# Patient Record
Sex: MALE | Race: WHITE | Employment: UNEMPLOYED | URBAN - METROPOLITAN AREA
[De-identification: names, ages, dates, MRNs, and addresses within clinical notes are randomized per-mention and may not be internally consistent; named-entity substitution may affect disease eponyms.]

---

## 2017-01-26 ENCOUNTER — OFFICE VISIT (OUTPATIENT)
Dept: ORTHOPEDIC SURGERY | Age: 52
End: 2017-01-26

## 2017-01-26 VITALS
RESPIRATION RATE: 18 BRPM | OXYGEN SATURATION: 97 % | HEIGHT: 70 IN | DIASTOLIC BLOOD PRESSURE: 63 MMHG | SYSTOLIC BLOOD PRESSURE: 110 MMHG | HEART RATE: 66 BPM | BODY MASS INDEX: 26.14 KG/M2 | TEMPERATURE: 98.2 F | WEIGHT: 182.6 LBS

## 2017-01-26 DIAGNOSIS — M96.1 LUMBAR POSTLAMINECTOMY SYNDROME: Primary | ICD-10-CM

## 2017-01-26 DIAGNOSIS — M54.16 RADICULOPATHY, LUMBAR REGION: ICD-10-CM

## 2017-01-26 DIAGNOSIS — M46.1 SACROILIITIS, NOT ELSEWHERE CLASSIFIED (HCC): ICD-10-CM

## 2017-01-26 RX ORDER — ZINC GLUCONATE 50 MG
50 TABLET ORAL
COMMUNITY
End: 2017-09-05

## 2017-01-26 RX ORDER — ASPIRIN 325 MG
1000 TABLET, DELAYED RELEASE (ENTERIC COATED) ORAL
COMMUNITY
End: 2017-09-05

## 2017-01-26 RX ORDER — ASCORBIC ACID 500 MG
500 TABLET ORAL
COMMUNITY
End: 2017-09-05
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
## 2017-01-30 ENCOUNTER — HOSPITAL ENCOUNTER (OUTPATIENT)
Dept: LAB | Age: 52
Discharge: HOME | End: 2017-01-30
Payer: MEDICARE

## 2017-01-30 DIAGNOSIS — Z13.9 SCREENING: Primary | ICD-10-CM

## 2017-01-30 DIAGNOSIS — Z13.9 SCREENING: ICD-10-CM

## 2017-01-30 LAB
ALBUMIN SERPL BCP-MCNC: 3.9 G/DL (ref 3.4–5)
ALBUMIN/GLOB SERPL: 1.4 {RATIO} (ref 0.8–1.7)
ALP SERPL-CCNC: 73 U/L (ref 45–117)
ALT SERPL-CCNC: 20 U/L (ref 16–61)
ANION GAP BLD CALC-SCNC: 6 MMOL/L (ref 3–18)
AST SERPL W P-5'-P-CCNC: 9 U/L (ref 15–37)
BILIRUB SERPL-MCNC: 0.3 MG/DL (ref 0.2–1)
BUN SERPL-MCNC: 20 MG/DL (ref 7–18)
BUN/CREAT SERPL: 23 (ref 12–20)
CALCIUM SERPL-MCNC: 8.6 MG/DL (ref 8.5–10.1)
CHLORIDE SERPL-SCNC: 108 MMOL/L (ref 100–108)
CHOLEST SERPL-MCNC: 146 MG/DL
CO2 SERPL-SCNC: 28 MMOL/L (ref 21–32)
CREAT SERPL-MCNC: 0.88 MG/DL (ref 0.6–1.3)
GLOBULIN SER CALC-MCNC: 2.7 G/DL (ref 2–4)
GLUCOSE SERPL-MCNC: 105 MG/DL (ref 74–99)
HDLC SERPL-MCNC: 40 MG/DL (ref 40–60)
HDLC SERPL: 3.7 {RATIO} (ref 0–5)
LDLC SERPL CALC-MCNC: 90.4 MG/DL (ref 0–100)
LIPID PROFILE,FLP: NORMAL
POTASSIUM SERPL-SCNC: 4.2 MMOL/L (ref 3.5–5.5)
PROT SERPL-MCNC: 6.6 G/DL (ref 6.4–8.2)
PSA SERPL-MCNC: 1.8 NG/ML (ref 0–4)
SODIUM SERPL-SCNC: 142 MMOL/L (ref 136–145)
TRIGL SERPL-MCNC: 78 MG/DL (ref ?–150)
TSH SERPL DL<=0.05 MIU/L-ACNC: 1.66 UIU/ML (ref 0.36–3.74)
VLDLC SERPL CALC-MCNC: 15.6 MG/DL

## 2017-01-30 PROCEDURE — 36415 COLL VENOUS BLD VENIPUNCTURE: CPT | Performed by: FAMILY MEDICINE

## 2017-01-30 PROCEDURE — 84153 ASSAY OF PSA TOTAL: CPT | Performed by: FAMILY MEDICINE

## 2017-01-30 PROCEDURE — 80061 LIPID PANEL: CPT | Performed by: FAMILY MEDICINE

## 2017-01-30 PROCEDURE — 84443 ASSAY THYROID STIM HORMONE: CPT | Performed by: FAMILY MEDICINE

## 2017-01-30 PROCEDURE — 80053 COMPREHEN METABOLIC PANEL: CPT | Performed by: FAMILY MEDICINE

## 2017-02-02 ENCOUNTER — OFFICE VISIT (OUTPATIENT)
Dept: FAMILY MEDICINE CLINIC | Age: 52
End: 2017-02-02

## 2017-02-02 VITALS
WEIGHT: 177 LBS | HEART RATE: 73 BPM | BODY MASS INDEX: 25.34 KG/M2 | SYSTOLIC BLOOD PRESSURE: 97 MMHG | TEMPERATURE: 97.6 F | HEIGHT: 70 IN | OXYGEN SATURATION: 97 % | DIASTOLIC BLOOD PRESSURE: 67 MMHG | RESPIRATION RATE: 20 BRPM

## 2017-02-02 DIAGNOSIS — M51.36 DEGENERATIVE DISC DISEASE, LUMBAR: ICD-10-CM

## 2017-02-02 DIAGNOSIS — K21.00 GASTROESOPHAGEAL REFLUX DISEASE WITH ESOPHAGITIS: ICD-10-CM

## 2017-02-02 DIAGNOSIS — R91.1 NODULE OF RIGHT LUNG: ICD-10-CM

## 2017-02-02 DIAGNOSIS — Z23 ENCOUNTER FOR IMMUNIZATION: ICD-10-CM

## 2017-02-02 DIAGNOSIS — J45.40 MODERATE PERSISTENT ASTHMA WITHOUT COMPLICATION: Primary | ICD-10-CM

## 2017-02-02 DIAGNOSIS — Z86.73 HISTORY OF CVA (CEREBROVASCULAR ACCIDENT): ICD-10-CM

## 2017-02-02 RX ORDER — PANTOPRAZOLE SODIUM 40 MG/1
40 TABLET, DELAYED RELEASE ORAL DAILY
Qty: 30 TAB | Refills: 2 | Status: SHIPPED | OUTPATIENT
Start: 2017-02-02 | End: 2017-05-02 | Stop reason: ALTCHOICE

## 2017-02-02 RX ORDER — CYCLOBENZAPRINE HCL 10 MG
10 TABLET ORAL
Qty: 30 TAB | Refills: 0 | Status: SHIPPED | OUTPATIENT
Start: 2017-02-02 | End: 2017-05-02 | Stop reason: SDUPTHER

## 2017-02-02 RX ORDER — PREDNISONE 20 MG/1
60 TABLET ORAL
Qty: 9 TAB | Refills: 0 | Status: SHIPPED | OUTPATIENT
Start: 2017-02-02 | End: 2017-02-05

## 2017-02-02 RX ORDER — BUDESONIDE AND FORMOTEROL FUMARATE DIHYDRATE 160; 4.5 UG/1; UG/1
1 AEROSOL RESPIRATORY (INHALATION) 2 TIMES DAILY
Qty: 1 INHALER | Refills: 3 | Status: SHIPPED | OUTPATIENT
Start: 2017-02-02 | End: 2017-05-02 | Stop reason: SDUPTHER

## 2017-02-02 RX ORDER — ALBUTEROL SULFATE 90 UG/1
2 AEROSOL, METERED RESPIRATORY (INHALATION)
Qty: 1 INHALER | Refills: 11 | Status: SHIPPED | OUTPATIENT
Start: 2017-02-02 | End: 2018-01-02 | Stop reason: SDUPTHER

## 2017-02-02 RX ORDER — RANITIDINE 150 MG/1
150 TABLET, FILM COATED ORAL
Qty: 30 TAB | Refills: 2 | Status: SHIPPED | OUTPATIENT
Start: 2017-02-02 | End: 2017-05-02 | Stop reason: SDUPTHER

## 2017-02-02 RX ORDER — CODEINE PHOSPHATE AND GUAIFENESIN 10; 100 MG/5ML; MG/5ML
5 SOLUTION ORAL
Qty: 180 ML | Refills: 0 | Status: SHIPPED | OUTPATIENT
Start: 2017-02-02 | End: 2017-05-02 | Stop reason: ALTCHOICE

## 2017-02-02 NOTE — PROGRESS NOTES
HISTORY OF PRESENT ILLNESS  Elinor Tierney is a 46 y.o. male. 2/2/2017  9:00 AM    Chief Complaint   Patient presents with    Follow Up Chronic Condition     pt here for follow up chronic condition DDD of Lumbar       HPI: Here today for follow up chronic disease. Last labs recently done. Following with GI, orthopedics, and spine center. Asthma: Has been using Albuterol inhaler PRN; however, feels that he has been uncontrolled the last few months. Reports using his Albuterol inhaler on a daily basis and waking up with wheezing. Does have a cough, nonproductive at this time. No fevers or chills. Right lung nodule noted 10/2015- no follow up done. DDD lumbar: Has been taking Gabapentin TID as prescribed- finds that this helps with chronic pain. Has had several back surgeries done in the past. Follows with spine center. Recent flare ups with back pain has caused him to require visits with spine center more frequently. Requesting refill for Flexeril PRN. CVA history: History in 2010- has been on ASA since. No residual. No complaints. Does not follow with neurology. GERD with esophagitis: Had been following with GI due to abdominal pain with N/V that was occurring a few months ago. EGD done 10/19/2016. Grade 1 esophagitis- mild distal esophagitis- PPI and H2 blocker. No new symptoms and no current complaints. Regular BMs without blood. History diverticulitis. Has not follow up with GI since having EGD done. Review of Systems   Constitutional: Negative for chills, fever and malaise/fatigue. Eyes: Negative for double vision, photophobia and pain. Respiratory: Positive for cough, shortness of breath and wheezing. Negative for sputum production. Cardiovascular: Negative for chest pain, palpitations and leg swelling. Gastrointestinal: Negative for abdominal pain, constipation, diarrhea, nausea and vomiting. Genitourinary: Negative for dysuria, frequency and urgency.    Musculoskeletal: Negative for back pain, joint pain and myalgias. Neurological: Negative for dizziness, weakness and headaches. PHQ Screening   PHQ 2 / 9, over the last two weeks 2/2/2017   Little interest or pleasure in doing things Not at all   Feeling down, depressed or hopeless Not at all   Total Score PHQ 2 0         History  Past Medical History   Diagnosis Date    Asthma     DDD (degenerative disc disease), lumbar 2013     noted on MRI with annular tears    Diverticulitis 2016    GERD (gastroesophageal reflux disease) 2016     with esophagitis according to endscopy    Kidney stone     Lumbar post-laminectomy syndrome     Sacroiliitis ([address]     Stroke [facility]) may 14 2010    Unspecified rotator cuff tear or rupture of left shoulder, not specified as traumatic 03/2016       Past Surgical History   Procedure Laterality Date    Hx orthopaedic  03/2016     left shoulder    Hx back surgery  2002, 2004, 2006     L5-S1 fusion, laminectomies    Hx mohs procedure       right    Hx knee arthroscopy Right      knee    Colonoscopy N/A 8/12/2016     COLONOSCOPY with polypectomy [facility] by [facility]lio Coburn [facility] at [facility] Hx endoscopy  10/2016     grade 1 espohagitis    Hx endoscopy  11/2016     mild esophagitis       Social History     Social History    Marital status: SINGLE     Spouse name: N/A    Number of children: N/A    Years of education: N/A     Occupational History    Not on file.      Social History Main Topics    Smoking status: Former Smoker     Packs/day: 2.00     Types: Cigarettes    Smokeless tobacco: Never Used    Alcohol use No    Drug use: No    Sexual activity: Yes     Partners: Female     Other Topics Concern    Not on file     Social History Narrative    ** Merged History Encounter **            Family History   Problem Relation Age of Onset    Other Brother     Cancer Maternal Grandmother        Allergies   Allergen Reactions    Penicillins Angioedema    Penicillins Anaphylaxis    Vicodin [Hydrocodone-Acetaminophen] Nausea and Vomiting    Azithromycin Nausea and Vomiting    Vicodin [Hydrocodone-Acetaminophen] Nausea and Vomiting    Erythromycin Other (comments)       Current Outpatient Prescriptions   Medication Sig Dispense Refill    pantoprazole (PROTONIX) 40 mg tablet Take 1 Tab by mouth daily. 30 Tab 1    raNITIdine (ZANTAC) 150 mg tablet Take 1 Tab by mouth nightly. 30 Tab 1    albuterol (PROVENTIL HFA, VENTOLIN HFA, PROAIR HFA) 90 mcg/actuation inhaler Take 2 Puffs by inhalation every six (6) hours as needed for Wheezing. 1 Inhaler 2    gabapentin (NEURONTIN) 800 mg tablet Take 1 Tab by mouth three (3) times daily. 90 Tab 5    aspirin 81 mg chewable tablet Take 81 mg by mouth daily.  zinc gluconate 50 mg tablet 50 mg.      omega 3-dha-epa-fish oil (FISH OIL) 60- mg cap 1,000 mg.      ascorbic acid, vitamin C, (VITAMIN C) 500 mg tablet 500 mg.  cyclobenzaprine (FLEXERIL) 10 mg tablet Take 1 Tab by mouth three (3) times daily as needed for Muscle Spasm(s). 30 Tab 0    oxyCODONE-acetaminophen (PERCOCET) 5-325 mg per tablet Take 1 tab BID-TID PRN Pain 30 Tab 0    ondansetron (ZOFRAN ODT) 4 mg disintegrating tablet Take 1 Tab by mouth every eight (8) hours as needed for Nausea. 20 Tab 0       Health Maintenance and Screenings  *Medicare wellness done 11/2016      Advance Care Planning:   Patient was offered the opportunity to discuss advance care planning NO   Does patient have an Advance Directive:  NO   If no, did you provide information on [facility]ctions? Patient Care Team:  Patient Care Team:  Taiwo Will NP as PCP - General (Nurse Practitioner)  Mi Robledo MD (Orthopedic Surgery)  Anuja Dean MD (Physiatry)  Lilibeth Solomon MD (Gastroenterology)        LABS:     Ref.  Range 1/30/2017 09:04   Sodium Latest Ref Range: 136 - 145 mmol/L 142   Potassium Latest Ref Range: 3.5 - 5.5 mmol/L 4.2   Chloride Latest Ref Range: 100 - 108 mmol/L 108   CO2 Latest Ref Range: 21 - 32 mmol/L 28   Anion gap Latest Ref Range: 3.0 - 18 mmol/L 6   Glucose Latest Ref Range: 74 - 99 mg/dL 105 (H)   BUN Latest Ref Range: 7.0 - 18 MG/DL 20 (H)   Creatinine Latest Ref Range: 0.6 - 1.3 MG/DL 0.88   BUN/Creatinine ratio Latest Ref Range: 12 - 20   23 (H)   Calcium Latest Ref Range: 8.5 - 10.1 MG/DL 8.6   GFR est non-AA Latest Ref Range: >60 ml/min/1.73m2 >60   GFR est AA Latest Ref Range: >60 ml/min/1.73m2 >60   Bilirubin, total Latest Ref Range: 0.2 - 1.0 MG/DL 0.3   Protein, total Latest Ref Range: 6.4 - 8.2 g/dL 6.6   Albumin Latest Ref Range: 3.4 - 5.0 g/dL 3.9   Globulin Latest Ref Range: 2.0 - 4.0 g/dL 2.7   A-G Ratio Latest Ref Range: 0.8 - 1.7   1.4   ALT Latest Ref Range: 16 - 61 U/L 20   AST Latest Ref Range: 15 - 37 U/L 9 (L)   Alk. phosphatase Latest Ref Range: 45 - 117 U/L 73   Triglyceride Latest Ref Range: <150 MG/DL 78   Cholesterol, total Latest Ref Range: <200 MG/   HDL Cholesterol Latest Ref Range: 40 - 60 MG/DL 40   CHOL/HDL Ratio Latest Ref Range: 0 - 5.0   3.7   LDL, calculated Latest Ref Range: 0 - 100 MG/DL 90.4   VLDL, calculated Latest Units: MG/DL 15.6   TSH Latest Ref Range: 0.36 - 3.74 uIU/mL 1.66   Prostate Specific Ag Latest Ref Range: 0.0 - 4.0 ng/mL 1.8       RADIOLOGY:    10/2015  CT abdomen and pelvis - Renal stone protocol    INDICATION: 2 weeks abdominal pain with blood in urine  TECHNIQUE: 5 mm collimation axial images obtained from the diaphragm to the  level of the pubic symphysis without oral or nonionic intravenous contrast,  following the renal calculus protocol. COMPARISON: November 30, 2014  Abdomen Findings:   Please note, lack of intravenous contrast renders this study suboptimal for  evaluating solid abdominal organs.    Lung bases: Dependent atelectasis; 5 mm nodule right lung base  Liver: Benign calcification  Spleen: Normal  Pancreas: Normal  Adrenal glands: Normal  Gallbladder: Normal  Aorta:Nonaneurysmal, partially calcified  Left Kidney: 5 mm nonobstructing stone mid kidney. Moderate intrarenal  collecting system dilatation  Right Kidney: 6 mm nonobstructing stone superior pole  Pelvis Findings:   Bowel: Nonobstructed  Appendix: Normal caliber without surrounding inflammatory change  No free intraperitoneal fluid or gas. Left ureter: Moderately dilated throughout; 7 mm stone at the UVJ  Right ureter: No stone or dilatation  Bladder: Mild asymmetrically thickened posterior wall. 4 mm stone along the  posterior left base  Prostatic calcifications. Skeleton: Degenerative changes present in the sacroiliac joints and spine. L5-S1  fusion hardware present. Stable mild compression deformity of the T11 vertebral  body. Lymph nodes: No enlargement  IMPRESSION:  Moderate left intrarenal collecting system dilatation and dilatation of the left  ureter, secondary to a 7 mm stone at the left UVJ. Bilateral nonobstructing urinary tract calculi. Stone in the bladder. Asymmetric bladder wall thickening posteriorly. 5 mm nodule right lung base. [employer] RECOMMENDATIONS:  LOW SMOKING OR OTHER EXPOSURE RISK:  4 - 6mm: Follow-up CT at 12 months; if stable, no further follow-up. HIGH SMOKING OR OTHER EXPOSURE RISK:  4 - 6mm: Follow-up CT at 6 - 12months: if stable, follow-up at 24 months. If  stable at 24 months, no further follow-up. Physical Exam   Constitutional: He is oriented to person, place, and time. He appears well-developed and well-nourished. No distress. Neck: Normal range of motion. Neck supple. Cardiovascular: Normal rate, regular rhythm and normal heart sounds. No murmur heard. Pulmonary/Chest: Effort normal and breath sounds normal. No respiratory distress. Abdominal: Soft. Bowel sounds are normal. There is no tenderness. Musculoskeletal: He exhibits no edema. Neurological: He is alert and oriented to person, place, and time.  He exhibits normal muscle tone. Coordination normal.   Skin: Skin is warm and dry. Vitals:    02/02/17 0834   BP: 97/67   Pulse: 73   Resp: 20   Temp: 97.6 °F (36.4 °C)   TempSrc: Oral   SpO2: 97%   Weight: 177 lb (80.3 kg)   Height: 5' 10\" (1.778 m)   PainSc:   4   PainLoc: Back       ASSESSMENT and Steph Spann was seen today for diarrhea and kidney stone. Diagnoses and all orders for this visit:    Moderate persistent asthma without complication  *Discussed with patient- appears to be uncontrolled asthma. Moderate persistent- will start on Symbicort for maintenance inhaler and continue with Albuterol PRN. Will do burst dosing of steroid and cough medication to improve control at this time and prevent exacerbation. *Advised to rinse mouth after using Symbicort due to ICS. - albuterol (PROVENTIL HFA, VENTOLIN HFA, PROAIR HFA) 90 mcg/actuation inhaler; Take 2 Puffs by inhalation every six (6) hours as needed for Wheezing or Shortness of Breath. Dispense: 1 Inhaler; Refill: 11  - guaiFENesin-codeine (GUAIFENESIN AC) 100-10 mg/5 mL solution; Take 5 mL by mouth three (3) times daily as needed for Cough or Congestion. Max Daily Amount: 15 mL. Dispense: 180 mL; Refill: 0  - predniSONE (DELTASONE) 20 mg tablet; Take 3 Tabs by mouth daily (with breakfast) for 3 days. Dispense: 9 Tab; Refill: 0  - budesonide-formoterol (SYMBICORT) 160-4.5 mcg/actuation HFA inhaler; Take 1 Puff by inhalation two (2) times a day. Dispense: 1 Inhaler; Refill: 3    Nodule of right lung  *Will order repeat. Has been lost to follow up. - CT CHEST W WO CONT; Future    History of CVA (cerebrovascular accident)  *Noted. LDL controlled at 90, consider statin in future. Patient prefers not to take medication if does not need to. On ASA daily- continue. Gastroesophageal reflux disease with esophagitis  *Continue with PPI and H2 blocker due to esophagitis. Is on ASA which may be contributing; however, ASA beneficial due to history of CVA.    *Highly advised to follow up with GI regarding esophagitis. Most likely will need titrated on medications. - pantoprazole (PROTONIX) 40 mg tablet; Take 1 Tab by mouth daily. Dispense: 30 Tab; Refill: 2  - raNITIdine (ZANTAC) 150 mg tablet; Take 1 Tab by mouth nightly. Dispense: 30 Tab; Refill: 2    Degenerative disc disease, lumbar  *Continue with spine center. Refill on Flexeril for PRN usage. - cyclobenzaprine (FLEXERIL) 10 mg tablet; Take 1 Tab by mouth three (3) times daily as needed for Muscle Spasm(s). Dispense: 30 Tab; Refill: 0      Encounter for immunization  *Given today in office. See LPN documentation.   - ADMIN PNEUMOCOCCAL VACCINE  - PNEUMOCOCCAL POLYSACCHARIDE VACCINE, 23-VALENT, ADULT OR IMMUNOSUPPRESSED PT DOSE,        *Plan of care reviewed with patient. Patient in agreement with plan and expresses understanding. All questions answered and patient encouraged to call or RTO if further questions or concerns. Follow-up Disposition:  Return in about 3 months (around 5/2/2017).

## 2017-02-02 NOTE — MR AVS SNAPSHOT
Visit Information Date & Time Provider Department Dept. Phone Encounter #  
 2/2/2017  8:15 AM INDIANA Delvalle [phone] [id number] Your Appointments 3/2/2017  8:35 AM  
Follow Up with Mason Paz MD  
[facility] ([facility] Appt Note: 6 mnth fu  
 [address] [facility] [address]  
[address] [place] [address],[address]  
  
    
 5/2/2017  8:15 AM  
FOLLOW UP EXAM with INDIANA Delvalle ([facility] Appt Note: 3 month f/u  
 [address] [facility] [address]  
[facility] [address]  
  
    
 11/21/2017  8:30 AM  
Office Visit with INDIANA Delvalle ([facility]) Appt Note: [address] [facility] [address]  
[facility] [address] Upcoming Health Maintenance Date Due Pneumococcal 19-64 Medium Risk (1 of 1 - PPSV23) 7/18/1984 DTaP/Tdap/Td series (1 - Tdap) 1/2/2011 MEDICARE YEARLY EXAM 11/18/2017 COLONOSCOPY 8/12/2021 Allergies as of 2/2/2017  Review Complete On: 2/2/2017 By: Ashley Jones NP Severity Noted Reaction Type Reactions Penicillins High 10/14/2012    Angioedema Penicillins High 05/15/2013    Anaphylaxis Vicodin [Hydrocodone-acetaminophen] High 10/14/2012    Nausea and Vomiting Azithromycin  06/02/2016    Nausea and Vomiting Vicodin [Hydrocodone-acetaminophen]  05/15/2013    Nausea and Vomiting Erythromycin Low 07/12/2016    Other (comments) Current Immunizations  Reviewed on 2/2/2017 Name Date Influenza Vaccine (Quad) PF 11/17/2016 Pneumococcal Polysaccharide (PPSV-23)  Incomplete Td 1/1/2011  Reviewed by Ashley Jones NP on 2/2/2017 at  9:17 AM  
 Reviewed by Taiwo Will NP on 2/2/2017 at  9:17 AM  
 Reviewed by Taiwo Will NP on 2/2/2017 at  9:17 AM  
 Reviewed by Taiwo Will NP on 2/2/2017 at  9:25 AM  
 Reviewed by Taiwo Will NP on 2/2/2017 at  9:25 AM  
You Were Diagnosed With   
  
 Codes Comments Moderate persistent asthma without complication    -  Primary ICD-10-CM: J45.40 ICD-9-CM: 493.90 History of CVA (cerebrovascular accident)     ICD-10-CM: Z86.73 
ICD-9-CM: V12.54 Gastroesophageal reflux disease with esophagitis     ICD-10-CM: K21.0 ICD-9-CM: 530.11 Degenerative disc disease, lumbar     ICD-10-CM: M51.36 
ICD-9-CM: 722.52 Encounter for immunization     ICD-10-CM: N80 ICD-9-CM: V03.89 Nodule of right lung     ICD-10-CM: R91.1 ICD-9-CM: 793.11 Vitals BP Pulse Temp Resp Height(growth percentile) Weight(growth percentile) 97/67 (BP 1 Location: Right arm, BP Patient Position: Sitting) 73 97.6 °F (36.4 °C) (Oral) 20 5' 10\" (1.778 m) 177 lb (80.3 kg) SpO2 BMI Smoking Status 97% 25.4 kg/m2 Former Smoker Vitals History BMI and BSA Data Body Mass Index Body Surface Area  
 25.4 kg/m 2 1.99 m 2 Preferred Pharmacy Pharmacy Name Phone [facility] [address] [phone] Your Updated Medication List  
  
   
This list is accurate as of: 2/2/17  9:39 AM.  Always use your most recent med list.  
  
  
  
  
 albuterol 90 mcg/actuation inhaler Commonly known as:  PROVENTIL HFA, VENTOLIN HFA, PROAIR HFA Take 2 Puffs by inhalation every six (6) hours as needed for Wheezing or Shortness of Breath. ascorbic acid (vitamin C) 500 mg tablet Commonly known as:  VITAMIN C  
500 mg.  
  
 aspirin 81 mg chewable tablet Take 81 mg by mouth daily. budesonide-formoterol 160-4.5 mcg/actuation HFA inhaler Commonly known as:  SYMBICORT Take 1 Puff by inhalation two (2) times a day. cyclobenzaprine 10 mg tablet Commonly known as:  FLEXERIL Take 1 Tab by mouth three (3) times daily as needed for Muscle Spasm(s). FISH OIL 60- mg Cap Generic drug:  omega 3-dha-epa-fish oil  
1,000 mg.  
  
 gabapentin 800 mg tablet Commonly known as:  NEURONTIN Take 1 Tab by mouth three (3) times daily. guaiFENesin-codeine 100-10 mg/5 mL solution Commonly known as:  guaiFENesin AC Take 5 mL by mouth three (3) times daily as needed for Cough or Congestion. Max Daily Amount: 15 mL. ondansetron 4 mg disintegrating tablet Commonly known as:  ZOFRAN ODT Take 1 Tab by mouth every eight (8) hours as needed for Nausea. oxyCODONE-acetaminophen 5-325 mg per tablet Commonly known as:  PERCOCET Take 1 tab BID-TID PRN Pain  
  
 pantoprazole 40 mg tablet Commonly known as:  PROTONIX Take 1 Tab by mouth daily. predniSONE 20 mg tablet Commonly known as:  Raya Chaitanya Take 3 Tabs by mouth daily (with breakfast) for 3 days. raNITIdine 150 mg tablet Commonly known as:  ZANTAC Take 1 Tab by mouth nightly. zinc gluconate 50 mg tablet 50 mg.  
  
  
  
  
Prescriptions Printed Refills  
 guaiFENesin-codeine (GUAIFENESIN AC) 100-10 mg/5 mL solution 0 Sig: Take 5 mL by mouth three (3) times daily as needed for Cough or Congestion. Max Daily Amount: 15 mL. Class: Print Route: Oral  
  
Prescriptions Sent to Pharmacy Refills  
 pantoprazole (PROTONIX) 40 mg tablet 2 Sig: Take 1 Tab by mouth daily. Class: Normal  
 Pharmacy: [facility] [place] [address]. Ph #: [phone] Route: Oral  
 raNITIdine (ZANTAC) 150 mg tablet 2 Sig: Take 1 Tab by mouth nightly. Class: Normal  
 Pharmacy: [address] [place] [address]. Rd[address] [place] [address]. Ph #: [phone] Route: Oral  
 cyclobenzaprine (FLEXERIL) 10 mg tablet 0  Sig: Take 1 Tab by mouth three (3) times daily as needed for Muscle Spasm(s). Class: Normal  
 Pharmacy: [address] Ph #: [phone] Route: Oral  
 albuterol (PROVENTIL HFA, VENTOLIN HFA, PROAIR HFA) 90 mcg/actuation inhaler 11 Sig: Take 2 Puffs by inhalation every six (6) hours as needed for Wheezing or Shortness of Breath. Class: Normal  
 Pharmacy: [address] Ph #: [phone] Route: Inhalation  
 predniSONE (DELTASONE) 20 mg tablet 0 Sig: Take 3 Tabs by mouth daily (with breakfast) for 3 days. Class: Normal  
 Pharmacy: [address] Ph #: [phone] Route: Oral  
 budesonide-formoterol (SYMBICORT) 160-4.5 mcg/actuation HFA inhaler 3 Sig: Take 1 Puff by inhalation two (2) times a day. Class: Normal  
 Pharmacy: [address] Ph #: [phone] Route: Inhalation We Performed the Following ADMIN PNEUMOCOCCAL VACCINE [ [facility]] PNEUMOCOCCAL POLYSACCHARIDE VACCINE, 23-VALENT, ADULT OR IMMUNOSUPPRESSED PT DOSE, [76983 CPT(R)] To-Do List   
 02/02/2017 Imaging:  CT CHEST W WO CONT Patient Instructions Gastroenterology [facility] [address], [address] [place] [address] also locations at [place] [address] Phone: [phone] Please provide this summary of care documentation to your next provider. Your primary care clinician is listed as Neel Berkowitz. If you have any questions after today's visit, please call [phone].

## 2017-02-02 NOTE — PATIENT INSTRUCTIONS
Gastroenterology    [facility] [address] [facility], [address] [place], [place] [address] 2[address] [facility], [address]- also locations at [facility] and [facility]   Phone: [phone]

## 2017-02-15 ENCOUNTER — HOSPITAL ENCOUNTER (OUTPATIENT)
Dept: CT IMAGING | Age: 52
Discharge: HOME | End: 2017-02-15
Attending: NURSE PRACTITIONER
Payer: MEDICARE

## 2017-02-15 DIAGNOSIS — R91.1 NODULE OF RIGHT LUNG: ICD-10-CM

## 2017-02-15 PROCEDURE — 74011636320 HC RX REV CODE- 636/320: Performed by: NURSE PRACTITIONER

## 2017-02-15 PROCEDURE — 71260 CT THORAX DX C+: CPT

## 2017-02-15 RX ADMIN — IOPAMIDOL 75 ML: 612 INJECTION, SOLUTION INTRAVENOUS at 08:42

## 2017-02-16 ENCOUNTER — TELEPHONE (OUTPATIENT)
Dept: FAMILY MEDICINE CLINIC | Age: 52
End: 2017-02-16

## 2017-03-02 ENCOUNTER — OFFICE VISIT (OUTPATIENT)
Dept: ORTHOPEDIC SURGERY | Age: 52
End: 2017-03-02

## 2017-03-02 ENCOUNTER — TELEPHONE (OUTPATIENT)
Dept: FAMILY MEDICINE CLINIC | Age: 52
End: 2017-03-02

## 2017-03-02 VITALS
DIASTOLIC BLOOD PRESSURE: 65 MMHG | HEART RATE: 65 BPM | WEIGHT: 179.6 LBS | SYSTOLIC BLOOD PRESSURE: 106 MMHG | RESPIRATION RATE: 16 BRPM | BODY MASS INDEX: 25.71 KG/M2 | HEIGHT: 70 IN

## 2017-03-02 DIAGNOSIS — M54.16 RADICULOPATHY, LUMBAR REGION: ICD-10-CM

## 2017-03-02 DIAGNOSIS — M46.1 SACROILIITIS (HCC): ICD-10-CM

## 2017-03-02 DIAGNOSIS — M96.1 POSTLAMINECTOMY SYNDROME: Primary | ICD-10-CM

## 2017-03-02 RX ORDER — GABAPENTIN 800 MG/1
800 TABLET ORAL 3 TIMES DAILY
Qty: 90 TAB | Refills: 5 | Status: SHIPPED | OUTPATIENT
Start: 2017-03-02 | End: 2017-08-22 | Stop reason: SDUPTHER

## 2017-03-02 NOTE — TELEPHONE ENCOUNTER
Patient's osmar called to check the status of the patient's CT scan. She added that she was told by someone in [email or web address] [employer] office that NP [facility] ROMIE MORALES haven't reviewed the results and was also not there at that time. Asking to be called back NP [facility] ROMIE MORALES.

## 2017-03-02 NOTE — TELEPHONE ENCOUNTER
Return call made to Ms. Arevalo at [phone].  No answer left message that a letter had been mailed out 2 weeks ago, results of the CT was normal.
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
## 2017-03-02 NOTE — MR AVS SNAPSHOT
Visit Information Date & Time Provider Department Dept. Phone Encounter #  
 3/2/2017  8:35 AM Anuja Dean MD [facility] [phone] [id number] Follow-up Instructions Return for following MRI. Your Appointments 5/2/2017  8:15 AM  
[facility] Appt Note: 3 month f/u  
 [address] [employer] [address]  
[facility] [address]  
  
    
 11/21/2017  8:30 AM  
Office Visit with Taiwo Will NP [facility] ([facility]) Appt Note: [address] [facility] [address]  
[facility] [address] Upcoming Health Maintenance Date Due DTaP/Tdap/Td series (1 - Tdap) 1/2/2011 MEDICARE YEARLY EXAM 11/18/2017 COLONOSCOPY 8/12/2021 Allergies as of 3/2/2017  Review Complete On: 3/2/2017 By: Anuja Dean MD  
  
 Severity Noted Reaction Type Reactions Penicillins High 10/14/2012    Angioedema Penicillins High 05/15/2013    Anaphylaxis Vicodin [Hydrocodone-acetaminophen] High 10/14/2012    Nausea and Vomiting Azithromycin  06/02/2016    Nausea and Vomiting Vicodin [Hydrocodone-acetaminophen]  05/15/2013    Nausea and Vomiting Erythromycin Low 07/12/2016    Other (comments) Current Immunizations  Reviewed on 2/2/2017 Name Date Influenza Vaccine (Quad) PF 11/17/2016 Pneumococcal Polysaccharide (PPSV-23) 2/2/2017 Td 1/1/2011 Not reviewed this visit You Were Diagnosed With   
  
 Codes Comments Postlaminectomy syndrome    -  Primary ICD-10-CM: M96.1 ICD-9-CM: 722.80 Sacroiliitis ([facility] [address]     ICD-10-CM: M46.1 ICD-9-CM: 720.2 Radiculopathy, lumbar region     ICD-10-CM: M54.16 
ICD-9-CM: 724.4 Vitals  BP  
  
  
  
  
  
 106/65 (BP 1 Location: Left arm, BP Patient Position: Sitting) BMI and BSA Data Body Mass Index Body Surface Area 25.77 kg/m 2 2.01 m 2 Preferred Pharmacy Pharmacy Name Phone [facility] [address] [phone] Your Updated Medication List  
  
   
This list is accurate as of: 3/2/17  9:06 AM.  Always use your most recent med list.  
  
  
  
  
 albuterol 90 mcg/actuation inhaler Commonly known as:  PROVENTIL HFA, VENTOLIN HFA, PROAIR HFA Take 2 Puffs by inhalation every six (6) hours as needed for Wheezing or Shortness of Breath. ascorbic acid (vitamin C) 500 mg tablet Commonly known as:  VITAMIN C  
500 mg.  
  
 aspirin 81 mg chewable tablet Take 81 mg by mouth daily. budesonide-formoterol 160-4.5 mcg/actuation HFA inhaler Commonly known as:  SYMBICORT Take 1 Puff by inhalation two (2) times a day. cyclobenzaprine 10 mg tablet Commonly known as:  FLEXERIL Take 1 Tab by mouth three (3) times daily as needed for Muscle Spasm(s). FISH OIL 60- mg Cap Generic drug:  omega 3-dha-epa-fish oil  
1,000 mg.  
  
 * gabapentin 800 mg tablet Commonly known as:  NEURONTIN Take 1 Tab by mouth three (3) times daily. * gabapentin 800 mg tablet Commonly known as:  NEURONTIN Take 1 Tab by mouth three (3) times daily. guaiFENesin-codeine 100-10 mg/5 mL solution Commonly known as:  guaiFENesin AC Take 5 mL by mouth three (3) times daily as needed for Cough or Congestion. Max Daily Amount: 15 mL. ondansetron 4 mg disintegrating tablet Commonly known as:  ZOFRAN ODT Take 1 Tab by mouth every eight (8) hours as needed for Nausea. oxyCODONE-acetaminophen 5-325 mg per tablet Commonly known as:  PERCOCET Take 1 tab BID-TID PRN Pain  
  
 pantoprazole 40 mg tablet Commonly known as:  PROTONIX Take 1 Tab by mouth daily. raNITIdine 150 mg tablet Commonly known as:  ZANTAC Take 1 Tab by mouth nightly. zinc gluconate 50 mg tablet 50 mg.  
  
 * Notice: This list has 2 medication(s) that are the same as other medications prescribed for you. Read the directions carefully, and ask your doctor or other care provider to review them with you. Prescriptions Sent to Pharmacy Refills  
 gabapentin (NEURONTIN) 800 mg tablet 5 Sig: Take 1 Tab by mouth three (3) times daily. Class: Normal  
 Pharmacy: [place] [id number]  #: [phone] Route: Oral  
  
Follow-up Instructions Return for following MRI. To-Do List   
 03/09/2017 Imaging:  MRI LUMB SPINE W WO CONT Please provide this summary of care documentation to your next provider. Your primary care clinician is listed as Indiana Greene. If you have any questions after today's visit, please call [phone].

## 2017-03-02 NOTE — TELEPHONE ENCOUNTER
3/2/2017  11:02 AM    Chief Complaint   Patient presents with    Other     MRI results        Noted request of CT results. I reviewed results about 2 weeks ago and mailed letter to patient. Forwarded to clinical staff to inform wife of this and can give verbal of results.

## 2017-03-08 ENCOUNTER — HOSPITAL ENCOUNTER (OUTPATIENT)
Dept: MRI IMAGING | Age: 52
Discharge: HOME | End: 2017-03-08
Attending: PHYSICAL MEDICINE & REHABILITATION
Payer: MEDICARE

## 2017-03-08 VITALS — BODY MASS INDEX: 25.68 KG/M2 | WEIGHT: 179 LBS

## 2017-03-08 DIAGNOSIS — M54.16 RADICULOPATHY, LUMBAR REGION: ICD-10-CM

## 2017-03-08 DIAGNOSIS — M96.1 POSTLAMINECTOMY SYNDROME: ICD-10-CM

## 2017-03-08 DIAGNOSIS — M46.1 SACROILIITIS (HCC): ICD-10-CM

## 2017-03-08 PROCEDURE — 72158 MRI LUMBAR SPINE W/O & W/DYE: CPT

## 2017-03-08 PROCEDURE — 74011250636 HC RX REV CODE- 250/636: Performed by: PHYSICAL MEDICINE & REHABILITATION

## 2017-03-08 PROCEDURE — A9585 GADOBUTROL INJECTION: HCPCS | Performed by: PHYSICAL MEDICINE & REHABILITATION

## 2017-03-08 RX ADMIN — GADOBUTROL 7.5 ML: 604.72 INJECTION INTRAVENOUS at 20:16

## 2017-04-03 ENCOUNTER — OFFICE VISIT (OUTPATIENT)
Dept: FAMILY MEDICINE CLINIC | Age: 52
End: 2017-04-03

## 2017-04-03 VITALS
HEIGHT: 70 IN | BODY MASS INDEX: 24.62 KG/M2 | HEART RATE: 75 BPM | DIASTOLIC BLOOD PRESSURE: 80 MMHG | TEMPERATURE: 96.4 F | RESPIRATION RATE: 18 BRPM | WEIGHT: 172 LBS | SYSTOLIC BLOOD PRESSURE: 115 MMHG | OXYGEN SATURATION: 97 %

## 2017-04-03 DIAGNOSIS — R10.32 LEFT LOWER QUADRANT PAIN: Primary | ICD-10-CM

## 2017-04-03 LAB
BILIRUB UR QL STRIP: NEGATIVE
GLUCOSE UR-MCNC: NEGATIVE MG/DL
KETONES P FAST UR STRIP-MCNC: NEGATIVE MG/DL
PH UR STRIP: 5.5 [PH] (ref 4.6–8)
PROT UR QL STRIP: NEGATIVE MG/DL
SP GR UR STRIP: 1.02 (ref 1–1.03)
UA UROBILINOGEN AMB POC: NORMAL (ref 0.2–1)
URINALYSIS CLARITY POC: CLEAR
URINALYSIS COLOR POC: YELLOW
URINE BLOOD POC: NEGATIVE
URINE LEUKOCYTES POC: NEGATIVE
URINE NITRITES POC: NEGATIVE

## 2017-04-03 RX ORDER — METRONIDAZOLE 500 MG/1
500 TABLET ORAL 3 TIMES DAILY
Qty: 30 TAB | Refills: 0 | Status: SHIPPED | OUTPATIENT
Start: 2017-04-03 | End: 2017-04-13

## 2017-04-03 RX ORDER — LEVOFLOXACIN 750 MG/1
750 TABLET ORAL DAILY
Qty: 10 TAB | Refills: 0 | Status: SHIPPED | OUTPATIENT
Start: 2017-04-03 | End: 2017-05-02 | Stop reason: ALTCHOICE

## 2017-04-03 NOTE — PROGRESS NOTES
Patient: Tu Harris MRN: [id number]  SSN: [id number]    YOB: 1965  Age: 46 y.o. Sex: male      Date of Service: 4/3/2017   Provider: Lima Humphreys   Office Location:   [address] [place] [facility], [address] [place] [address], [address]  Office Phone: [phone]  Office Fax: [phone]        REASON FOR VISIT:   Chief Complaint   Patient presents with    Abdominal Pain     pt states burning sensation in lower stomach for a couple days        VITALS:   Visit Vitals    /80 (BP 1 Location: Right arm, BP Patient Position: Sitting)    Pulse 75    Temp 96.4 °F (35.8 °C) (Oral)    Resp 18    Ht 5' 10\" (1.778 m)    Wt 172 lb (78 kg)    SpO2 97%    BMI 24.68 kg/m2       MEDICATIONS:   Current Outpatient Prescriptions   Medication Sig Dispense Refill    gabapentin (NEURONTIN) 800 mg tablet Take 1 Tab by mouth three (3) times daily. 90 Tab 5    pantoprazole (PROTONIX) 40 mg tablet Take 1 Tab by mouth daily. 30 Tab 2    raNITIdine (ZANTAC) 150 mg tablet Take 1 Tab by mouth nightly. 30 Tab 2    cyclobenzaprine (FLEXERIL) 10 mg tablet Take 1 Tab by mouth three (3) times daily as needed for Muscle Spasm(s). 30 Tab 0    albuterol (PROVENTIL HFA, VENTOLIN HFA, PROAIR HFA) 90 mcg/actuation inhaler Take 2 Puffs by inhalation every six (6) hours as needed for Wheezing or Shortness of Breath. 1 Inhaler 11    guaiFENesin-codeine (GUAIFENESIN AC) 100-10 mg/5 mL solution Take 5 mL by mouth three (3) times daily as needed for Cough or Congestion. Max Daily Amount: 15 mL. 180 mL 0    budesonide-formoterol (SYMBICORT) 160-4.5 mcg/actuation HFA inhaler Take 1 Puff by inhalation two (2) times a day.  1 Inhaler 3    zinc gluconate 50 mg tablet 50 mg.      omega 3-dha-epa-fish oil (FISH OIL) 60- mg cap 1,000 mg.      ascorbic acid, vitamin C, (VITAMIN C) 500 mg tablet 500 mg.      oxyCODONE-acetaminophen (PERCOCET) 5-325 mg per tablet Take 1 tab BID-TID PRN Pain 30 Tab 0    ondansetron (ZOFRAN ODT) 4 mg disintegrating tablet Take 1 Tab by mouth every eight (8) hours as needed for Nausea. 20 Tab 0    gabapentin (NEURONTIN) 800 mg tablet Take 1 Tab by mouth three (3) times daily. 90 Tab 5    aspirin 81 mg chewable tablet Take 81 mg by mouth daily.           ALLERGIES:   Allergies   Allergen Reactions    Penicillins Angioedema    Penicillins Anaphylaxis    Vicodin [Hydrocodone-Acetaminophen] Nausea and Vomiting    Azithromycin Nausea and Vomiting    Vicodin [Hydrocodone-Acetaminophen] Nausea and Vomiting    Erythromycin Other (comments)        ACTIVE MEDICAL PROBLEMS:  Patient Active Problem List   Diagnosis Code    Degenerative disc disease, lumbar M51.36    S/P spinal fusion Z98.1    Lumbar postlaminectomy syndrome M96.1    Lumbar neuritis M54.16    Radiculopathy, lumbar region M54.16    History of CVA (cerebrovascular accident) Z86.73        MEDICAL/SURGICAL HISTORY:  Past Medical History:   Diagnosis Date    Asthma     DDD (degenerative disc disease), lumbar 2013    noted on MRI with annular tears    Diverticulitis 2016    GERD (gastroesophageal reflux disease) 2016    with esophagitis according to endscopy    Kidney stone     Lumbar post-laminectomy syndrome     Sacroiliitis ([address]     Stroke [facility]) may 14 2010    Unspecified rotator cuff tear or rupture of left shoulder, not specified as traumatic 03/2016      Past Surgical History:   Procedure Laterality Date    COLONOSCOPY N/A 8/12/2016    COLONOSCOPY with polypectomy performed by Trupti Mendoza MD at [address]  2002, 2004, 2006    L5-S1 fusion, laminectomies    HX ENDOSCOPY  10/2016    grade 1 espohagitis    HX ENDOSCOPY  11/2016    mild esophagitis    HX KNEE ARTHROSCOPY Right     knee    HX MOHS PROCEDURES      right    HX ORTHOPAEDIC  03/2016    left shoulder        FAMILY HISTORY:  Family History   Problem Relation Age of Onset    Other Brother     Cancer Maternal Grandmother         SOCIAL HISTORY:  Social History   Substance Use Topics    Smoking status: Former Smoker     Packs/day: 2.00     Types: Cigarettes    Smokeless tobacco: Never Used    Alcohol use No            HISTORY OF PRESENT ILLNESS:   Grace Raines is a 46 y.o. male who presents to the office complaining of LLQ abdominal pain x 2-3 days. Patient believes he passed a kidney stone this morning around 2 am. Has a history of kidney stones, and had been experiencing intermittent left sided flank and abdominal pain on and off for the past week. Over the past few days, noted blood in his urine, as well as difficulty urinating. Describes that his urinary stream would \"stop flowing\" before he felt that he had fully emptied his bladder. Symptoms spontaneously resolved after an episode of painful urination early this morning. Of note, he has been referred to urology, but has not yet returned the call to schedule an appointment. Patient now complains of a \"burning\" pain in the left lower quadrant of his abdomen, which radiates to the left upper quadrant. He thinks pain is worse after eating. He does have a history of diverticulitis as well, and believes symptoms feel similar, but states he has been too distracted by the kidney stone pain to really notice it. Pain is associated with diarrhea, described as non-bloody \"liquid stool\" x 2-3 days. REVIEW OF SYSTEMS:  Review of Systems   Constitutional: Positive for weight loss. Negative for chills, diaphoresis, fever and malaise/fatigue. Respiratory: Negative for cough, shortness of breath and wheezing. Cardiovascular: Negative for chest pain and palpitations. Gastrointestinal: Positive for abdominal pain, diarrhea and nausea. Negative for blood in stool, constipation, melena and vomiting. Genitourinary: Positive for dysuria, flank pain and hematuria. Negative for frequency and urgency. Neurological: Negative for dizziness and weakness. PHYSICAL EXAMINATION:  Physical Exam   Constitutional: He is well-developed, well-nourished, and in no distress. Cardiovascular: Normal rate, regular rhythm and normal heart sounds. Exam reveals no gallop and no friction rub. No murmur heard. Pulmonary/Chest: Effort normal and breath sounds normal. He has no wheezes. He has no rales. Abdominal: Soft. Bowel sounds are normal. He exhibits no distension and no mass. There is tenderness ( LUQ and LLQ). There is no rebound and no guarding. negative CVA tenderness   Skin: Skin is warm and dry. No rash noted. RESULTS:  Results for orders placed or performed in visit on 04/03/17   AMB POC URINALYSIS DIP STICK AUTO W/O MICRO   Result Value Ref Range    Color (UA POC) Yellow     Clarity (UA POC) Clear     Glucose (UA POC) Negative Negative    Bilirubin (UA POC) Negative Negative    Ketones (UA POC) Negative Negative    Specific gravity (UA POC) 1.020 1.001 - 1.035    Blood (UA POC) Negative Negative    pH (UA POC) 5.5 4.6 - 8.0    Protein (UA POC) Negative Negative mg/dL    Urobilinogen (UA POC) 0.2 mg/dL 0.2 - 1    Nitrites (UA POC) Negative Negative    Leukocyte esterase (UA POC) Negative Negative        ASSESSMENT/PLAN:  Agueda Escobedo was seen today for abdominal pain. Diagnoses and all orders for this visit:    Left lower quadrant pain  -     AMB POC URINALYSIS DIP STICK AUTO W/O MICRO  -     levoFLOXacin (LEVAQUIN) 750 mg tablet; Take 1 Tab by mouth daily. -     metroNIDAZOLE (FLAGYL) 500 mg tablet; Take 1 Tab by mouth three (3) times daily for 10 days. Other orders  -     Cancel: CULTURE, URINE; Future    - Reassured of negative urine dip in office today, but encouraged patient to follow up with urology given his chronic complaints  - Today's LLQ pain seems to be more consistent with diverticulitis.  Given patient's history of recurrent infections, and the fact that he is well-appearing today, will forgo CT scan and treat empirically with Flagyl and Levaquin as above  - Advised to seek care in the ER if symptoms worsen or fever develops. Return here if not improving substantially over the course of the next 72 hours    Follow up with PCP as scheduled 5/2/17  Return sooner as needed     Patient expresses understanding and is agreeable with the above plan.       PATIENT CARE TEAM:   Patient Care Team:  Johnathan Berry NP as PCP - General (Nurse Practitioner)  Katelyn Chan MD (Orthopedic Surgery)  Aileen Lockett MD (Physiatry)  Gato Rodriguez MD (Gastroenterology)       [place], [place]   April 3, 2017    4:36 PM

## 2017-04-03 NOTE — PROGRESS NOTES
Chief Complaint   Patient presents with    Abdominal Pain     pt states burning sensation in lower stomach for a couple days

## 2017-04-20 ENCOUNTER — OFFICE VISIT (OUTPATIENT)
Dept: ORTHOPEDIC SURGERY | Age: 52
End: 2017-04-20

## 2017-04-20 VITALS
WEIGHT: 178.4 LBS | SYSTOLIC BLOOD PRESSURE: 93 MMHG | HEART RATE: 61 BPM | DIASTOLIC BLOOD PRESSURE: 71 MMHG | RESPIRATION RATE: 18 BRPM | HEIGHT: 70 IN | BODY MASS INDEX: 25.54 KG/M2

## 2017-04-20 DIAGNOSIS — M54.16 RADICULOPATHY, LUMBAR REGION: ICD-10-CM

## 2017-04-20 DIAGNOSIS — M51.36 DDD (DEGENERATIVE DISC DISEASE), LUMBAR: ICD-10-CM

## 2017-04-20 DIAGNOSIS — M96.1 LUMBAR POSTLAMINECTOMY SYNDROME: Primary | ICD-10-CM

## 2017-04-20 DIAGNOSIS — M51.26 LUMBAR HERNIATED DISC: ICD-10-CM

## 2017-04-20 DIAGNOSIS — M46.1 SACROILIITIS (HCC): ICD-10-CM

## 2017-04-20 NOTE — MR AVS SNAPSHOT
Visit Information Date & Time Provider Department Dept. Phone Encounter #  
 4/20/2017  8:35 AM Pepper Granados MD [facility] [phone] [id number] Follow-up Instructions Return in about 4 months (around 8/20/2017). Your Appointments 5/2/2017  8:15 AM  
FOLLOW UP EXAM with Theron Bentley NP [address] [facility] ([address]) Appt Note: 3 month f/u  
 [address] [facility] [id number]  
[facility] [address]  
  
    
 11/21/2017  8:30 AM  
Office Visit with Theron Bentley NP [address] [facility] ([address]) Appt Note: [address] [facility] [address]  
[facility] [id number] Upcoming Health Maintenance Date Due DTaP/Tdap/Td series (1 - Tdap) 1/2/2011 MEDICARE YEARLY EXAM 11/18/2017 COLONOSCOPY 8/12/2021 Allergies as of 4/20/2017  Review Complete On: 4/20/2017 By: Pepper Granados MD  
  
 Severity Noted Reaction Type Reactions Penicillins High 10/14/2012    Angioedema Penicillins High 05/15/2013    Anaphylaxis Vicodin [Hydrocodone-acetaminophen] High 10/14/2012    Nausea and Vomiting Azithromycin  06/02/2016    Nausea and Vomiting Vicodin [Hydrocodone-acetaminophen]  05/15/2013    Nausea and Vomiting Erythromycin Low 07/12/2016    Other (comments) Current Immunizations  Reviewed on 2/2/2017 Name Date Influenza Vaccine (Quad) PF 11/17/2016 Pneumococcal Polysaccharide (PPSV-23) 2/2/2017 Td 1/1/2011 Not reviewed this visit You Were Diagnosed With   
  
 Codes Comments Lumbar postlaminectomy syndrome    -  Primary ICD-10-CM: M96.1 ICD-9-CM: 722.83 Lumbar herniated disc     ICD-10-CM: M51.26 
ICD-9-CM: 722.10 Sacroiliitis ([facility] [address]     ICD-10-CM: M46.1 ICD-9-CM: 720.2 Radiculopathy, lumbar region     ICD-10-CM: M54.16 
ICD-9-CM: 724.4 DDD (degenerative disc disease), lumbar     ICD-10-CM: M51.36 
ICD-9-CM: 722.52 Vitals BP Pulse Resp Height(growth percentile) Weight(growth percentile) BMI  
 93/71 (BP 1 Location: Left arm, BP Patient Position: Sitting) 61 18 5' 10\" (1.778 m) 178 lb 6.4 oz (80.9 kg) 25.6 kg/m2 Smoking Status Former Smoker BMI and BSA Data Body Mass Index Body Surface Area  
 25.6 kg/m 2 2 m 2 Preferred Pharmacy [facility] [id number] [phone] Your Updated Medication List  
  
   
This list is accurate as of: 4/20/17  9:45 AM.  Always use your most recent med list.  
  
  
  
  
 albuterol 90 mcg/actuation inhaler Commonly known as:  PROVENTIL HFA, VENTOLIN HFA, PROAIR HFA Take 2 Puffs by inhalation every six (6) hours as needed for Wheezing or Shortness of Breath. ascorbic acid (vitamin C) 500 mg tablet Commonly known as:  VITAMIN C  
500 mg.  
  
 aspirin 81 mg chewable tablet Take 81 mg by mouth daily. budesonide-formoterol 160-4.5 mcg/actuation HFA inhaler Commonly known as:  SYMBICORT Take 1 Puff by inhalation two (2) times a day. cyclobenzaprine 10 mg tablet Commonly known as:  FLEXERIL Take 1 Tab by mouth three (3) times daily as needed for Muscle Spasm(s). FISH OIL 60- mg Cap Generic drug:  omega 3-dha-epa-fish oil  
1,000 mg.  
  
 * gabapentin 800 mg tablet Commonly known as:  NEURONTIN Take 1 Tab by mouth three (3) times daily. * gabapentin 800 mg tablet Commonly known as:  NEURONTIN Take 1 Tab by mouth three (3) times daily. guaiFENesin-codeine 100-10 mg/5 mL solution Commonly known as:  guaiFENesin AC Take 5 mL by mouth three (3) times daily as needed for Cough or Congestion. Max Daily Amount: 15 mL. levoFLOXacin 750 mg tablet Commonly known as:  Madhu Kubas Take 1 Tab by mouth daily. ondansetron 4 mg disintegrating tablet Commonly known as:  ZOFRAN ODT Take 1 Tab by mouth every eight (8) hours as needed for Nausea. oxyCODONE-acetaminophen 5-325 mg per tablet Commonly known as:  PERCOCET Take 1 tab BID-TID PRN Pain  
  
 pantoprazole 40 mg tablet Commonly known as:  PROTONIX Take 1 Tab by mouth daily. raNITIdine 150 mg tablet Commonly known as:  ZANTAC Take 1 Tab by mouth nightly. zinc gluconate 50 mg tablet 50 mg.  
  
 * Notice: This list has 2 medication(s) that are the same as other medications prescribed for you. Read the directions carefully, and ask your doctor or other care provider to review them with you. Follow-up Instructions Return in about 4 months (around 8/20/2017). Please provide this summary of care documentation to your next provider. Your primary care clinician is listed as Thomas Moore. If you have any questions after today's visit, please call [phone].
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
## 2017-05-02 ENCOUNTER — OFFICE VISIT (OUTPATIENT)
Dept: FAMILY MEDICINE CLINIC | Age: 52
End: 2017-05-02

## 2017-05-02 ENCOUNTER — HOSPITAL ENCOUNTER (OUTPATIENT)
Dept: LAB | Age: 52
Discharge: HOME | End: 2017-05-02
Payer: MEDICARE

## 2017-05-02 VITALS
TEMPERATURE: 95.8 F | RESPIRATION RATE: 18 BRPM | HEART RATE: 67 BPM | OXYGEN SATURATION: 97 % | SYSTOLIC BLOOD PRESSURE: 99 MMHG | WEIGHT: 174 LBS | DIASTOLIC BLOOD PRESSURE: 68 MMHG | HEIGHT: 70 IN | BODY MASS INDEX: 24.91 KG/M2

## 2017-05-02 DIAGNOSIS — K30 FUNCTIONAL DYSPEPSIA: ICD-10-CM

## 2017-05-02 DIAGNOSIS — R11.2 NAUSEA AND VOMITING, INTRACTABILITY OF VOMITING NOT SPECIFIED, UNSPECIFIED VOMITING TYPE: ICD-10-CM

## 2017-05-02 DIAGNOSIS — K21.00 GASTROESOPHAGEAL REFLUX DISEASE WITH ESOPHAGITIS: ICD-10-CM

## 2017-05-02 DIAGNOSIS — R41.0 CONFUSION AND DISORIENTATION: ICD-10-CM

## 2017-05-02 DIAGNOSIS — Z86.73 HISTORY OF CVA (CEREBROVASCULAR ACCIDENT): ICD-10-CM

## 2017-05-02 DIAGNOSIS — M51.36 DEGENERATIVE DISC DISEASE, LUMBAR: ICD-10-CM

## 2017-05-02 DIAGNOSIS — J45.40 MODERATE PERSISTENT ASTHMA WITHOUT COMPLICATION: Primary | ICD-10-CM

## 2017-05-02 DIAGNOSIS — R73.01 ELEVATED FASTING GLUCOSE: ICD-10-CM

## 2017-05-02 LAB — HBA1C MFR BLD HPLC: 5.3 %

## 2017-05-02 PROCEDURE — 83013 H PYLORI (C-13) BREATH: CPT | Performed by: NURSE PRACTITIONER

## 2017-05-02 RX ORDER — BUDESONIDE AND FORMOTEROL FUMARATE DIHYDRATE 160; 4.5 UG/1; UG/1
1 AEROSOL RESPIRATORY (INHALATION) 2 TIMES DAILY
Qty: 1 INHALER | Refills: 5 | Status: SHIPPED | OUTPATIENT
Start: 2017-05-02 | End: 2018-01-02 | Stop reason: SDUPTHER

## 2017-05-02 RX ORDER — ONDANSETRON 4 MG/1
4 TABLET, ORALLY DISINTEGRATING ORAL
Qty: 20 TAB | Refills: 0 | Status: SHIPPED | OUTPATIENT
Start: 2017-05-02 | End: 2017-09-05

## 2017-05-02 RX ORDER — RANITIDINE 150 MG/1
150 TABLET, FILM COATED ORAL 2 TIMES DAILY
Qty: 60 TAB | Refills: 11 | Status: SHIPPED | OUTPATIENT
Start: 2017-05-02 | End: 2017-08-07 | Stop reason: SDUPTHER

## 2017-05-02 RX ORDER — CYCLOBENZAPRINE HCL 10 MG
10 TABLET ORAL
Qty: 30 TAB | Refills: 5 | Status: SHIPPED | OUTPATIENT
Start: 2017-05-02 | End: 2017-11-29 | Stop reason: SDUPTHER

## 2017-05-02 NOTE — PROGRESS NOTES
HISTORY OF PRESENT ILLNESS  Martin Whitley is a 46 y.o. male. 5/2/2017  9:00 AM    Chief Complaint   Patient presents with    Follow-up     pt presents for follow up for chronic conditions        HPI: Here today for follow up chronic disease. Last labs done 1/2017. Following with GI, orthopedics, and spine center. Asthma: Has been using Symbicort- has been managing symptoms. Has been using Albuterol inhaler PRN about 2-3 times per week. Felt like he was flared up during allergy season. No new complaints. DDD lumbar: Has been taking Gabapentin TID as prescribed- finds that this helps with chronic pain. Has had several back surgeries done in the past. Follows with spine center. Recent flare ups with back pain has caused him to require visits with spine center more frequently. Using Flexeril PRN. CVA history: History in 2010- has been on ASA since. No residual. Does not follow with neurology. GERD with esophagitis: Had been following with GI due to abdominal pain with N/V that was occurring about 6 months ago. EGD done 10/2016 showing Grade 1 esophagitis- PPI and H2 blocker but would like to switch to just H2 blocker if possible. Using Zofran PRN for severe nausea. Still has some breakthrough symptoms. Regular BMs without blood. History diverticulitis. Has not follow up with GI since having EGD done. Complains of episodes where he becomes pale, diaphoretic, dizzy, weak, and confused. He states that this happens intermittently and has no pattern that he can think of. Finds that eating something sweet helps with symptoms but reports that he can have an episode shortly after eating a meal. No syncope, no chest pain, and no SOB. History of CVA in 2010- self reported. Review of Systems   Constitutional: Positive for diaphoresis. Negative for chills, fever and malaise/fatigue. Eyes: Negative for double vision, photophobia and pain.    Respiratory: Negative for cough, sputum production, shortness of breath and wheezing. Cardiovascular: Negative for chest pain, palpitations and leg swelling. Gastrointestinal: Negative for abdominal pain, constipation, diarrhea, nausea and vomiting. Genitourinary: Negative for dysuria, frequency and urgency. Musculoskeletal: Negative for back pain, joint pain and myalgias. Neurological: Positive for dizziness and weakness. Negative for tingling, sensory change, speech change, focal weakness, seizures, loss of consciousness and headaches. Psychiatric/Behavioral: Negative for depression. PHQ Screening   PHQ 2 / 9, over the last two weeks 4/3/2017   Little interest or pleasure in doing things Not at all   Feeling down, depressed or hopeless Not at all   Total Score PHQ 2 0         History  Past Medical History:   Diagnosis Date    Asthma     DDD (degenerative disc disease), lumbar 2013    noted on MRI with annular tears    Diverticulitis 2016    GERD (gastroesophageal reflux disease) 2016    with esophagitis according to endscopy    Kidney stone     Lumbar post-laminectomy syndrome     Sacroiliitis ([facility] [address]     Stroke [facility]) may 14 2010    Unspecified rotator cuff tear or rupture of left shoulder, not specified as traumatic 03/2016       Past Surgical History:   Procedure Laterality Date    COLONOSCOPY N/A 8/12/2016    COLONOSCOPY with polypectomy performed by Madeline Denise MD at [address]  2002, 2004, 2006    L5-S1 fusion, laminectomies    HX ENDOSCOPY  10/2016    grade 1 espohagitis    HX ENDOSCOPY  11/2016    mild esophagitis    HX KNEE ARTHROSCOPY Right     knee    HX MOHS PROCEDURES      right    HX ORTHOPAEDIC  03/2016    left shoulder       Social History     Social History    Marital status: SINGLE     Spouse name: N/A    Number of children: N/A    Years of education: N/A     Occupational History    Not on file.      Social History Main Topics    Smoking status: Former Smoker     Packs/day: 2.00     Types: Cigarettes    Smokeless tobacco: Never Used    Alcohol use No    Drug use: No    Sexual activity: Yes     Partners: Female     Other Topics Concern    Not on file     Social History Narrative    ** Merged History Encounter **            Family History   Problem Relation Age of Onset    Other Brother     Cancer Maternal Grandmother        Allergies   Allergen Reactions    Penicillins Angioedema    Penicillins Anaphylaxis    Vicodin [Hydrocodone-Acetaminophen] Nausea and Vomiting    Azithromycin Nausea and Vomiting    Vicodin [Hydrocodone-Acetaminophen] Nausea and Vomiting    Erythromycin Other (comments)       Current Outpatient Prescriptions   Medication Sig Dispense Refill    gabapentin (NEURONTIN) 800 mg tablet Take 1 Tab by mouth three (3) times daily. 90 Tab 5    pantoprazole (PROTONIX) 40 mg tablet Take 1 Tab by mouth daily. 30 Tab 2    raNITIdine (ZANTAC) 150 mg tablet Take 1 Tab by mouth nightly. 30 Tab 2    cyclobenzaprine (FLEXERIL) 10 mg tablet Take 1 Tab by mouth three (3) times daily as needed for Muscle Spasm(s). 30 Tab 0    albuterol (PROVENTIL HFA, VENTOLIN HFA, PROAIR HFA) 90 mcg/actuation inhaler Take 2 Puffs by inhalation every six (6) hours as needed for Wheezing or Shortness of Breath. 1 Inhaler 11    budesonide-formoterol (SYMBICORT) 160-4.5 mcg/actuation HFA inhaler Take 1 Puff by inhalation two (2) times a day. 1 Inhaler 3    ondansetron (ZOFRAN ODT) 4 mg disintegrating tablet Take 1 Tab by mouth every eight (8) hours as needed for Nausea. 20 Tab 0    aspirin 81 mg chewable tablet Take 81 mg by mouth daily.       zinc gluconate 50 mg tablet 50 mg.      omega 3-dha-epa-fish oil (FISH OIL) 60- mg cap 1,000 mg.      ascorbic acid, vitamin C, (VITAMIN C) 500 mg tablet 500 mg.      oxyCODONE-acetaminophen (PERCOCET) 5-325 mg per tablet Take 1 tab BID-TID PRN Pain 30 Tab 0       Health Maintenance and Screenings  *Medicare wellness done 11/2016      Advance Care Planning:   Patient was offered the opportunity to discuss advance care planning NO   Does patient have an Advance Directive:  NO   If no, did you provide information on C[facility] Connections? Patient Care Team:  Patient Care Team:  Micky Sarkar NP as PCP - General (Nurse Practitioner)  Lauren Warren MD (Orthopedic Surgery)  Mariana Frey MD (Physiatry)  Jack Mcmillan MD (Gastroenterology)        LABS:    Lab Results   Component Value Date/Time    Hemoglobin A1c (POC) 5.3 05/02/2017 08:30 AM         RADIOLOGY:    10/2016 CT head  CT OF THE HEAD WITHOUT CONTRAST. CLINICAL HISTORY: New onset headache, starting yesterday. TECHNIQUE: Helical scan obtained of the head were obtained from the skull vertex  through the base of the skull without intravenous contrast. All CT scans are  performed using dose optimization techniques as appropriate to the performed  exam including the following: Automated exposure control, adjustment of mA  and/or kV according to patient size, and use of iterative reconstructive  technique. COMPARISON: 3/1/2015. FINDINGS:   The sulcal pattern and ventricular system are normal in size and configuration  for age. No intracranial hemorrhage. No mass effect. The visualized paranasal  sinuses are clear. The mastoid air cells are clear. The visualized bony  structures are unremarkable. IMPRESSION  IMPRESSION:   Stable, unremarkable exam      Physical Exam   Constitutional: He is oriented to person, place, and time. He appears well-developed and well-nourished. No distress. Eyes: EOM are normal. Pupils are equal, round, and reactive to light. Neck: Normal range of motion. Neck supple. No thyromegaly present. Cardiovascular: Normal rate, regular rhythm and normal heart sounds. No murmur heard. Pulmonary/Chest: Effort normal and breath sounds normal. No respiratory distress. Abdominal: Soft. Bowel sounds are normal. There is no tenderness.    Musculoskeletal: He exhibits no edema. Neurological: He is alert and oriented to person, place, and time. No cranial nerve deficit. He exhibits normal muscle tone. Coordination normal.   Skin: Skin is warm and dry. Psychiatric: His speech is normal. His mood appears not anxious. He does not exhibit a depressed mood. Vitals:    05/02/17 0818   BP: 99/68   Pulse: 67   Resp: 18   Temp: 95.8 °F (35.4 °C)   TempSrc: Oral   SpO2: 97%   Weight: 174 lb (78.9 kg)   Height: 5' 10\" (1.778 m)   PainSc:   4   PainLoc: Back       ASSESSMENT and Kd Lan was seen today for diarrhea and kidney stone. Diagnoses and all orders for this visit:      Moderate persistent asthma without complication  *Refilled. Controlled symptoms. Nodule of lung resolved on CT chest recently. - budesonide-formoterol (SYMBICORT) 160-4.5 mcg/actuation HFA inhaler; Take 1 Puff by inhalation two (2) times a day. Dispense: 1 Inhaler; Refill: 5    History of CVA (cerebrovascular accident)  *Diagnosed in past per medical records. No evidence noted on last CT in 10/2016. Further work up to be done as below due to recurrent episodes of confusion and sweating. Continue on ASA- see below. Not following with neruology. - DUPLEX CAROTID BILATERAL; Future    Gastroesophageal reflux disease with esophagitis  *Discussed with patient. He has not seen GI despite encouragement in the past. Will do Hpylori testing. He would like to trial decrease to H2 blocker BID instead of PPI in the AM and H2 blocker in the PM- risk with PPI usage is reasoning. Has had EGD done in 11/2016 showing esophagitis- still having some symptoms, see below.  - H. PYLORI BREATH TEST; Future  - H PYLORI DRUG ADMIN  - raNITIdine (ZANTAC) 150 mg tablet; Take 1 Tab by mouth two (2) times a day. Dispense: 60 Tab; Refill: 11    Functional dyspepsia/ Nausea and vomiting, intractability of vomiting not specified, unspecified vomiting type  *HPylori testing completed. PRN Zofran.  Advised on low acidic diet- eats a lot of tomato based foods, but has been cutting back.  - H. PYLORI BREATH TEST; Future  - H PYLORI DRUG ADMIN  - ondansetron (ZOFRAN ODT) 4 mg disintegrating tablet; Take 1 Tab by mouth every eight (8) hours as needed for Nausea. Dispense: 20 Tab; Refill: 0    Degenerative disc disease, lumbar  *Refilled. Follows with spine center. - cyclobenzaprine (FLEXERIL) 10 mg tablet; Take 1 Tab by mouth three (3) times daily as needed for Muscle Spasm(s). Dispense: 30 Tab; Refill: 5    Confusion and disorientation  *Discussed with patient about possible causes. A1c today normal. Could be hypoglycemia; however, patient reports eating meals a few hours before episodes. Encouraged to keep log. Would like to do US carotids for further evaluation for stroke risk. *Discussed with patient about symptoms that would require an ER visit. Patient understands symptoms that are an emergency and will go to the ER if they occur.   - AMB POC HEMOGLOBIN A1C  - DUPLEX CAROTID BILATERAL; Future    Elevated fasting glucose  *Noted on last labs and then with possible hypoglycemic episodes. Normal A1c.   - AMB POC HEMOGLOBIN A1C        *Plan of care reviewed with patient. Patient in agreement with plan and expresses understanding. All questions answered and patient encouraged to call or RTO if further questions or concerns. Follow-up Disposition:  Return in about 3 months (around 8/2/2017) for chronic disease followup- 15 min.

## 2017-05-02 NOTE — PATIENT INSTRUCTIONS
Learning About Diet for Kidney Stone Prevention  What are kidney stones? Kidney stones are made of salts and minerals in the urine that form small \"govind. \" Stones can form in the kidneys and the ureters (the tubes that lead from the kidneys to the bladder). They can also form in the bladder. Stones may not cause a problem as long as they stay in the kidneys. But they can cause sudden, severe pain. Pain is most likely when the stones travel from the kidneys to the bladder. Kidney stones can cause bloody urine. Kidney stones often run in families. You are more likely to get them if you don't drink enough fluids, mainly water. Certain foods and drinks and some dietary supplements may also increase your risk for kidney stones if you consume too much of them. What can you do to prevent kidney stones? Changing what you eat may not prevent all types of kidney stones. But for people who have a history of certain kinds of kidney stones, some changes in diet may help. A dietitian can help you set up a meal plan that includes healthy, low-oxalate choices. Here are some general guidelines to get you started. Plan your meals and snacks around foods that are low in oxalate. These foods include:  · Corn, kale, parsnips, and squash,. · Beef, chicken, pork, turkey, and fish. · Milk, butter, cheese, and yogurt. You can eat certain foods that are medium-high in oxalate, but eat them only once in a while. These foods include:  · Bread. · Brown rice. · English muffins. · Figs. · Popcorn. · String beans. · Tomatoes. Limit very high-oxalate foods, including:  · Black tea. · Coffee. · Chocolate. · Dark green vegetables. · Nuts. Here are some other things you can do to help prevent kidney stones. · Drink plenty of fluids. If you have kidney, heart, or liver disease and have to limit fluids, talk with your doctor before you increase the amount of fluids you drink.   · Do not take more than the recommended daily dose of vitamins C and D.  · Limit the salt in your diet. · Eat a balanced diet that is not too high in protein. Follow-up care is a key part of your treatment and safety. Be sure to make and go to all appointments, and call your doctor if you are having problems. It's also a good idea to know your test results and keep a list of the medicines you take. Where can you learn more? Go to http://lola-conner.info/. Enter C138 in the search box to learn more about \"Learning About Diet for Kidney Stone Prevention. \"  Current as of: July 26, 2016  Content Version: 11.2  © 0523-7253 [employer]. Care instructions adapted under license by [employer] (which disclaims liability or warranty for this information). If you have questions about a medical condition or this instruction, always ask your healthcare professional. [address] any warranty or liability for your use of this information.

## 2017-05-04 LAB — UREA BREATH TEST QL: NEGATIVE

## 2017-07-07 ENCOUNTER — OFFICE VISIT (OUTPATIENT)
Dept: ORTHOPEDIC SURGERY | Age: 52
End: 2017-07-07

## 2017-07-07 ENCOUNTER — TELEPHONE (OUTPATIENT)
Dept: ORTHOPEDIC SURGERY | Age: 52
End: 2017-07-07

## 2017-07-07 VITALS
RESPIRATION RATE: 18 BRPM | DIASTOLIC BLOOD PRESSURE: 73 MMHG | WEIGHT: 172 LBS | BODY MASS INDEX: 24.62 KG/M2 | TEMPERATURE: 97.8 F | HEART RATE: 82 BPM | HEIGHT: 70 IN | SYSTOLIC BLOOD PRESSURE: 106 MMHG

## 2017-07-07 DIAGNOSIS — M62.830 MUSCLE SPASM OF BACK: ICD-10-CM

## 2017-07-07 DIAGNOSIS — Z98.1 S/P SPINAL FUSION: ICD-10-CM

## 2017-07-07 DIAGNOSIS — M96.1 LUMBAR POSTLAMINECTOMY SYNDROME: ICD-10-CM

## 2017-07-07 DIAGNOSIS — M51.36 DEGENERATIVE DISC DISEASE, LUMBAR: Primary | ICD-10-CM

## 2017-07-07 DIAGNOSIS — M51.36 DDD (DEGENERATIVE DISC DISEASE), LUMBAR: ICD-10-CM

## 2017-07-07 DIAGNOSIS — M54.16 RADICULOPATHY, LUMBAR REGION: ICD-10-CM

## 2017-07-07 RX ORDER — OXYCODONE AND ACETAMINOPHEN 5; 325 MG/1; MG/1
TABLET ORAL
Qty: 20 TAB | Refills: 0 | Status: SHIPPED | OUTPATIENT
Start: 2017-07-07 | End: 2017-09-05

## 2017-07-07 RX ORDER — METHYLPREDNISOLONE 4 MG/1
TABLET ORAL
Qty: 1 DOSE PACK | Refills: 0 | Status: SHIPPED | OUTPATIENT
Start: 2017-07-07 | End: 2017-08-22 | Stop reason: ALTCHOICE

## 2017-07-07 NOTE — TELEPHONE ENCOUNTER
kuldip Watters per HIPAA, called to advise Dr. Jesica Lopez that patient is not doing well and having trouble walking - high fall risk. They were offered an appointment for Monday, but are concerned that patient will be falling all weekend. Can Dr. Jesica Lopez see patient this afternoon for an emergency appointment? Please contact patient or Tasneem Marie at [phone] to advise.

## 2017-07-07 NOTE — PATIENT INSTRUCTIONS
Back Pain: Care Instructions  Your Care Instructions    Back pain has many possible causes. It is often related to problems with muscles and ligaments of the back. It may also be related to problems with the nerves, discs, or bones of the back. Moving, lifting, standing, sitting, or sleeping in an awkward way can strain the back. Sometimes you don't notice the injury until later. Arthritis is another common cause of back pain. Although it may hurt a lot, back pain usually improves on its own within several weeks. Most people recover in 12 weeks or less. Using good home treatment and being careful not to stress your back can help you feel better sooner. Follow-up care is a key part of your treatment and safety. Be sure to make and go to all appointments, and call your doctor if you are having problems. Its also a good idea to know your test results and keep a list of the medicines you take. How can you care for yourself at home? · Sit or lie in positions that are most comfortable and reduce your pain. Try one of these positions when you lie down:  ¨ Lie on your back with your knees bent and supported by large pillows. ¨ Lie on the floor with your legs on the seat of a sofa or chair. Corie Loges on your side with your knees and hips bent and a pillow between your legs. ¨ Lie on your stomach if it does not make pain worse. · Do not sit up in bed, and avoid soft couches and twisted positions. Bed rest can help relieve pain at first, but it delays healing. Avoid bed rest after the first day of back pain. · Change positions every 30 minutes. If you must sit for long periods of time, take breaks from sitting. Get up and walk around, or lie in a comfortable position. · Try using a heating pad on a low or medium setting for 15 to 20 minutes every 2 or 3 hours. Try a warm shower in place of one session with the heating pad. · You can also try an ice pack for 10 to 15 minutes every 2 to 3 hours.  Put a thin cloth between the ice pack and your skin. · Take pain medicines exactly as directed. ¨ If the doctor gave you a prescription medicine for pain, take it as prescribed. ¨ If you are not taking a prescription pain medicine, ask your doctor if you can take an over-the-counter medicine. · Take short walks several times a day. You can start with 5 to 10 minutes, 3 or 4 times a day, and work up to longer walks. Walk on level surfaces and avoid hills and stairs until your back is better. · Return to work and other activities as soon as you can. Continued rest without activity is usually not good for your back. · To prevent future back pain, do exercises to stretch and strengthen your back and stomach. Learn how to use good posture, safe lifting techniques, and proper body mechanics. When should you call for help? Call your doctor now or seek immediate medical care if:  · You have new or worsening numbness in your legs. · You have new or worsening weakness in your legs. (This could make it hard to stand up.)  · You lose control of your bladder or bowels. Watch closely for changes in your health, and be sure to contact your doctor if:  · Your pain gets worse. · You are not getting better after 2 weeks. Where can you learn more? Go to http://lola-conner.info/. Enter C552 in the search box to learn more about \"Back Pain: Care Instructions. \"  Current as of: March 21, 2017  Content Version: 11.3  © 6842-1765 [email or web address]. Care instructions adapted under license by [employer] (which disclaims liability or warranty for this information). If you have questions about a medical condition or this instruction, always ask your healthcare professional. [address] any warranty or liability for your use of this information.

## 2017-07-07 NOTE — TELEPHONE ENCOUNTER
Clorinda Glisson called again regarding below message, patient is unable to walk.   Please contact them as soon as possible to advise if can be seen today by Dr. Rina Zabala

## 2017-07-07 NOTE — PROGRESS NOTES
[address]  [address], [address]  [place], [address] [place] [address] Street  Phone: [phone]  Fax: [phone]    Dave Staples  : 1965  PCP: Hao Balderrama NP    PROGRESS NOTE    HISTORY OF PRESENT ILLNESS:  Chief Complaint   Patient presents with    Back Pain     lower back pain     Radha Townsend is a 46 y.o.  male with history of low back pain without radicular symptoms. He laird a history of L5-S1 fusion and is diagnosed with lumbar postlaminectomy syndrome. He presents today with a flare of low back pain. 2 nights ago he started with a small amount of pain and this has increased greatly over the past 2 days. He denies leg pain. He does admit to bilateral buttock pain. He denies any injury or trauma. Pain is sharp and non-radiating. Pain is worse with standing and affects sleep . Nothing is helping the pain. He comes in today walking with a forward bent posture. He is unable to stand up right due to pain. Denies bladder/bowel dysfunction, saddle paresthesia, weakness, gait disturbance, or other neurological deficit. Pt at this time desires to continue with current care/proceed with medication evaluation. He normally takes Neurontin 800 mg TID. He also takes Flexeril qhs from his PCP. He has managed this type of flare before MDP. His most recent steroid was from his PCP for 3 days for a respiratory issue in 2017. He saw urologist this morning for a F/U for kidney stones. He will be started on HCTZ, low salt diet, no tea and increase water intake. ASSESSMENT  46 y.o. male with low back pain. There are no diagnoses linked to this encounter. IMPRESSION/PLAN    1) Pt was given information on back care and exercises. 2)  MDP today. 3) Short course of Percocet for severe pain. 4) Mr. Tiffanie Penny has a reminder for a \"due or due soon\" health maintenance.  I have asked that he contact his primary care provider, Hao Balderrama NP, for follow-up on this health maintenance. 5) We have informed patient to notify us for immediate appointment if he has any worsening neurogical symptoms or if an emergency situation presents, then call 911  5) Pt will follow-up in August with Dr. Kike Bowling. Risks and benefits of ongoing opiate therapy have been reviewed with the patient.  is appropriate. No pain behaviors. Denies thoughts of harming self or others. Pt has a good risk to benefit ratio which allows the pt to function in a home environment without side effects. PAST MEDICAL HISTORY  Past Medical History:   Diagnosis Date    Asthma     DDD (degenerative disc disease), lumbar 2013    noted on MRI with annular tears    Diverticulitis 2016    GERD (gastroesophageal reflux disease) 2016    with esophagitis according to endscopy    Kidney stone     Lumbar post-laminectomy syndrome     [facility] ([facility] [address]     Stroke [facility]) may 14 2010    Unspecified rotator cuff tear or rupture of left shoulder, not specified as traumatic 03/2016        MEDICATIONS  Current Outpatient Prescriptions   Medication Sig Dispense Refill    pantoprazole (PROTONIX) 40 mg tablet       hydroCHLOROthiazide (HYDRODIURIL) 25 mg tablet Take 1 Tab by mouth daily. Indications: CALCIUM RENAL CALCULI PREVENTION 90 Tab 3    levoFLOXacin (LEVAQUIN) 750 mg tablet       raNITIdine (ZANTAC) 150 mg tablet Take 1 Tab by mouth two (2) times a day. 60 Tab 11    budesonide-formoterol (SYMBICORT) 160-4.5 mcg/actuation HFA inhaler Take 1 Puff by inhalation two (2) times a day. 1 Inhaler 5    cyclobenzaprine (FLEXERIL) 10 mg tablet Take 1 Tab by mouth three (3) times daily as needed for Muscle Spasm(s). 30 Tab 5    ondansetron (ZOFRAN ODT) 4 mg disintegrating tablet Take 1 Tab by mouth every eight (8) hours as needed for Nausea. 20 Tab 0    gabapentin (NEURONTIN) 800 mg tablet Take 1 Tab by mouth three (3) times daily.  90 Tab 5    albuterol (PROVENTIL HFA, VENTOLIN HFA, PROAIR HFA) 90 mcg/actuation inhaler Take 2 Puffs by inhalation every six (6) hours as needed for Wheezing or Shortness of Breath. 1 Inhaler 11    zinc gluconate 50 mg tablet 50 mg.      omega 3-dha-epa-fish oil (FISH OIL) 60- mg cap 1,000 mg.      ascorbic acid, vitamin C, (VITAMIN C) 500 mg tablet 500 mg.      oxyCODONE-acetaminophen (PERCOCET) 5-325 mg per tablet Take 1 tab BID-TID PRN Pain 30 Tab 0    aspirin 81 mg chewable tablet Take 81 mg by mouth daily. ALLERGIES  Allergies   Allergen Reactions    Penicillins Angioedema    Penicillins Anaphylaxis    Vicodin [Hydrocodone-Acetaminophen] Nausea and Vomiting    Azithromycin Nausea and Vomiting    Vicodin [Hydrocodone-Acetaminophen] Nausea and Vomiting    Erythromycin Other (comments)       SOCIAL HISTORY  Social History     Social History    Marital status: SINGLE     Spouse name: N/A    Number of children: N/A    Years of education: N/A     Occupational History    Not on file. Social History Main Topics    Smoking status: Former Smoker     Packs/day: 2.00     Types: Cigarettes    Smokeless tobacco: Never Used    Alcohol use No    Drug use: No    Sexual activity: Yes     Partners: Female     Other Topics Concern    Not on file     Social History Narrative    ** Merged History Encounter **            SUBJECTIVE    Pain Scale: 10 - Worst pain ever/10    Pain Assessment  4/20/2017   Location of Pain Back;Leg;Buttocks   Location Modifiers Left;Right   Severity of Pain 4   Quality of Pain Aching   Quality of Pain Comment -   Duration of Pain Persistent   Frequency of Pain Constant   Aggravating Factors Bending;Stretching;Straightening;Exercise;Kneeling;Squatting;Standing;Walking;Stairs   Aggravating Factors Comment -   Limiting Behavior Yes   Relieving Factors Rest   Result of Injury No   Work-Related Injury -   Type of Injury -   Type of Injury Comment -       Accompanied by family member.     REVIEW OF SYSTEMS  ROS    Constitutional: Negative for fever, chills, or weight change. Respiratory: Negative for cough or shortness of breath. Cardiovascular: Negative for chest pain or palpitations. Gastrointestinal: Negative for acid reflux, change in bowel habits, or constipation. Genitourinary: Negative for incontinence, dysuria and flank pain. Musculoskeletal: Positive for low back pain. Skin: Negative for rash. Neurological: Negative for headaches, dizziness, or numbness. Endo/Heme/Allergies: Negative . Psychiatric/Behavioral: Negative. PHYSICAL EXAMINATION  Visit Vitals    /73    Pulse 82    Temp 97.8 °F (36.6 °C) (Oral)    Resp 18    Ht 5' 10\" (1.778 m)    Wt 172 lb (78 kg)    BMI 24.68 kg/m2       Constitutional: Well developed,  well nourished,  awake, alert, and in no acute distress. Neurological:  Sensation to light touch is intact. Psychiatric: Affect and mood are appropriate. Integumentary: No rashes or abrasions noted on exposed areas,  warm, dry and intact. Cardiovascular/Peripheral Vascular:  No peripheral edema is noted. Lymphatic:  No evidence of lymphedema. No cervical lymphadenopathy. SPINE/MUSCULOSKELETAL EXAM    Lumbar spine:  No rash, ecchymosis, or gross obliquity. No fasciculations. No focal atrophy is noted. Range of motion is decreased. Tenderness to palpation low back . SI joints non-tender. Trochanters non tender. Musculoskeletal:  Pain with extension, axial loading, or forward flexion. No pain with internal or external rotation of his hips. MOTOR     Hip Flex  Quads Hamstrings Ankle DF EHL Ankle PF   Right +4/5 +4/5 +4/5 +4/5 +4/5 +4/5   Left +4/5 +4/5 +4/5 +4/5 +4/5 +4/5       Straight Leg raise negative right, positive left. Ambulation without assistive device. Full weight bearing, non-antalgic gait. The patient is having to walk in a bent over position due to pain.      Felix Torres NP

## 2017-07-07 NOTE — TELEPHONE ENCOUNTER
Called and spoke to Albino Waller([facility]) and she states the patient was currently in the urology office and she states she had to help the patient to give a urine sample because he can not stand up by himself due to the pain. She states the patient had to basically walk on his hands and knees due to the pain. She states the patient usually have these flare ups one or twice a year and Dr. Lacy Rhodes has been treating him. She states he started to have some pain the night before last and now the pain is 12/10 pain level. She states he has not fallen yet but he has caught himself from falling. There is weakness and pain even when he is lying down. I have spoken to Dr. Lacy Rhodes and he states the patient has been on steroids twice in the past couple of months and that we could do an injection. Dr. Lacy Rhodes does not have any openings but one of our NP can see him to evaluate and make sure he is neurologically intact. Called and informed Ms. Roxy Hamilton ([facility]) and informed her that Dr. Lacy Rhodes will not be able to see him today but he recommends he see one of our NP to make sure he is neurologically intact and to be evaluated. Patient was heard in the background saying the pain was in his low back on his left side and in his left buttocks and it feels like its burning and on fire. I also informed her that Dr. Lacy Rhodes also offered if the patient would like to have an injection done. And she states he does not want one at this time because when he usually gets these flare ups he is given a steroid and a muscle relaxant and that usually helps. She states their PCP gives them Flexeril now. I informed him that we have made an appointment with our NP Allen Pat at 2:30 pm at our [facility] office. She verbalized understanding.

## 2017-08-07 DIAGNOSIS — K21.00 GASTROESOPHAGEAL REFLUX DISEASE WITH ESOPHAGITIS: ICD-10-CM

## 2017-08-07 RX ORDER — RANITIDINE 150 MG/1
TABLET, FILM COATED ORAL
Qty: 30 TAB | Refills: 2 | Status: SHIPPED | OUTPATIENT
Start: 2017-08-07 | End: 2017-09-05 | Stop reason: SDUPTHER

## 2017-08-14 ENCOUNTER — TELEPHONE (OUTPATIENT)
Dept: FAMILY MEDICINE CLINIC | Age: 52
End: 2017-08-14

## 2017-08-14 NOTE — TELEPHONE ENCOUNTER
Call made to Mr. Miracle Haney, spoke with wife Mrs Adrian Llamas, using two identifiers for the pt, name and . She was made aware that he has a Bilateral Carotid Duplex that was ordered at last visit that needed to be done. She stated that she was unaware of that and stated that someone from [facility] called the pt but didn't have anything in the system to be scheduled. I made her aware that the test was ordered on 17 at last visit. She was given the number to Central Scheduling to call and get it scheduled and she verbalized understanding and confirmed that they will be in for the scheduled appointment tomorrow.

## 2017-08-22 ENCOUNTER — OFFICE VISIT (OUTPATIENT)
Dept: ORTHOPEDIC SURGERY | Age: 52
End: 2017-08-22

## 2017-08-22 VITALS
HEART RATE: 62 BPM | WEIGHT: 174 LBS | BODY MASS INDEX: 24.91 KG/M2 | HEIGHT: 70 IN | DIASTOLIC BLOOD PRESSURE: 67 MMHG | SYSTOLIC BLOOD PRESSURE: 103 MMHG

## 2017-08-22 DIAGNOSIS — M51.36 OTHER INTERVERTEBRAL DISC DEGENERATION, LUMBAR REGION: ICD-10-CM

## 2017-08-22 DIAGNOSIS — M51.36 DEGENERATIVE DISC DISEASE, LUMBAR: ICD-10-CM

## 2017-08-22 DIAGNOSIS — M96.1 POSTLAMINECTOMY SYNDROME, LUMBAR: Primary | ICD-10-CM

## 2017-08-22 RX ORDER — GABAPENTIN 800 MG/1
800 TABLET ORAL 3 TIMES DAILY
Qty: 90 TAB | Refills: 5 | Status: SHIPPED | OUTPATIENT
Start: 2017-08-22 | End: 2017-11-08 | Stop reason: SDUPTHER
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
## 2017-08-24 ENCOUNTER — HOSPITAL ENCOUNTER (OUTPATIENT)
Age: 52
Setting detail: OUTPATIENT SURGERY
Discharge: HOME | End: 2017-08-24
Attending: PHYSICAL MEDICINE & REHABILITATION | Admitting: PHYSICAL MEDICINE & REHABILITATION
Payer: MEDICARE

## 2017-08-24 ENCOUNTER — APPOINTMENT (OUTPATIENT)
Dept: GENERAL RADIOLOGY | Age: 52
End: 2017-08-24
Attending: PHYSICAL MEDICINE & REHABILITATION
Payer: MEDICARE

## 2017-08-24 VITALS
TEMPERATURE: 98 F | OXYGEN SATURATION: 97 % | BODY MASS INDEX: 24.91 KG/M2 | HEIGHT: 70 IN | WEIGHT: 174 LBS | RESPIRATION RATE: 18 BRPM | SYSTOLIC BLOOD PRESSURE: 118 MMHG | HEART RATE: 61 BPM | DIASTOLIC BLOOD PRESSURE: 79 MMHG

## 2017-08-24 PROCEDURE — 74011250636 HC RX REV CODE- 250/636

## 2017-08-24 PROCEDURE — 74011000250 HC RX REV CODE- 250

## 2017-08-24 PROCEDURE — 77030014124 HC TY EPDRL BBMI -A: Performed by: PHYSICAL MEDICINE & REHABILITATION

## 2017-08-24 PROCEDURE — 74011250637 HC RX REV CODE- 250/637: Performed by: PHYSICAL MEDICINE & REHABILITATION

## 2017-08-24 PROCEDURE — 77030003543 HC NDL EPDRL BBMI -A: Performed by: PHYSICAL MEDICINE & REHABILITATION

## 2017-08-24 PROCEDURE — 74011636320 HC RX REV CODE- 636/320

## 2017-08-24 PROCEDURE — 76010000009 HC PAIN MGT 0 TO 30 MIN PROC: Performed by: PHYSICAL MEDICINE & REHABILITATION

## 2017-08-24 RX ORDER — LIDOCAINE HYDROCHLORIDE 10 MG/ML
INJECTION, SOLUTION EPIDURAL; INFILTRATION; INTRACAUDAL; PERINEURAL AS NEEDED
Status: DISCONTINUED | OUTPATIENT
Start: 2017-08-24 | End: 2017-08-24 | Stop reason: HOSPADM

## 2017-08-24 RX ORDER — DEXAMETHASONE SODIUM PHOSPHATE 100 MG/10ML
INJECTION INTRAMUSCULAR; INTRAVENOUS AS NEEDED
Status: DISCONTINUED | OUTPATIENT
Start: 2017-08-24 | End: 2017-08-24 | Stop reason: HOSPADM

## 2017-08-24 RX ORDER — DIAZEPAM 5 MG/1
5-20 TABLET ORAL ONCE
Status: COMPLETED | OUTPATIENT
Start: 2017-08-24 | End: 2017-08-24

## 2017-08-24 RX ADMIN — DIAZEPAM 10 MG: 5 TABLET ORAL at 13:32

## 2017-08-24 NOTE — DISCHARGE INSTRUCTIONS
[facility]   ([place])  Dr. Seb Espana, Dr. Obadiah Severe, Dr. Nestor Han not drive a car, operate heavy machinery or dangerous equipment for 24 hours. * Activity as tolerated; rest for the remainder of the day. * Resume pre-block medications including those for your family doctor. * Do not drink alcoholic beverages for 24 hours. Alcohol and the medications you have received may interact and cause an adverse reaction. * You may feel better this evening and worse tomorrow, as the numbing medications wears off and the steroid has yet to begin to work. After 48 hrs the steroid should begin to release bringing you relief. * You may shower this evening and remove any bandages. * Avoid hot tubs and heating pads for 24 hours. You may use cold packs on the procedure site as tolerated for the first 24 hours. * If a headache develops, drink plenty of fluids and rest.  Take over the counter medications for headache if needed. If the headache continues longer than 24 hours, call MD at the [address] [place] [address]. [phone]    * Continue taking pain medications as needed. * You may resume your regular diet if tolerated. Otherwise, start with sips of water and advance slowly. * If Diabetic: check your blood sugar three times a day for the next 3 days. If your sugar is greater than 300 call your family doctor. If your sugar is greater than 400, have someone transport you to the nearest Emergency Room. * If you experience any of the following problems, Please Call the [address] [place] [address] at [phone].         * Shortness of Breath    * Fever of 101 or higher    * Nausea / Vomiting    * Severe Headache    * Weakness or numbness in arms or legs that is not      resolving    * Prolonged increase in pain greater than 4 days      DISCHARGE SUMMARY from Nurse      PATIENT INSTRUCTIONS:    After oral sedation, for 24 hours or while taking prescription Narcotics:  · Limit your activities  · Do not drive and operate hazardous machinery  · Do not make important personal or business decisions  · Do  not drink alcoholic beverages  · If you have not urinated within 8 hours after discharge, please contact your surgeon on call. Report the following to your surgeon:  · Excessive pain, swelling, redness or odor of or around the surgical area  · Temperature over 101  · Nausea and vomiting lasting longer than 4 hours or if unable to take medications  · Any signs of decreased circulation or nerve impairment to extremity: change in color, persistent  numbness, tingling, coldness or increase pain  · Any questions            What to do at Home:  Recommended activity: Activity as tolerated, NO DRIVING FOR 12 Hours post injection          *  Please give a list of your current medications to your Primary Care Provider. *  Please update this list whenever your medications are discontinued, doses are      changed, or new medications (including over-the-counter products) are added. *  Please carry medication information at all times in case of emergency situations. These are general instructions for a healthy lifestyle:    No smoking/ No tobacco products/ Avoid exposure to second hand smoke    Surgeon General's Warning:  Quitting smoking now greatly reduces serious risk to your health. Obesity, smoking, and sedentary lifestyle greatly increases your risk for illness    A healthy diet, regular physical exercise & weight monitoring are important for maintaining a healthy lifestyle    You may be retaining fluid if you have a history of heart failure or if you experience any of the following symptoms:  Weight gain of 3 pounds or more overnight or 5 pounds in a week, increased swelling in our hands or feet or shortness of breath while lying flat in bed.   Please call your doctor as soon as you notice any of these symptoms; do not wait until your next office visit. Recognize signs and symptoms of STROKE:    F-face looks uneven    A-arms unable to move or move unevenly    S-speech slurred or non-existent    T-time-call 911 as soon as signs and symptoms begin-DO NOT go       Back to bed or wait to see if you get better-TIME IS BRAIN. [employer] audience Activation    Thank you for requesting access to [employer]. Please follow the instructions below to securely access and download your online medical record. [employer] allows you to send messages to your doctor, view your test results, renew your prescriptions, schedule appointments, and more. How Do I Sign Up? 1. In your internet browser, go to [email or web address]  2. Click on the First Time User? Click Here link in the Sign In box. You will be redirect to the New Member Sign Up page. 3. Enter your Bizible Access Code exactly as it appears below. You will not need to use this code after youve completed the sign-up process. If you do not sign up before the expiration date, you must request a new code. Bizible Access Code: Activation code not generated  Current Bizible Status: Patient Declined (This is the date your Bizible access code will )    4. Enter the last four digits of your Social Security Number (xxxx) and Date of Birth (mm/dd/yyyy) as indicated and click Submit. You will be taken to the next sign-up page. 5. Create a Bizible ID. This will be your Bizible login ID and cannot be changed, so think of one that is secure and easy to remember. 6. Create a Bizible password. You can change your password at any time. 7. Enter your Password Reset Question and Answer. This can be used at a later time if you forget your password. 8. Enter your e-mail address. You will receive e-mail notification when new information is available in [address] 9. Click Sign Up. You can now view and download portions of your medical record.   10. Click the Download Summary menu link to download a portable copy of your medical information. Additional Information    If you have questions, please visit the Frequently Asked Questions section of the [employer] website at [email or web address] Remember, [employer] is NOT to be used for urgent needs. For medical emergencies, dial 911.

## 2017-08-24 NOTE — PROCEDURES
Intralaminar Epidural Steroid Block Procedure Note      Patient Name: Radha Townsend    Date of Procedure: August 24, 2017    Preoperative Diagnosis: Cervical postlaminectomy syndrome [M96.1]  Annular tear of lumbar disc [M51.36]    Post Operative Diagnosis: Cervical postlaminectomy syndrome [M96.1]  Annular tear of lumbar disc [M51.36]    Procedure: L3-L4 Epidural Block     PROCEDURE:  Lumbar Epidural Block    Consent:  Informed  Consent was obtained prior to the procedure. The patient was given the opportunity to ask questions regarding the procedure and its associated risks. In addition to the potential risks associated with the procedure itself, the patient was informed both verbally and in writing of potential side effects of the use glucocorticoids. The patient appeared to comprehend the informed consent and desired to have the procedure performed. The patient was placed in the prone position on the fluoroscopy table and the back prepped and draped in the usual sterile manner. The interlaminar space was identified using fluoroscopy and the skin anesthetized with 1% Lidocaine. A #18 Tuohy Epidural needle was advanced under fluoroscopic control from a paramedian approach to the  epidural space as noted above, using the loss of resistance to fluid technique. Then, 30 mg of preservative free Dexamethasone and 4 cc of Lidocaine was slowly injected. The patient tolerated the procedure well. The injection area was cleaned and band aids applied. No excessive bleeding was noted. Patient dressed and was discharged to home with instructions.

## 2017-08-28 ENCOUNTER — HOSPITAL ENCOUNTER (OUTPATIENT)
Dept: VASCULAR SURGERY | Age: 52
Discharge: HOME | End: 2017-08-28
Attending: NURSE PRACTITIONER
Payer: MEDICARE

## 2017-08-28 DIAGNOSIS — R41.0 CONFUSION AND DISORIENTATION: ICD-10-CM

## 2017-08-28 DIAGNOSIS — Z86.73 HISTORY OF CVA (CEREBROVASCULAR ACCIDENT): ICD-10-CM

## 2017-08-28 PROCEDURE — 93880 EXTRACRANIAL BILAT STUDY: CPT

## 2017-08-28 NOTE — PROCEDURES
[facility]  *** FINAL REPORT ***    Name: Liza Ruiz  MRN: [id number]    Outpatient  : 1965  HIS Order #: [id number]  [id number] [facility] Visit #: [id number]  Date: 28 Aug 2017    TYPE OF TEST: Cerebrovascular Duplex    REASON FOR TEST  Transient ischemic attacks, Hypertension, unspecified, Tobacco use    Right Carotid:-             Proximal               Mid                 Distal  cm/s  Systolic  Diastolic  Systolic  Diastolic  Systolic  Diastolic  CCA:     01.0      24.0       95.0      26.0      102.0      30.0  Bulb:  ECA:     78.0      19.0  ICA:     72.0      18.0       65.0      26.0       66.0      13.0  ICA/CCA:  0.7       0.6    ICA Stenosis: Normal    Right Vertebral:-  Finding: Antegrade  Sys:       41.0  Zoila:       11.0    Right Subclavian: Normal    Left Carotid:-            Proximal                Mid                 Distal  cm/s  Systolic  Diastolic  Systolic  Diastolic  Systolic  Diastolic  CCA:     01.7      22.0       89.0      26.0       92.0      26.0  Bulb:  ECA:     75.0      17.0  ICA:     44.0      17.0       60.0      21.0       63.0      29.0  ICA/CCA:  0.7       1.1    ICA Stenosis: Normal    Left Vertebral:-  Finding: Antegrade  Sys:       31.0  Zoila:        9.0    Left Subclavian: Normal    INTERPRETATION/FINDINGS  Duplex images were obtained using 2-D gray scale, color flow, and  spectral Doppler analysis. 1. No evidence of significant arterial occlusive disease in the  internal carotid arteries. 2. No significant stenosis in the external carotid arteries  bilaterally. 3. Antegrade flow in both vertebral arteries. 4. Normal flow in both subclavian arteries. ADDITIONAL COMMENTS  No previous study available for comparison. I have personally reviewed the data relevant to the interpretation of  this  study. TECHNOLOGIST: ABBIE Helton RVS  Signed: 2017 03:21 PM    PHYSICIAN: Shelbi Lockett D.O.   Signed: 2017 04:10 PM

## 2017-09-05 ENCOUNTER — OFFICE VISIT (OUTPATIENT)
Dept: FAMILY MEDICINE CLINIC | Age: 52
End: 2017-09-05

## 2017-09-05 VITALS
RESPIRATION RATE: 20 BRPM | TEMPERATURE: 95.6 F | OXYGEN SATURATION: 96 % | BODY MASS INDEX: 24.62 KG/M2 | WEIGHT: 172 LBS | HEIGHT: 70 IN | HEART RATE: 58 BPM | DIASTOLIC BLOOD PRESSURE: 78 MMHG | SYSTOLIC BLOOD PRESSURE: 112 MMHG

## 2017-09-05 DIAGNOSIS — J45.40 MODERATE PERSISTENT ASTHMA WITHOUT COMPLICATION: Primary | ICD-10-CM

## 2017-09-05 DIAGNOSIS — Z23 ENCOUNTER FOR IMMUNIZATION: ICD-10-CM

## 2017-09-05 DIAGNOSIS — R61 DIAPHORESIS: ICD-10-CM

## 2017-09-05 DIAGNOSIS — K21.00 GASTROESOPHAGEAL REFLUX DISEASE WITH ESOPHAGITIS: ICD-10-CM

## 2017-09-05 DIAGNOSIS — M25.561 ACUTE PAIN OF RIGHT KNEE: ICD-10-CM

## 2017-09-05 DIAGNOSIS — Z86.73 HISTORY OF CVA (CEREBROVASCULAR ACCIDENT): ICD-10-CM

## 2017-09-05 DIAGNOSIS — R41.0 CONFUSION: ICD-10-CM

## 2017-09-05 RX ORDER — RANITIDINE 150 MG/1
150 TABLET, FILM COATED ORAL
Qty: 30 TAB | Refills: 2 | Status: SHIPPED | OUTPATIENT
Start: 2017-09-05 | End: 2017-11-29 | Stop reason: SDUPTHER

## 2017-09-05 RX ORDER — PANTOPRAZOLE SODIUM 40 MG/1
40 TABLET, DELAYED RELEASE ORAL DAILY
Qty: 30 TAB | Refills: 2 | Status: SHIPPED | OUTPATIENT
Start: 2017-09-05 | End: 2017-11-29 | Stop reason: SDUPTHER

## 2017-09-05 NOTE — PROGRESS NOTES
Pt presented today for Influenza Vaccine Administered without complaints. Pt observed for 5 min. No adverse  reactions noted and pt left office in stable condition.

## 2017-09-05 NOTE — PROGRESS NOTES
HISTORY OF PRESENT ILLNESS  Jill Joiner is a 46 y.o. male. 9/5/2017  8:25 AM    Chief Complaint   Patient presents with    Follow Up Chronic Condition     pt here for follow up chronic conditions Asthma, CVA, DDD Lumbar, GERD    Results     pt here for results of duplex studies       HPI: Here today for follow up chronic disease. Last labs done 1/2017 and 7/2017. Following with GI and the spine center (DDD lumbar, post-laminectomy syndrome). Asthma: Has been using Symbicort- has been managing symptoms. Has been using Albuterol inhaler PRN about 2-3 times per week. Allergies make asthma control worse. No new complaints. CVA history: History in 2010- has been on ASA since. No residual. Does not follow with neurology. GERD with esophagitis: EGD done 10/2016 showing Grade 1 esophagitis- PPI and H2 blocker being used which keeps symptoms controlled. Using Zofran PRN for severe nausea. Still has some breakthrough symptoms. Regular BMs without blood. History diverticulitis. Wife reports that they have seen GI afterwards and there was no new plans made. Episodes of pale, diaphoretic, dizzy, weak, and confused. Has been happening for a few years. He states that this happens intermittently and has no pattern that he can think of- can occur twice in a month and then not for a few months. Finds that eating something sweet helps with symptoms but reports that he can have an episode shortly after eating a meal. No syncope, no chest pain, and no SOB. History of CVA in 2010- self reported. A1c 5/2017 5.3%. Negative carotid duplex done recently. No episodes in the last month. Complains of right knee pain that started a few months ago after falling. He denies any direct fall on the knee but since then has been having inside right knee pain. Reports that if he takes a \"wrong step\" that it really hurts. Did wear a brace for a little while which helped some. Has not been taking anything for the pain. Review of Systems   Constitutional: Negative for chills, fever and malaise/fatigue. Eyes: Negative for double vision, photophobia and pain. Respiratory: Negative for cough, shortness of breath and wheezing. Cardiovascular: Negative for chest pain, palpitations and leg swelling. Gastrointestinal: Negative for abdominal pain, constipation, diarrhea, nausea and vomiting. Genitourinary: Negative for dysuria, frequency and urgency. Musculoskeletal: Positive for back pain and joint pain. Negative for myalgias. +chronic back pain   Neurological: Negative for dizziness, weakness and headaches. PHQ Screening   PHQ over the last two weeks 9/5/2017   Little interest or pleasure in doing things Not at all   Feeling down, depressed or hopeless Not at all   Total Score PHQ 2 0         History  Past Medical History:   Diagnosis Date    Asthma     DDD (degenerative disc disease), lumbar 2013    noted on MRI with annular tears    Diverticulitis 2016    GERD (gastroesophageal reflux disease) 2016    with esophagitis according to endscopy    Kidney stone     Lumbar post-laminectomy syndrome     Sacroiliitis ([facility] [address]     Stroke [facility]) may 14 2010    Unspecified rotator cuff tear or rupture of left shoulder, not specified as traumatic 03/2016       Past Surgical History:   Procedure Laterality Date    COLONOSCOPY N/A 8/12/2016    COLONOSCOPY with polypectomy performed by Robert Tovar MD at [address]  2002, 2004, 2006    L5-S1 fusion, laminectomies    HX ENDOSCOPY  10/2016    grade 1 espohagitis    HX ENDOSCOPY  11/2016    mild esophagitis    HX KNEE ARTHROSCOPY Right     knee    HX MOHS PROCEDURES      right    HX ORTHOPAEDIC      right shoulder       Social History     Social History    Marital status: SINGLE     Spouse name: N/A    Number of children: N/A    Years of education: N/A     Occupational History    Not on file.      Social History Main Topics    Smoking status: Former Smoker     Packs/day: 2.00     Types: Cigarettes    Smokeless tobacco: Never Used    Alcohol use No    Drug use: No    Sexual activity: Yes     Partners: Female     Other Topics Concern    Not on file     Social History Narrative    ** Merged History Encounter **            Family History   Problem Relation Age of Onset    Other Brother     Cancer Maternal Grandmother        Allergies   Allergen Reactions    Penicillins Angioedema    Penicillins Anaphylaxis    Vicodin [Hydrocodone-Acetaminophen] Nausea and Vomiting    Azithromycin Nausea and Vomiting    Vicodin [Hydrocodone-Acetaminophen] Nausea and Vomiting    Erythromycin Other (comments)       Current Outpatient Prescriptions   Medication Sig Dispense Refill    raNITIdine (ZANTAC) 150 mg tablet Take 1 Tab by mouth nightly. 30 Tab 2    pantoprazole (PROTONIX) 40 mg tablet Take 1 Tab by mouth daily. 30 Tab 2    gabapentin (NEURONTIN) 800 mg tablet Take 1 Tab by mouth three (3) times daily. 90 Tab 5    budesonide-formoterol (SYMBICORT) 160-4.5 mcg/actuation HFA inhaler Take 1 Puff by inhalation two (2) times a day. 1 Inhaler 5    cyclobenzaprine (FLEXERIL) 10 mg tablet Take 1 Tab by mouth three (3) times daily as needed for Muscle Spasm(s). 30 Tab 5    albuterol (PROVENTIL HFA, VENTOLIN HFA, PROAIR HFA) 90 mcg/actuation inhaler Take 2 Puffs by inhalation every six (6) hours as needed for Wheezing or Shortness of Breath. 1 Inhaler 11    aspirin 81 mg chewable tablet Take 81 mg by mouth daily. Health Maintenance and Screenings  *Medicare wellness done 11/2016      Advance Care Planning:   Patient was offered the opportunity to discuss advance care planning NO   Does patient have an Advance Directive:  NO   If no, did you provide information on Caring Connections?          Patient Care Team:  Patient Care Team:  Kelsey Collins NP as PCP - General (Nurse Practitioner)  Martinez Johnson MD (Orthopedic Surgery)  Natalie Mcfarland MD (Physiatry)  Gaston Day MD (Gastroenterology)  Precious Granados MD (Urology)        LABS:     Ref. Range 7/7/2017 11:19   Sodium Latest Ref Range: 134 - 144 mmol/L 142   Potassium Latest Ref Range: 3.5 - 5.2 mmol/L 4.7   Chloride Latest Ref Range: 96 - 106 mmol/L 107 (H)   CO2 Latest Ref Range: 18 - 29 mmol/L 20   Glucose Latest Ref Range: 65 - 99 mg/dL 111 (H)   BUN Latest Ref Range: 6 - 24 mg/dL 15   Creatinine Latest Ref Range: 0.76 - 1.27 mg/dL 0.79   BUN/Creatinine ratio Latest Ref Range: 9 - 20  19   Calcium Latest Ref Range: 8.7 - 10.2 mg/dL 8.8   GFR est non-AA Latest Ref Range: >59 mL/min/1.73 104   GFR est AA Latest Ref Range: >59 mL/min/1.73 120       RADIOLOGY:    INTERPRETATION/FINDINGS  Duplex images were obtained using 2-D gray scale, color flow, and  spectral Doppler analysis. 1. No evidence of significant arterial occlusive disease in the  internal carotid arteries. 2. No significant stenosis in the external carotid arteries  bilaterally. 3. Antegrade flow in both vertebral arteries. 4. Normal flow in both subclavian arteries. Physical Exam   Constitutional: He is oriented to person, place, and time. He appears well-developed and well-nourished. No distress. Eyes: EOM are normal. Pupils are equal, round, and reactive to light. Neck: Normal range of motion. Neck supple. Cardiovascular: Normal rate, regular rhythm and normal heart sounds. No murmur heard. Pulmonary/Chest: Effort normal and breath sounds normal. No respiratory distress. Abdominal: Soft. Bowel sounds are normal. There is no tenderness. Musculoskeletal: He exhibits no edema. Right knee: He exhibits normal range of motion, no swelling, no effusion, no LCL laxity, normal patellar mobility and no MCL laxity. Tenderness found. Medial joint line tenderness noted. Neurological: He is alert and oriented to person, place, and time. No cranial nerve deficit.  He exhibits normal muscle tone. Coordination normal.   Skin: Skin is warm and dry. Psychiatric: His speech is normal. His mood appears not anxious. He does not exhibit a depressed mood. Vitals:    09/05/17 0816   BP: 112/78   Pulse: (!) 58   Resp: 20   Temp: 95.6 °F (35.3 °C)   TempSrc: Oral   SpO2: 96%   Weight: 172 lb (78 kg)   Height: 5' 10\" (1.778 m)   PainSc:   7   PainLoc: Knee       ASSESSMENT and PLAN  Diagnoses and all orders for this visit:      Moderate persistent asthma without complication  *Controlled, continue current maintenance inhaler and PRN Albuterol. Refills not needed at this time. History of CVA (cerebrovascular accident)  *Continue on ASA- may be contributing to below; however, due to history of CVA, continuance recommended. Gastroesophageal reflux disease with esophagitis  *Will continue on PPI and H2 blocker therapy at this. Encouraged to follow up with GI. Hpylori negative in past.  - raNITIdine (ZANTAC) 150 mg tablet; Take 1 Tab by mouth nightly. Dispense: 30 Tab; Refill: 2  - pantoprazole (PROTONIX) 40 mg tablet; Take 1 Tab by mouth daily. Dispense: 30 Tab; Refill: 2    Confusion/ Diaphoresis  *Discussed with patient and wife. Advised on possible causes- negative DM work up, CT head negative, and negative carotids. Advised on cardiac work up such as stress test- declines at this time. *Discussed with patient about symptoms that would require an ER visit. Patient understands symptoms that are an emergency and will go to the ER if they occur. Acute pain of right knee  *No instability noted in knee. Advised on conservative management including ice, brace use, exercises, and Tylenol PRN. Advised on xray, joint injection, and/or orthopedics referral- declines at this time. Encounter for immunization  *Given today in office.  See LPN documentation.   - ADMIN INFLUENZA VIRUS VAC  - INFLUENZA VIRUS VACCINE QUADRIVALENT, PRESERVATIVE FREE SYRINGE ([id number])        *Plan of care reviewed with patient. Patient in agreement with plan and expresses understanding. All questions answered and patient encouraged to call or RTO if further questions or concerns. Follow-up Disposition:  Return in about 3 months (around 12/5/2017) for chronic disease routine care- 30 min.

## 2017-09-25 ENCOUNTER — OFFICE VISIT (OUTPATIENT)
Dept: ORTHOPEDIC SURGERY | Age: 52
End: 2017-09-25

## 2017-09-25 VITALS
DIASTOLIC BLOOD PRESSURE: 71 MMHG | HEART RATE: 63 BPM | WEIGHT: 175.4 LBS | BODY MASS INDEX: 25.11 KG/M2 | SYSTOLIC BLOOD PRESSURE: 104 MMHG | RESPIRATION RATE: 20 BRPM | HEIGHT: 70 IN

## 2017-09-25 DIAGNOSIS — M96.1 POSTLAMINECTOMY SYNDROME OF LUMBAR REGION: Primary | ICD-10-CM

## 2017-09-25 DIAGNOSIS — M51.36 OTHER INTERVERTEBRAL DISC DEGENERATION, LUMBAR REGION: ICD-10-CM

## 2017-09-25 RX ORDER — GABAPENTIN 800 MG/1
800 TABLET ORAL 3 TIMES DAILY
Qty: 90 TAB | Refills: 5 | Status: SHIPPED | OUTPATIENT
Start: 2017-09-25 | End: 2018-01-30 | Stop reason: SDUPTHER
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
## 2017-09-25 NOTE — MR AVS SNAPSHOT
Visit Information Date & Time Provider Department Dept. Phone Encounter #  
 9/25/2017  9:15 AM Mesfin Mariscal  [address], [address] [facility] - SPECIALTY [place] [address] [phone] Follow-up Instructions Return for with NP in 6 months. Your Appointments 11/21/2017  8:30 AM  
Office Visit with INDIANA Sanders ([facility]) Appt Note: [address] [facility] [address]  
[facility] [address]  
  
    
 12/5/2017  8:30 AM  
Follow Up with INDIANA Sanders ([facility]) Appt Note: 3 month f/u 30 min chronic disease [facility] [address] [facility]  
[phone]  
  
   
 [address]  
  
    
  
 10/6/2017  9:30 AM  
Any with Tadeo Lyn MD  
[facility] ([facility]) Appt Note: 3m fu  
 [address] 
[address] [facility] [address]  
[address] [facility] [address] [place] [address] Upcoming Health Maintenance Date Due DTaP/Tdap/Td series (1 - Tdap) 1/2/2011 MEDICARE YEARLY EXAM 11/18/2017 Prostate-Specific Antigen 1/30/2018 COLONOSCOPY 8/12/2021 Allergies as of 9/25/2017  Review Complete On: 9/25/2017 By: Mesfin Mariscal MD  
  
 Severity Noted Reaction Type Reactions Penicillins High 10/14/2012    Angioedema Penicillins High 05/15/2013    Anaphylaxis Vicodin [Hydrocodone-acetaminophen] High 10/14/2012    Nausea and Vomiting Azithromycin  06/02/2016    Nausea and Vomiting Vicodin [Hydrocodone-acetaminophen]  05/15/2013    Nausea and Vomiting Erythromycin Low 07/12/2016    Other (comments) Current Immunizations  Reviewed on 2/2/2017 Name Date Influenza Vaccine (Quad) PF 9/5/2017, 11/17/2016 Pneumococcal Polysaccharide (PPSV-23) 2/2/2017 Td 1/1/2011 Not reviewed this visit You Were Diagnosed With   
  
 Codes Comments Postlaminectomy syndrome of lumbar region    -  Primary ICD-10-CM: M96.1 ICD-9-CM: 722.83 Other intervertebral disc degeneration, lumbar region     ICD-10-CM: M51.36 
ICD-9-CM: 722.52 Vitals BP Pulse Resp Height(growth percentile) Weight(growth percentile) BMI  
 104/71 (BP 1 Location: Left arm, BP Patient Position: Sitting) 63 20 5' 10\" (1.778 m) 175 lb 6.4 oz (79.6 kg) 25.17 kg/m2 Smoking Status Former Smoker Vitals History BMI and BSA Data Body Mass Index Body Surface Area  
 25.17 kg/m 2 1.98 m 2 Preferred Pharmacy Pharmacy Name Phone [facility] [address] [phone] Your Updated Medication List  
  
   
This list is accurate as of: 9/25/17 10:00 AM.  Always use your most recent med list.  
  
  
  
  
 albuterol 90 mcg/actuation inhaler Commonly known as:  PROVENTIL HFA, VENTOLIN HFA, PROAIR HFA Take 2 Puffs by inhalation every six (6) hours as needed for Wheezing or Shortness of Breath. aspirin 81 mg chewable tablet Take 81 mg by mouth daily. budesonide-formoterol 160-4.5 mcg/actuation HFA inhaler Commonly known as:  SYMBICORT Take 1 Puff by inhalation two (2) times a day. cyclobenzaprine 10 mg tablet Commonly known as:  FLEXERIL Take 1 Tab by mouth three (3) times daily as needed for Muscle Spasm(s). * gabapentin 800 mg tablet Commonly known as:  NEURONTIN Take 1 Tab by mouth three (3) times daily. * gabapentin 800 mg tablet Commonly known as:  NEURONTIN Take 1 Tab by mouth three (3) times daily. pantoprazole 40 mg tablet Commonly known as:  PROTONIX Take 1 Tab by mouth daily. raNITIdine 150 mg tablet Commonly known as:  ZANTAC Take 1 Tab by mouth nightly. * Notice:   This list has 2 medication(s) that are the same as other medications prescribed for you. Read the directions carefully, and ask your doctor or other care provider to review them with you. Prescriptions Sent to Pharmacy Refills  
 gabapentin (NEURONTIN) 800 mg tablet 5 Sig: Take 1 Tab by mouth three (3) times daily. Class: Normal  
 Pharmacy: [facility] [address]  #: [phone] Route: Oral  
  
Follow-up Instructions Return for with NP in 6 months. Please provide this summary of care documentation to your next provider. Your primary care clinician is listed as Stephen Osgood. If you have any questions after today's visit, please call [phone].

## 2017-10-31 ENCOUNTER — OFFICE VISIT (OUTPATIENT)
Dept: ORTHOPEDIC SURGERY | Age: 52
End: 2017-10-31

## 2017-10-31 VITALS
HEART RATE: 60 BPM | BODY MASS INDEX: 25.05 KG/M2 | RESPIRATION RATE: 18 BRPM | HEIGHT: 70 IN | WEIGHT: 175 LBS | OXYGEN SATURATION: 99 % | SYSTOLIC BLOOD PRESSURE: 115 MMHG | DIASTOLIC BLOOD PRESSURE: 64 MMHG | TEMPERATURE: 97.4 F

## 2017-10-31 DIAGNOSIS — M54.16 RADICULOPATHY, LUMBAR REGION: ICD-10-CM

## 2017-10-31 DIAGNOSIS — M51.36 DEGENERATIVE DISC DISEASE, LUMBAR: Primary | ICD-10-CM

## 2017-10-31 DIAGNOSIS — M51.26 LUMBAR HERNIATED DISC: ICD-10-CM

## 2017-10-31 RX ORDER — KETOROLAC TROMETHAMINE 15 MG/ML
30 INJECTION, SOLUTION INTRAMUSCULAR; INTRAVENOUS ONCE
Qty: 1 VIAL | Refills: 0
Start: 2017-10-31 | End: 2017-10-31

## 2017-10-31 RX ORDER — METHYLPREDNISOLONE 4 MG/1
TABLET ORAL
Qty: 1 DOSE PACK | Refills: 0 | Status: SHIPPED | OUTPATIENT
Start: 2017-10-31 | End: 2017-11-08 | Stop reason: ALTCHOICE

## 2017-10-31 NOTE — MR AVS SNAPSHOT
Visit Information Date & Time Provider Department Dept. Phone Encounter #  
 10/31/2017 10:00 AM Art Goode NP [facility] [phone] [id number] Follow-up Instructions Return in about 4 weeks (around 11/28/2017) for w/ NP for fu. Your Appointments 11/21/2017  8:30 AM  
Office Visit with INDIANA Padilla ([facility]) Appt Note: [address] [facility] [address]  
[facility] [address]  
  
    
 12/5/2017  8:30 AM  
Follow Up with Moustapha Nathan, NP [address] ([facility]) Appt Note: 3 month f/u 30 min chronic disease [address]  
  
   
 [address] [facility]  
  
    
 3/27/2018  8:30 AM  
Follow Up with Cindy Leon MD  
[address] [facility], [address] and [facility] [place] ([facility]) Appt Note: 6 mo follow up  lower back/pat request Dr Anshul Kitchen not NP  
 [address] [facility] [address] [facility] [address]  
  
    
  
 12/14/2017  8:30 AM  
Any with Florina Homans, MD  
[facility] ([facility]) Appt Note: 3m fu; mailed new appt; 3m fu  
 [address] 
[address] [facility] [address]  
[address] [facility] [address] [facility] [address] Upcoming Health Maintenance Date Due DTaP/Tdap/Td series (1 - Tdap) 1/2/2011 MEDICARE YEARLY EXAM 11/18/2017 Prostate-Specific Antigen 1/30/2018 COLONOSCOPY 8/12/2021 Allergies as of 10/31/2017  Review Complete On: 9/25/2017 By: Cindy Leon MD  
  
 Severity Noted Reaction Type Reactions Penicillins High 10/14/2012    Angioedema Penicillins High 05/15/2013    Anaphylaxis  Vicodin [Hydrocodone-acetaminophen] High 10/14/2012    Nausea and Vomiting Azithromycin  06/02/2016    Nausea and Vomiting Vicodin [Hydrocodone-acetaminophen]  05/15/2013    Nausea and Vomiting Erythromycin Low 07/12/2016    Other (comments) Current Immunizations  Reviewed on 2/2/2017 Name Date Influenza Vaccine (Quad) PF 9/5/2017, 11/17/2016 Pneumococcal Polysaccharide (PPSV-23) 2/2/2017 Td 1/1/2011 Not reviewed this visit You Were Diagnosed With   
  
 Codes Comments Degenerative disc disease, lumbar    -  Primary ICD-10-CM: M51.36 
ICD-9-CM: 722.52 Radiculopathy, lumbar region     ICD-10-CM: M54.16 
ICD-9-CM: 724.4 Lumbar herniated disc     ICD-10-CM: M51.26 
ICD-9-CM: 722.10 Vitals BP Pulse Temp Resp Height(growth percentile) Weight(growth percentile)  
 115/64 60 97.4 °F (36.3 °C) 18 5' 10\" (1.778 m) 175 lb (79.4 kg) SpO2 BMI Smoking Status 99% 25.11 kg/m2 Former Smoker BMI and BSA Data Body Mass Index Body Surface Area  
 25.11 kg/m 2 1.98 m 2 Preferred Pharmacy Pharmacy Name Phone [facility] [phone] Your Updated Medication List  
  
   
This list is accurate as of: 10/31/17 10:54 AM.  Always use your most recent med list.  
  
  
  
  
 albuterol 90 mcg/actuation inhaler Commonly known as:  PROVENTIL HFA, VENTOLIN HFA, PROAIR HFA Take 2 Puffs by inhalation every six (6) hours as needed for Wheezing or Shortness of Breath. aspirin 81 mg chewable tablet Take 81 mg by mouth daily. budesonide-formoterol 160-4.5 mcg/actuation Hfaa Commonly known as:  SYMBICORT Take 1 Puff by inhalation two (2) times a day. cyclobenzaprine 10 mg tablet Commonly known as:  FLEXERIL Take 1 Tab by mouth three (3) times daily as needed for Muscle Spasm(s). * gabapentin 800 mg tablet Commonly known as:  NEURONTIN Take 1 Tab by mouth three (3) times daily. * gabapentin 800 mg tablet Commonly known as:  NEURONTIN Take 1 Tab by mouth three (3) times daily. ketorolac 15 mg/mL Soln injection Commonly known as:  TORADOL  
2 mL by IntraMUSCular route once for 1 dose. methylPREDNISolone 4 mg tablet Commonly known as:  MEDROL (CRISTA) Per dose pack instructions  
  
 pantoprazole 40 mg tablet Commonly known as:  PROTONIX Take 1 Tab by mouth daily. raNITIdine 150 mg tablet Commonly known as:  ZANTAC Take 1 Tab by mouth nightly. * Notice: This list has 2 medication(s) that are the same as other medications prescribed for you. Read the directions carefully, and ask your doctor or other care provider to review them with you. Prescriptions Sent to Pharmacy Refills  
 methylPREDNISolone (MEDROL, CRISTA,) 4 mg tablet 0 Sig: Per dose pack instructions Class: Normal  
 Pharmacy: [address]  #: [phone] We Performed the Following KETOROLAC TROMETHAMINE INJ [ \[facility]\""] OK THER/PROPH/DIAG INJECTION, SUBCUT/IM [id number] CPT(R)] Follow-up Instructions Return in about 4 weeks (around 11/28/2017) for w/ NP for fu. Patient Instructions Back Pain: Care Instructions Your Care Instructions Back pain has many possible causes. It is often related to problems with muscles and ligaments of the back. It may also be related to problems with the nerves, discs, or bones of the back. Moving, lifting, standing, sitting, or sleeping in an awkward way can strain the back. Sometimes you don't notice the injury until later. Arthritis is another common cause of back pain. Although it may hurt a lot, back pain usually improves on its own within several weeks. Most people recover in 12 weeks or less. Using good home treatment and being careful not to stress your back can help you feel better sooner. Follow-up care is a key part of your treatment and safety.  Be sure to make and go to all appointments, and call your doctor if you are having problems. It's also a good idea to know your test results and keep a list of the medicines you take. How can you care for yourself at home? · Sit or lie in positions that are most comfortable and reduce your pain. Try one of these positions when you lie down: ¨ Lie on your back with your knees bent and supported by large pillows. ¨ Lie on the floor with your legs on the seat of a sofa or chair. Katiuska Phoebe on your side with your knees and hips bent and a pillow between your legs. ¨ Lie on your stomach if it does not make pain worse. · Do not sit up in bed, and avoid soft couches and twisted positions. Bed rest can help relieve pain at first, but it delays healing. Avoid bed rest after the first day of back pain. · Change positions every 30 minutes. If you must sit for long periods of time, take breaks from sitting. Get up and walk around, or lie in a comfortable position. · Try using a heating pad on a low or medium setting for 15 to 20 minutes every 2 or 3 hours. Try a warm shower in place of one session with the heating pad. · You can also try an ice pack for 10 to 15 minutes every 2 to 3 hours. Put a thin cloth between the ice pack and your skin. · Take pain medicines exactly as directed. ¨ If the doctor gave you a prescription medicine for pain, take it as prescribed. ¨ If you are not taking a prescription pain medicine, ask your doctor if you can take an over-the-counter medicine. · Take short walks several times a day. You can start with 5 to 10 minutes, 3 or 4 times a day, and work up to longer walks. Walk on level surfaces and avoid hills and stairs until your back is better. · Return to work and other activities as soon as you can. Continued rest without activity is usually not good for your back.  
· To prevent future back pain, do exercises to stretch and strengthen your back and stomach. Learn how to use good posture, safe lifting techniques, and proper body mechanics. When should you call for help? Call your doctor now or seek immediate medical care if: 
? · You have new or worsening numbness in your legs. ? · You have new or worsening weakness in your legs. (This could make it hard to stand up.) ? · You lose control of your bladder or bowels. ? Watch closely for changes in your health, and be sure to contact your doctor if: 
? · Your pain gets worse. ? · You are not getting better after 2 weeks. Where can you learn more? Go to http://lola-conner.info/. Enter J015 in the search box to learn more about \"Back Pain: Care Instructions. \" Current as of: March 21, 2017 Content Version: 11.4 © 3593-6331 [employer]. Care instructions adapted under license by [employer] (which disclaims liability or warranty for this information). If you have questions about a medical condition or this instruction, always ask your healthcare professional. [place] [address] any warranty or liability for your use of this information. Introducing hospitals & HEALTH SERVICES! Dear Dagoberto Griffith: Thank you for requesting a [employer] account. Our records indicate that you have previously registered for a [employer] account but its currently inactive. Please call our [employer] support line at [phone]. Additional Information If you have questions, please visit the Frequently Asked Questions section of the [employer] website at [email or web address] Clinicbook/[employer]/. Remember, [employer] is NOT to be used for urgent needs. For medical emergencies, dial 911. Now available from your iPhone and Android! Please provide this summary of care documentation to your next provider. Your primary care clinician is listed as Indiana Greene. If you have any questions after today's visit, please call [phone].

## 2017-10-31 NOTE — PROGRESS NOTES
[address] [facility],[address]  [facility] ([place], [address]  Phone: [phone]  Fax: [phone]    Lennie Mallory  : 1965  PCP: Anthony Serrano NP    PROGRESS NOTE    HISTORY OF PRESENT ILLNESS:  Chief Complaint   Patient presents with    Back Pain     increased back pain     Leonard Mariscal is a 46 y.o.  male with history of low back pain without radicular symptoms. He has a history of a L5-S1 fusion and is diagnosed with lumbar post laminectomy syndrome. He also has sacroiliits and He reports bilateral SI joint injections provided significant relief. He is intolerant of Cymbalta. He recently underwent a L3/4 epidural in August noting great relief. He was to continue Neurontin 800 mg TID. Today, he states his pain is much worse today. This pain started about 1 week ago, no injury. This is a different location of pain. His pain today is in his left low back, buttocks, postieor thigh to his heel, L5-S1 distribution. Denies bladder/bowel dysfunction, saddle paresthesia, weakness, gait disturbance, or other neurological deficit. States he has been using flexeril and motrin with minimal relief. Pt at this time desires to  continue with current care/proceed with medication evaluation. Lumbar spine MRI dated 3/8/17 reviewed. Per report, similar surgical changes of decompression and fusion L5/S1. Patent central canal and foramina at this level. Slightly progressed degenerative changes above the fusion level with multiple level posterior annular fissures and disc herniations as discussed. No high-grade central canal stenosis or foraminal stenosis. L4/L5 disc extrusion increased in size and narrowing right greater than left lateral recesses with abutment of the crossing L5 nerve root but no gross impingement    ASSESSMENT  46 y.o. male with low back pain. Diagnoses and all orders for this visit:    1. Degenerative disc disease, lumbar    2.  Radiculopathy, lumbar region    3. Lumbar herniated disc         IMPRESSION/PLAN    1) Pt was given information on low back exercises. 2) Toradol injection today for pain. 3) MDP to start tomorrow for this acute flare of pain. No NSAIDS while on MDP. 4) Tylenol for pain. 5) Mr. Patricia Cortez has a reminder for a \"due or due soon\" health maintenance. I have asked that he contact his primary care provider, Macario Bloch, NP, for follow-up on this health maintenance. 6) We have informed patient to notify us for immediate appointment if he has any worsening neurogical symptoms or if an emergency situation presents, then call 911  7) Pt will follow-up in 4 weeks. No pain behaviors. Denies thoughts of harming self or others. Pt has a good risk to benefit ratio which allows the pt to function in a home environment without side effects. PAST MEDICAL HISTORY  Past Medical History:   Diagnosis Date    Asthma     DDD (degenerative disc disease), lumbar 2013    noted on MRI with annular tears    Diverticulitis 2016    GERD (gastroesophageal reflux disease) 2016    with esophagitis according to endscopy    Kidney stone     Lumbar post-laminectomy syndrome     [facility]roiliitis ([facility] [address]     Stroke [facility]) may 14 2010    Unspecified rotator cuff tear or rupture of left shoulder, not specified as traumatic 03/2016        MEDICATIONS  Current Outpatient Prescriptions   Medication Sig Dispense Refill    gabapentin (NEURONTIN) 800 mg tablet Take 1 Tab by mouth three (3) times daily. 90 Tab 5    raNITIdine (ZANTAC) 150 mg tablet Take 1 Tab by mouth nightly. 30 Tab 2    pantoprazole (PROTONIX) 40 mg tablet Take 1 Tab by mouth daily. 30 Tab 2    gabapentin (NEURONTIN) 800 mg tablet Take 1 Tab by mouth three (3) times daily. 90 Tab 5    budesonide-formoterol (SYMBICORT) 160-4.5 mcg/actuation HFA inhaler Take 1 Puff by inhalation two (2) times a day.  1 Inhaler 5    cyclobenzaprine (FLEXERIL) 10 mg tablet Take 1 Tab by mouth three (3) times daily as needed for Muscle Spasm(s). 30 Tab 5    albuterol (PROVENTIL HFA, VENTOLIN HFA, PROAIR HFA) 90 mcg/actuation inhaler Take 2 Puffs by inhalation every six (6) hours as needed for Wheezing or Shortness of Breath. 1 Inhaler 11    aspirin 81 mg chewable tablet Take 81 mg by mouth daily. ALLERGIES  Allergies   Allergen Reactions    Penicillins Angioedema    Penicillins Anaphylaxis    Vicodin [Hydrocodone-Acetaminophen] Nausea and Vomiting    Azithromycin Nausea and Vomiting    Vicodin [Hydrocodone-Acetaminophen] Nausea and Vomiting    Erythromycin Other (comments)       SOCIAL HISTORY    Social History     Social History    Marital status: SINGLE     Spouse name: N/A    Number of children: N/A    Years of education: N/A     Occupational History    Not on file. Social History Main Topics    Smoking status: Former Smoker     Packs/day: 2.00     Types: Cigarettes    Smokeless tobacco: Never Used    Alcohol use No    Drug use: No    Sexual activity: Yes     Partners: Female     Other Topics Concern    Not on file     Social History Narrative    ** Merged History Encounter **            SUBJECTIVE      Pain Scale: 10 - Worst pain ever/10    Pain Assessment  9/25/2017   Location of Pain Back   Location Modifiers -   Severity of Pain 4   Quality of Pain Aching   Quality of Pain Comment -   Duration of Pain Persistent   Frequency of Pain Constant   Aggravating Factors -   Aggravating Factors Comment -   Limiting Behavior Some   Relieving Factors -   Result of Injury -   Work-Related Injury -   Type of Injury -   Type of Injury Comment -       Accompanied by family member. REVIEW OF SYSTEMS  ROS    Constitutional: Negative for fever, chills, or weight change. Respiratory: Negative for cough or shortness of breath. Cardiovascular: Negative for chest pain or palpitations. Gastrointestinal: Negative for acid reflux, change in bowel habits, or constipation.   Genitourinary: Negative for incontinence, dysuria and flank pain. Musculoskeletal: Positive for low back and left leg pain. Skin: Negative for rash. Neurological: Negative for headaches, dizziness, or numbness. Endo/Heme/Allergies: Negative . Psychiatric/Behavioral: Negative. PHYSICAL EXAMINATION  Visit Vitals    /64    Pulse 60    Temp 97.4 °F (36.3 °C)    Resp 18    Ht 5' 10\" (1.778 m)    Wt 175 lb (79.4 kg)    SpO2 99%    BMI 25.11 kg/m2       Constitutional: Well developed,  well nourished,  awake, alert, and in no acute distress. Neurological:  Sensation to light touch is intact. Psychiatric: Affect and mood are appropriate. Integumentary: No rashes or abrasions noted on exposed areas,  warm, dry and intact. Cardiovascular/Peripheral Vascular:  No peripheral edema is noted. Lymphatic:  No evidence of lymphedema. No cervical lymphadenopathy. SPINE/MUSCULOSKELETAL EXAM        Lumbar spine:  No rash, ecchymosis, or gross obliquity. No fasciculations. No focal atrophy is noted. Range of motion is intact. Tenderness to palpation  To low back. SI joints non-tender. Trochanters non tender. Musculoskeletal:  No pain with extension, axial loading, or forward flexion. No pain with internal or external rotation of his hips. MOTOR     Hip Flex  Quads Hamstrings Ankle DF EHL Ankle PF   Right +4/5 +4/5 +4/5 +4/5 +4/5 +4/5   Left +4/5 +4/5 +4/5 +4/5 +4/5 +4/5   Straight Leg raise positive left. normal gait and station    Ambulation without assistive device. full weight bearing, non-antalgic gait.     Rajani Price NP

## 2017-10-31 NOTE — PATIENT INSTRUCTIONS

## 2017-11-07 ENCOUNTER — TELEPHONE (OUTPATIENT)
Dept: ORTHOPEDIC SURGERY | Age: 52
End: 2017-11-07

## 2017-11-07 NOTE — TELEPHONE ENCOUNTER
Have him come in tomorrow to see [address] [place] [address]. She will re-evaluate him and schedule a block if indicated.

## 2017-11-07 NOTE — TELEPHONE ENCOUNTER
Patient was seen 10/3/2017 given steroids which have not helped at all. Patient wants to go ahead a schedule a block as soon as possible due to his pain Please call patient back at [phone] with date and facility.

## 2017-11-08 ENCOUNTER — OFFICE VISIT (OUTPATIENT)
Dept: ORTHOPEDIC SURGERY | Age: 52
End: 2017-11-08

## 2017-11-08 VITALS
HEART RATE: 70 BPM | BODY MASS INDEX: 25.05 KG/M2 | DIASTOLIC BLOOD PRESSURE: 56 MMHG | HEIGHT: 70 IN | WEIGHT: 175 LBS | SYSTOLIC BLOOD PRESSURE: 97 MMHG | RESPIRATION RATE: 18 BRPM

## 2017-11-08 DIAGNOSIS — M96.1 POSTLAMINECTOMY SYNDROME OF LUMBAR REGION: ICD-10-CM

## 2017-11-08 DIAGNOSIS — M54.16 LUMBAR NEURITIS: ICD-10-CM

## 2017-11-08 DIAGNOSIS — Z51.81 ENCOUNTER FOR THERAPEUTIC DRUG MONITORING: ICD-10-CM

## 2017-11-08 DIAGNOSIS — M54.16 RADICULOPATHY, LUMBAR REGION: ICD-10-CM

## 2017-11-08 DIAGNOSIS — M54.16 LUMBAR NEURITIS: Primary | ICD-10-CM

## 2017-11-08 RX ORDER — OXYCODONE AND ACETAMINOPHEN 5; 325 MG/1; MG/1
1 TABLET ORAL
Qty: 25 TAB | Refills: 0 | Status: SHIPPED | OUTPATIENT
Start: 2017-11-08 | End: 2017-11-15 | Stop reason: ALTCHOICE

## 2017-11-08 NOTE — PROGRESS NOTES
Chief complaint/History of Present Illness:  Chief Complaint   Patient presents with    Back Pain     Follow up increased pain in low back    Leg Pain     ABRAM Regan is a  46 y.o.  male      HISTORY OF PRESENT ILLNESS:  The patient comes in today for severe radiating, left buttock and leg pain down to his foot. He has numbness and tingling in it. It is a sharp shooting pain. The pain is so bad he feels like he is dragging his leg at times. He was seen by SAILAJA Thapa on October 31, 2017, where he was given a Medrol Dosepak and a Toradol injection. He states neither one helped at all. The Toradol did help him sleep when he got home but other than that, it did not help at all. He has a history of an L5-S1 fusion back in 2006 by some [place]. He is on Gabapentin 800 mg three times a day. He denies fever or bowel or bladder dysfunction. PHYSICAL EXAM:  Mr. Eva Rodriguez is a 66-year-old male. He is alert and oriented. He is writhing in pain on the table lying on his right side. When I try to get him to sitting, he can barely sit. He has to lean back a little bit. He has a very positive straight leg raise on the left, negative on the right. ASSESSENT/PLAN:  This is a patient with a prior fusion who is having an increased flare of pain over the last two weeks of which steroids have not helped. MRI from this year shows a disc bulge at L4-5 with a superimposed central disc herniation that migrates 6 mm below the level of the disc. It crosses the L5 nerve root without gross impingement, moderate foraminal stenosis. I feel the patient would benefit from selective nerve root block. He has had blocks in the past including that, epidurals, and SI joint injections. They all seem to help him with his particular pattern of pain. We are going to order a L L4-L5 selective nerve root block.   I am going to give him a weeks worth of Percocet for his acute pain here that he rates as a 10/10. The least amount of pain he has had this week is a 7/10. We did the opioid risk tool. He scored a 3/10 for a personal history of alcohol abuse, but he has been alcohol-free for the past five years. His  is appropriate. We will get a UDS today. I do not feel we need to have a pain agreement since this is a one-time acute pain prescription and not for chronic pain. He will continue his Neurontin, and we will see him back after the block. Review of systems:    Past Medical History:   Diagnosis Date    Asthma     DDD (degenerative disc disease), lumbar 2013    noted on MRI with annular tears    Diverticulitis 2016    GERD (gastroesophageal reflux disease) 2016    with esophagitis according to endscopy    Kidney stone     Lumbar post-laminectomy syndrome     Sacroiliitis ([facility] [address]     Stroke [facility]) may 14 2010    Unspecified rotator cuff tear or rupture of left shoulder, not specified as traumatic 03/2016     Past Surgical History:   Procedure Laterality Date    COLONOSCOPY N/A 8/12/2016    COLONOSCOPY with polypectomy performed by Asad Moreira MD at [address]  2002, 2004, 2006    L5-S1 fusion, laminectomies    HX ENDOSCOPY  10/2016    grade 1 espohagitis    HX ENDOSCOPY  11/2016    mild esophagitis    HX KNEE ARTHROSCOPY Right     knee    HX MOHS PROCEDURES      right    HX ORTHOPAEDIC      right shoulder     Social History     Social History    Marital status: SINGLE     Spouse name: N/A    Number of children: N/A    Years of education: N/A     Occupational History    Not on file.      Social History Main Topics    Smoking status: Former Smoker     Packs/day: 2.00     Types: Cigarettes    Smokeless tobacco: Never Used    Alcohol use No    Drug use: No    Sexual activity: Yes     Partners: Female     Other Topics Concern    Not on file     Social History Narrative    ** Merged History Encounter **          Family History   Problem Relation Age of Onset    Other Brother     Cancer Maternal Grandmother        Physical Exam:  Visit Vitals    BP 97/56    Pulse 70    Resp 18    Ht 5' 10\" (1.778 m)    Wt 175 lb (79.4 kg)    BMI 25.11 kg/m2     Pain Scale:10 /10    Least pain over the last week has been 7/10. Worst pain over the last week has been 10/10. Opioid Risk Tool Reviewed: YES, Score 3  Aberrant behaviors: None. Urine Drug Screen: ordered today  Controlled substance agreement on file: no.  reviewed:yes  Pill count is consistent with his prescription: n/a  Concomitant use of a benzodiazepine: no  MME 0  Narcan not warranted        Risks and benefits of ongoing opiate therapy have been reviewed with the patient. No pain behaviors. Denies thoughts of harming self or others. Pt has a good risk to benefit ratio which allows the pt to function in a home environment without side effects        has been . reviewed and is appropriate          Diagnoses and all orders for this visit:    1. Lumbar neuritis  -     SCHEDULE SURGERY  -     oxyCODONE-acetaminophen (PERCOCET) 5-325 mg per tablet; Take 1 Tab by mouth every six (6) hours as needed for Pain. Max Daily Amount: 4 Tabs. Indications: Pain  -     DRUG SCREEN UR - W/ CONFIRM; Future    2. Radiculopathy, lumbar region  -     SCHEDULE SURGERY  -     oxyCODONE-acetaminophen (PERCOCET) 5-325 mg per tablet; Take 1 Tab by mouth every six (6) hours as needed for Pain. Max Daily Amount: 4 Tabs. Indications: Pain  -     DRUG SCREEN UR - W/ CONFIRM; Future    3. Postlaminectomy syndrome of lumbar region  -     SCHEDULE SURGERY  -     oxyCODONE-acetaminophen (PERCOCET) 5-325 mg per tablet; Take 1 Tab by mouth every six (6) hours as needed for Pain. Max Daily Amount: 4 Tabs. Indications: Pain  -     DRUG SCREEN UR - W/ CONFIRM; Future    4. Encounter for therapeutic drug monitoring  -     SCHEDULE SURGERY  -     oxyCODONE-acetaminophen (PERCOCET) 5-325 mg per tablet;  Take 1 Tab by mouth every six (6) hours as needed for Pain. Max Daily Amount: 4 Tabs. Indications: Pain  -     DRUG SCREEN UR - W/ CONFIRM; Future            Follow-up Disposition:  Return for after block.         We have informed Charly Bella to notify us for immediate appointment if he has any worsening neurogical symptoms or if an emergency situation presents, then call [phone]

## 2017-11-08 NOTE — PATIENT INSTRUCTIONS

## 2017-11-09 ENCOUNTER — APPOINTMENT (OUTPATIENT)
Dept: GENERAL RADIOLOGY | Age: 52
End: 2017-11-09
Attending: PHYSICAL MEDICINE & REHABILITATION
Payer: MEDICARE

## 2017-11-09 ENCOUNTER — HOSPITAL ENCOUNTER (OUTPATIENT)
Age: 52
Setting detail: OUTPATIENT SURGERY
Discharge: HOME | End: 2017-11-09
Attending: PHYSICAL MEDICINE & REHABILITATION | Admitting: PHYSICAL MEDICINE & REHABILITATION
Payer: MEDICARE

## 2017-11-09 VITALS
OXYGEN SATURATION: 98 % | RESPIRATION RATE: 20 BRPM | TEMPERATURE: 98.2 F | SYSTOLIC BLOOD PRESSURE: 114 MMHG | BODY MASS INDEX: 25.05 KG/M2 | WEIGHT: 175 LBS | HEIGHT: 70 IN | DIASTOLIC BLOOD PRESSURE: 82 MMHG | HEART RATE: 76 BPM

## 2017-11-09 PROCEDURE — 77030003676 HC NDL SPN MPRI -A: Performed by: PHYSICAL MEDICINE & REHABILITATION

## 2017-11-09 PROCEDURE — 76010000009 HC PAIN MGT 0 TO 30 MIN PROC: Performed by: PHYSICAL MEDICINE & REHABILITATION

## 2017-11-09 PROCEDURE — 74011250637 HC RX REV CODE- 250/637: Performed by: PHYSICAL MEDICINE & REHABILITATION

## 2017-11-09 PROCEDURE — 77030003669 HC NDL SPN COOK -B: Performed by: PHYSICAL MEDICINE & REHABILITATION

## 2017-11-09 PROCEDURE — 74011250636 HC RX REV CODE- 250/636: Performed by: PHYSICAL MEDICINE & REHABILITATION

## 2017-11-09 PROCEDURE — 74011000250 HC RX REV CODE- 250: Performed by: PHYSICAL MEDICINE & REHABILITATION

## 2017-11-09 PROCEDURE — 74011000250 HC RX REV CODE- 250

## 2017-11-09 PROCEDURE — 74011250636 HC RX REV CODE- 250/636

## 2017-11-09 PROCEDURE — 74011636320 HC RX REV CODE- 636/320: Performed by: PHYSICAL MEDICINE & REHABILITATION

## 2017-11-09 PROCEDURE — 74011636320 HC RX REV CODE- 636/320

## 2017-11-09 RX ORDER — LIDOCAINE HYDROCHLORIDE 10 MG/ML
INJECTION, SOLUTION EPIDURAL; INFILTRATION; INTRACAUDAL; PERINEURAL AS NEEDED
Status: DISCONTINUED | OUTPATIENT
Start: 2017-11-09 | End: 2017-11-09 | Stop reason: HOSPADM

## 2017-11-09 RX ORDER — DIAZEPAM 5 MG/1
5-20 TABLET ORAL ONCE
Status: COMPLETED | OUTPATIENT
Start: 2017-11-09 | End: 2017-11-09

## 2017-11-09 RX ORDER — DEXAMETHASONE SODIUM PHOSPHATE 100 MG/10ML
INJECTION INTRAMUSCULAR; INTRAVENOUS AS NEEDED
Status: DISCONTINUED | OUTPATIENT
Start: 2017-11-09 | End: 2017-11-09 | Stop reason: HOSPADM

## 2017-11-09 RX ADMIN — DIAZEPAM 10 MG: 5 TABLET ORAL at 13:58

## 2017-11-09 NOTE — PROCEDURES
PROCEDURE NOTE      Patient Name: Ramakrishna Walker    Date of Procedure: November 9, 2017    Preoperative Diagnosis:  Cervical postlaminectomy syndrome [M96.1]  Acute left lumbar radiculopathy [M54.16]    Post Operative Diagnosis:  Cervical postlaminectomy syndrome [M96.1]  Acute left lumbar radiculopathy [M54.16]    Procedure :    left L4 Selective Nerve Root Block  left L5 Selective Nerve Root Block      Consent:  Informed consent was obtained prior to the procedure. The patient was given the opportunity to ask questions regarding the procedure and its associated risks. In addition to the potential risks associated with the procedure itself, the patient was informed both verbally and in writing of the potential side effects of the use of glucocorticoid. The patient appeared to comprehend the informed consent and desired to have the procedure performed. Procedure: The patient was placed in the prone position on the fluoroscopy table and the back was prepped and draped in the usual sterile manner. The exact spinal level was  identified using fluoroscopy, and Lidocaine 1 % was injected locally, a # 22 gauge spinal needle was passed to the transverse process. The depth was noted and the needle redirected to pass inferior and approximately one cm anterior to the transverse process. YES  1 cc of Isovue M-200 was used to verify positioning in the epidural and paravertebral space and outlined the course of the spinal nerve into the epidural space. The same procedure was repeated at each spinal level indicated above. A total of 30 mg of preservative free Dexamethasone and 4 cc of Lidocaine was slowly injected. The patient tolerated the procedure well. The injection area was cleaned and bandaids applied. Not excessive bleeding was noted. Patient dressed and discharged to home with instructions. Discussion: The patient tolerated the procedure well. Aisha Singh MD  November 9, 2017

## 2017-11-09 NOTE — DISCHARGE INSTRUCTIONS
[facility]   ([place])  Dr. Junior Mcgrath, Dr. Roselyn Pryor, Dr. Romano Hence not drive a car, operate heavy machinery or dangerous equipment for 24 hours. * Activity as tolerated; rest for the remainder of the day. * Resume pre-block medications including those for your family doctor. * Do not drink alcoholic beverages for 24 hours. Alcohol and the medications you have received may interact and cause an adverse reaction. * You may feel better this evening and worse tomorrow, as the numbing medications wears off and the steroid has yet to begin to work. After 48 hrs the steroid should begin to release bringing you relief. * You may shower this evening and remove any bandages. * Avoid hot tubs and heating pads for 24 hours. You may use cold packs on the procedure site as tolerated for the first 24 hours. * If a headache develops, drink plenty of fluids and rest.  Take over the counter medications for headache if needed. If the headache continues longer than 24 hours, call MD at the [address] [place] [address]. [phone]    * Continue taking pain medications as needed. * You may resume your regular diet if tolerated. Otherwise, start with sips of water and advance slowly. * If Diabetic: check your blood sugar three times a day for the next 3 days. If your sugar is greater than 300 call your family doctor. If your sugar is greater than 400, have someone transport you to the nearest Emergency Room. * If you experience any of the following problems, Please Call the [address] [place] [address] at [phone].         * Shortness of Breath    * Fever of 101 or higher    * Nausea / Vomiting    * Severe Headache    * Weakness or numbness in arms or legs that is not      resolving    * Prolonged increase in pain greater than 4 days      DISCHARGE SUMMARY from Nurse      PATIENT INSTRUCTIONS:    After oral sedation, for 24 hours or while taking prescription Narcotics:  · Limit your activities  · Do not drive and operate hazardous machinery  · Do not make important personal or business decisions  · Do  not drink alcoholic beverages  · If you have not urinated within 8 hours after discharge, please contact your surgeon on call. Report the following to your surgeon:  · Excessive pain, swelling, redness or odor of or around the surgical area  · Temperature over 101  · Nausea and vomiting lasting longer than 4 hours or if unable to take medications  · Any signs of decreased circulation or nerve impairment to extremity: change in color, persistent  numbness, tingling, coldness or increase pain  · Any questions            What to do at Home:  Recommended activity: Activity as tolerated, NO DRIVING FOR 12 Hours post injection          *  Please give a list of your current medications to your Primary Care Provider. *  Please update this list whenever your medications are discontinued, doses are      changed, or new medications (including over-the-counter products) are added. *  Please carry medication information at all times in case of emergency situations. These are general instructions for a healthy lifestyle:    No smoking/ No tobacco products/ Avoid exposure to second hand smoke    Surgeon General's Warning:  Quitting smoking now greatly reduces serious risk to your health. Obesity, smoking, and sedentary lifestyle greatly increases your risk for illness    A healthy diet, regular physical exercise & weight monitoring are important for maintaining a healthy lifestyle    You may be retaining fluid if you have a history of heart failure or if you experience any of the following symptoms:  Weight gain of 3 pounds or more overnight or 5 pounds in a week, increased swelling in our hands or feet or shortness of breath while lying flat in bed.   Please call your doctor as soon as you notice any of these symptoms; do not wait until your next office visit. Recognize signs and symptoms of STROKE:    F-face looks uneven    A-arms unable to move or move unevenly    S-speech slurred or non-existent    T-time-call 911 as soon as signs and symptoms begin-DO NOT go       Back to bed or wait to see if you get better-TIME IS BRAIN.

## 2017-11-15 ENCOUNTER — HOSPITAL ENCOUNTER (EMERGENCY)
Age: 52
Discharge: HOME | End: 2017-11-15
Attending: EMERGENCY MEDICINE
Payer: MEDICARE

## 2017-11-15 ENCOUNTER — APPOINTMENT (OUTPATIENT)
Dept: GENERAL RADIOLOGY | Age: 52
End: 2017-11-15
Attending: EMERGENCY MEDICINE
Payer: MEDICARE

## 2017-11-15 VITALS
DIASTOLIC BLOOD PRESSURE: 76 MMHG | SYSTOLIC BLOOD PRESSURE: 118 MMHG | WEIGHT: 175 LBS | BODY MASS INDEX: 25.11 KG/M2 | RESPIRATION RATE: 17 BRPM | TEMPERATURE: 98.1 F | OXYGEN SATURATION: 96 % | HEART RATE: 85 BPM

## 2017-11-15 DIAGNOSIS — M25.531 WRIST PAIN, ACUTE, RIGHT: Primary | ICD-10-CM
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
## 2017-11-15 PROCEDURE — 74011250637 HC RX REV CODE- 250/637: Performed by: PHYSICIAN ASSISTANT

## 2017-11-15 PROCEDURE — 73110 X-RAY EXAM OF WRIST: CPT

## 2017-11-15 PROCEDURE — 99283 EMERGENCY DEPT VISIT LOW MDM: CPT

## 2017-11-15 PROCEDURE — L3908 WHO COCK-UP NONMOLDE PRE OTS: HCPCS

## 2017-11-15 RX ORDER — OXYCODONE AND ACETAMINOPHEN 5; 325 MG/1; MG/1
1 TABLET ORAL
Qty: 10 TAB | Refills: 0 | Status: SHIPPED | OUTPATIENT
Start: 2017-11-15 | End: 2017-12-04

## 2017-11-15 RX ORDER — OXYCODONE AND ACETAMINOPHEN 5; 325 MG/1; MG/1
1 TABLET ORAL
Status: COMPLETED | OUTPATIENT
Start: 2017-11-15 | End: 2017-11-15

## 2017-11-15 RX ADMIN — OXYCODONE HYDROCHLORIDE AND ACETAMINOPHEN 1 TABLET: 5; 325 TABLET ORAL at 20:47

## 2017-11-16 NOTE — ED PROVIDER NOTES
HPI Comments: 7:34 PM Torrey Zhang is a 46 y.o. male with h/o CVA who presents to ED complaining of right wrist/hand pain onset yesterday. Pt states he slipped on his deck and fell, caught himself on his hands and has had pain in his wrist hand since. Has taken OTC pain meds and ice pack with no relief. Pt has history of wrist fracture and states pain in in same are of past fracture. Pain is 10/10 and constant. Pt denies tobacco use, ETOH use, drug use. No other concerns or symptoms at this time. PCP: Mercie Snellen, NP      The history is provided by the patient. Past Medical History:   Diagnosis Date    Asthma     DDD (degenerative disc disease), lumbar 2013    noted on MRI with annular tears    Diverticulitis 2016    GERD (gastroesophageal reflux disease) 2016    with esophagitis according to endscopy    Kidney stone     Lumbar post-laminectomy syndrome     Sacroiliitis ([address]     Stroke [facility]) may 14 2010    Unspecified rotator cuff tear or rupture of left shoulder, not specified as traumatic 03/2016       Past Surgical History:   Procedure Laterality Date    COLONOSCOPY N/A 8/12/2016    COLONOSCOPY with polypectomy performed by Kerry Rogers MD at [address]  2002, 2004, 2006    L5-S1 fusion, laminectomies    HX ENDOSCOPY  10/2016    grade 1 espohagitis    HX ENDOSCOPY  11/2016    mild esophagitis    HX KNEE ARTHROSCOPY Right     knee    HX MOHS PROCEDURES      right    HX ORTHOPAEDIC      right shoulder         Family History:   Problem Relation Age of Onset    Other Brother     Cancer Maternal Grandmother        Social History     Social History    Marital status: SINGLE     Spouse name: N/A    Number of children: N/A    Years of education: N/A     Occupational History    Not on file.      Social History Main Topics    Smoking status: Former Smoker     Packs/day: 2.00     Types: Cigarettes    Smokeless tobacco: Never Used    Alcohol use No    Drug use: No    Sexual activity: Yes     Partners: Female     Other Topics Concern    Not on file     Social History Narrative    ** Merged History Encounter **              ALLERGIES: Penicillins; Penicillins; Vicodin [hydrocodone-acetaminophen]; Azithromycin; Vicodin [hydrocodone-acetaminophen]; and Erythromycin    Review of Systems   Constitutional: Negative for fatigue and fever. HENT: Negative for congestion. Respiratory: Negative for shortness of breath. Cardiovascular: Negative for chest pain. Gastrointestinal: Negative for diarrhea, nausea and vomiting. Musculoskeletal: Positive for arthralgias and joint swelling (rigth wrist). Positive for right wrist/hand pain   Skin: Negative for color change and wound. Neurological: Negative for dizziness and headaches. All other systems reviewed and are negative. Vitals:    11/15/17 1926   BP: 118/76   Pulse: 85   Resp: 17   Temp: 98.1 °F (36.7 °C)   SpO2: 96%   Weight: 79.4 kg (175 lb)            Physical Exam   Constitutional: He is oriented to person, place, and time. He appears well-developed and well-nourished. He appears distressed. HENT:   Head: Normocephalic and atraumatic. Nose: Nose normal.   Eyes: Pupils are equal, round, and reactive to light. Neck: Normal range of motion. Cardiovascular: Normal rate, regular rhythm and normal heart sounds. Pulmonary/Chest: Effort normal. No respiratory distress. He has no wheezes. Musculoskeletal: He exhibits tenderness. Right wrist: He exhibits decreased range of motion, tenderness, bony tenderness and swelling. He exhibits no effusion, no crepitus, no deformity and no laceration. + snuffbox tenderness   Neurological: He is alert and oriented to person, place, and time. Skin: Skin is warm and dry. Psychiatric: He has a normal mood and affect. His behavior is normal.   Vitals reviewed.        MDM  Number of Diagnoses or Management Options  Diagnosis management comments: XR Results (most recent):  Results from Hospital Encounter encounter on 11/15/17    XR WRIST RT AP/LAT/OBL MIN 3V    Narrative  HISTORY: Right wrist injury. Pain. Exam: Right wrist.    Technique: Three views right wrist were obtained. No prior studies were  performed for comparison. FINDINGS: No acute fracture, dislocation or radiopaque foreign bodies are seen. Joint spaces are unremarkable. Visualized soft tissues are within normal limits. Impression  IMPRESSION:  1. No acute fracture-dislocation. IMpression: wrist pain, fall    Plan: thumb spica  Discharge home  Pain control  FOllow up with Ortho one week for repeat xray       Amount and/or Complexity of Data Reviewed  Tests in the radiology section of CPT®: ordered and reviewed    Risk of Complications, Morbidity, and/or Mortality  Presenting problems: moderate  Diagnostic procedures: moderate  Management options: moderate    Patient Progress  Patient progress: stable    ED Course       Procedures      Vitals:  Patient Vitals for the past 12 hrs:   Temp Pulse Resp BP SpO2   11/15/17 1926 98.1 °F (36.7 °C) 85 17 118/76 96 %         Medications ordered:   Medications - No data to display      Lab findings:  No results found for this or any previous visit (from the past 12 hour(s)). X-Ray, CT or other radiology findings or impressions:  XR WRIST RT AP/LAT    (Results Pending)       Progress notes, Consult notes or additional Procedure notes:       Disposition:  Diagnosis: No diagnosis found. Disposition: discharge    Follow-up Information     None           Patient's Medications   Start Taking    No medications on file   Continue Taking    ALBUTEROL (PROVENTIL HFA, VENTOLIN HFA, PROAIR HFA) 90 MCG/ACTUATION INHALER    Take 2 Puffs by inhalation every six (6) hours as needed for Wheezing or Shortness of Breath. ASPIRIN 81 MG CHEWABLE TABLET    Take 81 mg by mouth daily.     BUDESONIDE-FORMOTEROL (SYMBICORT) 160-4.5 MCG/ACTUATION HFA INHALER    Take 1 Puff by inhalation two (2) times a day. CYCLOBENZAPRINE (FLEXERIL) 10 MG TABLET    Take 1 Tab by mouth three (3) times daily as needed for Muscle Spasm(s). GABAPENTIN (NEURONTIN) 800 MG TABLET    Take 1 Tab by mouth three (3) times daily. OXYCODONE-ACETAMINOPHEN (PERCOCET) 5-325 MG PER TABLET    Take 1 Tab by mouth every six (6) hours as needed for Pain. Max Daily Amount: 4 Tabs. Indications: Pain    PANTOPRAZOLE (PROTONIX) 40 MG TABLET    Take 1 Tab by mouth daily. RANITIDINE (ZANTAC) 150 MG TABLET    Take 1 Tab by mouth nightly. These Medications have changed    No medications on file   Stop Taking    No medications on file         Scribe [address] [place] [address] acting as a scribe for and in the presence of Frances Diallo MD      November 15, 2017 at 7:32 PM       Provider Attestation:      I personally performed the services described in the documentation, reviewed the documentation, as recorded by the scribe in my presence, and it accurately and completely records my words and actions.  November 15, 2017 at 7:32 PM - Frances Diallo MD

## 2017-11-16 NOTE — ED NOTES
I have reviewed discharge instructions with the patient. The patient verbalized understanding. Ulnar gutter splint applied to RUE, per order.  Pt left ED in stable condition with no distress noted and no complaints voiced

## 2017-11-16 NOTE — DISCHARGE INSTRUCTIONS
Musculoskeletal Pain: Care Instructions  Your Care Instructions    Different problems with the bones, muscles, nerves, ligaments, and tendons in the body can cause pain. One or more areas of your body may ache or burn. Or they may feel tired, stiff, or sore. The medical term for this type of pain is musculoskeletal pain. It can have many different causes. Sometimes the pain is caused by an injury such as a strain or sprain. Or you might have pain from using one part of your body in the same way over and over again. This is called overuse. In some cases, the cause of the pain is another health problem such as arthritis or fibromyalgia. The doctor will examine you and ask you questions about your health to help find the cause of your pain. Blood tests or imaging tests like an X-ray may also be helpful. But sometimes doctors can't find a cause of the pain. Treatment depends on your symptoms and the cause of the pain, if known. The doctor has checked you carefully, but problems can develop later. If you notice any problems or new symptoms, get medical treatment right away. Follow-up care is a key part of your treatment and safety. Be sure to make and go to all appointments, and call your doctor if you are having problems. It's also a good idea to know your test results and keep a list of the medicines you take. How can you care for yourself at home? · Rest until you feel better. · Do not do anything that makes the pain worse. Return to exercise gradually if you feel better and your doctor says it's okay. · Be safe with medicines. Read and follow all instructions on the label. ¨ If the doctor gave you a prescription medicine for pain, take it as prescribed. ¨ If you are not taking a prescription pain medicine, ask your doctor if you can take an over-the-counter medicine. · Put ice or a cold pack on the area for 10 to 20 minutes at a time to ease pain.  Put a thin cloth between the ice and your skin.  When should you call for help? Call your doctor now or seek immediate medical care if:  ? · You have new pain, or your pain gets worse. ? · You have new symptoms such as a fever, a rash, or chills. ? Watch closely for changes in your health, and be sure to contact your doctor if:  ? · You do not get better as expected. Where can you learn more? Go to http://lola-conner.info/. Enter C533 in the search box to learn more about \"Musculoskeletal Pain: Care Instructions. \"  Current as of: October 14, 2016  Content Version: 11.4  © 8790-5616 [employer]. Care instructions adapted under license by [employer] (which disclaims liability or warranty for this information). If you have questions about a medical condition or this instruction, always ask your healthcare professional. [address] any warranty or liability for your use of this information.

## 2017-11-28 ENCOUNTER — OFFICE VISIT (OUTPATIENT)
Dept: ORTHOPEDIC SURGERY | Age: 52
End: 2017-11-28

## 2017-11-28 VITALS
WEIGHT: 177 LBS | BODY MASS INDEX: 25.34 KG/M2 | SYSTOLIC BLOOD PRESSURE: 99 MMHG | DIASTOLIC BLOOD PRESSURE: 65 MMHG | HEART RATE: 67 BPM | HEIGHT: 70 IN

## 2017-11-28 DIAGNOSIS — M96.1 LUMBAR POSTLAMINECTOMY SYNDROME: Primary | ICD-10-CM

## 2017-11-28 DIAGNOSIS — M51.36 OTHER INTERVERTEBRAL DISC DEGENERATION, LUMBAR REGION: ICD-10-CM

## 2017-11-28 DIAGNOSIS — M51.26 LUMBAR HERNIATED DISC: ICD-10-CM

## 2017-11-28 DIAGNOSIS — M54.16 RADICULOPATHY, LUMBAR REGION: ICD-10-CM
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
## 2017-11-28 NOTE — MR AVS SNAPSHOT
Visit Information Date & Time Provider Department Dept. Phone Encounter #  
 11/28/2017  9:40 AM Sarahi Lowe  [facility], [address] and [facility] [place] [phone] Follow-up Instructions Return if symptoms worsen or fail to improve. Your Appointments 11/29/2017  8:45 AM  
Follow Up with Anthony Serrano NP [address] ([facility]) Appt Note: xray for hand pat fell [address]  
[phone]  
  
   
 [address]  
  
    
 12/5/2017  8:30 AM  
Follow Up with Anthony Serrano NP [address] ([facility]) Appt Note: 3 month f/u 30 min chronic disease [address]  
[phone]  
  
    
 3/27/2018  8:30 AM  
Follow Up with Sarahi Lowe MD  
[facility] [place] ([facility]) Appt Note: 6 mo follow up  lower back/pat request Dr Bossman Le not NP  
 [address]  
  
    
  
 12/14/2017  8:30 AM  
Any with Anika Ortiz MD  
[facility] ([facility]) Appt Note: 3m fu; mailed new appt; 3m fu  
 [address] 
[address] [facility] [address]  
[address] [facility] [address] [facility] [address] Upcoming Health Maintenance Date Due DTaP/Tdap/Td series (1 - Tdap) 1/2/2011 MEDICARE YEARLY EXAM 11/18/2017 Prostate-Specific Antigen 1/30/2018 COLONOSCOPY 8/12/2021 Allergies as of 11/28/2017  Review Complete On: 11/28/2017 By: Sarahi Lowe MD  
  
 Severity Noted Reaction Type Reactions Penicillins High 10/14/2012    Angioedema Penicillins High 05/15/2013    Anaphylaxis Vicodin [Hydrocodone-acetaminophen] High 10/14/2012    Nausea and Vomiting Azithromycin  06/02/2016    Nausea and Vomiting Vicodin [Hydrocodone-acetaminophen]  05/15/2013    Nausea and Vomiting Erythromycin Low 07/12/2016    Other (comments) Current Immunizations  Reviewed on 2/2/2017 Name Date Influenza Vaccine (Quad) PF 9/5/2017, 11/17/2016 Pneumococcal Polysaccharide (PPSV-23) 2/2/2017 Td 1/1/2011 Not reviewed this visit You Were Diagnosed With   
  
 Codes Comments Lumbar postlaminectomy syndrome    -  Primary ICD-10-CM: M96.1 ICD-9-CM: 722.83 Lumbar herniated disc     ICD-10-CM: M51.26 
ICD-9-CM: 722.10 Radiculopathy, lumbar region     ICD-10-CM: M54.16 
ICD-9-CM: 724.4 Other intervertebral disc degeneration, lumbar region     ICD-10-CM: M51.36 
ICD-9-CM: 722.52 Vitals BP Pulse Height(growth percentile) Weight(growth percentile) BMI Smoking Status 99/65 67 5' 10\" (1.778 m) 177 lb (80.3 kg) 25.4 kg/m2 Former Smoker Vitals History BMI and BSA Data Body Mass Index Body Surface Area  
 25.4 kg/m 2 1.99 m 2 Preferred Pharmacy Pharmacy Name Phone [facility] [address] [phone] Your Updated Medication List  
  
   
This list is accurate as of: 11/28/17 10:44 AM.  Always use your most recent med list.  
  
  
  
  
 albuterol 90 mcg/actuation inhaler Commonly known as:  PROVENTIL HFA, VENTOLIN HFA, PROAIR HFA Take 2 Puffs by inhalation every six (6) hours as needed for Wheezing or Shortness of Breath. aspirin 81 mg chewable tablet Take 81 mg by mouth daily. budesonide-formoterol 160-4.5 mcg/actuation Hfaa Commonly known as:  SYMBICORT Take 1 Puff by inhalation two (2) times a day. cyclobenzaprine 10 mg tablet Commonly known as:  FLEXERIL Take 1 Tab by mouth three (3) times daily as needed for Muscle Spasm(s). gabapentin 800 mg tablet Commonly known as:  NEURONTIN Take 1 Tab by mouth three (3) times daily. oxyCODONE-acetaminophen 5-325 mg per tablet Commonly known as:  PERCOCET Take 1 Tab by mouth every four (4) hours as needed for Pain. Max Daily Amount: 6 Tabs. pantoprazole 40 mg tablet Commonly known as:  PROTONIX Take 1 Tab by mouth daily. raNITIdine 150 mg tablet Commonly known as:  ZANTAC Take 1 Tab by mouth nightly. Follow-up Instructions Return if symptoms worsen or fail to improve. Introducing [facility]! Dear Korina Khalil: Thank you for requesting a [employer]ap[employer] account. Our records indicate that you have previously registered for a [employer] account but its currently inactive. Please call our SynapCell support line at [phone]. Additional Information If you have questions, please visit the Frequently Asked Questions section of the [employer] website at [email or web address] Remember, SynapCell is NOT to be used for urgent needs. For medical emergencies, dial 911. Now available from your iPhone and Android! Please provide this summary of care documentation to your next provider. Your primary care clinician is listed as Elisa Ryan. If you have any questions after today's visit, please call [phone].

## 2017-11-29 ENCOUNTER — OFFICE VISIT (OUTPATIENT)
Dept: FAMILY MEDICINE CLINIC | Age: 52
End: 2017-11-29

## 2017-11-29 VITALS
BODY MASS INDEX: 25.34 KG/M2 | HEART RATE: 69 BPM | RESPIRATION RATE: 20 BRPM | WEIGHT: 177 LBS | OXYGEN SATURATION: 96 % | TEMPERATURE: 96.1 F | DIASTOLIC BLOOD PRESSURE: 61 MMHG | HEIGHT: 70 IN | SYSTOLIC BLOOD PRESSURE: 107 MMHG

## 2017-11-29 DIAGNOSIS — S63.501D SPRAIN OF RIGHT WRIST, SUBSEQUENT ENCOUNTER: ICD-10-CM

## 2017-11-29 DIAGNOSIS — M51.36 DEGENERATIVE DISC DISEASE, LUMBAR: ICD-10-CM

## 2017-11-29 DIAGNOSIS — K21.00 GASTROESOPHAGEAL REFLUX DISEASE WITH ESOPHAGITIS: ICD-10-CM

## 2017-11-29 DIAGNOSIS — S69.91XD INJURY OF RIGHT WRIST, SUBSEQUENT ENCOUNTER: Primary | ICD-10-CM

## 2017-11-29 DIAGNOSIS — M25.531 RIGHT WRIST PAIN: ICD-10-CM

## 2017-11-29 RX ORDER — CYCLOBENZAPRINE HCL 10 MG
10 TABLET ORAL
Qty: 30 TAB | Refills: 6 | Status: SHIPPED | OUTPATIENT
Start: 2017-11-29 | End: 2018-12-15 | Stop reason: SDUPTHER

## 2017-11-29 RX ORDER — RANITIDINE 150 MG/1
150 TABLET, FILM COATED ORAL
Qty: 30 TAB | Refills: 3 | Status: SHIPPED | OUTPATIENT
Start: 2017-11-29 | End: 2018-01-02 | Stop reason: SDUPTHER

## 2017-11-29 RX ORDER — PANTOPRAZOLE SODIUM 40 MG/1
40 TABLET, DELAYED RELEASE ORAL DAILY
Qty: 30 TAB | Refills: 3 | Status: SHIPPED | OUTPATIENT
Start: 2017-11-29 | End: 2018-01-02 | Stop reason: ALTCHOICE

## 2017-11-29 NOTE — PROGRESS NOTES
OFFICE NOTE    Charly Bella is a 46 y.o. male presenting today for office visit. 11/29/2017  9:10 AM      Chief Complaint   Patient presents with    Hand Injury     pt here with c/o right hand injury, pt fell and hurt his hand         HPI: Here today for ER follow up- evaluated 11/15 for right wrist pain after a fall- slipped on deck and caught himself with his right hand. Has been having pain since. History of right wrist fracture and pain feels the same. Negative right wrist xray during ER visit but was told to have a repeat done in a few weeks due to amount of swelling. Was supposed to follow up with orthopedics but has not been able to get into that office. Continued pain- has been keeping wrapped in ACE bandage. Prescribed Percocet on discharge. Also needs refill on Flexeril, Protonix, and Zantac. Has been anticipating surgery with spine specialist. Has been unable to get into GI due to tight personal schedule. Review of Systems   Constitutional: Negative for chills and fever. Musculoskeletal: Positive for arthralgias and joint swelling. Negative for gait problem, neck pain and neck stiffness. Skin: Negative for rash and wound. Neurological: Negative for weakness, numbness and headaches.          PHQ Screening   PHQ over the last two weeks 11/29/2017   Little interest or pleasure in doing things Not at all   Feeling down, depressed or hopeless Not at all   Total Score PHQ 2 0         History  Past Medical History:   Diagnosis Date    Asthma     DDD (degenerative disc disease), lumbar 2013    noted on MRI with annular tears    Diverticulitis 2016    GERD (gastroesophageal reflux disease) 2016    with esophagitis according to endscopy    Kidney stone     Lumbar post-laminectomy syndrome     Sacroiliitis ([address]     Stroke [facility]) may 14 2010    Unspecified rotator cuff tear or rupture of left shoulder, not specified as traumatic 03/2016       Past Surgical History:   Procedure Laterality Date    COLONOSCOPY N/A 8/12/2016    COLONOSCOPY with polypectomy performed by Jazmín Guajardo MD at [address] [place] [address]  2002, 2004, 2006    L5-S1 fusion, laminectomies    HX ENDOSCOPY  10/2016    grade 1 espohagitis    HX ENDOSCOPY  11/2016    mild esophagitis    HX KNEE ARTHROSCOPY Right     knee    HX MOHS PROCEDURES      right    HX ORTHOPAEDIC      right shoulder       Social History     Social History    Marital status: SINGLE     Spouse name: N/A    Number of children: N/A    Years of education: N/A     Occupational History    Not on file. Social History Main Topics    Smoking status: Former Smoker     Packs/day: 2.00     Types: Cigarettes    Smokeless tobacco: Never Used    Alcohol use No    Drug use: No    Sexual activity: Yes     Partners: Female     Other Topics Concern    Not on file     Social History Narrative    ** Merged History Encounter **            Allergies   Allergen Reactions    Penicillins Angioedema    Penicillins Anaphylaxis    Vicodin [Hydrocodone-Acetaminophen] Nausea and Vomiting    Azithromycin Nausea and Vomiting    Vicodin [Hydrocodone-Acetaminophen] Nausea and Vomiting    Erythromycin Other (comments)       Current Outpatient Prescriptions   Medication Sig Dispense Refill    cyclobenzaprine (FLEXERIL) 10 mg tablet Take 1 Tab by mouth three (3) times daily as needed for Muscle Spasm(s). 30 Tab 6    raNITIdine (ZANTAC) 150 mg tablet Take 1 Tab by mouth nightly. 30 Tab 3    pantoprazole (PROTONIX) 40 mg tablet Take 1 Tab by mouth daily. 30 Tab 3    gabapentin (NEURONTIN) 800 mg tablet Take 1 Tab by mouth three (3) times daily. 90 Tab 5    budesonide-formoterol (SYMBICORT) 160-4.5 mcg/actuation HFA inhaler Take 1 Puff by inhalation two (2) times a day.  1 Inhaler 5    albuterol (PROVENTIL HFA, VENTOLIN HFA, PROAIR HFA) 90 mcg/actuation inhaler Take 2 Puffs by inhalation every six (6) hours as needed for Wheezing or Shortness of Breath. 1 Inhaler 11    oxyCODONE-acetaminophen (PERCOCET) 5-325 mg per tablet Take 1 Tab by mouth every four (4) hours as needed for Pain. Max Daily Amount: 6 Tabs. 10 Tab 0    aspirin 81 mg chewable tablet Take 81 mg by mouth daily. Patient Care Team:  Patient Care Team:  Mayra Henson NP as PCP - General (Nurse Practitioner)  Radha Heller MD (Orthopedic Surgery)  Chris Dahl MD (Physiatry)  Radha Galo MD (Gastroenterology)  Tara Fuller MD (Urology)        LABS:  None new to review    RADIOLOGY:    *Preliminary read- no noted fracture noted        Physical Exam   Constitutional: He is oriented to person, place, and time. He appears well-developed and well-nourished. No distress. Pulmonary/Chest: Effort normal. No respiratory distress. Musculoskeletal:        Right wrist: He exhibits decreased range of motion, tenderness and swelling. He exhibits no deformity. -mild swelling of right wrist; tenderness and pain reported; decreased ROM due to pain   Neurological: He is alert and oriented to person, place, and time. He exhibits normal muscle tone. Coordination normal.   Skin: Skin is warm and dry. Vitals:    11/29/17 0851   BP: 107/61   Pulse: 69   Resp: 20   Temp: 96.1 °F (35.6 °C)   TempSrc: Oral   SpO2: 96%   Weight: 177 lb (80.3 kg)   Height: 5' 10\" (1.778 m)   PainSc:   5   PainLoc: Hand         Assessment and Plan    Injury of right wrist, subsequent encounter/ Right wrist pain/ Sprain of right wrist, subsequent encounter  *No noted definite fracture on xray. Final reading as per radiology. *Advised on sprain of wrist and recommended gentle ROM with rest, ACE bandage if needed for compression, PRN Tylenol, and PRN ice over area. Advised that it may take 4-6 weeks to improve. - XR WRIST RT AP/LAT/OBL MIN 3V; Future    Degenerative disc disease, lumbar  *Refilled. Not fully addressed today. Follows with spine center.    - cyclobenzaprine (FLEXERIL) 10 mg tablet; Take 1 Tab by mouth three (3) times daily as needed for Muscle Spasm(s). Dispense: 30 Tab; Refill: 6    Gastroesophageal reflux disease with esophagitis  *Refilled. Educated patient on need for follow up with GI. He expresses understanding but reports that his personal schedule and all his back appointments have restricted the ability to make this follow up.   - raNITIdine (ZANTAC) 150 mg tablet; Take 1 Tab by mouth nightly. Dispense: 30 Tab; Refill: 3  - pantoprazole (PROTONIX) 40 mg tablet; Take 1 Tab by mouth daily. Dispense: 30 Tab; Refill: 3        *Plan of care reviewed with patient. Patient in agreement with plan and expresses understanding. All questions answered and patient encouraged to call or RTO if further questions or concerns. Follow-up Disposition:  Return for already scheduled appointment 12/2017 for Medicare Wellness and Chronic disease- 45 mins. [address]

## 2017-11-29 NOTE — MR AVS SNAPSHOT
Visit Information Date & Time Provider Department Dept. Phone Encounter #  
 11/29/2017  8:45 AM Moustapha Evans, [address] [facility] [address] [phone] [id number] Follow-up Instructions Return for already scheduled appointment 12/2017 for Medicare Wellness and Chronic disease- 45 mins. Justino Broach Your Appointments 12/5/2017  8:15 AM  
Office Visit with Moustapha Evans  [facility] [address] ([address] [facility]) Appt Note: 3 month f/u 30 min chronic disease; medicare wellness   and 3 month return; Medicare wellness  and return [address] [facility]  
[phone]  
  
   
 [address]  
  
    
 12/15/2017  9:00 AM  
SURGERY CONSULT with Warren Foy MD  
[facility] [address] [place] [address]) Appt Note: SC-patient is aware of date/time/loc and to take disk [address] [place] [address]  
[phone]  
  
   
 [address]  
  
    
 3/27/2018  8:30 AM  
Follow Up with Cindy Leon MD  
[address] [place] [address], [address] and [facility] [place] ([address] [place] [address]) Appt Note: 6 mo follow up  lower back/pat request Dr Anshul Kitchen not NP  
 [address]  
  
    
  
 12/14/2017  8:30 AM  
Any with Florina Homans, MD  
[facility] ([address] [place] [address]) Appt Note: 3m fu; mailed new appt; 3m fu  
 [address] 
[address] [facility] [address]  
[address] [facility] [facility] [address] [facility] [facility] [address] Upcoming Health Maintenance Date Due DTaP/Tdap/Td series (1 - Tdap) 1/2/2011 MEDICARE YEARLY EXAM 11/18/2017 Prostate-Specific Antigen 1/30/2018 COLONOSCOPY 8/12/2021 Allergies as of 11/29/2017  Review Complete On: 11/29/2017 By: Moustapha Evans NP  
  
 Severity Noted Reaction Type Reactions Penicillins High 10/14/2012    Angioedema Penicillins High 05/15/2013    Anaphylaxis Vicodin [Hydrocodone-acetaminophen] High 10/14/2012    Nausea and Vomiting Azithromycin  06/02/2016    Nausea and Vomiting Vicodin [Hydrocodone-acetaminophen]  05/15/2013    Nausea and Vomiting Erythromycin Low 07/12/2016    Other (comments) Current Immunizations  Reviewed on 2/2/2017 Name Date Influenza Vaccine (Quad) PF 9/5/2017, 11/17/2016 Pneumococcal Polysaccharide (PPSV-23) 2/2/2017 Td 1/1/2011 Not reviewed this visit You Were Diagnosed With   
  
 Codes Comments Injury of right wrist, subsequent encounter    -  Primary ICD-10-CM: T21.93DO ICD-9-CM: V58.89, 959.3 Sprain of right wrist, subsequent encounter     ICD-10-CM: S63.501D ICD-9-CM: V58.89, 842.00 Right wrist pain     ICD-10-CM: M25.531 ICD-9-CM: 719.43 Degenerative disc disease, lumbar     ICD-10-CM: M51.36 
ICD-9-CM: 722.52 Gastroesophageal reflux disease with esophagitis     ICD-10-CM: K21.0 ICD-9-CM: 530.11 Vitals BP Pulse Temp Resp Height(growth percentile) Weight(growth percentile) 107/61 (BP 1 Location: Left arm, BP Patient Position: Sitting) 69 96.1 °F (35.6 °C) (Oral) 20 5' 10\" (1.778 m) 177 lb (80.3 kg) SpO2 BMI Smoking Status 96% 25.4 kg/m2 Former Smoker Vitals History BMI and BSA Data Body Mass Index Body Surface Area  
 25.4 kg/m 2 1.99 m 2 Preferred Pharmacy Pharmacy Name Phone [facility] [address] [phone] Your Updated Medication List  
  
   
This list is accurate as of: 11/29/17  9:40 AM.  Always use your most recent med list.  
  
  
  
  
 albuterol 90 mcg/actuation inhaler Commonly known as:  PROVENTIL HFA, VENTOLIN HFA, PROAIR HFA Take 2 Puffs by inhalation every six (6) hours as needed for Wheezing or Shortness of Breath. aspirin 81 mg chewable tablet Take 81 mg by mouth daily. budesonide-formoterol 160-4.5 mcg/actuation Hfaa Commonly known as:  SYMBICORT Take 1 Puff by inhalation two (2) times a day. cyclobenzaprine 10 mg tablet Commonly known as:  FLEXERIL Take 1 Tab by mouth three (3) times daily as needed for Muscle Spasm(s). gabapentin 800 mg tablet Commonly known as:  NEURONTIN Take 1 Tab by mouth three (3) times daily. oxyCODONE-acetaminophen 5-325 mg per tablet Commonly known as:  PERCOCET Take 1 Tab by mouth every four (4) hours as needed for Pain. Max Daily Amount: 6 Tabs. pantoprazole 40 mg tablet Commonly known as:  PROTONIX Take 1 Tab by mouth daily. raNITIdine 150 mg tablet Commonly known as:  ZANTAC Take 1 Tab by mouth nightly. Prescriptions Sent to Pharmacy Refills  
 cyclobenzaprine (FLEXERIL) 10 mg tablet 6 Sig: Take 1 Tab by mouth three (3) times daily as needed for Muscle Spasm(s). Class: Normal  
 Pharmacy: [facility] [address] [place] [address]. Ph #: [phone] Route: Oral  
 raNITIdine (ZANTAC) 150 mg tablet 3 Sig: Take 1 Tab by mouth nightly. Class: Normal  
 Pharmacy: [facility] [address] [place] [address]. Ph #: [phone] Route: Oral  
 pantoprazole (PROTONIX) 40 mg tablet 3 Sig: Take 1 Tab by mouth daily. Class: Normal  
 Pharmacy: [facility] [address] [place] [address]. Ph #: [phone] Route: Oral  
  
Follow-up Instructions Return for already scheduled appointment 12/2017 for Medicare Wellness and Chronic disease- 45 mins. Zhang Medel To-Do List   
 11/29/2017 Imaging:  XR WRIST RT AP/LAT/OBL MIN 3V Introducing [facility]! Dear Silvia Jaimes: Thank you for requesting a Meograph account.   Our records indicate that you have previously registered for a Meograph account but its currently inactive. Please call our imedo support line at [phone]. Additional Information If you have questions, please visit the Frequently Asked Questions section of the imedo website at [email or web address] Remember, imedo is NOT to be used for urgent needs. For medical emergencies, dial 911. Now available from your iPhone and Android! Please provide this summary of care documentation to your next provider. Your primary care clinician is listed as Neel Berkowitz. If you have any questions after today's visit, please call [phone].

## 2017-12-04 ENCOUNTER — HOSPITAL ENCOUNTER (EMERGENCY)
Age: 52
Discharge: HOME | DRG: 603 | End: 2017-12-04
Attending: EMERGENCY MEDICINE
Payer: MEDICARE

## 2017-12-04 VITALS
OXYGEN SATURATION: 98 % | BODY MASS INDEX: 24.77 KG/M2 | SYSTOLIC BLOOD PRESSURE: 128 MMHG | HEART RATE: 93 BPM | HEIGHT: 70 IN | RESPIRATION RATE: 16 BRPM | TEMPERATURE: 97.7 F | WEIGHT: 173 LBS | DIASTOLIC BLOOD PRESSURE: 84 MMHG

## 2017-12-04 DIAGNOSIS — L03.115 CELLULITIS OF RIGHT LOWER EXTREMITY: Primary | ICD-10-CM

## 2017-12-04 LAB
ANION GAP SERPL CALC-SCNC: 8 MMOL/L (ref 3–18)
BASOPHILS # BLD: 0.1 K/UL (ref 0–0.06)
BASOPHILS NFR BLD: 1 % (ref 0–2)
BUN SERPL-MCNC: 14 MG/DL (ref 7–18)
BUN/CREAT SERPL: 15 (ref 12–20)
CALCIUM SERPL-MCNC: 8.9 MG/DL (ref 8.5–10.1)
CHLORIDE SERPL-SCNC: 106 MMOL/L (ref 100–108)
CO2 SERPL-SCNC: 26 MMOL/L (ref 21–32)
CREAT SERPL-MCNC: 0.96 MG/DL (ref 0.6–1.3)
D DIMER PPP FEU-MCNC: 0.28 UG/ML(FEU)
DIFFERENTIAL METHOD BLD: ABNORMAL
EOSINOPHIL # BLD: 0.4 K/UL (ref 0–0.4)
EOSINOPHIL NFR BLD: 4 % (ref 0–5)
ERYTHROCYTE [DISTWIDTH] IN BLOOD BY AUTOMATED COUNT: 12.4 % (ref 11.6–14.5)
GLUCOSE SERPL-MCNC: 134 MG/DL (ref 74–99)
HCT VFR BLD AUTO: 43.7 % (ref 36–48)
HGB BLD-MCNC: 15.1 G/DL (ref 13–16)
LYMPHOCYTES # BLD: 1.8 K/UL (ref 0.9–3.6)
LYMPHOCYTES NFR BLD: 17 % (ref 21–52)
MCH RBC QN AUTO: 30.4 PG (ref 24–34)
MCHC RBC AUTO-ENTMCNC: 34.6 G/DL (ref 31–37)
MCV RBC AUTO: 87.9 FL (ref 74–97)
MONOCYTES # BLD: 1.4 K/UL (ref 0.05–1.2)
MONOCYTES NFR BLD: 14 % (ref 3–10)
NEUTS SEG # BLD: 6.8 K/UL (ref 1.8–8)
NEUTS SEG NFR BLD: 64 % (ref 40–73)
PLATELET # BLD AUTO: 243 K/UL (ref 135–420)
PMV BLD AUTO: 9.8 FL (ref 9.2–11.8)
POTASSIUM SERPL-SCNC: 3.6 MMOL/L (ref 3.5–5.5)
RBC # BLD AUTO: 4.97 M/UL (ref 4.7–5.5)
SODIUM SERPL-SCNC: 140 MMOL/L (ref 136–145)
WBC # BLD AUTO: 10.3 K/UL (ref 4.6–13.2)

## 2017-12-04 RX ORDER — CLINDAMYCIN HYDROCHLORIDE 300 MG/1
300 CAPSULE ORAL 3 TIMES DAILY
Qty: 30 CAP | Refills: 0 | Status: SHIPPED | OUTPATIENT
Start: 2017-12-04 | End: 2017-12-09

## 2017-12-04 RX ORDER — OXYCODONE AND ACETAMINOPHEN 5; 325 MG/1; MG/1
1 TABLET ORAL
Qty: 6 TAB | Refills: 0 | Status: ON HOLD | OUTPATIENT
Start: 2017-12-04 | End: 2017-12-09

## 2017-12-04 RX ORDER — HYDROCODONE BITARTRATE AND ACETAMINOPHEN 5; 325 MG/1; MG/1
1 TABLET ORAL
Status: COMPLETED | OUTPATIENT
Start: 2017-12-04 | End: 2017-12-04

## 2017-12-04 RX ORDER — CLINDAMYCIN HYDROCHLORIDE 150 MG/1
300 CAPSULE ORAL
Status: COMPLETED | OUTPATIENT
Start: 2017-12-04 | End: 2017-12-04

## 2017-12-04 RX ADMIN — CLINDAMYCIN HYDROCHLORIDE 300 MG: 150 CAPSULE ORAL at 21:14

## 2017-12-04 RX ADMIN — HYDROCODONE BITARTRATE AND ACETAMINOPHEN 1 TABLET: 5; 325 TABLET ORAL at 21:14

## 2017-12-05 NOTE — ED PROVIDER NOTES
EMERGENCY DEPARTMENT HISTORY AND PHYSICAL EXAM    8:30 PM      Date: 12/4/2017  Patient Name: Paulette Nayak    History of Presenting Illness     Chief Complaint   Patient presents with    Skin Infection         History Provided By: Patient    Chief Complaint: Skin Problem   Duration: 3 Weeks  Timing:  Constant and Worsening  Location: Lower right leg   Quality: N/A  Severity: 10 out of 10  Modifying Factors: None   Associated Symptoms: redness and swelling to the right lower leg       Additional History (Context): Paulette Nayak is a 46 y.o. male with hx of CVA, sacroiliitis, and DDD presenting to the ED with c/o constant, worsening skin problem to the right lower leg that began 3 weeks ago. Pt reports he fell on the front porch 3 weeks ago, states redness and swelling has worsened. Pt denies CP, SOB, hx of blood clots, nausea, vomiting or diarrhea. Associated sx include redness and swelling to the right lower leg. Severity is 10/10. Notes he began to limp a couple of hours ago due to the pain. No other sx or complaints given at this time. PCP: Emilee Eastman NP    Current Facility-Administered Medications   Medication Dose Route Frequency Provider Last Rate Last Dose    clindamycin (CLEOCIN) capsule 300 mg  300 mg Oral NOW Jasmyn Miles MD        HYDROcodone-acetaminophen [facility]) 5-325 mg per tablet 1 Tab  1 Tab Oral NOW Jasmyn Miles MD         Current Outpatient Prescriptions   Medication Sig Dispense Refill    oxyCODONE-acetaminophen (PERCOCET) 5-325 mg per tablet Take 1 Tab by mouth every four (4) hours as needed for Pain. Max Daily Amount: 6 Tabs. 6 Tab 0    clindamycin (CLEOCIN) 300 mg capsule Take 1 Cap by mouth three (3) times daily for 10 days. 30 Cap 0    cyclobenzaprine (FLEXERIL) 10 mg tablet Take 1 Tab by mouth three (3) times daily as needed for Muscle Spasm(s). 30 Tab 6    raNITIdine (ZANTAC) 150 mg tablet Take 1 Tab by mouth nightly.  30 Tab 3    pantoprazole (PROTONIX) 40 mg tablet Take 1 Tab by mouth daily. 30 Tab 3    gabapentin (NEURONTIN) 800 mg tablet Take 1 Tab by mouth three (3) times daily. 90 Tab 5    budesonide-formoterol (SYMBICORT) 160-4.5 mcg/actuation HFA inhaler Take 1 Puff by inhalation two (2) times a day. 1 Inhaler 5    albuterol (PROVENTIL HFA, VENTOLIN HFA, PROAIR HFA) 90 mcg/actuation inhaler Take 2 Puffs by inhalation every six (6) hours as needed for Wheezing or Shortness of Breath. 1 Inhaler 11    aspirin 81 mg chewable tablet Take 81 mg by mouth daily. Past History     Past Medical History:  Past Medical History:   Diagnosis Date    Asthma     DDD (degenerative disc disease), lumbar 2013    noted on MRI with annular tears    Diverticulitis 2016    GERD (gastroesophageal reflux disease) 2016    with esophagitis according to endscopy    Kidney stone     Lumbar post-laminectomy syndrome     Sacroiliitis ([address]     Stroke [facility]) may 14 2010    Unspecified rotator cuff tear or rupture of left shoulder, not specified as traumatic 03/2016       Past Surgical History:  Past Surgical History:   Procedure Laterality Date    COLONOSCOPY N/A 8/12/2016    COLONOSCOPY with polypectomy performed by Bill Castaneda MD at [address]  2002, 2004, 2006    L5-S1 fusion, laminectomies    HX ENDOSCOPY  10/2016    grade 1 espohagitis    HX ENDOSCOPY  11/2016    mild esophagitis    HX KNEE ARTHROSCOPY Right     knee    HX MOHS PROCEDURES      right    HX ORTHOPAEDIC      right shoulder       Family History:  Family History   Problem Relation Age of Onset    Other Brother     Cancer Maternal Grandmother        Social History:  Social History   Substance Use Topics    Smoking status: Former Smoker     Packs/day: 2.00     Types: Cigarettes    Smokeless tobacco: Never Used    Alcohol use No       Allergies:   Allergies   Allergen Reactions    Penicillins Angioedema    Penicillins Anaphylaxis    Vicodin [Hydrocodone-Acetaminophen] Nausea and Vomiting    Azithromycin Nausea and Vomiting    Vicodin [Hydrocodone-Acetaminophen] Nausea and Vomiting    Erythromycin Other (comments)         Review of Systems       Review of Systems   Constitutional: Negative for activity change, fatigue and fever. HENT: Negative for congestion and rhinorrhea. Eyes: Negative for visual disturbance. Respiratory: Negative for shortness of breath. Cardiovascular: Negative for chest pain and palpitations. Gastrointestinal: Negative for abdominal pain, diarrhea, nausea and vomiting. Genitourinary: Negative for dysuria and hematuria. Musculoskeletal: Negative for back pain. Right lower leg pain      Skin: Negative for rash. Redness and heat to the right lower leg    Neurological: Negative for dizziness, weakness and light-headedness. All other systems reviewed and are negative. Physical Exam     Visit Vitals    /84 (BP 1 Location: Left arm, BP Patient Position: Sitting)    Pulse 93    Temp 97.7 °F (36.5 °C)    Resp 16    Ht 5' 10\" (1.778 m)    Wt 78.5 kg (173 lb)    SpO2 98%    BMI 24.82 kg/m2         Physical Exam   Constitutional: He is oriented to person, place, and time. He appears well-developed and well-nourished. No distress. HENT:   Head: Normocephalic and atraumatic. Right Ear: External ear normal.   Left Ear: External ear normal.   Nose: Nose normal.   Mouth/Throat: Oropharynx is clear and moist.   Eyes: Conjunctivae and EOM are normal. Pupils are equal, round, and reactive to light. No scleral icterus. Neck: Normal range of motion. Neck supple. No JVD present. No tracheal deviation present. No thyromegaly present. Cardiovascular: Normal rate, regular rhythm, normal heart sounds and intact distal pulses. Exam reveals no gallop and no friction rub. No murmur heard. Pulmonary/Chest: Effort normal and breath sounds normal. He exhibits no tenderness. Abdominal: Soft. Bowel sounds are normal. He exhibits no distension. There is no tenderness. There is no rebound and no guarding. Musculoskeletal: Normal range of motion. He exhibits tenderness. He exhibits no edema. R anterior tibial region with 10 x 5 cm of erythema and induration noted laterally, calf pain, distal pulses are intact, wound marked with time and date   R knee with scaling plaques noted, no erythema at the level of the joint with FROM    Lymphadenopathy:     He has no cervical adenopathy. Neurological: He is alert and oriented to person, place, and time. No cranial nerve deficit. Coordination normal.   B LE paresthesia, Gait steady, Motor 5/5   Skin: Skin is warm and dry. Psychiatric: He has a normal mood and affect. His behavior is normal. Judgment and thought content normal.   Nursing note and vitals reviewed. Diagnostic Study Results     Labs -  Recent Results (from the past 12 hour(s))   D DIMER    Collection Time: 12/04/17  8:15 PM   Result Value Ref Range    D DIMER 0.28 <0.46 ug/ml(FEU)   CBC WITH AUTOMATED DIFF    Collection Time: 12/04/17  8:15 PM   Result Value Ref Range    WBC 10.3 4.6 - 13.2 K/uL    RBC 4.97 4.70 - 5.50 M/uL    HGB 15.1 13.0 - 16.0 g/dL    HCT 43.7 36.0 - 48.0 %    MCV 87.9 74.0 - 97.0 FL    MCH 30.4 24.0 - 34.0 PG    MCHC 34.6 31.0 - 37.0 g/dL    RDW 12.4 11.6 - 14.5 %    PLATELET 905 254 - 074 K/uL    MPV 9.8 9.2 - 11.8 FL    NEUTROPHILS 64 40 - 73 %    LYMPHOCYTES 17 (L) 21 - 52 %    MONOCYTES 14 (H) 3 - 10 %    EOSINOPHILS 4 0 - 5 %    BASOPHILS 1 0 - 2 %    ABS. NEUTROPHILS 6.8 1.8 - 8.0 K/UL    ABS. LYMPHOCYTES 1.8 0.9 - 3.6 K/UL    ABS. MONOCYTES 1.4 (H) 0.05 - 1.2 K/UL    ABS. EOSINOPHILS 0.4 0.0 - 0.4 K/UL    ABS.  BASOPHILS 0.1 (H) 0.0 - 0.06 K/UL    DF AUTOMATED     METABOLIC PANEL, BASIC    Collection Time: 12/04/17  8:15 PM   Result Value Ref Range    Sodium 140 136 - 145 mmol/L    Potassium 3.6 3.5 - 5.5 mmol/L    Chloride 106 100 - 108 mmol/L    CO2 26 21 - 32 mmol/L    Anion gap 8 3.0 - 18 mmol/L    Glucose 134 (H) 74 - 99 mg/dL    BUN 14 7.0 - 18 MG/DL    Creatinine 0.96 0.6 - 1.3 MG/DL    BUN/Creatinine ratio 15 12 - 20      GFR est AA >60 >60 ml/min/1.73m2    GFR est non-AA >60 >60 ml/min/1.73m2    Calcium 8.9 8.5 - 10.1 MG/DL       Radiologic Studies -   No orders to display         Medical Decision Making   I am the first provider for this patient. I reviewed the vital signs, available nursing notes, past medical history, past surgical history, family history and social history. Vital Signs-Reviewed the patient's vital signs. Pulse Oximetry Analysis -  98% on room air (Interpretation) Normal     Cardiac Monitor:  Rate: 93 bpm   Rhythm:  Normal Sinus Rhythm     Records Reviewed: Nursing Notes (Time of Review: 8:30 PM)    ED Course: Progress Notes, Reevaluation, and Consults:      Provider Notes (Medical Decision Making): Pt is a 45yo male with a hx of a chronic low back pain, kidney stones, DDD, CVA, asthma presents with R calf pain and redness. Pt has induration and warmth suggestive of cellulitis without fluctuance. D-dimer is reassuring and the area was marked. Pt and spouse educated on when to return if spreading. Will also control his acute pain with percocet as I suspect his pain will be otherwise hard to control. Pt is to follow with a wound check in the next 2 days and to return if at all worsened or concerned. Bobby Day, DO 9:12 PM        Diagnosis     Clinical Impression:   1.  Cellulitis of right lower extremity        Disposition: Discharge     Follow-up Information     Follow up With Details Comments Contact Info    Giovanni Flores NP Call in 2 days  [address] [facility] [address] [facility] EMERGENCY DEPT  As needed, If symptoms worsen [address] [place] [id number] [phone]           Patient's Medications   Start Taking    CLINDAMYCIN (CLEOCIN) 300 MG CAPSULE    Take 1 Cap by mouth three (3) times daily for 10 days. Continue Taking    ALBUTEROL (PROVENTIL HFA, VENTOLIN HFA, PROAIR HFA) 90 MCG/ACTUATION INHALER    Take 2 Puffs by inhalation every six (6) hours as needed for Wheezing or Shortness of Breath. ASPIRIN 81 MG CHEWABLE TABLET    Take 81 mg by mouth daily. BUDESONIDE-FORMOTEROL (SYMBICORT) 160-4.5 MCG/ACTUATION HFA INHALER    Take 1 Puff by inhalation two (2) times a day. CYCLOBENZAPRINE (FLEXERIL) 10 MG TABLET    Take 1 Tab by mouth three (3) times daily as needed for Muscle Spasm(s). GABAPENTIN (NEURONTIN) 800 MG TABLET    Take 1 Tab by mouth three (3) times daily. PANTOPRAZOLE (PROTONIX) 40 MG TABLET    Take 1 Tab by mouth daily. RANITIDINE (ZANTAC) 150 MG TABLET    Take 1 Tab by mouth nightly. These Medications have changed    Modified Medication Previous Medication    OXYCODONE-ACETAMINOPHEN (PERCOCET) 5-325 MG PER TABLET oxyCODONE-acetaminophen (PERCOCET) 5-325 mg per tablet       Take 1 Tab by mouth every four (4) hours as needed for Pain. Max Daily Amount: 6 Tabs. Take 1 Tab by mouth every four (4) hours as needed for Pain. Max Daily Amount: 6 Tabs. Stop Taking    No medications on file     _______________________________    Attestations:  Fabie [address] [place] [address] acting as a scribe for and in the presence of Dorota Valladares MD      December 04, 2017 at 8:30 PM       Provider Attestation:      I personally performed the services described in the documentation, reviewed the documentation, as recorded by the scribe in my presence, and it accurately and completely records my words and actions.  December 04, 2017 at 8:30 PM - Dorota Valladares MD    _______________________________

## 2017-12-06 ENCOUNTER — HOSPITAL ENCOUNTER (INPATIENT)
Age: 52
LOS: 3 days | Discharge: HOME | DRG: 603 | End: 2017-12-09
Attending: EMERGENCY MEDICINE | Admitting: HOSPITALIST
Payer: MEDICARE

## 2017-12-06 ENCOUNTER — APPOINTMENT (OUTPATIENT)
Dept: CT IMAGING | Age: 52
DRG: 603 | End: 2017-12-06
Attending: NURSE PRACTITIONER
Payer: MEDICARE

## 2017-12-06 DIAGNOSIS — L03.818 CELLULITIS OF OTHER SPECIFIED SITE: Primary | ICD-10-CM

## 2017-12-06 PROBLEM — L03.90 CELLULITIS: Status: ACTIVE | Noted: 2017-12-06

## 2017-12-06 PROBLEM — L03.115 CELLULITIS AND ABSCESS OF RIGHT LEG: Status: ACTIVE | Noted: 2017-12-06

## 2017-12-06 PROBLEM — L02.415 CELLULITIS AND ABSCESS OF RIGHT LEG: Status: ACTIVE | Noted: 2017-12-06

## 2017-12-06 LAB
ALBUMIN SERPL-MCNC: 3.9 G/DL (ref 3.4–5)
ALBUMIN/GLOB SERPL: 1.1 {RATIO} (ref 0.8–1.7)
ALP SERPL-CCNC: 67 U/L (ref 45–117)
ALT SERPL-CCNC: 20 U/L (ref 16–61)
ANION GAP SERPL CALC-SCNC: 7 MMOL/L (ref 3–18)
AST SERPL-CCNC: 12 U/L (ref 15–37)
BASOPHILS # BLD: 0 K/UL (ref 0–0.06)
BASOPHILS NFR BLD: 0 % (ref 0–2)
BILIRUB SERPL-MCNC: 0.5 MG/DL (ref 0.2–1)
BUN SERPL-MCNC: 13 MG/DL (ref 7–18)
BUN/CREAT SERPL: 15 (ref 12–20)
CALCIUM SERPL-MCNC: 8.6 MG/DL (ref 8.5–10.1)
CHLORIDE SERPL-SCNC: 104 MMOL/L (ref 100–108)
CO2 SERPL-SCNC: 27 MMOL/L (ref 21–32)
CREAT SERPL-MCNC: 0.85 MG/DL (ref 0.6–1.3)
DIFFERENTIAL METHOD BLD: ABNORMAL
EOSINOPHIL # BLD: 0.2 K/UL (ref 0–0.4)
EOSINOPHIL NFR BLD: 2 % (ref 0–5)
ERYTHROCYTE [DISTWIDTH] IN BLOOD BY AUTOMATED COUNT: 12.3 % (ref 11.6–14.5)
GLOBULIN SER CALC-MCNC: 3.4 G/DL (ref 2–4)
GLUCOSE SERPL-MCNC: 88 MG/DL (ref 74–99)
HCT VFR BLD AUTO: 41.4 % (ref 36–48)
HGB BLD-MCNC: 14.4 G/DL (ref 13–16)
LACTATE BLD-SCNC: 0.5 MMOL/L (ref 0.4–2)
LYMPHOCYTES # BLD: 1.4 K/UL (ref 0.9–3.6)
LYMPHOCYTES NFR BLD: 15 % (ref 21–52)
MCH RBC QN AUTO: 30.2 PG (ref 24–34)
MCHC RBC AUTO-ENTMCNC: 34.8 G/DL (ref 31–37)
MCV RBC AUTO: 86.8 FL (ref 74–97)
MONOCYTES # BLD: 1.5 K/UL (ref 0.05–1.2)
MONOCYTES NFR BLD: 16 % (ref 3–10)
NEUTS SEG # BLD: 6.2 K/UL (ref 1.8–8)
NEUTS SEG NFR BLD: 67 % (ref 40–73)
PLATELET # BLD AUTO: 248 K/UL (ref 135–420)
PMV BLD AUTO: 10.1 FL (ref 9.2–11.8)
POTASSIUM SERPL-SCNC: 3.6 MMOL/L (ref 3.5–5.5)
PROT SERPL-MCNC: 7.3 G/DL (ref 6.4–8.2)
RBC # BLD AUTO: 4.77 M/UL (ref 4.7–5.5)
SODIUM SERPL-SCNC: 138 MMOL/L (ref 136–145)
WBC # BLD AUTO: 9.4 K/UL (ref 4.6–13.2)

## 2017-12-06 PROCEDURE — 96365 THER/PROPH/DIAG IV INF INIT: CPT

## 2017-12-06 PROCEDURE — 74011250636 HC RX REV CODE- 250/636: Performed by: NURSE PRACTITIONER

## 2017-12-06 PROCEDURE — 85025 COMPLETE CBC W/AUTO DIFF WBC: CPT | Performed by: EMERGENCY MEDICINE

## 2017-12-06 PROCEDURE — 87040 BLOOD CULTURE FOR BACTERIA: CPT | Performed by: EMERGENCY MEDICINE

## 2017-12-06 PROCEDURE — 96375 TX/PRO/DX INJ NEW DRUG ADDON: CPT

## 2017-12-06 PROCEDURE — 96366 THER/PROPH/DIAG IV INF ADDON: CPT

## 2017-12-06 PROCEDURE — 73701 CT LOWER EXTREMITY W/DYE: CPT

## 2017-12-06 PROCEDURE — 65270000029 HC RM PRIVATE

## 2017-12-06 PROCEDURE — 74011250636 HC RX REV CODE- 250/636: Performed by: EMERGENCY MEDICINE

## 2017-12-06 PROCEDURE — 96368 THER/DIAG CONCURRENT INF: CPT

## 2017-12-06 PROCEDURE — 99284 EMERGENCY DEPT VISIT MOD MDM: CPT

## 2017-12-06 PROCEDURE — 74011636320 HC RX REV CODE- 636/320: Performed by: EMERGENCY MEDICINE

## 2017-12-06 PROCEDURE — 83605 ASSAY OF LACTIC ACID: CPT

## 2017-12-06 PROCEDURE — 80053 COMPREHEN METABOLIC PANEL: CPT | Performed by: EMERGENCY MEDICINE

## 2017-12-06 PROCEDURE — 74011250637 HC RX REV CODE- 250/637: Performed by: NURSE PRACTITIONER

## 2017-12-06 RX ORDER — CYCLOBENZAPRINE HCL 10 MG
10 TABLET ORAL
Status: DISCONTINUED | OUTPATIENT
Start: 2017-12-07 | End: 2017-12-09 | Stop reason: HOSPADM

## 2017-12-06 RX ORDER — RANITIDINE 150 MG/1
150 TABLET, FILM COATED ORAL
Status: DISCONTINUED | OUTPATIENT
Start: 2017-12-07 | End: 2017-12-07 | Stop reason: CLARIF

## 2017-12-06 RX ORDER — MORPHINE SULFATE 2 MG/ML
4 INJECTION, SOLUTION INTRAMUSCULAR; INTRAVENOUS
Status: COMPLETED | OUTPATIENT
Start: 2017-12-06 | End: 2017-12-06

## 2017-12-06 RX ORDER — OXYCODONE AND ACETAMINOPHEN 5; 325 MG/1; MG/1
2 TABLET ORAL
Status: COMPLETED | OUTPATIENT
Start: 2017-12-06 | End: 2017-12-06

## 2017-12-06 RX ORDER — HEPARIN SODIUM 5000 [USP'U]/ML
5000 INJECTION, SOLUTION INTRAVENOUS; SUBCUTANEOUS EVERY 8 HOURS
Status: DISCONTINUED | OUTPATIENT
Start: 2017-12-07 | End: 2017-12-09 | Stop reason: HOSPADM

## 2017-12-06 RX ORDER — ONDANSETRON 2 MG/ML
4 INJECTION INTRAMUSCULAR; INTRAVENOUS
Status: COMPLETED | OUTPATIENT
Start: 2017-12-06 | End: 2017-12-06

## 2017-12-06 RX ORDER — GABAPENTIN 400 MG/1
800 CAPSULE ORAL 3 TIMES DAILY
Status: DISCONTINUED | OUTPATIENT
Start: 2017-12-07 | End: 2017-12-09 | Stop reason: HOSPADM

## 2017-12-06 RX ORDER — ALBUTEROL SULFATE 90 UG/1
2 AEROSOL, METERED RESPIRATORY (INHALATION)
Status: DISCONTINUED | OUTPATIENT
Start: 2017-12-06 | End: 2017-12-07 | Stop reason: CLARIF

## 2017-12-06 RX ORDER — CYCLOBENZAPRINE HCL 10 MG
10 TABLET ORAL
Status: COMPLETED | OUTPATIENT
Start: 2017-12-06 | End: 2017-12-06

## 2017-12-06 RX ORDER — OXYCODONE AND ACETAMINOPHEN 5; 325 MG/1; MG/1
1 TABLET ORAL
Status: DISCONTINUED | OUTPATIENT
Start: 2017-12-06 | End: 2017-12-09 | Stop reason: HOSPADM

## 2017-12-06 RX ORDER — LEVOFLOXACIN 5 MG/ML
750 INJECTION, SOLUTION INTRAVENOUS EVERY 24 HOURS
Status: DISCONTINUED | OUTPATIENT
Start: 2017-12-06 | End: 2017-12-07

## 2017-12-06 RX ORDER — GABAPENTIN 400 MG/1
800 CAPSULE ORAL 3 TIMES DAILY
Status: DISCONTINUED | OUTPATIENT
Start: 2017-12-06 | End: 2017-12-07 | Stop reason: SDUPTHER

## 2017-12-06 RX ORDER — BUDESONIDE AND FORMOTEROL FUMARATE DIHYDRATE 160; 4.5 UG/1; UG/1
1 AEROSOL RESPIRATORY (INHALATION)
Status: DISCONTINUED | OUTPATIENT
Start: 2017-12-07 | End: 2017-12-09 | Stop reason: HOSPADM

## 2017-12-06 RX ORDER — GUAIFENESIN 100 MG/5ML
81 LIQUID (ML) ORAL DAILY
Status: DISCONTINUED | OUTPATIENT
Start: 2017-12-07 | End: 2017-12-09 | Stop reason: HOSPADM

## 2017-12-06 RX ORDER — PANTOPRAZOLE SODIUM 40 MG/1
40 TABLET, DELAYED RELEASE ORAL DAILY
Status: DISCONTINUED | OUTPATIENT
Start: 2017-12-07 | End: 2017-12-09 | Stop reason: HOSPADM

## 2017-12-06 RX ORDER — SODIUM CHLORIDE 9 MG/ML
125 INJECTION, SOLUTION INTRAVENOUS CONTINUOUS
Status: DISCONTINUED | OUTPATIENT
Start: 2017-12-06 | End: 2017-12-09 | Stop reason: HOSPADM

## 2017-12-06 RX ADMIN — Medication 4 MG: at 19:56

## 2017-12-06 RX ADMIN — GABAPENTIN 800 MG: 400 CAPSULE ORAL at 21:32

## 2017-12-06 RX ADMIN — ONDANSETRON 4 MG: 2 INJECTION INTRAMUSCULAR; INTRAVENOUS at 19:58

## 2017-12-06 RX ADMIN — SODIUM CHLORIDE 125 ML/HR: 900 INJECTION, SOLUTION INTRAVENOUS at 19:54

## 2017-12-06 RX ADMIN — LEVOFLOXACIN 750 MG: 5 INJECTION, SOLUTION INTRAVENOUS at 19:38

## 2017-12-06 RX ADMIN — SODIUM CHLORIDE 1000 ML: 900 INJECTION, SOLUTION INTRAVENOUS at 19:50

## 2017-12-06 RX ADMIN — OXYCODONE HYDROCHLORIDE AND ACETAMINOPHEN 2 TABLET: 5; 325 TABLET ORAL at 21:33

## 2017-12-06 RX ADMIN — VANCOMYCIN HYDROCHLORIDE 1500 MG: 1 INJECTION, POWDER, LYOPHILIZED, FOR SOLUTION INTRAVENOUS at 19:50

## 2017-12-06 RX ADMIN — IOPAMIDOL 100 ML: 612 INJECTION, SOLUTION INTRAVENOUS at 20:11

## 2017-12-06 RX ADMIN — CYCLOBENZAPRINE HYDROCHLORIDE 10 MG: 10 TABLET, FILM COATED ORAL at 21:33

## 2017-12-06 NOTE — Clinical Note
Status[de-identified] Inpatient [101] Type of Bed: Medical [8] Inpatient Hospitalization Certified Necessary for the Following Reasons: 9. Other (further clarification in H&P documentation) Admitting Diagnosis: Cellulitis and abscess of right leg [[id number]] Admitting Physician: Napoleon Mina [[id number]] Attending Physician: Napoleon Mina [[id number]] Estimated Length of Stay: 3-4 Midnights Discharge Plan[de-identified] Home with Office Follow-up

## 2017-12-06 NOTE — IP AVS SNAPSHOT
Ya Hatfield 
 
 
 [address] Patient: Yudith Stovall MRN: [id number] :1965 My Medications STOP taking these medications   
 clindamycin 300 mg capsule Commonly known as:  CLEOCIN  
   
  
  
TAKE these medications as instructed Instructions Each Dose to Equal  
 Morning Noon Evening Bedtime  
 albuterol 90 mcg/actuation inhaler Commonly known as:  PROVENTIL HFA, VENTOLIN HFA, PROAIR HFA Your last dose was: Your next dose is: Take 2 Puffs by inhalation every six (6) hours as needed for Wheezing or Shortness of Breath. 2 Puff  
    
   
   
   
  
 aspirin 81 mg chewable tablet Your last dose was: Your next dose is: Take 81 mg by mouth daily. 81 mg  
    
   
   
   
  
 budesonide-formoterol 160-4.5 mcg/actuation [facility] Commonly known as:  SYMBICORT Your last dose was: Your next dose is: Take 1 Puff by inhalation two (2) times a day. 1 Puff  
    
   
   
   
  
 cyclobenzaprine 10 mg tablet Commonly known as:  FLEXERIL Your last dose was: Your next dose is: Take 1 Tab by mouth three (3) times daily as needed for Muscle Spasm(s). 10 mg  
    
   
   
   
  
 gabapentin 800 mg tablet Commonly known as:  NEURONTIN Your last dose was: Your next dose is: Take 1 Tab by mouth three (3) times daily. 800 mg  
    
   
   
   
  
 oxyCODONE-acetaminophen 5-325 mg per tablet Commonly known as:  PERCOCET Your last dose was: Your next dose is: Take 1 Tab by mouth every four (4) hours as needed for Pain. Max Daily Amount: 6 Tabs. 1 Tab  
    
   
   
   
  
 pantoprazole 40 mg tablet Commonly known as:  PROTONIX Your last dose was: Your next dose is: Take 1 Tab by mouth daily.   
 40 mg  
    
   
   
   
  
 raNITIdine 150 mg tablet Commonly known as:  ZANTAC Your last dose was: Your next dose is: Take 1 Tab by mouth nightly. 150 mg  
    
   
   
   
  
 trimethoprim-sulfamethoxazole 160-800 mg per tablet Commonly known as:  BACTRIM DS Your last dose was: Your next dose is: Take 1 Tab by mouth two (2) times a day. 1 Tab Where to Get Your Medications These medications were sent to [facility] [address] [place], [address] [place] [id number] Hours:  24-hours Phone:  [phone]  
  trimethoprim-sulfamethoxazole 160-800 mg per tablet Information on where to get these meds will be given to you by the nurse or doctor. ! Ask your nurse or doctor about these medications  
  oxyCODONE-acetaminophen 5-325 mg per tablet

## 2017-12-06 NOTE — IP AVS SNAPSHOT
[address] Patient: Torrey Zhang MRN: [id number] :1965 About your hospitalization You were admitted on:  2017 You last received care in the:  [facility] [address] You were discharged on:  2017 Why you were hospitalized Your primary diagnosis was:  Not on File Your diagnoses also included:  Cellulitis And Abscess Of Right Leg, Cellulitis Things You Need To Do (next 8 weeks) Follow up with Mercie Snellen, NP Phone:  [phone] Where:  [facility], [address], [facility] [address] Tuesday Dec 12, 2017 Follow up with Mercie Snellen, NP  
@ 9:30 Phone:  [phone] Where:  Rema [facility]en, [address], [address] Wednesday Dec 13, 2017 HOSPITAL FOLLOW-UP with Mercie Snellen, NP at  9:00 AM  
Where: [address] [facility] [place] ([address] [place] [address]) Thursday Dec 14, 2017 Any with Alessandra Oseguera MD at  8:30 AM  
Where:  [facility] ([address] [place] [address]) Friday Dec 15, 2017 SURGERY CONSULT with Pamela Mcgraw MD at  9:00 AM  
Where:  [address] [facility], [address] and [facility] ([address] [place] [address])  Office Visit with Mercie Snellen, NP at  8:45 AM  
Where: [address] [place], [place] [address]e. ([address] [place] [address]) Discharge Orders None A check raquel indicates which time of day the medication should be taken. My Medications STOP taking these medications   
 clindamycin 300 mg capsule Commonly known as:  CLEOCIN  
   
  
  
TAKE these medications as instructed Instructions Each Dose to Equal  
 Morning Noon Evening Bedtime  
 albuterol 90 mcg/actuation inhaler Commonly known as:  PROVENTIL HFA, VENTOLIN HFA, PROAIR HFA Your last dose was: Your next dose is: Take 2 Puffs by inhalation every six (6) hours as needed for Wheezing or Shortness of Breath. 2 Puff  
    
   
   
   
  
 aspirin 81 mg chewable tablet Your last dose was: Your next dose is: Take 81 mg by mouth daily. 81 mg  
    
   
   
   
  
 budesonide-formoterol 160-4.5 mcg/actuation Hfaa Commonly known as:  SYMBICORT Your last dose was: Your next dose is: Take 1 Puff by inhalation two (2) times a day. 1 Puff  
    
   
   
   
  
 cyclobenzaprine 10 mg tablet Commonly known as:  FLEXERIL Your last dose was: Your next dose is: Take 1 Tab by mouth three (3) times daily as needed for Muscle Spasm(s). 10 mg  
    
   
   
   
  
 gabapentin 800 mg tablet Commonly known as:  NEURONTIN Your last dose was: Your next dose is: Take 1 Tab by mouth three (3) times daily. 800 mg  
    
   
   
   
  
 oxyCODONE-acetaminophen 5-325 mg per tablet Commonly known as:  PERCOCET Your last dose was: Your next dose is: Take 1 Tab by mouth every four (4) hours as needed for Pain. Max Daily Amount: 6 Tabs. 1 Tab  
    
   
   
   
  
 pantoprazole 40 mg tablet Commonly known as:  PROTONIX Your last dose was: Your next dose is: Take 1 Tab by mouth daily. 40 mg  
    
   
   
   
  
 raNITIdine 150 mg tablet Commonly known as:  ZANTAC Your last dose was: Your next dose is: Take 1 Tab by mouth nightly. 150 mg  
    
   
   
   
  
 trimethoprim-sulfamethoxazole 160-800 mg per tablet Commonly known as:  BACTRIM DS Your last dose was: Your next dose is: Take 1 Tab by mouth two (2) times a day. 1 Tab Where to Get Your Medications These medications were sent to [facility] [address] [place] [address] - [address] [place], [address]  [address] Hours:  24-hours Phone:  [phone]  
  trimethoprim-sulfamethoxazole 160-800 mg per tablet Information on where to get these meds will be given to you by the nurse or doctor. ! Ask your nurse or doctor about these medications  
  oxyCODONE-acetaminophen 5-325 mg per tablet Discharge Instructions Patient armband removed and given to patient to take home. Patient was informed of the privacy risks if armband lost or stolen DISCHARGE SUMMARY from Nurse PATIENT INSTRUCTIONS: 
 
 
F-face looks uneven A-arms unable to move or move unevenly S-speech slurred or non-existent T-time-call 911 as soon as signs and symptoms begin-DO NOT go Back to bed or wait to see if you get better-TIME IS BRAIN. Warning Signs of HEART ATTACK Call 911 if you have these symptoms: 
? Chest discomfort. Most heart attacks involve discomfort in the center of the chest that lasts more than a few minutes, or that goes away and comes back. It can feel like uncomfortable pressure, squeezing, fullness, or pain. ? Discomfort in other areas of the upper body. Symptoms can include pain or discomfort in one or both arms, the back, neck, jaw, or stomach. ? Shortness of breath with or without chest discomfort. ? Other signs may include breaking out in a cold sweat, nausea, or lightheadedness. Don't wait more than five minutes to call [address]! Fast action can save your life. Calling 911 is almost always the fastest way to get lifesaving treatment.  Emergency Medical Services staff can begin treatment when they arrive  up to an hour sooner than if someone gets to the hospital by car. The discharge information has been reviewed with the patient. The patient verbalized understanding. Discharge medications reviewed with the patient Cellulitis: Care Instructions Your Care Instructions Cellulitis is a skin infection. It often occurs after a break in the skin from a scrape, cut, bite, or puncture, or after a rash. The doctor has checked you carefully, but problems can develop later. If you notice any problems or new symptoms, get medical treatment right away. Follow-up care is a key part of your treatment and safety. Be sure to make and go to all appointments, and call your doctor if you are having problems. It's also a good idea to know your test results and keep a list of the medicines you take. How can you care for yourself at home? · Take your antibiotics as directed. Do not stop taking them just because you feel better. You need to take the full course of antibiotics. · Prop up the infected area on pillows to reduce pain and swelling. Try to keep the area above the level of your heart as often as you can. · If your doctor told you how to care for your wound, follow your doctor's instructions. If you did not get instructions, follow this general advice: ¨ Wash the wound with clean water 2 times a day. Don't use hydrogen peroxide or alcohol, which can slow healing. ¨ You may cover the wound with a thin layer of petroleum jelly, such as Vaseline, and a nonstick bandage. ¨ Apply more petroleum jelly and replace the bandage as needed. · Be safe with medicines. Take pain medicines exactly as directed. ¨ If the doctor gave you a prescription medicine for pain, take it as prescribed. ¨ If you are not taking a prescription pain medicine, ask your doctor if you can take an over-the-counter medicine. To prevent cellulitis in the future · Try to prevent cuts, scrapes, or other injuries to your skin. Cellulitis most often occurs where there is a break in the skin. · If you get a scrape, cut, mild burn, or bite, wash the wound with clean water as soon as you can to help avoid infection. Don't use hydrogen peroxide or alcohol, which can slow healing. · If you have swelling in your legs (edema), support stockings and good skin care may help prevent leg sores and cellulitis. · Take care of your feet, especially if you have diabetes or other conditions that increase the risk of infection. Wear shoes and socks. Do not go barefoot. If you have athlete's foot or other skin problems on your feet, talk to your doctor about how to treat them. When should you call for help? Call your doctor now or seek immediate medical care if: 
? · You have signs that your infection is getting worse, such as: 
¨ Increased pain, swelling, warmth, or redness. ¨ Red streaks leading from the area. ¨ Pus draining from the area. ¨ A fever. ? · You get a rash. ? Watch closely for changes in your health, and be sure to contact your doctor if: 
? · You are not getting better after 1 day (24 hours). ? · You do not get better as expected. Where can you learn more? Go to http://lola-conner.info/. Bravo Simon in the search box to learn more about \"Cellulitis: Care Instructions. \" Current as of: October 13, 2016 Content Version: 11.4 © 0746-7961 Healthwise, Incorporated. Care instructions adapted under license by [employer] (which disclaims liability or warranty for this information). If you have questions about a medical condition or this instruction, always ask your healthcare professional. [address] any warranty or liability for your use of this information. and appropriate educational materials and side effects teaching were provided.  
___________________________________________________________________________ ________________________________________________________ MyChart Activation Thank you for requesting access to Moovweb. Please follow the instructions below to securely access and download your online medical record. Moovweb allows you to send messages to your doctor, view your test results, renew your prescriptions, schedule appointments, and more. How Do I Sign Up? 1. In your internet browser, go to [email or web address] 
2. Click on the First Time User? Click Here link in the Sign In box. You will be redirect to the New Member Sign Up page. 3. Enter your Moovweb Access Code exactly as it appears below. You will not need to use this code after youve completed the sign-up process. If you do not sign up before the expiration date, you must request a new code. Moovweb Access Code: [email or web address] Expires: 3/3/2018  8:39 AM (This is the date your Moovweb access code will ) 4. Enter the last four digits of your Social Security Number (xxxx) and Date of Birth (mm/dd/yyyy) as indicated and click Submit. You will be taken to the next sign-up page. 5. Create a Moovweb ID. This will be your Moovweb login ID and cannot be changed, so think of one that is secure and easy to remember. 6. Create a Moovweb password. You can change your password at any time. 7. Enter your Password Reset Question and Answer. This can be used at a later time if you forget your password. 8. Enter your e-mail address. You will receive e-mail notification when new information is available in [address] 9. Click Sign Up. You can now view and download portions of your medical record. 10. Click the Download Summary menu link to download a portable copy of your medical information. Additional Information If you have questions, please visit the Frequently Asked Questions section of the Moovweb website at [email or web address] Remember, [employer] is NOT to be used for urgent needs.  For medical emergencies, dial 911. Discharge Instructions Patient: Elder Hutchinson MRN: [id number]  CSN: [id number] YOB: 1965  Age: 46 y.o. Sex: male DOA: 12/6/2017 LOS:  LOS: 3 days   Discharge Date: DIET:  Cardiac Diet ACTIVITY: Activity as tolerated Home health care for Skilled care for medication management (patient has since refused home health) ADDITIONAL INFORMATION: If you experience any of the following symptoms but not limited to Fever, chills, nausea, vomiting, diarrhea, change in mentation, falling, bleeding, shortness of breath, chest pain, please call your primary care physician or return to the emergency room if you cannot get hold of your doctor:  
 
FOLLOW UP CARE: 
Dr. Winton Severe, NP in 5 - 7 days. Please call and set up an appointment. Vernell López NP 
12/9/2017 1:02 PM 
 
 
 
 
 
 
ACO Transitions of Care Introducing [facility] [address] offers a voluntary care coordination program to provide high quality service and care to [facility] fee-for-service beneficiaries. Simon Waldron was designed to help you enhance your health and well-being through the following services: ? Transitions of Care  support for individuals who are transitioning from one care setting to another (example: Hospital to home). ? Chronic and Complex Care Coordination  support for individuals and caregivers of those with serious or chronic illnesses or with more than one chronic (ongoing) condition and those who take a number of different medications. If you meet specific medical criteria, a [facility] [address] may call you directly to coordinate your care with your primary care physician and your other care providers. For questions about the [facility] programs, please, contact your physicians office.  
 
For general questions or additional information about Accountable Care Organizations: 
Please visit www.medicare.gov/acos. html or call 1-800-MEDICARE (8-654.305.5827) TTY users should call 9-506.639.1197. Surfkitchen Announcement We are excited to announce that we are making your provider's discharge notes available to you in Surfkitchen. You will see these notes when they are completed and signed by the physician that discharged you from your recent hospital stay. If you have any questions or concerns about any information you see in Surfkitchen, please call the Health Information Department where you were seen or reach out to your Primary Care Provider for more information about your plan of care. Introducing [facility]! Raquel Garduno introduces Surfkitchen patient portal. Now you can access parts of your medical record, email your doctor's office, and request medication refills online. 1. In your internet browser, go to [email or web address] 2. Click on the First Time User? Click Here link in the Sign In box. You will see the New Member Sign Up page. 3. Enter your Surfkitchen Access Code exactly as it appears below. You will not need to use this code after youve completed the sign-up process. If you do not sign up before the expiration date, you must request a new code. · Surfkitchen Access Code: [email or web address] Expires: 3/3/2018  8:39 AM 
 
4. Enter the last four digits of your Social Security Number (xxxx) and Date of Birth (mm/dd/yyyy) as indicated and click Submit. You will be taken to the next sign-up page. 5. Create a Energy Informaticst ID. This will be your Surfkitchen login ID and cannot be changed, so think of one that is secure and easy to remember. 6. Create a Surfkitchen password. You can change your password at any time. 7. Enter your Password Reset Question and Answer. This can be used at a later time if you forget your password. 8. Enter your e-mail address. You will receive e-mail notification when new information is available in [address] 9. Click Sign Up. You can now view and download portions of your medical record. 10. Click the Download Summary menu link to download a portable copy of your medical information. If you have questions, please visit the Frequently Asked Questions section of the wiMANt website. Remember, HelloTel is NOT to be used for urgent needs. For medical emergencies, dial 911. Now available from your iPhone and Android! Unresulted Labs-Please follow up with your PCP about these lab tests Order Current Status CULTURE, BLOOD Preliminary result CULTURE, BLOOD Preliminary result Providers Seen During Your Hospitalization Provider Specialty Primary office phone Ayad Coats MD Emergency Medicine [phone] Kwaku Almeida MD Emergency Medicine [phone] Gemma Nolasco MD Internal Medicine [phone] Your Primary Care Physician (PCP) Primary Care Physician Office Phone Office Fax Alyssa Michelle [phone] You are allergic to the following Allergen Reactions Penicillins Angioedema Penicillins Anaphylaxis Vicodin (Hydrocodone-Acetaminophen) Nausea and Vomiting Azithromycin Nausea and Vomiting Vicodin (Hydrocodone-Acetaminophen) Nausea and Vomiting Erythromycin Other (comments) Recent Documentation Height Weight BMI Smoking Status 1.778 m 73.9 kg 23.39 kg/m2 Former Smoker Emergency Contacts Name Discharge Info Relation Home Work Mobile [address] CAREGIVER [3] Girlfriend [18] [phone] Sharon Floyd [5] [phone] Patient Belongings The following personal items are in your possession at time of discharge: 
  Dental Appliances: None  Visual Aid: None      Home Medications: None   Jewelry: Ring  Clothing: Pajamas, Pants, Shirt, Slippers    Other Valuables: Cell Phone  Personal Items Sent to Safe: none Please provide this summary of care documentation to your next provider. Signatures-by signing, you are acknowledging that this After Visit Summary has been reviewed with you and you have received a copy. Patient Signature:  ____________________________________________________________ Date:  ____________________________________________________________  
  
Rich Naas Provider Signature:  ____________________________________________________________ Date:  ____________________________________________________________

## 2017-12-07 LAB
ALBUMIN SERPL-MCNC: 2.8 G/DL (ref 3.4–5)
ALBUMIN/GLOB SERPL: 1.2 {RATIO} (ref 0.8–1.7)
ALP SERPL-CCNC: 55 U/L (ref 45–117)
ALT SERPL-CCNC: 16 U/L (ref 16–61)
ANION GAP SERPL CALC-SCNC: 6 MMOL/L (ref 3–18)
AST SERPL-CCNC: 10 U/L (ref 15–37)
BILIRUB SERPL-MCNC: 0.6 MG/DL (ref 0.2–1)
BUN SERPL-MCNC: 12 MG/DL (ref 7–18)
BUN/CREAT SERPL: 15 (ref 12–20)
CALCIUM SERPL-MCNC: 7.8 MG/DL (ref 8.5–10.1)
CHLORIDE SERPL-SCNC: 106 MMOL/L (ref 100–108)
CHOLEST SERPL-MCNC: 97 MG/DL
CO2 SERPL-SCNC: 28 MMOL/L (ref 21–32)
CREAT SERPL-MCNC: 0.8 MG/DL (ref 0.6–1.3)
ERYTHROCYTE [DISTWIDTH] IN BLOOD BY AUTOMATED COUNT: 12.6 % (ref 11.6–14.5)
GLOBULIN SER CALC-MCNC: 2.4 G/DL (ref 2–4)
GLUCOSE SERPL-MCNC: 130 MG/DL (ref 74–99)
HCT VFR BLD AUTO: 37 % (ref 36–48)
HDLC SERPL-MCNC: 43 MG/DL (ref 40–60)
HDLC SERPL: 2.3 {RATIO} (ref 0–5)
HGB BLD-MCNC: 12.4 G/DL (ref 13–16)
LDLC SERPL CALC-MCNC: 38.6 MG/DL (ref 0–100)
LIPID PROFILE,FLP: NORMAL
MCH RBC QN AUTO: 29.7 PG (ref 24–34)
MCHC RBC AUTO-ENTMCNC: 33.5 G/DL (ref 31–37)
MCV RBC AUTO: 88.5 FL (ref 74–97)
PLATELET # BLD AUTO: 238 K/UL (ref 135–420)
PMV BLD AUTO: 9.9 FL (ref 9.2–11.8)
POTASSIUM SERPL-SCNC: 3.7 MMOL/L (ref 3.5–5.5)
PROT SERPL-MCNC: 5.2 G/DL (ref 6.4–8.2)
RBC # BLD AUTO: 4.18 M/UL (ref 4.7–5.5)
SODIUM SERPL-SCNC: 140 MMOL/L (ref 136–145)
TRIGL SERPL-MCNC: 77 MG/DL (ref ?–150)
VLDLC SERPL CALC-MCNC: 15.4 MG/DL
WBC # BLD AUTO: 6.2 K/UL (ref 4.6–13.2)

## 2017-12-07 PROCEDURE — 74011250636 HC RX REV CODE- 250/636: Performed by: EMERGENCY MEDICINE

## 2017-12-07 PROCEDURE — 65270000029 HC RM PRIVATE

## 2017-12-07 PROCEDURE — 74011250636 HC RX REV CODE- 250/636: Performed by: HOSPITALIST

## 2017-12-07 PROCEDURE — 36415 COLL VENOUS BLD VENIPUNCTURE: CPT | Performed by: HOSPITALIST

## 2017-12-07 PROCEDURE — 80053 COMPREHEN METABOLIC PANEL: CPT | Performed by: HOSPITALIST

## 2017-12-07 PROCEDURE — 85027 COMPLETE CBC AUTOMATED: CPT | Performed by: HOSPITALIST

## 2017-12-07 PROCEDURE — 80061 LIPID PANEL: CPT | Performed by: HOSPITALIST

## 2017-12-07 PROCEDURE — 74011250637 HC RX REV CODE- 250/637: Performed by: HOSPITALIST

## 2017-12-07 PROCEDURE — 94640 AIRWAY INHALATION TREATMENT: CPT

## 2017-12-07 RX ORDER — ALBUTEROL SULFATE 0.83 MG/ML
2.5 SOLUTION RESPIRATORY (INHALATION)
Status: DISCONTINUED | OUTPATIENT
Start: 2017-12-07 | End: 2017-12-09 | Stop reason: HOSPADM

## 2017-12-07 RX ORDER — SODIUM CHLORIDE 9 MG/ML
250 INJECTION, SOLUTION INTRAVENOUS ONCE
Status: COMPLETED | OUTPATIENT
Start: 2017-12-07 | End: 2017-12-07

## 2017-12-07 RX ORDER — FAMOTIDINE 20 MG/1
20 TABLET, FILM COATED ORAL EVERY EVENING
Status: DISCONTINUED | OUTPATIENT
Start: 2017-12-07 | End: 2017-12-09 | Stop reason: HOSPADM

## 2017-12-07 RX ADMIN — SODIUM CHLORIDE 1000 MG: 900 INJECTION, SOLUTION INTRAVENOUS at 21:25

## 2017-12-07 RX ADMIN — PANTOPRAZOLE SODIUM 40 MG: 40 TABLET, DELAYED RELEASE ORAL at 09:29

## 2017-12-07 RX ADMIN — ASPIRIN 81 MG 81 MG: 81 TABLET ORAL at 09:29

## 2017-12-07 RX ADMIN — GABAPENTIN 800 MG: 400 CAPSULE ORAL at 21:25

## 2017-12-07 RX ADMIN — CYCLOBENZAPRINE HYDROCHLORIDE 10 MG: 10 TABLET, FILM COATED ORAL at 21:25

## 2017-12-07 RX ADMIN — SODIUM CHLORIDE 250 ML: 900 INJECTION, SOLUTION INTRAVENOUS at 03:49

## 2017-12-07 RX ADMIN — OXYCODONE HYDROCHLORIDE AND ACETAMINOPHEN 1 TABLET: 5; 325 TABLET ORAL at 21:25

## 2017-12-07 RX ADMIN — HEPARIN SODIUM 5000 UNITS: 5000 INJECTION, SOLUTION INTRAVENOUS; SUBCUTANEOUS at 01:57

## 2017-12-07 RX ADMIN — OXYCODONE HYDROCHLORIDE AND ACETAMINOPHEN 1 TABLET: 5; 325 TABLET ORAL at 15:37

## 2017-12-07 RX ADMIN — BUDESONIDE AND FORMOTEROL FUMARATE DIHYDRATE 1 PUFF: 160; 4.5 AEROSOL RESPIRATORY (INHALATION) at 20:39

## 2017-12-07 RX ADMIN — GABAPENTIN 800 MG: 400 CAPSULE ORAL at 09:29

## 2017-12-07 RX ADMIN — OXYCODONE HYDROCHLORIDE AND ACETAMINOPHEN 1 TABLET: 5; 325 TABLET ORAL at 08:19

## 2017-12-07 RX ADMIN — FAMOTIDINE 20 MG: 20 TABLET, FILM COATED ORAL at 18:53

## 2017-12-07 RX ADMIN — HEPARIN SODIUM 5000 UNITS: 5000 INJECTION, SOLUTION INTRAVENOUS; SUBCUTANEOUS at 18:53

## 2017-12-07 RX ADMIN — SODIUM CHLORIDE 125 ML/HR: 900 INJECTION, SOLUTION INTRAVENOUS at 10:19

## 2017-12-07 RX ADMIN — SODIUM CHLORIDE 125 ML/HR: 900 INJECTION, SOLUTION INTRAVENOUS at 03:49

## 2017-12-07 RX ADMIN — SODIUM CHLORIDE 1000 MG: 900 INJECTION, SOLUTION INTRAVENOUS at 10:19

## 2017-12-07 RX ADMIN — GABAPENTIN 800 MG: 400 CAPSULE ORAL at 15:53

## 2017-12-07 RX ADMIN — HEPARIN SODIUM 5000 UNITS: 5000 INJECTION, SOLUTION INTRAVENOUS; SUBCUTANEOUS at 09:29

## 2017-12-07 NOTE — PROGRESS NOTES
met with patient(in the Emergency Department) and his girlfriend of many years, completed the initial Spiritual Assessment of the patient, and offered Pastoral Care, see flow sheets for interventions. Pastoral support provided. He is optimistic about his recovery. Patient does not have any Lutheran/cultural needs that will affect patients preferences in health care. Chart reviewed. Chaplains will continue to follow and will provide pastoral care on an as needed/as requested basis. Chris Weinberg MDiv.   Board Certified [employer] [phone]

## 2017-12-07 NOTE — PROGRESS NOTES
Albuterol nebulizer was therapeutically interchanged for albuterol MDI per the P&T Committee approved [facility]

## 2017-12-07 NOTE — ROUTINE PROCESS
Bedside and Verbal shift change report given to [address] (oncoming nurse) by Kina Geronimo (offgoing nurse). Report included the following information SBAR, Kardex, MAR and Recent Results.

## 2017-12-07 NOTE — INTERDISCIPLINARY ROUNDS
Interdisciplinary Round Note   Patient Information:   Gus Jensen   398/89   Reason for Admission: Cellulitis and abscess of right leg  Cellulitis   Attending Provider:   Nasrin Yarbrough MD  Primary Care Physician:       Jerri Jaimes, NP       [phone]   Estimated discharge date:  [facility]   Hospital day: 1  [unfilled]  - - -  RRAT Score: Low Risk            10       Total Score        3 Has Seen PCP in Last 6 Months (Yes=3, No=0)    5 Pt. Coverage (Medicare=5 , Medicaid, or Self-Pay=4)    2 Charlson Comorbidity Score (Age + Comorbid Conditions)        Criteria that do not apply:    . Living with Significant Other. Assisted Living. LTAC. SNF. or   Rehab    Patient Length of Stay (>5 days = 3)    IP Visits Last 12 Months (1-3=4, 4=9, >4=11)       None     No           Chemical      Lines, Drains, & Airways  None       IV Antibiotics:    Current Antimicrobial Therapy (168h ago through future)    Ordered     Start Stop    12/06/17 1843  vancomycin (VANCOCIN) 1,000 mg in 0.9% sodium chloride (MBP/ADV) 250 mL adv  1,000 mg,   IntraVENous,   EVERY 12 HOURS      12/07/17 0800 --    12/06/17 1817  levoFLOXacin (LEVAQUIN) 750 mg in D5W IVPB  750 mg,   IntraVENous,   EVERY 24 HOURS      12/06/17 1818 --        GI Prophylaxis: GI Prophylaxis: no   Type: Protonix, Zantac      Recent Glucose Results:   Lab Results   Component Value Date/Time     (H) 12/07/2017 03:08 AM    GLU 88 12/06/2017 06:40 PM      Activity Level:   Activity Level: Bed Rest    Needs assistance with ADLs: no       Goals for Today: IV antibiotics   Recommendations:   Discharge Disposition: Home Independent  P.T, O.T. and CM  Follow up phone call to assess:   medication knowledge and compliance, follow up physician appointments and ongoing needs assessment    Needs for Discharge: D IDR Team:    Recommendations from IDR team:      Other Notes:

## 2017-12-07 NOTE — PROGRESS NOTES
Admit Date: 12/6/2017  Date of Service: 12/7/2017    Reason for follow-up: cellulitis      Assessment:         RLE cellulitis: improving since admission; no s/s sepsis at this time   - blood cx negative  x2 thus far   - 12/6 CT lower extremity  Chronic low back pain  H/o COPD: stable, no exacerbation; on home medications    Plan:   F/u blood cx  D/c levofloxacin  Continue vancomycin for now   - will plan to deescalate tomorrow with goal to d/c to home on bactrim    Current Antibtiocs:   Vancomycin 12/6- present  Levofloxacin: 12/6- present    Lines:   Peripheral    I spent 45 minutes with the patient in face-to-face consultation, of which greater than 50% was spent in counseling and coordination of care as described above. Case discussed with:  [x]Patient  [x]Family  [x]Nursing  []Case Management  DVT Prophylaxis:  []Lovenox  [x]Hep SQ  []SCDs  []Coumadin   []On Heparin gtt  I have independently examined the patient and reviewed all lab studies and imgaing as well as review of nursing notes and physican notes from the past 24 hours. The plan of care has been discussed with the patient and all questions are answered. Debbie BRANTLEYO. Pager [phone]      Allergies   Allergen Reactions    Penicillins Angioedema    Penicillins Anaphylaxis    Vicodin [Hydrocodone-Acetaminophen] Nausea and Vomiting    Azithromycin Nausea and Vomiting    Vicodin [Hydrocodone-Acetaminophen] Nausea and Vomiting    Erythromycin Other (comments)           Subjective:      Pt seen and examined. Feeling a little better today. Less swelling but still complains of tightness to the anterior portion of his lower leg. No fevers or chills. Notes that the redness has receded from his foot and ankle.     Objective:        Visit Vitals    BP (!) 88/54 (BP 1 Location: Left arm, BP Patient Position: At rest)    Pulse 67    Temp 96.9 °F (36.1 °C)    Resp 12    Ht 5' 10\" (1.778 m)    Wt 73.9 kg (163 lb)    SpO2 97%    BMI 23.39 kg/m2     Temp (24hrs), Av.5 °F (36.4 °C), Min:96.8 °F (36 °C), Max:98.1 °F (36.7 °C)        General:   awake alert and oriented, non-toxic   Skin:   no rashes, dry and warm; right LE with mild erythema to anterior lower leg with associated swelling. No fluctuance. No drainage; right knee dry and excoriated; thick toe nails with interdigital flaky skin   HEENT:  No scleral icterus or pallor; oral mucosa moist, lips moist   Lymph Nodes:   not assessed today   Lungs:   non, labored; bilaterally clear to aspiration- no crackles wheezes rales or rhonchi   Heart:  RRR, s1 and s2; no murmurs rubs or gallops; no edema, + pedal pulses   Abdomen:  soft, non-distended, active bowel sounds, non-tender   Genitourinary:  deferred   Extremities:   average muscle tone; no contractures, no joint effusions   Neurologic:  No gross focal motor or sensory abnormalities; CN 2-12 intact; Follows commands. Psychiatric:   appropriate and interactive. Labs: Results:   Chemistry Recent Labs      17   03017   18417   GLU  130*  88  134*   NA  140  138  140   K  3.7  3.6  3.6   CL  106  104  106   CO2  28  27  26   BUN  12  13  14   CREA  0.80  0.85  0.96   CA  7.8*  8.6  8.9   AGAP  6  7  8   BUCR  15  15  15   AP  55  67   --    TP  5.2*  7.3   --    ALB  2.8*  3.9   --    GLOB  2.4  3.4   --    AGRAT  1.2  1.1   --       CBC w/Diff Recent Labs      17   03017   18417   WBC  6.2  9.4  10.3   RBC  4.18*  4.77  4.97   HGB  12.4*  14.4  15.1   HCT  37.0  41.4  43.7   PLT  238  248  243   GRANS   --   67  64   LYMPH   --   15*  17*   EOS   --   2  4        No results found for: SDES Lab Results   Component Value Date/Time    Culture result: NO GROWTH AFTER 10 HOURS 2017 07:00 PM    Culture result: NO GROWTH AFTER 10 HOURS 2017 06:40 PM          Imagin/6 CT lower ext: 1.  Extensive subcutaneous edema throughout the leg, nonspecific, possibly  cellulitis. No evidence of abscess. No CT evidence of osteomyelitis.     2. Advanced degenerative changes in the right knee noted. Focal subchondral  sclerosis in the medial posterior condyle and tibial plateau, possibly sequela  of osteochondral injury, insufficiency fractures, or osteonecrosis. MRI could  provide further characterization.

## 2017-12-07 NOTE — ED PROVIDER NOTES
HPI Comments: Pt with a hx of psoriasis and sands down the plaques. Pt noticed the pan and redness a few days ago he came in was seen placed on clindamycin and a Minto was drawn around the site,  Today it is much worse, the erythema has extended greatly beyond the Minto and there is erythema down to his foot there is tightness, erythema and tenderness. No fever, no sob, no chest pain, no tachycardia reported        Patient is a 46 y.o. male presenting with lower extremity edema and leg pain. The history is provided by the patient. No  was used. Leg Swelling   This is a recurrent problem. The current episode started more than 2 days ago. The problem occurs constantly. The problem has been rapidly worsening. Pertinent negatives include no chest pain and no shortness of breath. The symptoms are aggravated by walking. The symptoms are relieved by rest. He has tried nothing for the symptoms. Leg Pain    This is a recurrent problem. The current episode started more than 2 days ago. The problem occurs constantly. The problem has been rapidly worsening. The pain is present in the right lower leg. The quality of the pain is described as constant. The pain is at a severity of 6/10. The pain is moderate. The symptoms are aggravated by movement and standing. Treatments tried: clindamycin did not work. There has been no history of extremity trauma.         Past Medical History:   Diagnosis Date    Asthma     DDD (degenerative disc disease), lumbar 2013    noted on MRI with annular tears    Diverticulitis 2016    GERD (gastroesophageal reflux disease) 2016    with esophagitis according to endscopy    Kidney stone     Lumbar post-laminectomy syndrome     Sacroiliitis ([facility] [address]     Stroke [facility]) may 14 2010    Unspecified rotator cuff tear or rupture of left shoulder, not specified as traumatic 03/2016       Past Surgical History:   Procedure Laterality Date    COLONOSCOPY N/A 8/12/2016 COLONOSCOPY with polypectomy performed by Sreedhar Hernandez MD at [place] [address] [address] [place]  2002, 2004, 2006    L5-S1 fusion, laminectomies    HX ENDOSCOPY  10/2016    grade 1 espohagitis    HX ENDOSCOPY  11/2016    mild esophagitis    HX KNEE ARTHROSCOPY Right     knee    HX MOHS PROCEDURES      right    HX ORTHOPAEDIC      right shoulder         Family History:   Problem Relation Age of Onset    Other Brother     Cancer Maternal Grandmother        Social History     Social History    Marital status: SINGLE     Spouse name: N/A    Number of children: N/A    Years of education: N/A     Occupational History    Not on file. Social History Main Topics    Smoking status: Former Smoker     Packs/day: 2.00     Types: Cigarettes    Smokeless tobacco: Never Used    Alcohol use No    Drug use: No    Sexual activity: Yes     Partners: Female     Other Topics Concern    Not on file     Social History Narrative    ** Merged History Encounter **              ALLERGIES: Penicillins; Penicillins; Vicodin [hydrocodone-acetaminophen]; Azithromycin; Vicodin [hydrocodone-acetaminophen]; and Erythromycin    Review of Systems   Constitutional: Negative for fever. Respiratory: Negative for shortness of breath. Cardiovascular: Positive for leg swelling. Negative for chest pain and palpitations. Skin: Positive for color change. All other systems reviewed and are negative. Vitals:    12/06/17 1738 12/06/17 2048   BP: 125/80    Pulse: (!) 102    Resp: 20    Temp: 98.1 °F (36.7 °C)    SpO2: 99% 99%   Weight: 73.9 kg (163 lb)    Height: 5' 10\" (1.778 m)             Physical Exam   Constitutional: He is oriented to person, place, and time. He appears well-developed and well-nourished. HENT:   Head: Normocephalic and atraumatic. Eyes: Conjunctivae and EOM are normal. Pupils are equal, round, and reactive to light. Neck: Normal range of motion. Neck supple.    Cardiovascular: Normal rate and regular rhythm. Pulmonary/Chest: Effort normal and breath sounds normal.   Abdominal: Soft. Bowel sounds are normal.   Musculoskeletal: Normal range of motion. Neurological: He is alert and oriented to person, place, and time. He has normal reflexes. Skin: Skin is warm and dry. There is erythema. Erythema, warmth, tenderness noted to right lower leg, no fluctuance noted in leg, +palpable pedal pulse and normal  Distal circulation   Psychiatric: He has a normal mood and affect. His behavior is normal. Judgment and thought content normal.   Nursing note and vitals reviewed. MDM  Number of Diagnoses or Management Options  Diagnosis management comments: Ct scan was ordered for suspicious area that could be abscess formation. Vancomycin and levaquin ordered for infection. Ct scan showed only celluitis and pt treated for same,  His labs did not reflect sepsis and pt also given pain med, states morphine really did not work and states percocet works for his pain. Discussed case with Dr. Koch Delay    Admitted to Dr. Moriah Mas for iv abx and further eval    ADMISSION NOTE:  9:36 PM  Patient is being admitted to the hospital by dr. Moriah Mas. The results of their tests and reasons for their admission have been discussed with them and/or available family. They convey agreement and understanding for the need to be admitted and for their admission diagnosis.               Amount and/or Complexity of Data Reviewed  Clinical lab tests: ordered and reviewed  Tests in the radiology section of CPT®: ordered and reviewed  Review and summarize past medical records: yes  Discuss the patient with other providers: yes    Risk of Complications, Morbidity, and/or Mortality  Presenting problems: moderate  Diagnostic procedures: moderate  Management options: moderate    Critical Care  Total time providing critical care: < 30 minutes    Patient Progress  Patient progress: improved    ED Course Procedures          Vitals:  Patient Vitals for the past 12 hrs:   Temp Pulse Resp BP SpO2   12/06/17 2048 - - - - 99 %   12/06/17 1738 98.1 °F (36.7 °C) (!) 102 20 125/80 99 %       Medications ordered:   Medications   0.9% sodium chloride infusion (125 mL/hr IntraVENous New Bag 12/6/17 1954)   levoFLOXacin (LEVAQUIN) 750 mg in D5W IVPB (0 mg IntraVENous IV Completed 12/6/17 2108)   vancomycin (VANCOCIN) 1,500 mg in 0.9% sodium chloride 500 mL IVPB (1,500 mg IntraVENous New Bag 12/6/17 1950)   vancomycin (VANCOCIN) 1,000 mg in 0.9% sodium chloride (MBP/ADV) 250 mL adv (not administered)   gabapentin (NEURONTIN) capsule 800 mg (800 mg Oral Given 12/6/17 2132)   sodium chloride 0.9 % bolus infusion 1,000 mL (0 mL IntraVENous IV Completed 12/6/17 2050)   morphine injection 4 mg (4 mg IntraVENous Given 12/6/17 1956)   ondansetron (ZOFRAN) injection 4 mg (4 mg IntraVENous Given 12/6/17 1958)   iopamidol (ISOVUE 300) 61 % contrast injection 100 mL (100 mL IntraVENous Given 12/6/17 2011)   oxyCODONE-acetaminophen (PERCOCET) 5-325 mg per tablet 2 Tab (2 Tabs Oral Given 12/6/17 2133)   cyclobenzaprine (FLEXERIL) tablet 10 mg (10 mg Oral Given 12/6/17 2133)         Lab findings:  Recent Results (from the past 12 hour(s))   CBC WITH AUTOMATED DIFF    Collection Time: 12/06/17  6:40 PM   Result Value Ref Range    WBC 9.4 4.6 - 13.2 K/uL    RBC 4.77 4.70 - 5.50 M/uL    HGB 14.4 13.0 - 16.0 g/dL    HCT 41.4 36.0 - 48.0 %    MCV 86.8 74.0 - 97.0 FL    MCH 30.2 24.0 - 34.0 PG    MCHC 34.8 31.0 - 37.0 g/dL    RDW 12.3 11.6 - 14.5 %    PLATELET 025 111 - 216 K/uL    MPV 10.1 9.2 - 11.8 FL    NEUTROPHILS 67 40 - 73 %    LYMPHOCYTES 15 (L) 21 - 52 %    MONOCYTES 16 (H) 3 - 10 %    EOSINOPHILS 2 0 - 5 %    BASOPHILS 0 0 - 2 %    ABS. NEUTROPHILS 6.2 1.8 - 8.0 K/UL    ABS. LYMPHOCYTES 1.4 0.9 - 3.6 K/UL    ABS. MONOCYTES 1.5 (H) 0.05 - 1.2 K/UL    ABS. EOSINOPHILS 0.2 0.0 - 0.4 K/UL    ABS.  BASOPHILS 0.0 0.0 - 0.06 K/UL    DF AUTOMATED     METABOLIC PANEL, COMPREHENSIVE    Collection Time: 12/06/17  6:40 PM   Result Value Ref Range    Sodium 138 136 - 145 mmol/L    Potassium 3.6 3.5 - 5.5 mmol/L    Chloride 104 100 - 108 mmol/L    CO2 27 21 - 32 mmol/L    Anion gap 7 3.0 - 18 mmol/L    Glucose 88 74 - 99 mg/dL    BUN 13 7.0 - 18 MG/DL    Creatinine 0.85 0.6 - 1.3 MG/DL    BUN/Creatinine ratio 15 12 - 20      GFR est AA >60 >60 ml/min/1.73m2    GFR est non-AA >60 >60 ml/min/1.73m2    Calcium 8.6 8.5 - 10.1 MG/DL    Bilirubin, total 0.5 0.2 - 1.0 MG/DL    ALT (SGPT) 20 16 - 61 U/L    AST (SGOT) 12 (L) 15 - 37 U/L    Alk. phosphatase 67 45 - 117 U/L    Protein, total 7.3 6.4 - 8.2 g/dL    Albumin 3.9 3.4 - 5.0 g/dL    Globulin 3.4 2.0 - 4.0 g/dL    A-G Ratio 1.1 0.8 - 1.7     POC LACTIC ACID    Collection Time: 12/06/17  6:43 PM   Result Value Ref Range    Lactic Acid (POC) 0.5 0.4 - 2.0 mmol/L          X-Ray, CT or other radiology findings or impressions:  CT LOW EXT RT W CONT   Final Result        EXAMINATION: CT right lower extremity with IV contrast     INDICATION: Swelling, cellulitis     COMPARISON: None     TECHNIQUE: CT of the right lower extremely performed following 100 cc IV  Isovue-300, with multiplanar reformations. All CT scans at this facility are  performed using dose optimization technique as appropriate to a performed exam,  to include automated exposure control, adjustment of the mA and/or kV according  to patient size (including appropriate matching first site specific  examinations), or use of iterative reconstruction technique.     FINDINGS:     Bones: No acute fracture or subluxation identified. No aggressive osseous  destruction identified. Advanced right knee degenerative changes. Possible  medial compartment posterior condyle and tibial plateau subchondral sclerosis.     Muscles/tendons: Suboptimally evaluated by CT.  No obvious acute injury or focal  muscular lesion.     Soft tissues: Extensive subcutaneous stranding/edema throughout the leg. No  rim-enhancing collection to suggest abscess identified.     IMPRESSION  IMPRESSION:     1. Extensive subcutaneous edema throughout the leg, nonspecific, possibly  cellulitis. No evidence of abscess. No CT evidence of osteomyelitis.     2. Advanced degenerative changes in the right knee noted. Focal subchondral  sclerosis in the medial posterior condyle and tibial plateau, possibly sequela  of osteochondral injury, insufficiency fractures, or osteonecrosis. MRI could  provide further characterization.       Imaging      CT LOW EXT RT W CONT (Order #[id number]) on 12/6/2017 - Imaging Information           12/6/2017  8:52 PM - Pratik, Rad Results In       Narrative      EXAMINATION: CT right lower extremity with IV contrast    INDICATION: Swelling, cellulitis    COMPARISON: None    TECHNIQUE: CT of the right lower extremely performed following 100 cc IV  Isovue-300, with multiplanar reformations. All CT scans at this facility are  performed using dose optimization technique as appropriate to a performed exam,  to include automated exposure control, adjustment of the mA and/or kV according  to patient size (including appropriate matching first site specific  examinations), or use of iterative reconstruction technique. FINDINGS:    Bones: No acute fracture or subluxation identified. No aggressive osseous  destruction identified. Advanced right knee degenerative changes. Possible  medial compartment posterior condyle and tibial plateau subchondral sclerosis. Muscles/tendons: Suboptimally evaluated by CT. No obvious acute injury or focal  muscular lesion. Soft tissues: Extensive subcutaneous stranding/edema throughout the leg. No  rim-enhancing collection to suggest abscess identified.         Impression      IMPRESSION:    1. Extensive subcutaneous edema throughout the leg, nonspecific, possibly  cellulitis. No evidence of abscess. No CT evidence of osteomyelitis.     2. Advanced degenerative changes in the right knee noted. Focal subchondral  sclerosis in the medial posterior condyle and tibial plateau, possibly sequela  of osteochondral injury, insufficiency fractures, or osteonecrosis. MRI could  provide further characterization. Disposition:    Diagnosis:   1. Cellulitis of other specified site        Disposition: Admitted        Patient's Medications   Start Taking    No medications on file   Continue Taking    ALBUTEROL (PROVENTIL HFA, VENTOLIN HFA, PROAIR HFA) 90 MCG/ACTUATION INHALER    Take 2 Puffs by inhalation every six (6) hours as needed for Wheezing or Shortness of Breath. ASPIRIN 81 MG CHEWABLE TABLET    Take 81 mg by mouth daily. BUDESONIDE-FORMOTEROL (SYMBICORT) 160-4.5 MCG/ACTUATION HFA INHALER    Take 1 Puff by inhalation two (2) times a day. CLINDAMYCIN (CLEOCIN) 300 MG CAPSULE    Take 1 Cap by mouth three (3) times daily for 10 days. CYCLOBENZAPRINE (FLEXERIL) 10 MG TABLET    Take 1 Tab by mouth three (3) times daily as needed for Muscle Spasm(s). GABAPENTIN (NEURONTIN) 800 MG TABLET    Take 1 Tab by mouth three (3) times daily. OXYCODONE-ACETAMINOPHEN (PERCOCET) 5-325 MG PER TABLET    Take 1 Tab by mouth every four (4) hours as needed for Pain. Max Daily Amount: 6 Tabs. PANTOPRAZOLE (PROTONIX) 40 MG TABLET    Take 1 Tab by mouth daily. RANITIDINE (ZANTAC) 150 MG TABLET    Take 1 Tab by mouth nightly.    These Medications have changed    No medications on file   Stop Taking    No medications on file          Irma MARES

## 2017-12-07 NOTE — H&P
HISTORY & PHYSICAL            Patient: Thong Christopher MRN: [id number]  CSN: [id number]    YOB: 1965  Age: 46 y.o. Sex: male    DOA: 12/6/2017 LOS:  LOS: 1 day        DOA: 12/6/2017        Assessment/Plan     Active Problems:    Cellulitis and abscess of right leg (12/6/2017)      Cellulitis (12/6/2017)        Plan:  1. Cellulitis R leg - failed out pt medical Rx - Clinda , start IV Abx - Fela Myles   2. Chronic Low back pain - Continue neurontin   3. H/o COPD - continue bronchodilators   4. Continue other home meds   DVT Px - Heparin   Full code           Severity of Signs & Symptoms -- Moderate   Risk of adverse events -- Low   Current Medical Rx Plan - As Above   Patient history & comorbidities - Per HPI  Discharge Plan -- Home            HPI:     Thong Christopher is a 46 y.o. male who is being admitted to the hosp 2y to cellulitis of the R leg s/p failed out pt rx with clindamycin. Pt mentions he had a fall last week at home / concrete & fell on his R side - hitting R leg & hand - thought he had broken his R hand -- came to the ER & was placed on a splint. He mentions that a few days later his R leg below knee - shin area strated to get red -- he came to the ER on Monday for this R leg swelling - was noted to have cellulitis - discharged with Po Clinda - wife mentions he has been taking his meds but the redness has increased.    Pt started on IV Abx & Hospitalist asked to admit pt for further eval.       Past Medical History:   Diagnosis Date    Asthma     DDD (degenerative disc disease), lumbar 2013    noted on MRI with annular tears    Diverticulitis 2016    GERD (gastroesophageal reflux disease) 2016    with esophagitis according to endscopy    Kidney stone     Lumbar post-laminectomy syndrome     Sacroiliitis ([address]     Stroke [facility]) may 14 2010    Unspecified rotator cuff tear or rupture of left shoulder, not specified as traumatic 03/2016       Past Surgical History: Procedure Laterality Date    COLONOSCOPY N/A 8/12/2016    COLONOSCOPY with polypectomy performed by Ava Jalloh MD at [address] [place] [address] Rd  2002, 2004, 2006    L5-S1 fusion, laminectomies    HX ENDOSCOPY  10/2016    grade 1 espohagitis    HX ENDOSCOPY  11/2016    mild esophagitis    HX KNEE ARTHROSCOPY Right     knee    HX MOHS PROCEDURES      right    HX ORTHOPAEDIC      right shoulder       Family History   Problem Relation Age of Onset    Other Brother     Cancer Maternal Grandmother        Social History     Social History    Marital status: SINGLE     Spouse name: N/A    Number of children: N/A    Years of education: N/A     Social History Main Topics    Smoking status: Former Smoker     Packs/day: 2.00     Types: Cigarettes    Smokeless tobacco: Never Used    Alcohol use No    Drug use: No    Sexual activity: Yes     Partners: Female     Other Topics Concern    None     Social History Narrative    ** Merged History Encounter **            Prior to Admission medications    Medication Sig Start Date End Date Taking? Authorizing Provider   oxyCODONE-acetaminophen (PERCOCET) 5-325 mg per tablet Take 1 Tab by mouth every four (4) hours as needed for Pain. Max Daily Amount: 6 Tabs. 12/4/17   Dennise To MD   clindamycin (CLEOCIN) 300 mg capsule Take 1 Cap by mouth three (3) times daily for 10 days. 12/4/17 12/14/17  Dennise To MD   cyclobenzaprine (FLEXERIL) 10 mg tablet Take 1 Tab by mouth three (3) times daily as needed for Muscle Spasm(s). 11/29/17   Winton Severe, NP   raNITIdine (ZANTAC) 150 mg tablet Take 1 Tab by mouth nightly. 11/29/17   Dafne Severe, NP   pantoprazole (PROTONIX) 40 mg tablet Take 1 Tab by mouth daily. 11/29/17   Dafne Severe, NP   gabapentin (NEURONTIN) 800 mg tablet Take 1 Tab by mouth three (3) times daily.  9/25/17   Dejan Joseph MD   budesonide-formoterol (SYMBICORT) 160-4.5 mcg/actuation HFA inhaler Take 1 Puff by inhalation two (2) times a day. 5/2/17   Corin Alcantar NP   albuterol (PROVENTIL HFA, VENTOLIN HFA, PROAIR HFA) 90 mcg/actuation inhaler Take 2 Puffs by inhalation every six (6) hours as needed for Wheezing or Shortness of Breath. 2/2/17   Corin [place] Alcantar   aspirin 81 mg chewable tablet Take 81 mg by mouth daily. 5/17/10   Historical Provider       Allergies   Allergen Reactions    Penicillins Angioedema    Penicillins Anaphylaxis    Vicodin [Hydrocodone-Acetaminophen] Nausea and Vomiting    Azithromycin Nausea and Vomiting    Vicodin [Hydrocodone-Acetaminophen] Nausea and Vomiting    Erythromycin Other (comments)       Review of Systems  A comprehensive review of systems was negative except for that written in the History of Present Illness. Physical Exam:      Visit Vitals    BP 92/54    Pulse 72    Temp 97.9 °F (36.6 °C)    Resp 16    Ht 5' 10\" (1.778 m)    Wt 73.9 kg (163 lb)    SpO2 94%    BMI 23.39 kg/m2       Physical Exam:    Gen: In general, this is a well nourished male in no acute distress  HEENT: Sclerae nonicteric. Oral mucous membranes moist. Dentition poor  Neck: Supple with midline trachea. CV: RRR without murmur or rub appreciated. Resp:Respirations are unlabored without use of accessory muscl  Skin: Warm, no visible rashes. es. Lung fields bilaterally without wheezes or rhonchi. Abd: Soft, nontender, nondistended. Extrem: R leg cellulitis with increased redness & 1+ edema    Neuro: Patient is alert, oriented, and cooperative. No obvious focal defects. Moves all 4 extremities.     Labs Reviewed:    Recent Results (from the past 24 hour(s))   CBC WITH AUTOMATED DIFF    Collection Time: 12/06/17  6:40 PM   Result Value Ref Range    WBC 9.4 4.6 - 13.2 K/uL    RBC 4.77 4.70 - 5.50 M/uL    HGB 14.4 13.0 - 16.0 g/dL    HCT 41.4 36.0 - 48.0 %    MCV 86.8 74.0 - 97.0 FL    MCH 30.2 24.0 - 34.0 PG    MCHC 34.8 31.0 - 37.0 g/dL    RDW 12.3 11.6 - 14.5 %    PLATELET 417 135 - 420 K/uL    MPV 10.1 9.2 - 11.8 FL    NEUTROPHILS 67 40 - 73 %    LYMPHOCYTES 15 (L) 21 - 52 %    MONOCYTES 16 (H) 3 - 10 %    EOSINOPHILS 2 0 - 5 %    BASOPHILS 0 0 - 2 %    ABS. NEUTROPHILS 6.2 1.8 - 8.0 K/UL    ABS. LYMPHOCYTES 1.4 0.9 - 3.6 K/UL    ABS. MONOCYTES 1.5 (H) 0.05 - 1.2 K/UL    ABS. EOSINOPHILS 0.2 0.0 - 0.4 K/UL    ABS. BASOPHILS 0.0 0.0 - 0.06 K/UL    DF AUTOMATED     METABOLIC PANEL, COMPREHENSIVE    Collection Time: 12/06/17  6:40 PM   Result Value Ref Range    Sodium 138 136 - 145 mmol/L    Potassium 3.6 3.5 - 5.5 mmol/L    Chloride 104 100 - 108 mmol/L    CO2 27 21 - 32 mmol/L    Anion gap 7 3.0 - 18 mmol/L    Glucose 88 74 - 99 mg/dL    BUN 13 7.0 - 18 MG/DL    Creatinine 0.85 0.6 - 1.3 MG/DL    BUN/Creatinine ratio 15 12 - 20      GFR est AA >60 >60 ml/min/1.73m2    GFR est non-AA >60 >60 ml/min/1.73m2    Calcium 8.6 8.5 - 10.1 MG/DL    Bilirubin, total 0.5 0.2 - 1.0 MG/DL    ALT (SGPT) 20 16 - 61 U/L    AST (SGOT) 12 (L) 15 - 37 U/L    Alk.  phosphatase 67 45 - 117 U/L    Protein, total 7.3 6.4 - 8.2 g/dL    Albumin 3.9 3.4 - 5.0 g/dL    Globulin 3.4 2.0 - 4.0 g/dL    A-G Ratio 1.1 0.8 - 1.7     CULTURE, BLOOD    Collection Time: 12/06/17  6:40 PM   Result Value Ref Range    Special Requests: NO SPECIAL REQUESTS      Culture result: NO GROWTH AFTER 10 HOURS     POC LACTIC ACID    Collection Time: 12/06/17  6:43 PM   Result Value Ref Range    Lactic Acid (POC) 0.5 0.4 - 2.0 mmol/L   CULTURE, BLOOD    Collection Time: 12/06/17  7:00 PM   Result Value Ref Range    Special Requests: NO SPECIAL REQUESTS      Culture result: NO GROWTH AFTER 10 HOURS     METABOLIC PANEL, COMPREHENSIVE    Collection Time: 12/07/17  3:08 AM   Result Value Ref Range    Sodium 140 136 - 145 mmol/L    Potassium 3.7 3.5 - 5.5 mmol/L    Chloride 106 100 - 108 mmol/L    CO2 28 21 - 32 mmol/L    Anion gap 6 3.0 - 18 mmol/L    Glucose 130 (H) 74 - 99 mg/dL    BUN 12 7.0 - 18 MG/DL    Creatinine 0.80 0.6 - 1.3 MG/DL BUN/Creatinine ratio 15 12 - 20      GFR est AA >60 >60 ml/min/1.73m2    GFR est non-AA >60 >60 ml/min/1.73m2    Calcium 7.8 (L) 8.5 - 10.1 MG/DL    Bilirubin, total 0.6 0.2 - 1.0 MG/DL    ALT (SGPT) 16 16 - 61 U/L    AST (SGOT) 10 (L) 15 - 37 U/L    Alk. phosphatase 55 45 - 117 U/L    Protein, total 5.2 (L) 6.4 - 8.2 g/dL    Albumin 2.8 (L) 3.4 - 5.0 g/dL    Globulin 2.4 2.0 - 4.0 g/dL    A-G Ratio 1.2 0.8 - 1.7     LIPID PANEL    Collection Time: 12/07/17  3:08 AM   Result Value Ref Range    LIPID PROFILE          Cholesterol, total 97 <200 MG/DL    Triglyceride 77 <150 MG/DL    HDL Cholesterol 43 40 - 60 MG/DL    LDL, calculated 38.6 0 - 100 MG/DL    VLDL, calculated 15.4 MG/DL    CHOL/HDL Ratio 2.3 0 - 5.0     CBC W/O DIFF    Collection Time: 12/07/17  3:08 AM   Result Value Ref Range    WBC 6.2 4.6 - 13.2 K/uL    RBC 4.18 (L) 4.70 - 5.50 M/uL    HGB 12.4 (L) 13.0 - 16.0 g/dL    HCT 37.0 36.0 - 48.0 %    MCV 88.5 74.0 - 97.0 FL    MCH 29.7 24.0 - 34.0 PG    MCHC 33.5 31.0 - 37.0 g/dL    RDW 12.6 11.6 - 14.5 %    PLATELET 848 070 - 749 K/uL    MPV 9.9 9.2 - 11.8 FL       Imaging Reviewed:    IMPRESSION:     1. Extensive subcutaneous edema throughout the leg, nonspecific, possibly  cellulitis. No evidence of abscess. No CT evidence of osteomyelitis.     2. Advanced degenerative changes in the right knee noted. Focal subchondral  sclerosis in the medial posterior condyle and tibial plateau, possibly sequela  of osteochondral injury, insufficiency fractures, or osteonecrosis. MRI could  provide further characterization.           Jay Carreon MD  12/7/2017, 9:39 PM

## 2017-12-07 NOTE — PROGRESS NOTES
Physical Therapy Screening:  Services are not indicated at this time. [facility] based on results obtained during the nursing admission assessment. The patients chart was reviewed and the patient is not appropriate for a skilled therapy evaluation at this time. Please consult physical therapy if any therapy needs arise. Thank you.     Cristina Pizarro, PTA

## 2017-12-08 LAB
ANION GAP SERPL CALC-SCNC: 6 MMOL/L (ref 3–18)
BUN SERPL-MCNC: 13 MG/DL (ref 7–18)
BUN/CREAT SERPL: 16 (ref 12–20)
CALCIUM SERPL-MCNC: 8.2 MG/DL (ref 8.5–10.1)
CHLORIDE SERPL-SCNC: 107 MMOL/L (ref 100–108)
CO2 SERPL-SCNC: 26 MMOL/L (ref 21–32)
CREAT SERPL-MCNC: 0.83 MG/DL (ref 0.6–1.3)
DATE LAST DOSE: ABNORMAL
GLUCOSE SERPL-MCNC: 154 MG/DL (ref 74–99)
POTASSIUM SERPL-SCNC: 4.1 MMOL/L (ref 3.5–5.5)
REPORTED DOSE,DOSE: ABNORMAL UNITS
REPORTED DOSE/TIME,TMG: 2000
SODIUM SERPL-SCNC: 139 MMOL/L (ref 136–145)
VANCOMYCIN TROUGH SERPL-MCNC: 7.3 UG/ML (ref 10–20)

## 2017-12-08 PROCEDURE — 74011250636 HC RX REV CODE- 250/636: Performed by: EMERGENCY MEDICINE

## 2017-12-08 PROCEDURE — 74011250636 HC RX REV CODE- 250/636: Performed by: HOSPITALIST

## 2017-12-08 PROCEDURE — 74011250637 HC RX REV CODE- 250/637: Performed by: HOSPITALIST

## 2017-12-08 PROCEDURE — 36415 COLL VENOUS BLD VENIPUNCTURE: CPT | Performed by: EMERGENCY MEDICINE

## 2017-12-08 PROCEDURE — 65270000029 HC RM PRIVATE

## 2017-12-08 PROCEDURE — 80048 BASIC METABOLIC PNL TOTAL CA: CPT | Performed by: EMERGENCY MEDICINE

## 2017-12-08 PROCEDURE — 94640 AIRWAY INHALATION TREATMENT: CPT

## 2017-12-08 PROCEDURE — 80202 ASSAY OF VANCOMYCIN: CPT | Performed by: EMERGENCY MEDICINE

## 2017-12-08 RX ADMIN — GABAPENTIN 800 MG: 400 CAPSULE ORAL at 17:27

## 2017-12-08 RX ADMIN — CYCLOBENZAPRINE HYDROCHLORIDE 10 MG: 10 TABLET, FILM COATED ORAL at 22:19

## 2017-12-08 RX ADMIN — SODIUM CHLORIDE 125 ML/HR: 900 INJECTION, SOLUTION INTRAVENOUS at 20:06

## 2017-12-08 RX ADMIN — FAMOTIDINE 20 MG: 20 TABLET, FILM COATED ORAL at 17:27

## 2017-12-08 RX ADMIN — GABAPENTIN 800 MG: 400 CAPSULE ORAL at 22:19

## 2017-12-08 RX ADMIN — HEPARIN SODIUM 5000 UNITS: 5000 INJECTION, SOLUTION INTRAVENOUS; SUBCUTANEOUS at 17:27

## 2017-12-08 RX ADMIN — HEPARIN SODIUM 5000 UNITS: 5000 INJECTION, SOLUTION INTRAVENOUS; SUBCUTANEOUS at 11:37

## 2017-12-08 RX ADMIN — PANTOPRAZOLE SODIUM 40 MG: 40 TABLET, DELAYED RELEASE ORAL at 08:43

## 2017-12-08 RX ADMIN — OXYCODONE HYDROCHLORIDE AND ACETAMINOPHEN 1 TABLET: 5; 325 TABLET ORAL at 08:44

## 2017-12-08 RX ADMIN — ASPIRIN 81 MG 81 MG: 81 TABLET ORAL at 08:43

## 2017-12-08 RX ADMIN — SODIUM CHLORIDE 1000 MG: 900 INJECTION, SOLUTION INTRAVENOUS at 17:33

## 2017-12-08 RX ADMIN — BUDESONIDE AND FORMOTEROL FUMARATE DIHYDRATE 1 PUFF: 160; 4.5 AEROSOL RESPIRATORY (INHALATION) at 20:10

## 2017-12-08 RX ADMIN — SODIUM CHLORIDE 1000 MG: 900 INJECTION, SOLUTION INTRAVENOUS at 08:49

## 2017-12-08 RX ADMIN — GABAPENTIN 800 MG: 400 CAPSULE ORAL at 08:42

## 2017-12-08 RX ADMIN — OXYCODONE HYDROCHLORIDE AND ACETAMINOPHEN 1 TABLET: 5; 325 TABLET ORAL at 22:19

## 2017-12-08 RX ADMIN — OXYCODONE HYDROCHLORIDE AND ACETAMINOPHEN 1 TABLET: 5; 325 TABLET ORAL at 14:57

## 2017-12-08 RX ADMIN — BUDESONIDE AND FORMOTEROL FUMARATE DIHYDRATE 1 PUFF: 160; 4.5 AEROSOL RESPIRATORY (INHALATION) at 08:21

## 2017-12-08 RX ADMIN — SODIUM CHLORIDE 125 ML/HR: 900 INJECTION, SOLUTION INTRAVENOUS at 08:18

## 2017-12-08 RX ADMIN — HEPARIN SODIUM 5000 UNITS: 5000 INJECTION, SOLUTION INTRAVENOUS; SUBCUTANEOUS at 02:43

## 2017-12-08 NOTE — PROGRESS NOTES
FOC provided for this pt who declined Home Health, pt is agreeable to signing his decline which was placed in this pt's chart.

## 2017-12-08 NOTE — PROGRESS NOTES
Problem: Falls - Risk of  Goal: *Absence of Falls  Document Luz Fall Risk and appropriate interventions in the flowsheet.    Outcome: Progressing Towards Goal  Fall Risk Interventions:  Mobility Interventions: Communicate number of staff needed for ambulation/transfer         Medication Interventions: Patient to call before getting OOB    Elimination Interventions: Call light in reach

## 2017-12-08 NOTE — INTERDISCIPLINARY ROUNDS
Interdisciplinary Round Note   Patient Information:   Thong Christopher   [birth year]   Reason for Admission: Cellulitis and abscess of right leg  Cellulitis   Attending Provider:   Anuj Castro MD  Primary Care Physician:       Yvette Santiago NP       [phone]   Estimated discharge date:  [facility]   Hospital day: 2  [unfilled]  3d 7h  RRAT Score: Low Risk            10       Total Score        3 Has Seen PCP in Last 6 Months (Yes=3, No=0)    5 Pt. Coverage (Medicare=5 , Medicaid, or Self-Pay=4)    2 Charlson Comorbidity Score (Age + Comorbid Conditions)        Criteria that do not apply:    . Living with Significant Other. Assisted Living. LTAC. SNF. or   Rehab    Patient Length of Stay (>5 days = 3)    IP Visits Last 12 Months (1-3=4, 4=9, >4=11)       None     No           Chemical      Lines, Drains, & Airways  None       IV Antibiotics:    Current Antimicrobial Therapy (168h ago through future)    Ordered     Start Stop    12/06/17 1843  vancomycin (VANCOCIN) 1,000 mg in 0.9% sodium chloride (MBP/ADV) 250 mL adv  1,000 mg,   IntraVENous,   EVERY 12 HOURS      12/07/17 0800 --    12/06/17 1817  levoFLOXacin (LEVAQUIN) 750 mg in D5W IVPB  750 mg,   IntraVENous,   EVERY 24 HOURS      12/06/17 1818 --        GI Prophylaxis: GI Prophylaxis: yes   Type: Protonix, pepcid. Recent Glucose Results:   Lab Results   Component Value Date/Time     (H) 12/08/2017 08:45 AM      Activity Level: Activity Level: Bed Rest    Needs assistance with ADLs: no       Goals for Today: IV antibiotics   Recommendations:   Discharge Disposition: Home Independent  P.T, O.T. and CM  Follow up phone call to assess:   medication knowledge and compliance, follow up physician appointments and ongoing needs assessment    Needs for Discharge: D IDR Team:    Recommendations from IDR team:  Po Antibiotics H/H consult. Other Notes:  Pain control, ivf's. Iv antibiotics. Heparin.

## 2017-12-09 VITALS
HEIGHT: 70 IN | TEMPERATURE: 97 F | WEIGHT: 163 LBS | BODY MASS INDEX: 23.34 KG/M2 | SYSTOLIC BLOOD PRESSURE: 101 MMHG | HEART RATE: 75 BPM | RESPIRATION RATE: 18 BRPM | DIASTOLIC BLOOD PRESSURE: 83 MMHG | OXYGEN SATURATION: 97 %

## 2017-12-09 LAB
ANION GAP SERPL CALC-SCNC: 4 MMOL/L (ref 3–18)
BUN SERPL-MCNC: 9 MG/DL (ref 7–18)
BUN/CREAT SERPL: 12 (ref 12–20)
CALCIUM SERPL-MCNC: 8.5 MG/DL (ref 8.5–10.1)
CHLORIDE SERPL-SCNC: 107 MMOL/L (ref 100–108)
CO2 SERPL-SCNC: 30 MMOL/L (ref 21–32)
CREAT SERPL-MCNC: 0.75 MG/DL (ref 0.6–1.3)
DATE LAST DOSE: NORMAL
GLUCOSE SERPL-MCNC: 130 MG/DL (ref 74–99)
POTASSIUM SERPL-SCNC: 4.2 MMOL/L (ref 3.5–5.5)
REPORTED DOSE,DOSE: NORMAL UNITS
REPORTED DOSE/TIME,TMG: 100
SODIUM SERPL-SCNC: 141 MMOL/L (ref 136–145)
VANCOMYCIN TROUGH SERPL-MCNC: 11.4 UG/ML (ref 10–20)

## 2017-12-09 PROCEDURE — 94640 AIRWAY INHALATION TREATMENT: CPT

## 2017-12-09 PROCEDURE — 74011250636 HC RX REV CODE- 250/636: Performed by: HOSPITALIST

## 2017-12-09 PROCEDURE — 74011250637 HC RX REV CODE- 250/637: Performed by: HOSPITALIST

## 2017-12-09 PROCEDURE — 80202 ASSAY OF VANCOMYCIN: CPT | Performed by: EMERGENCY MEDICINE

## 2017-12-09 PROCEDURE — 74011250636 HC RX REV CODE- 250/636: Performed by: EMERGENCY MEDICINE

## 2017-12-09 PROCEDURE — 36415 COLL VENOUS BLD VENIPUNCTURE: CPT | Performed by: EMERGENCY MEDICINE

## 2017-12-09 PROCEDURE — 80048 BASIC METABOLIC PNL TOTAL CA: CPT | Performed by: EMERGENCY MEDICINE

## 2017-12-09 RX ORDER — OXYCODONE AND ACETAMINOPHEN 5; 325 MG/1; MG/1
1 TABLET ORAL
Qty: 12 TAB | Refills: 0 | Status: SHIPPED | OUTPATIENT
Start: 2017-12-09 | End: 2017-12-09

## 2017-12-09 RX ORDER — SULFAMETHOXAZOLE AND TRIMETHOPRIM 800; 160 MG/1; MG/1
1 TABLET ORAL 2 TIMES DAILY
Qty: 20 TAB | Refills: 0 | Status: SHIPPED | OUTPATIENT
Start: 2017-12-09 | End: 2018-01-02 | Stop reason: ALTCHOICE

## 2017-12-09 RX ORDER — OXYCODONE AND ACETAMINOPHEN 5; 325 MG/1; MG/1
1 TABLET ORAL
Qty: 20 TAB | Refills: 0 | Status: SHIPPED | OUTPATIENT
Start: 2017-12-09 | End: 2018-01-02 | Stop reason: ALTCHOICE

## 2017-12-09 RX ADMIN — HEPARIN SODIUM 5000 UNITS: 5000 INJECTION, SOLUTION INTRAVENOUS; SUBCUTANEOUS at 02:03

## 2017-12-09 RX ADMIN — GABAPENTIN 800 MG: 400 CAPSULE ORAL at 16:00

## 2017-12-09 RX ADMIN — ASPIRIN 81 MG 81 MG: 81 TABLET ORAL at 08:55

## 2017-12-09 RX ADMIN — GABAPENTIN 800 MG: 400 CAPSULE ORAL at 08:55

## 2017-12-09 RX ADMIN — OXYCODONE HYDROCHLORIDE AND ACETAMINOPHEN 1 TABLET: 5; 325 TABLET ORAL at 13:31

## 2017-12-09 RX ADMIN — SODIUM CHLORIDE 1000 MG: 900 INJECTION, SOLUTION INTRAVENOUS at 00:57

## 2017-12-09 RX ADMIN — PANTOPRAZOLE SODIUM 40 MG: 40 TABLET, DELAYED RELEASE ORAL at 08:55

## 2017-12-09 RX ADMIN — OXYCODONE HYDROCHLORIDE AND ACETAMINOPHEN 1 TABLET: 5; 325 TABLET ORAL at 07:50

## 2017-12-09 RX ADMIN — BUDESONIDE AND FORMOTEROL FUMARATE DIHYDRATE 1 PUFF: 160; 4.5 AEROSOL RESPIRATORY (INHALATION) at 09:14

## 2017-12-09 RX ADMIN — HEPARIN SODIUM 5000 UNITS: 5000 INJECTION, SOLUTION INTRAVENOUS; SUBCUTANEOUS at 10:00

## 2017-12-09 RX ADMIN — SODIUM CHLORIDE 125 ML/HR: 900 INJECTION, SOLUTION INTRAVENOUS at 05:31

## 2017-12-09 RX ADMIN — SODIUM CHLORIDE 1000 MG: 900 INJECTION, SOLUTION INTRAVENOUS at 10:00

## 2017-12-09 NOTE — ROUTINE PROCESS
Bedside and Verbal shift change report given to Suzanne Phoenix (oncoming nurse) by Geovanna Carlton RN (offgoing nurse). Report included the following information SBAR, Kardex, MAR and Recent Results.     SITUATION:    Code Status: Full Code   Reason for Admission: Cellulitis and abscess of right leg   Cellulitis    [facility] day: 2   Problem List:       Hospital Problems  Date Reviewed: 11/29/2017          Codes Class Noted POA    Cellulitis and abscess of right leg ICD-10-CM: L03.115, L02.415  ICD-9-CM: 682.6  12/6/2017 Unknown        Cellulitis ICD-10-CM: L03.90  ICD-9-CM: 682.9  12/6/2017 Unknown              BACKGROUND:    Past Medical History:   Past Medical History:   Diagnosis Date    Asthma     DDD (degenerative disc disease), lumbar 2013    noted on MRI with annular tears    Diverticulitis 2016    GERD (gastroesophageal reflux disease) 2016    with esophagitis according to endscopy    Kidney stone     Lumbar post-laminectomy syndrome     Sacroiliitis ([facility] [place] [address]     Stroke [facility]) may 14 2010    Unspecified rotator cuff tear or rupture of left shoulder, not specified as traumatic 03/2016         Patient taking anticoagulants yes     ASSESSMENT:    Changes in Assessment Throughout Shift: no     Patient has Central Line: no Reasons if yes: n/a   Patient has Márquez Cath: no Reasons if yes: n/a      Last Vitals:     Vitals:    12/08/17 0400 12/08/17 0800 12/08/17 1209 12/08/17 1529   BP: 101/59 110/68 115/62 107/66   Pulse: 77 82 84 82   Resp: 18 18 20 20   Temp: 97.6 °F (36.4 °C) 96.1 °F (35.6 °C) 97 °F (36.1 °C) 96 °F (35.6 °C)   SpO2: 95% 96% 96% 98%   Weight:       Height:            IV and DRAINS (will only show if present)   Peripheral IV 12/06/17 Left Antecubital-Site Assessment: Clean, dry, & intact     WOUND (if present)   Wound Type:  none   Dressing present Dressing Present : No   Wound Concerns/Notes:  none     PAIN    Pain Assessment    Pain Intensity 1: 4 (12/08/17 1557)    Pain Location 1: Foot, Leg    Pain Intervention(s) 1: Medication (see MAR), Elevation    Patient Stated Pain Goal: 0  o Interventions for Pain:  percocet  o Intervention effective: yes  o Time of last intervention: 4799   o Reassessment Completed: yes      Last 3 Weights:  Last 3 Recorded Weights in this Encounter    12/06/17 1738   Weight: 73.9 kg (163 lb)     Weight change:      INTAKE/OUPUT    Current Shift:      Last three shifts: 12/07 0701 - 12/08 1900  In: 2380 [P.O.:1880; I.V.:500]  Out: 6326 [Urine:6325]     LAB RESULTS     Recent Labs      12/07/17   0308  12/06/17   1840   WBC  6.2  9.4   HGB  12.4*  14.4   HCT  37.0  41.4   PLT  238  248        Recent Labs      12/08/17   0845  12/07/17   0308  12/06/17   1840   NA  139  140  138   K  4.1  3.7  3.6   GLU  154*  130*  88   BUN  13  12  13   CREA  0.83  0.80  0.85   CA  8.2*  7.8*  8.6       RECOMMENDATIONS AND DISCHARGE PLANNING     1. Pending tests/procedures/ Plan of Care or Other Needs: Discharge     2. Discharge plan for patient and Needs/Barriers: Home    3. Estimated Discharge Date: 12/10/17 Posted on Whiteboard in [facility] Room: yes      4. The patient's care plan was reviewed with the oncoming nurse. \"HEALS\" SAFETY CHECK      Fall Risk    Total Score: 3    Safety Measures: Safety Measures: Bed/Chair alarm on, Bed/Chair-Wheels locked, Bed in low position, Call light within reach    A safety check occurred in the patient's room between off going nurse and oncoming nurse listed above.     The safety check included the below items  Area Items   H  High Alert Medications - Verify all high alert medication drips (heparin, PCA, etc.)   E  Equipment - Suction is set up for ALL patients (with yanker)  - Red plugs utilized for all equipment (IV pumps, etc.)  - WOWs wiped down at end of shift.  - Room stocked with oxygen, suction, and other unit-specific supplies   A  Alarms - Bed alarm is set for fall risk patients  - Ensure chair alarm is in place and activated if patient is up in a chair   L  Lines - Check IV for any infiltration  - Márquez bag is empty if patient has a Márquez   - Tubing and IV bags are labeled   S  Safety   - Room is clean, patient is clean, and equipment is clean. - Hallways are clear from equipment besides carts. - Fall bracelet on for fall risk patients  - Ensure room is clear and free of clutter  - Suction is set up for ALL patients (with yanker)  - Hallways are clear from equipment besides carts.    - Isolation precautions followed, supplies available outside room, sign posted     Javy Robles RN

## 2017-12-09 NOTE — DISCHARGE INSTRUCTIONS
Patient armband removed and given to patient to take home. Patient was informed of the privacy risks if armband lost or stolen  DISCHARGE SUMMARY from Nurse    PATIENT INSTRUCTIONS:    After general anesthesia or intravenous sedation, for 24 hours or while taking prescription Narcotics:  · Limit your activities  · Do not drive and operate hazardous machinery  · Do not make important personal or business decisions  · Do  not drink alcoholic beverages  · If you have not urinated within 8 hours after discharge, please contact your surgeon on call. Report the following to your surgeon:  · Excessive pain, swelling, redness or odor of or around the surgical area  · Temperature over 100.5  · Nausea and vomiting lasting longer than 4 hours or if unable to take medications  · Any signs of decreased circulation or nerve impairment to extremity: change in color, persistent  numbness, tingling, coldness or increase pain  · Any questions    What to do at Home:  Recommended activity: Activity as tolerated and no driving for today    If you experience any of the following symptoms Nausea,vomiting, diarrhea, chest pain, shortness of breath  , please follow up with PCP. *  Please give a list of your current medications to your Primary Care Provider. *  Please update this list whenever your medications are discontinued, doses are      changed, or new medications (including over-the-counter products) are added. *  Please carry medication information at all times in case of emergency situations. These are general instructions for a healthy lifestyle:    No smoking/ No tobacco products/ Avoid exposure to second hand smoke  Surgeon General's Warning:  Quitting smoking now greatly reduces serious risk to your health.     Obesity, smoking, and sedentary lifestyle greatly increases your risk for illness    A healthy diet, regular physical exercise & weight monitoring are important for maintaining a healthy lifestyle    You may be retaining fluid if you have a history of heart failure or if you experience any of the following symptoms:  Weight gain of 3 pounds or more overnight or 5 pounds in a week, increased swelling in our hands or feet or shortness of breath while lying flat in bed. Please call your doctor as soon as you notice any of these symptoms; do not wait until your next office visit. Recognize signs and symptoms of STROKE:    F-face looks uneven    A-arms unable to move or move unevenly    S-speech slurred or non-existent    T-time-call 911 as soon as signs and symptoms begin-DO NOT go       Back to bed or wait to see if you get better-TIME IS BRAIN. Warning Signs of HEART ATTACK     Call 911 if you have these symptoms:   Chest discomfort. Most heart attacks involve discomfort in the center of the chest that lasts more than a few minutes, or that goes away and comes back. It can feel like uncomfortable pressure, squeezing, fullness, or pain.  Discomfort in other areas of the upper body. Symptoms can include pain or discomfort in one or both arms, the back, neck, jaw, or stomach.  Shortness of breath with or without chest discomfort.  Other signs may include breaking out in a cold sweat, nausea, or lightheadedness. Don't wait more than five minutes to call 911 - MINUTES MATTER! Fast action can save your life. Calling 911 is almost always the fastest way to get lifesaving treatment. Emergency Medical Services staff can begin treatment when they arrive -- up to an hour sooner than if someone gets to the hospital by car. The discharge information has been reviewed with the patient. The patient verbalized understanding. Discharge medications reviewed with the patient       Cellulitis: Care Instructions  Your Care Instructions    Cellulitis is a skin infection. It often occurs after a break in the skin from a scrape, cut, bite, or puncture, or after a rash.   The doctor has checked you carefully, but problems can develop later. If you notice any problems or new symptoms, get medical treatment right away. Follow-up care is a key part of your treatment and safety. Be sure to make and go to all appointments, and call your doctor if you are having problems. It's also a good idea to know your test results and keep a list of the medicines you take. How can you care for yourself at home? · Take your antibiotics as directed. Do not stop taking them just because you feel better. You need to take the full course of antibiotics. · Prop up the infected area on pillows to reduce pain and swelling. Try to keep the area above the level of your heart as often as you can. · If your doctor told you how to care for your wound, follow your doctor's instructions. If you did not get instructions, follow this general advice:  ¨ Wash the wound with clean water 2 times a day. Don't use hydrogen peroxide or alcohol, which can slow healing. ¨ You may cover the wound with a thin layer of petroleum jelly, such as Vaseline, and a nonstick bandage. ¨ Apply more petroleum jelly and replace the bandage as needed. · Be safe with medicines. Take pain medicines exactly as directed. ¨ If the doctor gave you a prescription medicine for pain, take it as prescribed. ¨ If you are not taking a prescription pain medicine, ask your doctor if you can take an over-the-counter medicine. To prevent cellulitis in the future  · Try to prevent cuts, scrapes, or other injuries to your skin. Cellulitis most often occurs where there is a break in the skin. · If you get a scrape, cut, mild burn, or bite, wash the wound with clean water as soon as you can to help avoid infection. Don't use hydrogen peroxide or alcohol, which can slow healing. · If you have swelling in your legs (edema), support stockings and good skin care may help prevent leg sores and cellulitis.   · Take care of your feet, especially if you have diabetes or other conditions that increase the risk of infection. Wear shoes and socks. Do not go barefoot. If you have athlete's foot or other skin problems on your feet, talk to your doctor about how to treat them. When should you call for help? Call your doctor now or seek immediate medical care if:  ? · You have signs that your infection is getting worse, such as:  ¨ Increased pain, swelling, warmth, or redness. ¨ Red streaks leading from the area. ¨ Pus draining from the area. ¨ A fever. ? · You get a rash. ? Watch closely for changes in your health, and be sure to contact your doctor if:  ? · You are not getting better after 1 day (24 hours). ? · You do not get better as expected. Where can you learn more? Go to http://lola-conner.info/. Sydni Ashby in the search box to learn more about \"Cellulitis: Care Instructions. \"  Current as of: October 13, 2016  Content Version: 11.4  © 5885-3508 [employer]. Care instructions adapted under license by [employer] (which disclaims liability or warranty for this information). If you have questions about a medical condition or this instruction, always ask your healthcare professional. [address] any warranty or liability for your use of this information. and appropriate educational materials and side effects teaching were provided. ___________________________________________________________________________________________________________________________________    [employer] Activation    Thank you for requesting access to [employer]. Please follow the instructions below to securely access and download your online medical record. [employer] allows you to send messages to your doctor, view your test results, renew your prescriptions, schedule appointments, and more. How Do I Sign Up? 1. In your internet browser, go to [email or web address]  2. Click on the First Time User? Click Here link in the Sign In box.  You will be redirect to the New Member Sign Up page. 3. Enter your [employer] Access Code exactly as it appears below. You will not need to use this code after youve completed the sign-up process. If you do not sign up before the expiration date, you must request a new code. [employer] Access Code: [email or web address]  Expires: 3/3/2018  8:39 AM (This is the date your [employer] access code will )    4. Enter the last four digits of your Social Security Number (xxxx) and Date of Birth (mm/dd/yyyy) as indicated and click Submit. You will be taken to the next sign-up page. 5. Create a [employer] ID. This will be your [employer] login ID and cannot be changed, so think of one that is secure and easy to remember. 6. Create a [employer] password. You can change your password at any time. 7. Enter your Password Reset Question and Answer. This can be used at a later time if you forget your password. 8. Enter your e-mail address. You will receive e-mail notification when new information is available in [address]. 9. Click Sign Up. You can now view and download portions of your medical record. 10. Click the Download Summary menu link to download a portable copy of your medical information. Additional Information    If you have questions, please visit the Frequently Asked Questions section of the [employer] website at [email or web address] Remember, [employer] is NOT to be used for urgent needs. For medical emergencies, dial 911. Discharge Instructions    Patient: Rashaad Posada MRN: [id number]  CSN: [id number]    YOB: 1965  Age: 46 y.o.   Sex: male    DOA: 2017 LOS:  LOS: 3 days   Discharge Date:      DIET:  Cardiac Diet    ACTIVITY: Activity as tolerated  Home health care for Skilled care for medication management (patient has since refused home health)     ADDITIONAL INFORMATION: If you experience any of the following symptoms but not limited to Fever, chills, nausea, vomiting, diarrhea, change in mentation, falling, bleeding, shortness of breath, chest pain, please call your primary care physician or return to the emergency room if you cannot get hold of your doctor:     FOLLOW UP CARE:  Dr. Giacomo Mcdonald NP in 5 - 7 days. Please call and set up an appointment.     Alcon Martel NP  12/9/2017 1:02 PM

## 2017-12-09 NOTE — DISCHARGE SUMMARY
[facility]  Discharge Summary       Patient: Elinor Tierney Age: 46 y.o. : 1965 MR#: [id number] SSN: [id number]  PCP on record: Mayra Henson NP  Admit date: 2017  Discharge date: 2017    Disposition:    []Home   [x]Home with Home Health (patient refused HH)  []SNF/NH   []Rehab   []Home with family   []Alternate Facility:____________________    Discharge Diagnoses:                             1.  Cellulitis, right lower extremity  2. COPD  3. GERD  4. Chronic lower back pain    Discharge Medications:     Current Discharge Medication List      START taking these medications    Details   trimethoprim-sulfamethoxazole (BACTRIM DS) 160-800 mg per tablet Take 1 Tab by mouth two (2) times a day. Qty: 20 Tab, Refills: 0         CONTINUE these medications which have CHANGED    Details   oxyCODONE-acetaminophen (PERCOCET) 5-325 mg per tablet Take 1 Tab by mouth every four (4) hours as needed for Pain. Max Daily Amount: 6 Tabs. Qty: 12 Tab, Refills: 0         CONTINUE these medications which have NOT CHANGED    Details   cyclobenzaprine (FLEXERIL) 10 mg tablet Take 1 Tab by mouth three (3) times daily as needed for Muscle Spasm(s). Qty: 30 Tab, Refills: 6    Associated Diagnoses: Degenerative disc disease, lumbar      raNITIdine (ZANTAC) 150 mg tablet Take 1 Tab by mouth nightly. Qty: 30 Tab, Refills: 3    Associated Diagnoses: Gastroesophageal reflux disease with esophagitis      pantoprazole (PROTONIX) 40 mg tablet Take 1 Tab by mouth daily. Qty: 30 Tab, Refills: 3    Associated Diagnoses: Gastroesophageal reflux disease with esophagitis      gabapentin (NEURONTIN) 800 mg tablet Take 1 Tab by mouth three (3) times daily. Qty: 90 Tab, Refills: 5      budesonide-formoterol (SYMBICORT) 160-4.5 mcg/actuation HFA inhaler Take 1 Puff by inhalation two (2) times a day. Qty: 1 Inhaler, Refills: 5    Associated Diagnoses:  Moderate persistent asthma without complication      albuterol (PROVENTIL HFA, VENTOLIN HFA, PROAIR HFA) 90 mcg/actuation inhaler Take 2 Puffs by inhalation every six (6) hours as needed for Wheezing or Shortness of Breath. Qty: 1 Inhaler, Refills: 11    Associated Diagnoses: Moderate persistent asthma without complication      aspirin 81 mg chewable tablet Take 81 mg by mouth daily. STOP taking these medications       clindamycin (CLEOCIN) 300 mg capsule Comments:   Reason for Stopping:             Consults:    - None  Procedures:  -   None  Significant Diagnostic Studies:     CT LOW EXT RT W CONT on 12/06/2017  -  IMPRESSION:     1. Extensive subcutaneous edema throughout the leg, nonspecific, possibly  cellulitis. No evidence of abscess. No CT evidence of osteomyelitis.     2. Advanced degenerative changes in the right knee noted. Focal subchondral  sclerosis in the medial posterior condyle and tibial plateau, possibly sequela  of osteochondral injury, insufficiency fractures, or osteonecrosis. MRI could  provide further characterization. Hospital Course by Problem   1. Cellulitis, right lower extremity - Patient admitted after failing outpatient management of cellulitis. Patient has showed significant improvement with IV antibiotics, discharge to complete coarse of treatment with Bactrim DS x 10 days. 2.  COPD - no acute issues, discussed with patient and encouraged continued use of inhalers as ordered. 3.  GERD - Patient reports concurrent use of protonix and zantac related to prior difficulties with reflux. Patient states that there have been discussions of stopping PPI. Will discharge patient on current regimen as on antibiotic regimen and follow up with PCP for adjustment. 4.  Chronic lower back pain - continue neurontin, flexoril as prior to admission. Today's examination of the patient revealed:     Subjective:   Feels much improved today, continues to c/o pain to right ankle.   No other concerns - no pain in left leg, no chest pain or shortness of breath. No reflux symptoms. Objective:   VS:   Visit Vitals    /83 (BP 1 Location: Left arm, BP Patient Position: At rest)    Pulse 75    Temp 97 °F (36.1 °C)    Resp 18    Ht 5' 10\" (1.778 m)    Wt 73.9 kg (163 lb)    SpO2 97%    BMI 23.39 kg/m2      Tmax/24hrs: Temp (24hrs), Av.9 °F (36.1 °C), Min:96 °F (35.6 °C), Max:97.3 °F (36.3 °C)     Input/Output:   Intake/Output Summary (Last 24 hours) at 17 1308  Last data filed at 17 1020   Gross per 24 hour   Intake             2980 ml   Output             5526 ml   Net            -2546 ml     General:  Alert, NAD  Cardiovascular:  RRR  Pulmonary:  LSC throughout  GI:  +BS in all four quadrants, soft, non-tender  Extremities:  Improved erythema to right leg, mild associated swelling; 2+ dorsalis pedis pulses bilaterally;     Labs:    Recent Results (from the past 24 hour(s))   METABOLIC PANEL, BASIC    Collection Time: 17  9:45 AM   Result Value Ref Range    Sodium 141 136 - 145 mmol/L    Potassium 4.2 3.5 - 5.5 mmol/L    Chloride 107 100 - 108 mmol/L    CO2 30 21 - 32 mmol/L    Anion gap 4 3.0 - 18 mmol/L    Glucose 130 (H) 74 - 99 mg/dL    BUN 9 7.0 - 18 MG/DL    Creatinine 0.75 0.6 - 1.3 MG/DL    BUN/Creatinine ratio 12 12 - 20      GFR est AA >60 >60 ml/min/1.73m2    GFR est non-AA >60 >60 ml/min/1.73m2    Calcium 8.5 8.5 - 10.1 MG/DL   VANCOMYCIN, TROUGH    Collection Time: 17 10:10 AM   Result Value Ref Range    Vancomycin,trough 11.4 10.0 - 20.0 ug/mL    Reported dose date: 2017      Reported dose time: 100      Reported dose: 1000 MG UNITS     Additional Data Reviewed:     Condition:   Follow-up Appointments:   1.  Your PCP: Jillian Ho NP, within 5-7 days    >30 minutes spent coordinating this discharge (review instructions/follow-up, prescriptions, preparing report for sign off)    Signed:  Melony Benton NP  2017  1:08 PM

## 2017-12-09 NOTE — PROGRESS NOTES
Admit Date: 2017  Date of Service: 2017    Reason for follow-up: cellulitis    Assessment:   RLE cellulitis: improving since admission; no s/s sepsis at this time   - blood cx negative  x2 thus far   -  CT lower extremity  Chronic low back pain  H/o COPD: stable, no exacerbation; on home medications    Plan:   F/u blood cx, preliminary negative x 2 days  Continue vancomycin for one additional day   - as continues to have fair amount of erythema will continue to monitor as inpatient until tomorrow and transition to oral antibiotics. Anticipate discharge in AM to home provided clinical improvement. Patient shares that he has no one to pick him up until tomorrow evening. Spoke to case management / nurse manager and patient is receptive to cab voucher to facilitate d/c home. Current Antibtiocs:   Vancomycin - present    Lines:   Peripheral    I spent 45 minutes with the patient in face-to-face consultation, of which greater than 50% was spent in counseling and coordination of care as described above. Case discussed with:  [x]Patient  [x]Family  [x]Nursing  []Case Management  DVT Prophylaxis:  []Lovenox  [x]Hep SQ  []SCDs  []Coumadin   []On Heparin gtt    Allergies   Allergen Reactions    Penicillins Angioedema    Penicillins Anaphylaxis    Vicodin [Hydrocodone-Acetaminophen] Nausea and Vomiting    Azithromycin Nausea and Vomiting    Vicodin [Hydrocodone-Acetaminophen] Nausea and Vomiting    Erythromycin Other (comments)     Subjective:      Pt seen and examined. Continues to feel improvement. No fevers or chills. Patient continues to note redness to right leg and intermittent pain to right ankle.       Objective:      Visit Vitals    /66 (BP 1 Location: Left arm, BP Patient Position: At rest)    Pulse 82    Temp 96 °F (35.6 °C)    Resp 20    Ht 5' 10\" (1.778 m)    Wt 73.9 kg (163 lb)    SpO2 98%    BMI 23.39 kg/m2     Temp (24hrs), Av.8 °F (36 °C), Min:96 °F (35.6 °C), Max:97.6 °F (36.4 °C)    General:   awake alert and oriented, non-toxic   Skin:   no rashes, dry and warm; right LE with mild erythema to anterior lower leg with associated swelling. No fluctuance. No drainage; right knee dry and excoriated; thick toe nails with interdigital flaky skin   HEENT:  No scleral icterus or pallor; oral mucosa moist, lips moist   Lymph Nodes:   not assessed today   Lungs:   non, labored; bilaterally clear to aspiration- no crackles wheezes rales or rhonchi   Heart:  RRR, s1 and s2; no murmurs rubs or gallops; no edema, + pedal pulses   Abdomen:  soft, non-distended, active bowel sounds, non-tender   Genitourinary:  deferred   Extremities:   average muscle tone; no contractures, no joint effusions   Neurologic:  No gross focal motor or sensory abnormalities; CN 2-12 intact; Follows commands. Psychiatric:   appropriate and interactive. Labs: Results:   Chemistry Recent Labs      17   0845  17   0308  17   1840   GLU  154*  130*  88   NA  139  140  138   K  4.1  3.7  3.6   CL  107  106  104   CO2  26  28  27   BUN  13  12  13   CREA  0.83  0.80  0.85   CA  8.2*  7.8*  8.6   AGAP  6  6  7   BUCR  16  15  15   AP   --   55  67   TP   --   5.2*  7.3   ALB   --   2.8*  3.9   GLOB   --   2.4  3.4   AGRAT   --   1.2  1.1      CBC w/Diff Recent Labs      17   0308  17   1840   WBC  6.2  9.4   RBC  4.18*  4.77   HGB  12.4*  14.4   HCT  37.0  41.4   PLT  238  248   GRANS   --   67   LYMPH   --   15*   EOS   --   2        No results found for: SDES Lab Results   Component Value Date/Time    Culture result: NO GROWTH 2 DAYS 2017 07:00 PM    Culture result: NO GROWTH 2 DAYS 2017 06:40 PM        Imagin/6 CT lower ext: 1. Extensive subcutaneous edema throughout the leg, nonspecific, possibly  cellulitis. No evidence of abscess. No CT evidence of osteomyelitis.     2. Advanced degenerative changes in the right knee noted.  Focal subchondral  sclerosis in the medial posterior condyle and tibial plateau, possibly sequela  of osteochondral injury, insufficiency fractures, or osteonecrosis. MRI could  provide further characterization.     Signed By: Nelson Boyle NP     December 8, 2017

## 2017-12-09 NOTE — ROUTINE PROCESS
Bedside shift change report given to Berenice Adams (oncoming nurse) by Chica Salgado (offgoing nurse). Report included the following information SBAR, Intake/Output, MAR, Recent Results and Cardiac Rhythm . Zia Carl

## 2017-12-09 NOTE — PROGRESS NOTES
Discharge education given to patient, IV to the right arm removed, tip intact, no pain, no signs of infiltration, no edema. Patient awaiting for  at this time. All questions answered.

## 2017-12-09 NOTE — INTERDISCIPLINARY ROUNDS
Interdisciplinary Round Note   Patient Information:   María Esparza   [birth year]   Reason for Admission: Cellulitis and abscess of right leg  Cellulitis   Attending Provider:   Marilee Merino MD  Primary Care Physician:       Quinn Bullard NP       [phone]   Estimated discharge date:  TBD   Hospital day: 3  [unfilled]  3d 7h  RRAT Score: Low Risk            10       Total Score        3 Has Seen PCP in Last 6 Months (Yes=3, No=0)    5 Pt. Coverage (Medicare=5 , Medicaid, or Self-Pay=4)    2 Charlson Comorbidity Score (Age + Comorbid Conditions)        Criteria that do not apply:    . Living with Significant Other. Assisted Living. LTAC. SNF. or   Rehab    Patient Length of Stay (>5 days = 3)    IP Visits Last 12 Months (1-3=4, 4=9, >4=11)       None     No           Chemical      Lines, Drains, & Airways  None       IV Antibiotics:    Current Antimicrobial Therapy (168h ago through future)    Ordered     Start Stop    12/06/17 1843  vancomycin (VANCOCIN) 1,000 mg in 0.9% sodium chloride (MBP/ADV) 250 mL adv  1,000 mg,   IntraVENous,   EVERY 12 HOURS      12/07/17 0800 --    12/06/17 1817  levoFLOXacin (LEVAQUIN) 750 mg in D5W IVPB  750 mg,   IntraVENous,   EVERY 24 HOURS      12/06/17 1818 --        GI Prophylaxis: GI Prophylaxis: yes   Type: Protonix, pepcid. Recent Glucose Results:   Lab Results   Component Value Date/Time     (H) 12/09/2017 09:45 AM      Activity Level: Activity Level: Bath Room Privileges, Up ad carlton    Needs assistance with ADLs: no       Goals for Today: IV antibiotics   Recommendations:   Discharge Disposition: Home Independent  P.T, O.T. and CM  Follow up phone call to assess:   medication knowledge and compliance, follow up physician appointments and ongoing needs assessment    Needs for Discharge: transportation IDR Team:    Recommendations from IDR team:  Po Antibiotics H/H consult. D/c today    Other Notes:  Pain control, ivf's. Iv antibiotics. Heparin.

## 2017-12-11 ENCOUNTER — TELEPHONE (OUTPATIENT)
Dept: OTHER | Age: 52
End: 2017-12-11

## 2017-12-11 ENCOUNTER — PATIENT OUTREACH (OUTPATIENT)
Dept: FAMILY MEDICINE CLINIC | Age: 52
End: 2017-12-11

## 2017-12-11 NOTE — TELEPHONE ENCOUNTER
Called pt to follow up on recent hospitalization here at [place]. Spoke with caregiver who states pt is doing well this am.  She states that they got his Rx filled and he is taking as directed. Pt has f/u appt with his PCP this week.     Yves Hermosillo RN

## 2017-12-11 NOTE — PROGRESS NOTES
Transition of Care    Hospital Admission:  12-6-17  Hospital Discharge:   12-9-17    Facility: [facility]    Disposition: Home   [facility]  [facility]per [facility] review[facility]    Per Discharge Summary of Dr. Ilene Dale,   On 46[birth year]90, in italics:     Discharge Diagnoses:                             1.  Cellulitis, right lower extremity  2. COPD  3. GERD  4. Chronic lower back pain      María Esparza is a 46 y.o. male    This patient was received as a referral from [facility] and High Risk Report       Summary of patients top three medical problems:    Problem 1: Cellulitis, right lower extremity     Problem 2: COPD    Problem 3: Chronic Lower Back Pain     Patient's challenges to self management identified:    No Challenges identified. Will Continue to assess       Medication Management:    Medication Rec. Completed with patient. Red Flags (per AVS)  Reviewed with patient:   - Excessive pain, swelling, Redness,or odor  - Temperature of 100.5   - Nausea/Vomiting lasting more than 4 hours  - Unable to take Medications  -Change in color, numbness, tingling, coldness, or increased pain from extremity  - Red streaks leading from area, Pus, or rash   - Not getting better as expected  - Any Questions. Patient denied any of the above red flag symptoms and stated, \"My leg feels a lot better\". \"The swelling is down. I can walk and get around\" Patient stated that he was able to walk outside and get some fresh air. Advance Care Planning:   Patient was offered the opportunity to discuss advance care planning:  no     Does patient have an Advance Directive:  no   If no, did you provide information on Advance Care Planning?  no     [employer], Referrals, and Durable Medical Equipment:   None reported. Follow up appointments:  Please refer to goals     Goals      Attends follow-up appointments as directed.             Plan: Nurse Navigator to follow up with appointment attendance and assist with scheduling or rescheduling of appointments as needed. 12/11/17 Review:   PCP appointment scheduled for 12/13/17 @ 0900. Patient and/or family members were alerted to the availability of physician or other advanced practioners after hours and on the weekends. Nurse Navigator contact information provided for follow up as needed. Patient voiced understanding of information discussed and denied john the had any questions prior to conclusion of telephone call.

## 2017-12-12 LAB
BACTERIA SPEC CULT: NORMAL
BACTERIA SPEC CULT: NORMAL
SERVICE CMNT-IMP: NORMAL
SERVICE CMNT-IMP: NORMAL

## 2017-12-13 ENCOUNTER — OFFICE VISIT (OUTPATIENT)
Dept: FAMILY MEDICINE CLINIC | Age: 52
End: 2017-12-13

## 2017-12-13 VITALS
RESPIRATION RATE: 20 BRPM | SYSTOLIC BLOOD PRESSURE: 110 MMHG | HEIGHT: 70 IN | OXYGEN SATURATION: 96 % | BODY MASS INDEX: 25.05 KG/M2 | TEMPERATURE: 97.1 F | HEART RATE: 73 BPM | WEIGHT: 175 LBS | DIASTOLIC BLOOD PRESSURE: 79 MMHG

## 2017-12-13 DIAGNOSIS — L84 SKIN CALLUS: ICD-10-CM

## 2017-12-13 DIAGNOSIS — L03.115 CELLULITIS OF RIGHT LOWER EXTREMITY: Primary | ICD-10-CM

## 2017-12-13 DIAGNOSIS — L85.3 XEROSIS OF SKIN: ICD-10-CM

## 2017-12-13 DIAGNOSIS — M17.11 PRIMARY OSTEOARTHRITIS OF RIGHT KNEE: ICD-10-CM

## 2017-12-13 PROBLEM — L02.415 CELLULITIS AND ABSCESS OF RIGHT LEG: Status: RESOLVED | Noted: 2017-12-06 | Resolved: 2017-12-13

## 2017-12-13 PROBLEM — Z51.81 ENCOUNTER FOR THERAPEUTIC DRUG MONITORING: Status: RESOLVED | Noted: 2017-11-08 | Resolved: 2017-12-13

## 2017-12-13 RX ORDER — AMMONIUM LACTATE 12 G/100G
CREAM TOPICAL 2 TIMES DAILY
Qty: 280 G | Refills: 0 | Status: SHIPPED | OUTPATIENT
Start: 2017-12-13 | End: 2018-07-05 | Stop reason: ALTCHOICE

## 2017-12-13 RX ORDER — TRIAMCINOLONE ACETONIDE 1 MG/G
OINTMENT TOPICAL 2 TIMES DAILY
Qty: 30 G | Refills: 0 | Status: SHIPPED | OUTPATIENT
Start: 2017-12-13 | End: 2018-07-05 | Stop reason: ALTCHOICE

## 2017-12-13 NOTE — MR AVS SNAPSHOT
Visit Information Date & Time Provider Department Dept. Phone Encounter #  
 12/13/2017  9:15 AM Nadiya Mckee, Aida Aldo Solomon Audrey. [phone] [id number] Follow-up Instructions Return for already scheduled appointment 1/2018. Your Appointments 12/15/2017  9:00 AM  
SURGERY CONSULT with Ashia Crespo MD  
[address] [facility], [address] and [facility]) Appt Note: SC-patient is aware of date/time/loc and to take disk [address] [facility] [address]  
[address] [facility] [address] [facility],[address] [facility]  
  
    
 1/2/2018  8:45 AM  
Office Visit with Nadiya Mckee  [facility] [address] ([facility]) Appt Note: MWV and f/u for chronic disease [address] [id number] [address] [facility]  
  
   
 [address] [facility]  
  
    
 3/27/2018  8:30 AM  
Follow Up with Sherlyn Darden MD  
[address] [place] [address], [address] and [facility] [employer] ([facility]) Appt Note: 6 mo follow up  lower back/pat request Dr Bang Mckenzie not NP  
 [address] [facility] [address] [facility]  
  
    
  
 12/14/2017  8:30 AM  
Any with Aida Tim MD  
[facility] ([facility]) Appt Note: 3m fu; mailed new appt; 3m fu  
 [address] [facility] 
[address] [facility] [address]  
[address] [facility] [address] [facility] [address] [facility] [address] Upcoming Health Maintenance Date Due DTaP/Tdap/Td series (1 - Tdap) 1/2/2011 MEDICARE YEARLY EXAM 11/18/2017 Prostate-Specific Antigen 1/30/2018 COLONOSCOPY 8/12/2021 Allergies as of 12/13/2017  Review Complete On: 12/13/2017 By: Nadiya Mckee NP Severity Noted Reaction Type Reactions Penicillins High 10/14/2012    Angioedema Penicillins High 05/15/2013    Anaphylaxis Vicodin [Hydrocodone-acetaminophen] High 10/14/2012    Nausea and Vomiting Azithromycin  06/02/2016    Nausea and Vomiting Hydrochlorothiazide  12/13/2017   Systemic Nausea Only Dizzy, lightheaded, blisters on his legs Vicodin [Hydrocodone-acetaminophen]  05/15/2013    Nausea and Vomiting Erythromycin Low 07/12/2016    Other (comments) Current Immunizations  Reviewed on 2/2/2017 Name Date Influenza Vaccine (Quad) PF 9/5/2017, 11/17/2016 Novel Influenza-H1N1-09, All Formulations 5/14/2010 12:00 AM  
 Pneumococcal Polysaccharide (PPSV-23) 2/2/2017 Td 1/1/2011 Not reviewed this visit You Were Diagnosed With   
  
 Codes Comments Cellulitis of right lower extremity    -  Primary ICD-10-CM: P22.368 ICD-9-CM: 682.6 Primary osteoarthritis of right knee     ICD-10-CM: M17.11 ICD-9-CM: 715.16 Xerosis of skin     ICD-10-CM: L85.3 ICD-9-CM: 706.8 Skin callus     ICD-10-CM: L84 
ICD-9-CM: 470 Vitals BP Pulse Temp Resp Height(growth percentile) Weight(growth percentile) 110/79 (BP 1 Location: Left arm, BP Patient Position: Sitting) 73 97.1 °F (36.2 °C) (Oral) 20 5' 10\" (1.778 m) 175 lb (79.4 kg) SpO2 BMI Smoking Status 96% 25.11 kg/m2 Former Smoker Vitals History BMI and BSA Data Body Mass Index Body Surface Area  
 25.11 kg/m 2 1.98 m 2 Preferred Pharmacy Pharmacy Name Phone [facility] [address] [place] [address] [address] [phone] Your Updated Medication List  
  
   
This list is accurate as of: 12/13/17  9:45 AM.  Always use your most recent med list.  
  
  
  
  
 albuterol 90 mcg/actuation inhaler Commonly known as:  PROVENTIL HFA, VENTOLIN HFA, PROAIR HFA Take 2 Puffs by inhalation every six (6) hours as needed for Wheezing or Shortness of Breath.   
  
 ammonium lactate 12 % topical cream  
Commonly known as:  AMLACTIN  
 Apply  to affected area two (2) times a day. rub in to affected area well  
  
 aspirin 81 mg chewable tablet Take 81 mg by mouth daily. budesonide-formoterol 160-4.5 mcg/actuation Hfaa Commonly known as:  SYMBICORT Take 1 Puff by inhalation two (2) times a day. cyclobenzaprine 10 mg tablet Commonly known as:  FLEXERIL Take 1 Tab by mouth three (3) times daily as needed for Muscle Spasm(s). gabapentin 800 mg tablet Commonly known as:  NEURONTIN Take 1 Tab by mouth three (3) times daily. oxyCODONE-acetaminophen 5-325 mg per tablet Commonly known as:  PERCOCET Take 1 Tab by mouth every four (4) hours as needed for Pain. Max Daily Amount: 6 Tabs. pantoprazole 40 mg tablet Commonly known as:  PROTONIX Take 1 Tab by mouth daily. raNITIdine 150 mg tablet Commonly known as:  ZANTAC Take 1 Tab by mouth nightly. triamcinolone acetonide 0.1 % ointment Commonly known as:  KENALOG Apply  to affected area two (2) times a day. use thin layer  
  
 trimethoprim-sulfamethoxazole 160-800 mg per tablet Commonly known as:  BACTRIM DS Take 1 Tab by mouth two (2) times a day. Prescriptions Sent to Pharmacy Refills  
 ammonium lactate (AMLACTIN) 12 % topical cream 0 Sig: Apply  to affected area two (2) times a day. rub in to affected area well Class: Normal  
 Pharmacy: [address] Ph #: [phone] Route: Topical  
 triamcinolone acetonide (KENALOG) 0.1 % ointment 0 Sig: Apply  to affected area two (2) times a day. use thin layer Class: Normal  
 Pharmacy: [address] Ph #: [phone] Route: Topical  
  
Follow-up Instructions Return for already scheduled appointment 1/2018. Introducing [facility]! [facility] introduces [email or web address] patient portal. Now you can access parts of your medical record, email your doctor's office, and request medication refills online. 1. In your internet browser, go to [email or web address] 2. Click on the First Time User? Click Here link in the Sign In box. You will see the New Member Sign Up page. 3. Enter your [email or web address] Access Code exactly as it appears below. You will not need to use this code after youve completed the sign-up process. If you do not sign up before the expiration date, you must request a new code. · [email or web address] Access Code: [email or web address] Expires: 3/3/2018  8:39 AM 
 
4. Enter the last four digits of your Social Security Number (xxxx) and Date of Birth (mm/dd/yyyy) as indicated and click Submit. You will be taken to the next sign-up page. 5. Create a [email or web address] ID. This will be your [email or web address] login ID and cannot be changed, so think of one that is secure and easy to remember. 6. Create a [email or web address] password. You can change your password at any time. 7. Enter your Password Reset Question and Answer. This can be used at a later time if you forget your password. 8. Enter your e-mail address. You will receive e-mail notification when new information is available in [address] 9. Click Sign Up. You can now view and download portions of your medical record. 10. Click the Download Summary menu link to download a portable copy of your medical information. If you have questions, please visit the Frequently Asked Questions section of the [email or web address] website. Remember, [email or web address] is NOT to be used for urgent needs. For medical emergencies, dial 911. Now available from your iPhone and Android! Please provide this summary of care documentation to your next provider. Your primary care clinician is listed as Elisa Ryan. If you have any questions after today's visit, please call [phone].

## 2017-12-13 NOTE — PROGRESS NOTES
OFFICE NOTE    Jarred Eastman is a 46 y.o. male presenting today for office visit. 12/13/2017  9:27 AM      Chief Complaint   Patient presents with   [address]     pt here for hospital follow up Cellutitis of right lower leg         HPI: Here today for hospital follow up- admitted 12/6 to 12/9 for cellulitis of right lower extremity- failed outpatient therapy. Currently prescribed Bactrim and Percocet- taking as prescribed. He reports great improvement since being admitted and that since discharge, he has continued to have improvement. No new complaints. No fevers or chills. Erythema improving. Transitions of care: NN discussed with patient on 12/11 regarding discharge. Follow up today is 4 days after discharge. Review of Systems   Constitutional: Negative for chills and fever. Gastrointestinal: Negative for diarrhea, nausea and vomiting.    Musculoskeletal:        +RLE cellulitis improving         PHQ Screening   PHQ over the last two weeks 12/13/2017   Little interest or pleasure in doing things Not at all   Feeling down, depressed or hopeless -   Total Score PHQ 2 -         History  Past Medical History:   Diagnosis Date    Asthma     Cellulitis 12/2017    DDD (degenerative disc disease), lumbar 2013    noted on MRI with annular tears    Diverticulitis 2016    GERD (gastroesophageal reflux disease) 2016    with esophagitis according to endscopy    Kidney stone     Lumbar post-laminectomy syndrome     Sacroiliitis ([address]     Stroke [facility]) may 14 2010    Unspecified rotator cuff tear or rupture of left shoulder, not specified as traumatic 03/2016       Past Surgical History:   Procedure Laterality Date    COLONOSCOPY N/A 8/12/2016    COLONOSCOPY with polypectomy performed by Maddy Potter MD at [address]  2002, 2004, 2006    L5-S1 fusion, laminectomies    HX ENDOSCOPY  10/2016    grade 1 espohagitis    HX ENDOSCOPY  11/2016    mild esophagitis  HX KNEE ARTHROSCOPY Right     knee    HX MOHS PROCEDURES      right    HX ORTHOPAEDIC      right shoulder       Social History     Social History    Marital status: SINGLE     Spouse name: N/A    Number of children: N/A    Years of education: N/A     Occupational History    Not on file. Social History Main Topics    Smoking status: Former Smoker     Packs/day: 2.00     Types: Cigarettes    Smokeless tobacco: Never Used    Alcohol use No    Drug use: No    Sexual activity: Yes     Partners: Female     Other Topics Concern    Not on file     Social History Narrative    ** Merged History Encounter **            Allergies   Allergen Reactions    Penicillins Angioedema    Penicillins Anaphylaxis    Vicodin [Hydrocodone-Acetaminophen] Nausea and Vomiting    Azithromycin Nausea and Vomiting    Hydrochlorothiazide Nausea Only     Dizzy, lightheaded, blisters on his legs    Vicodin [Hydrocodone-Acetaminophen] Nausea and Vomiting    Erythromycin Other (comments)       Current Outpatient Prescriptions   Medication Sig Dispense Refill    trimethoprim-sulfamethoxazole (BACTRIM DS) 160-800 mg per tablet Take 1 Tab by mouth two (2) times a day. 20 Tab 0    cyclobenzaprine (FLEXERIL) 10 mg tablet Take 1 Tab by mouth three (3) times daily as needed for Muscle Spasm(s). 30 Tab 6    raNITIdine (ZANTAC) 150 mg tablet Take 1 Tab by mouth nightly. 30 Tab 3    gabapentin (NEURONTIN) 800 mg tablet Take 1 Tab by mouth three (3) times daily. 90 Tab 5    oxyCODONE-acetaminophen (PERCOCET) 5-325 mg per tablet Take 1 Tab by mouth every four (4) hours as needed for Pain. Max Daily Amount: 6 Tabs. 20 Tab 0    pantoprazole (PROTONIX) 40 mg tablet Take 1 Tab by mouth daily. 30 Tab 3    budesonide-formoterol (SYMBICORT) 160-4.5 mcg/actuation HFA inhaler Take 1 Puff by inhalation two (2) times a day.  1 Inhaler 5    albuterol (PROVENTIL HFA, VENTOLIN HFA, PROAIR HFA) 90 mcg/actuation inhaler Take 2 Puffs by inhalation every six (6) hours as needed for Wheezing or Shortness of Breath. 1 Inhaler 11    aspirin 81 mg chewable tablet Take 81 mg by mouth daily. Patient Care Team:  Patient Care Team:  Rosemarie Ken NP as PCP - General (Nurse Practitioner)  Shae Valle MD (Orthopedic Surgery)  Jose G Mccarthy MD ([facility] Laity)  Yesenia Whelan MD (Gastroenterology)  Shelbi Oseguera MD (Urology)  Kusum Ely RN as Ambulatory Care Navigator        LABS:    Results for orders placed or performed during the hospital encounter of 12/06/17   CULTURE, BLOOD   Result Value Ref Range    Special Requests: NO SPECIAL REQUESTS      Culture result: NO GROWTH 6 DAYS     CULTURE, BLOOD   Result Value Ref Range    Special Requests: NO SPECIAL REQUESTS      Culture result: NO GROWTH 6 DAYS     CBC WITH AUTOMATED DIFF   Result Value Ref Range    WBC 9.4 4.6 - 13.2 K/uL    RBC 4.77 4.70 - 5.50 M/uL    HGB 14.4 13.0 - 16.0 g/dL    HCT 41.4 36.0 - 48.0 %    MCV 86.8 74.0 - 97.0 FL    MCH 30.2 24.0 - 34.0 PG    MCHC 34.8 31.0 - 37.0 g/dL    RDW 12.3 11.6 - 14.5 %    PLATELET 041 316 - 308 K/uL    MPV 10.1 9.2 - 11.8 FL    NEUTROPHILS 67 40 - 73 %    LYMPHOCYTES 15 (L) 21 - 52 %    MONOCYTES 16 (H) 3 - 10 %    EOSINOPHILS 2 0 - 5 %    BASOPHILS 0 0 - 2 %    ABS. NEUTROPHILS 6.2 1.8 - 8.0 K/UL    ABS. LYMPHOCYTES 1.4 0.9 - 3.6 K/UL    ABS. MONOCYTES 1.5 (H) 0.05 - 1.2 K/UL    ABS. EOSINOPHILS 0.2 0.0 - 0.4 K/UL    ABS.  BASOPHILS 0.0 0.0 - 0.06 K/UL    DF AUTOMATED     METABOLIC PANEL, COMPREHENSIVE   Result Value Ref Range    Sodium 138 136 - 145 mmol/L    Potassium 3.6 3.5 - 5.5 mmol/L    Chloride 104 100 - 108 mmol/L    CO2 27 21 - 32 mmol/L    Anion gap 7 3.0 - 18 mmol/L    Glucose 88 74 - 99 mg/dL    BUN 13 7.0 - 18 MG/DL    Creatinine 0.85 0.6 - 1.3 MG/DL    BUN/Creatinine ratio 15 12 - 20      GFR est AA >60 >60 ml/min/1.73m2    GFR est non-AA >60 >60 ml/min/1.73m2    Calcium 8.6 8.5 - 10.1 MG/DL    Bilirubin, total 0.5 0.2 - 1.0 MG/DL    ALT (SGPT) 20 16 - 61 U/L    AST (SGOT) 12 (L) 15 - 37 U/L    Alk. phosphatase 67 45 - 117 U/L    Protein, total 7.3 6.4 - 8.2 g/dL    Albumin 3.9 3.4 - 5.0 g/dL    Globulin 3.4 2.0 - 4.0 g/dL    A-G Ratio 1.1 0.8 - 1.7     METABOLIC PANEL, COMPREHENSIVE   Result Value Ref Range    Sodium 140 136 - 145 mmol/L    Potassium 3.7 3.5 - 5.5 mmol/L    Chloride 106 100 - 108 mmol/L    CO2 28 21 - 32 mmol/L    Anion gap 6 3.0 - 18 mmol/L    Glucose 130 (H) 74 - 99 mg/dL    BUN 12 7.0 - 18 MG/DL    Creatinine 0.80 0.6 - 1.3 MG/DL    BUN/Creatinine ratio 15 12 - 20      GFR est AA >60 >60 ml/min/1.73m2    GFR est non-AA >60 >60 ml/min/1.73m2    Calcium 7.8 (L) 8.5 - 10.1 MG/DL    Bilirubin, total 0.6 0.2 - 1.0 MG/DL    ALT (SGPT) 16 16 - 61 U/L    AST (SGOT) 10 (L) 15 - 37 U/L    Alk.  phosphatase 55 45 - 117 U/L    Protein, total 5.2 (L) 6.4 - 8.2 g/dL    Albumin 2.8 (L) 3.4 - 5.0 g/dL    Globulin 2.4 2.0 - 4.0 g/dL    A-G Ratio 1.2 0.8 - 1.7     LIPID PANEL   Result Value Ref Range    LIPID PROFILE          Cholesterol, total 97 <200 MG/DL    Triglyceride 77 <150 MG/DL    HDL Cholesterol 43 40 - 60 MG/DL    LDL, calculated 38.6 0 - 100 MG/DL    VLDL, calculated 15.4 MG/DL    CHOL/HDL Ratio 2.3 0 - 5.0     CBC W/O DIFF   Result Value Ref Range    WBC 6.2 4.6 - 13.2 K/uL    RBC 4.18 (L) 4.70 - 5.50 M/uL    HGB 12.4 (L) 13.0 - 16.0 g/dL    HCT 37.0 36.0 - 48.0 %    MCV 88.5 74.0 - 97.0 FL    MCH 29.7 24.0 - 34.0 PG    MCHC 33.5 31.0 - 37.0 g/dL    RDW 12.6 11.6 - 14.5 %    PLATELET 614 494 - 938 K/uL    MPV 9.9 9.2 - 97.2 FL   METABOLIC PANEL, BASIC   Result Value Ref Range    Sodium 139 136 - 145 mmol/L    Potassium 4.1 3.5 - 5.5 mmol/L    Chloride 107 100 - 108 mmol/L    CO2 26 21 - 32 mmol/L    Anion gap 6 3.0 - 18 mmol/L    Glucose 154 (H) 74 - 99 mg/dL    BUN 13 7.0 - 18 MG/DL    Creatinine 0.83 0.6 - 1.3 MG/DL    BUN/Creatinine ratio 16 12 - 20      GFR est AA >60 >60 ml/min/1.73m2    GFR est non-AA >60 >60 ml/min/1.73m2    Calcium 8.2 (L) 8.5 - 10.1 MG/DL   VANCOMYCIN, TROUGH   Result Value Ref Range    Vancomycin,trough 7.3 (L) 10.0 - 20.0 ug/mL    Reported dose date: 20171207      Reported dose time: 2000      Reported dose: 1000 MG UNITS   METABOLIC PANEL, BASIC   Result Value Ref Range    Sodium 141 136 - 145 mmol/L    Potassium 4.2 3.5 - 5.5 mmol/L    Chloride 107 100 - 108 mmol/L    CO2 30 21 - 32 mmol/L    Anion gap 4 3.0 - 18 mmol/L    Glucose 130 (H) 74 - 99 mg/dL    BUN 9 7.0 - 18 MG/DL    Creatinine 0.75 0.6 - 1.3 MG/DL    BUN/Creatinine ratio 12 12 - 20      GFR est AA >60 >60 ml/min/1.73m2    GFR est non-AA >60 >60 ml/min/1.73m2    Calcium 8.5 8.5 - 10.1 MG/DL   VANCOMYCIN, TROUGH   Result Value Ref Range    Vancomycin,trough 11.4 10.0 - 20.0 ug/mL    Reported dose date: 20171209      Reported dose time: 100      Reported dose: 1000 MG UNITS   POC LACTIC ACID   Result Value Ref Range    Lactic Acid (POC) 0.5 0.4 - 2.0 mmol/L         RADIOLOGY:    CT LOW EXT RT W CONT on 12/06/2017  -  IMPRESSION:    1. Extensive subcutaneous edema throughout the leg, nonspecific, possibly  cellulitis. No evidence of abscess. No CT evidence of osteomyelitis.    2. Advanced degenerative changes in the right knee noted. Focal subchondral  sclerosis in the medial posterior condyle and tibial plateau, possibly sequela  of osteochondral injury, insufficiency fractures, or osteonecrosis. MRI could  provide further characterization. Physical Exam   Constitutional: He is oriented to person, place, and time. He appears well-developed and well-nourished. No distress. Pulmonary/Chest: Effort normal. No respiratory distress. Musculoskeletal:        Right lower leg: He exhibits tenderness and swelling.         Legs:  -erythema noted of right shin area; estimated about 8 cm by 15 cm area of definitive erythema; no abscess noted  -right knee with callus formation and dry skin   Neurological: He is alert and oriented to person, place, and time. Skin: Skin is warm and dry. There is erythema. Vitals:    12/13/17 0919   BP: 110/79   Pulse: 73   Resp: 20   Temp: 97.1 °F (36.2 °C)   TempSrc: Oral   SpO2: 96%   Weight: 175 lb (79.4 kg)   Height: 5' 10\" (1.778 m)   PainSc:   2   PainLoc: Leg         Assessment and Plan    Cellulitis of right lower extremity  *Continue with current AB therapy- complete as directed. Patient and wife attest to improvement and he reports that the pain has lessened a lot. They will continue to monitor at home and call or RTO for worsening or lack of improvement. Primary osteoarthritis of right knee  *Noted on CT. Advised patient of finding. Xerosis of skin/ Skin callus  *Noted on examination. Some inflammation noted at this time- advised on steroid cream initially, then application of ammonium lactate to help with callus removal.   - ammonium lactate (AMLACTIN) 12 % topical cream; Apply  to affected area two (2) times a day. rub in to affected area well  Dispense: 280 g; Refill: 0  - triamcinolone acetonide (KENALOG) 0.1 % ointment; Apply  to affected area two (2) times a day. use thin layer  Dispense: 30 g; Refill: 0      *Plan of care reviewed with patient. Patient in agreement with plan and expresses understanding. All questions answered and patient encouraged to call or RTO if further questions or concerns. Follow-up Disposition:  Return for already scheduled appointment 1/2018.

## 2017-12-15 ENCOUNTER — OFFICE VISIT (OUTPATIENT)
Dept: ORTHOPEDIC SURGERY | Age: 52
End: 2017-12-15

## 2017-12-15 VITALS
DIASTOLIC BLOOD PRESSURE: 62 MMHG | SYSTOLIC BLOOD PRESSURE: 109 MMHG | HEIGHT: 70 IN | RESPIRATION RATE: 18 BRPM | TEMPERATURE: 97.8 F | OXYGEN SATURATION: 96 % | BODY MASS INDEX: 25.57 KG/M2 | WEIGHT: 178.6 LBS | HEART RATE: 68 BPM

## 2017-12-15 DIAGNOSIS — M96.1 LUMBAR POSTLAMINECTOMY SYNDROME: Primary | ICD-10-CM

## 2017-12-15 DIAGNOSIS — M54.16 RADICULOPATHY, LUMBAR REGION: ICD-10-CM

## 2017-12-15 PROBLEM — M51.26 HNP (HERNIATED NUCLEUS PULPOSUS), LUMBAR: Status: ACTIVE | Noted: 2017-12-15

## 2017-12-15 NOTE — PROGRESS NOTES
[address]  [address], [address]  [place], [address] [place] [address] Street  Phone: [phone]  Fax: [phone]  INITIAL CONSULTATION  Patient: Tripp Lynn                MRN: [id number]       SSN: [id number]  YOB: 1965        AGE: 46 y.o. SEX: male  Body mass index is 25.63 kg/(m^2). PCP: May Friend NP  12/15/17    Chief Complaint   Patient presents with    Back Pain     back pain eval    Referral / Consult         HISTORY OF PRESENT ILLNESS, RADIOGRAPHS, and PLAN:         HISTORY OF PRESENT ILLNESS:  Mr. Ca Woodruff is a 51-year-old male with a history of alcoholism, asthma, cellulitis, previous L5-S1 fusion in 2006 with battered root, arachnoiditis, chronic foot drop, multiple previous lumbar surgeries prior to that. He has been managed well with multiple selective blocks and epidurals. His last selective block was ineffective, and he has had chronic continuing pain since his last selective in October. He comes in today for evaluation of possible surgery. His physical exam demonstrates a foot drop, chronic leg pain, no nerve tension signs. He has resolving cellulitis in his right leg. RADIOGRAPHS:  MRI demonstrates an L5-S1 fusion, solid PLIF, arachnoiditis signs, central disc protrusion at L4-5, degenerative changes, L2-3, and L3-4. There is no instability and no localized disc herniation or focal nerve root compression. ASSESSMENT/PLAN: I discussed the matter at length with him. I do not see any indication for surgery here. His pain has not changed in a decade. It has generally been well managed with epidural steroids. He had one epidural that failed to provide him relief.   I would simply repeat his successful pain relieving measures again, and I have ordered another L3-4 epidural, which has been quite effective for him in the past.  If this should fail, and if he should have continuing pain, again, I see nothing on his studies that I would recommend surgery for. I think he is an extremely poor surgical candidate. I think his pain is classically neuropathic. He has a battered root and chronic foot drop. If we had to consider interventions for him, I do not see any decompressive surgeries that one would consider if he was stable and passed all the evaluations, psychologic evaluations, if he was infection free and other issues were clear, his type of pain would be ameliorated through something like spinal cord stimulation and not decompressive surgery. I will see him again as needed. At this point, I would just treat him with pain management techniques such as epidural steroids. cc: Winton Severe, NP           Past Medical History:   Diagnosis Date    Arthritis of knee, right 12/2017    advanced degen changes noted, CT right LE    Asthma     Cellulitis 12/2017    DDD (degenerative disc disease), lumbar 2013    noted on MRI with annular tears    Diverticulitis 2016    GERD (gastroesophageal reflux disease) 2016    with esophagitis according to endscopy    Kidney stone     Lumbar post-laminectomy syndrome     [address] [address]     Stroke [facility]) may 14 2010    Unspecified rotator cuff tear or rupture of left shoulder, not specified as traumatic 03/2016       Family History   Problem Relation Age of Onset    Other Brother     Cancer Maternal Grandmother        Current Outpatient Prescriptions   Medication Sig Dispense Refill    cyclobenzaprine (FLEXERIL) 10 mg tablet Take 1 Tab by mouth three (3) times daily as needed for Muscle Spasm(s). 30 Tab 6    raNITIdine (ZANTAC) 150 mg tablet Take 1 Tab by mouth nightly. 30 Tab 3    pantoprazole (PROTONIX) 40 mg tablet Take 1 Tab by mouth daily. 30 Tab 3    gabapentin (NEURONTIN) 800 mg tablet Take 1 Tab by mouth three (3) times daily. 90 Tab 5    budesonide-formoterol (SYMBICORT) 160-4.5 mcg/actuation HFA inhaler Take 1 Puff by inhalation two (2) times a day.  1 Inhaler 5    albuterol (PROVENTIL HFA, VENTOLIN HFA, PROAIR HFA) 90 mcg/actuation inhaler Take 2 Puffs by inhalation every six (6) hours as needed for Wheezing or Shortness of Breath. 1 Inhaler 11    aspirin 81 mg chewable tablet Take 81 mg by mouth daily.  clindamycin (CLEOCIN) 300 mg capsule       ammonium lactate (AMLACTIN) 12 % topical cream Apply  to affected area two (2) times a day. rub in to affected area well 280 g 0    triamcinolone acetonide (KENALOG) 0.1 % ointment Apply  to affected area two (2) times a day. use thin layer 30 g 0    trimethoprim-sulfamethoxazole (BACTRIM DS) 160-800 mg per tablet Take 1 Tab by mouth two (2) times a day. 20 Tab 0    oxyCODONE-acetaminophen (PERCOCET) 5-325 mg per tablet Take 1 Tab by mouth every four (4) hours as needed for Pain. Max Daily Amount: 6 Tabs.  20 Tab 0       Allergies   Allergen Reactions    Penicillins Angioedema    Penicillins Anaphylaxis    Vicodin [Hydrocodone-Acetaminophen] Nausea and Vomiting    Azithromycin Nausea and Vomiting    Hydrochlorothiazide Nausea Only     Dizzy, lightheaded, blisters on his legs    Vicodin [Hydrocodone-Acetaminophen] Nausea and Vomiting    Erythromycin Other (comments)       Past Surgical History:   Procedure Laterality Date    COLONOSCOPY N/A 8/12/2016    COLONOSCOPY with polypectomy performed by Jose Luis Steele MD at [address] [place] [address] Rd  2002, 2004, 2006    L5-S1 fusion, laminectomies    HX ENDOSCOPY  10/2016    grade 1 espohagitis    HX ENDOSCOPY  11/2016    mild esophagitis    HX KNEE ARTHROSCOPY Right     knee    HX MOHS PROCEDURES      right    HX ORTHOPAEDIC      right shoulder       Past Medical History:   Diagnosis Date    Arthritis of knee, right 12/2017    advanced degen changes noted, CT right LE    Asthma     Cellulitis 12/2017    DDD (degenerative disc disease), lumbar 2013    noted on MRI with annular tears    Diverticulitis 2016    GERD (gastroesophageal reflux disease) 2016 with esophagitis according to endscopy    Kidney stone     Lumbar post-laminectomy syndrome     [address] [address]     Stroke [facility]) may 14 2010    Unspecified rotator cuff tear or rupture of left shoulder, not specified as traumatic 03/2016       Social History     Social History    Marital status: SINGLE     Spouse name: N/A    Number of children: N/A    Years of education: N/A     Occupational History    Not on file. Social History Main Topics    Smoking status: Former Smoker     Packs/day: 2.00     Types: Cigarettes    Smokeless tobacco: Never Used    Alcohol use No    Drug use: No    Sexual activity: Yes     Partners: Female     Other Topics Concern    Not on file     Social History Narrative    ** Merged History Encounter **                REVIEW OF SYSTEMS:   CONSTITUTIONAL SYMPTOMS:  Negative. EYES:  Negative. EARS, NOSE, THROAT AND MOUTH:  Negative. CARDIOVASCULAR:  Negative. RESPIRATORY:  Negative. GENITOURINARY: Negative. GASTROINTESTINAL:  Negative. INTEGUMENTARY (SKIN AND/OR BREAST):  Negative. MUSCULOSKELETAL: Per HPI.   ENDOCRINE/RHEUMATOLOGIC:  Negative. NEUROLOGICAL:  Per HPI. HEMATOLOGIC/LYMPHATIC:  Negative. ALLERGIC/IMMUNOLOGIC:  Negative. PSYCHIATRIC:  Negative. PHYSICAL EXAMINATION:   Visit Vitals    /62    Pulse 68    Temp 97.8 °F (36.6 °C) (Oral)    Resp 18    Ht 5' 10\" (1.778 m)    Wt 178 lb 9.6 oz (81 kg)    SpO2 96%    BMI 25.63 kg/m2    PAIN SCALE: 6/10    CONSTITUTIONAL: The patient is in no apparent distress and is alert and oriented x 3. HEENT: Normocephalic. Hearing grossly intact. NECK: Supple and symmetric. no tenderness, or masses were felt. RESPIRATORY: No labored breathing. CARDIOVASCULAR: The carotid pulses were normal. Peripheral pulses were 2+. CHEST: Normal AP diameter and normal contour without any kyphoscoliosis. LYMPHATIC: No lymphadenopathy was appreciated in the neck, axillae or groin.   SKIN:  Negative for scars, rashes, lesions, or ulcers on the right upper, right lower, left upper, left lower and trunk. NEUROLOGICAL: Alert and oriented x 3. Ambulation without assistive device. FWB. EXTREMITIES:  See musculoskeletal.  MUSCULOSKELETAL:   Head and Neck:  Negative for misalignment, asymmetry, crepitation, defects, tenderness masses or effusions.  Left Upper Extremity: Inspection, percussion and palpation preformed. Kruegers sign is negative.  Right Upper Extremity: Inspection, percussion and palpation preformed. Kruegers sign is negative.  Spine, Ribs and Pelvis: Back pain with radiating LLE pain. Inspection, percussion and palpation preformed. Negative for misalignment, asymmetry, crepitation, defects, tenderness masses or effusions.  Left Lower Extremity: Radiating pain. Foot drop. Weakness. Inspection, percussion and palpation preformed. Negative straight leg raise.  Right Lower Extremity: Inspection, percussion and palpation preformed. Negative straight leg raise. SPINE EXAM:     Lumbar spine: No rash, ecchymosis, or gross obliquity. No focal atrophy is noted. ASSESSMENT    ICD-10-CM ICD-9-CM    1. Lumbar postlaminectomy syndrome M96.1 722.83 SCHEDULE SURGERY   2. Radiculopathy, lumbar region M54.16 724.4 SCHEDULE SURGERY       Written by Mao Grant, as dictated by Christie Elizalde MD.    I, Dr. Christie Elizalde MD, confirm that all documentation is accurate.

## 2017-12-21 ENCOUNTER — HOSPITAL ENCOUNTER (OUTPATIENT)
Age: 52
Setting detail: OUTPATIENT SURGERY
Discharge: HOME | End: 2017-12-21
Attending: PHYSICAL MEDICINE & REHABILITATION | Admitting: PHYSICAL MEDICINE & REHABILITATION
Payer: MEDICARE

## 2017-12-21 ENCOUNTER — APPOINTMENT (OUTPATIENT)
Dept: GENERAL RADIOLOGY | Age: 52
End: 2017-12-21
Attending: PHYSICAL MEDICINE & REHABILITATION
Payer: MEDICARE

## 2017-12-21 VITALS
OXYGEN SATURATION: 99 % | TEMPERATURE: 98.3 F | WEIGHT: 178 LBS | HEART RATE: 83 BPM | BODY MASS INDEX: 25.48 KG/M2 | HEIGHT: 70 IN | DIASTOLIC BLOOD PRESSURE: 79 MMHG | SYSTOLIC BLOOD PRESSURE: 109 MMHG | RESPIRATION RATE: 18 BRPM

## 2017-12-21 PROCEDURE — 74011636320 HC RX REV CODE- 636/320

## 2017-12-21 PROCEDURE — 76010000009 HC PAIN MGT 0 TO 30 MIN PROC: Performed by: PHYSICAL MEDICINE & REHABILITATION

## 2017-12-21 PROCEDURE — 74011250636 HC RX REV CODE- 250/636: Performed by: PHYSICAL MEDICINE & REHABILITATION

## 2017-12-21 PROCEDURE — 77030014124 HC TY EPDRL BBMI -A: Performed by: PHYSICAL MEDICINE & REHABILITATION

## 2017-12-21 PROCEDURE — 74011250637 HC RX REV CODE- 250/637: Performed by: PHYSICAL MEDICINE & REHABILITATION

## 2017-12-21 PROCEDURE — 74011000250 HC RX REV CODE- 250

## 2017-12-21 PROCEDURE — 74011250636 HC RX REV CODE- 250/636

## 2017-12-21 RX ORDER — LIDOCAINE HYDROCHLORIDE 10 MG/ML
INJECTION, SOLUTION EPIDURAL; INFILTRATION; INTRACAUDAL; PERINEURAL AS NEEDED
Status: DISCONTINUED | OUTPATIENT
Start: 2017-12-21 | End: 2017-12-21 | Stop reason: HOSPADM

## 2017-12-21 RX ORDER — DEXAMETHASONE SODIUM PHOSPHATE 100 MG/10ML
INJECTION INTRAMUSCULAR; INTRAVENOUS AS NEEDED
Status: DISCONTINUED | OUTPATIENT
Start: 2017-12-21 | End: 2017-12-21 | Stop reason: HOSPADM

## 2017-12-21 RX ORDER — DIAZEPAM 5 MG/1
5-20 TABLET ORAL ONCE
Status: COMPLETED | OUTPATIENT
Start: 2017-12-21 | End: 2017-12-21

## 2017-12-21 RX ADMIN — DIAZEPAM 10 MG: 5 TABLET ORAL at 12:44

## 2017-12-21 NOTE — DISCHARGE INSTRUCTIONS
[facility]   (GABRIEL)  Dr. Theo Dubois, Dr. Raymond Mcdonald, Dr. Bienvenido Cope not drive a car, operate heavy machinery or dangerous equipment for 24 hours. * Activity as tolerated; rest for the remainder of the day. * Resume pre-block medications including those for your family doctor. * Do not drink alcoholic beverages for 24 hours. Alcohol and the medications you have received may interact and cause an adverse reaction. * You may feel better this evening and worse tomorrow, as the numbing medications wears off and the steroid has yet to begin to work. After 48 hrs the steroid should begin to release bringing you relief. * You may shower this evening and remove any bandages. * Avoid hot tubs and heating pads for 24 hours. You may use cold packs on the procedure site as tolerated for the first 24 hours. * If a headache develops, drink plenty of fluids and rest.  Take over the counter medications for headache if needed. If the headache continues longer than 24 hours, call MD at the [address] [place] [address]. [phone]    * Continue taking pain medications as needed. * You may resume your regular diet if tolerated. Otherwise, start with sips of water and advance slowly. * If Diabetic: check your blood sugar three times a day for the next 3 days. If your sugar is greater than 300 call your family doctor. If your sugar is greater than 400, have someone transport you to the nearest Emergency Room. * If you experience any of the following problems, Please Call the [address] [place] [address] at [phone].         * Shortness of Breath    * Fever of 101 or higher    * Nausea / Vomiting    * Severe Headache    * Weakness or numbness in arms or legs that is not      resolving    * Prolonged increase in pain greater than 4 days      DISCHARGE SUMMARY from Nurse      PATIENT INSTRUCTIONS:    After oral sedation, for 24 hours or while taking prescription Narcotics:  · Limit your activities  · Do not drive and operate hazardous machinery  · Do not make important personal or business decisions  · Do  not drink alcoholic beverages  · If you have not urinated within 8 hours after discharge, please contact your surgeon on call. Report the following to your surgeon:  · Excessive pain, swelling, redness or odor of or around the surgical area  · Temperature over 101  · Nausea and vomiting lasting longer than 4 hours or if unable to take medications  · Any signs of decreased circulation or nerve impairment to extremity: change in color, persistent  numbness, tingling, coldness or increase pain  · Any questions            What to do at Home:  Recommended activity: Activity as tolerated, NO DRIVING FOR 12 Hours post injection          *  Please give a list of your current medications to your Primary Care Provider. *  Please update this list whenever your medications are discontinued, doses are      changed, or new medications (including over-the-counter products) are added. *  Please carry medication information at all times in case of emergency situations. These are general instructions for a healthy lifestyle:    No smoking/ No tobacco products/ Avoid exposure to second hand smoke    Surgeon General's Warning:  Quitting smoking now greatly reduces serious risk to your health. Obesity, smoking, and sedentary lifestyle greatly increases your risk for illness    A healthy diet, regular physical exercise & weight monitoring are important for maintaining a healthy lifestyle    You may be retaining fluid if you have a history of heart failure or if you experience any of the following symptoms:  Weight gain of 3 pounds or more overnight or 5 pounds in a week, increased swelling in our hands or feet or shortness of breath while lying flat in bed.   Please call your doctor as soon as you notice any of these symptoms; do not wait until your next office visit. Recognize signs and symptoms of STROKE:    F-face looks uneven    A-arms unable to move or move unevenly    S-speech slurred or non-existent    T-time-call 911 as soon as signs and symptoms begin-DO NOT go       Back to bed or wait to see if you get better-TIME IS BRAIN. "[employer]ad[employer] Activation    Thank you for requesting access to [employer]. Please follow the instructions below to securely access and download your online medical record. [employer] allows you to send messages to your doctor, view your test results, renew your prescriptions, schedule appointments, and more. How Do I Sign Up? 1. In your internet browser, go to [email or web address]  2. Click on the First Time User? Click Here link in the Sign In box. You will be redirect to the New Member Sign Up page. 3. Enter your [employer] Access Code exactly as it appears below. You will not need to use this code after youve completed the sign-up process. If you do not sign up before the expiration date, you must request a new code. [employer] Access Code: Activation code not generated  Current [employer] Status: Patient Declined (This is the date your [employer] access code will )    4. Enter the last four digits of your Social Security Number (xxxx) and Date of Birth (mm/dd/yyyy) as indicated and click Submit. You will be taken to the next sign-up page. 5. Create a [employer] ID. This will be your [employer] login ID and cannot be changed, so think of one that is secure and easy to remember. 6. Create a [employer] password. You can change your password at any time. 7. Enter your Password Reset Question and Answer. This can be used at a later time if you forget your password. 8. Enter your e-mail address. You will receive e-mail notification when new information is available in [address] 9. Click Sign Up. You can now view and download portions of your medical record.   10. Click the Download Summary menu link to download a portable copy of your medical information. Additional Information    If you have questions, please visit the Frequently Asked Questions section of the [employer] website at [email or web address] Remember, [employer] is NOT to be used for urgent needs. For medical emergencies, dial 911.

## 2017-12-21 NOTE — PROCEDURES
Intralaminar Epidural Steroid Block Procedure Note      Patient Name: Dominick Llamas    Date of Procedure: December 21, 2017    Preoperative Diagnosis: Cervical postlaminectomy syndrome [M96.1]  Acute left lumbar radiculopathy [M54.16]    Post Operative Diagnosis: Cervical postlaminectomy syndrome [M96.1]  Acute left lumbar radiculopathy [M54.16]    Procedure: L3-L4 Epidural Block     PROCEDURE:  Lumbar Epidural Block    Consent:  Informed  Consent was obtained prior to the procedure. The patient was given the opportunity to ask questions regarding the procedure and its associated risks. In addition to the potential risks associated with the procedure itself, the patient was informed both verbally and in writing of potential side effects of the use glucocorticoids. The patient appeared to comprehend the informed consent and desired to have the procedure performed. The patient was placed in the prone position on the fluoroscopy table and the back prepped and draped in the usual sterile manner. The interlaminar space was identified using fluoroscopy and the skin anesthetized with 1% Lidocaine. A #18 Tuohy Epidural needle was advanced under fluoroscopic control from a paramedian approach to the  epidural space as noted above, using the loss of resistance to fluid technique. Then, 30 mg of preservative free Dexamethasone and 4 cc of Lidocaine was slowly injected. The patient tolerated the procedure well. The injection area was cleaned and band aids applied. No excessive bleeding was noted. Patient dressed and was discharged to home with instructions.

## 2017-12-29 ENCOUNTER — PATIENT OUTREACH (OUTPATIENT)
Dept: FAMILY MEDICINE CLINIC | Age: 52
End: 2017-12-29

## 2017-12-29 NOTE — PROGRESS NOTES
Transitions of Care  Hospital Discharge        Per Discharge Summary of Dr. Traci To,   On 61, in italics:      Discharge Diagnoses:                             1.  Cellulitis, right lower extremity  2.  COPD  3.  GERD  4.  Chronic lower back pain    RRAT Score = 10      Follow Up Appointments:   - PCP Follow up 17 - Patient attended  - Urology Follow up 17 - Patient attended  - Orthopedics Follow up 12-15-17 Patient attended    Same Day Procedure on 17  L3-4 Lumbar Epidural Block/C-Arm     Nurse Navigator spoke with patient via telephone call. Patient verified name and  as identifiers. Patient stated that he is doing well. Patient stated that the pain in his leg is much better and has decreased since discharge. Red Flags  Patient denies red streaks, pus, drainage, and fever. Patient states there is a little redness left on his leg but it is much better. Patient states that it is getting better every day. Patient states that he is doing well post procedure on 17. Patient denies recent medication changes. Patient voiced that he is aware of next follow up appointment with Ms. Gui Yang scheduled for   18 @ 0873. Patient made aware of availability of a PCP provider after hours, weekends, and holidays through the office answering service. Nurse Navigator contact hours provided as well    Nurse Navigator to continue to follow case. Chart CC to Cass Santos .

## 2018-01-02 ENCOUNTER — OFFICE VISIT (OUTPATIENT)
Dept: FAMILY MEDICINE CLINIC | Age: 53
End: 2018-01-02

## 2018-01-02 VITALS
OXYGEN SATURATION: 96 % | DIASTOLIC BLOOD PRESSURE: 72 MMHG | HEIGHT: 70 IN | BODY MASS INDEX: 25.62 KG/M2 | TEMPERATURE: 96.1 F | SYSTOLIC BLOOD PRESSURE: 104 MMHG | HEART RATE: 73 BPM | WEIGHT: 179 LBS | RESPIRATION RATE: 16 BRPM

## 2018-01-02 DIAGNOSIS — Z13.31 SCREENING FOR DEPRESSION: ICD-10-CM

## 2018-01-02 DIAGNOSIS — Z71.89 ACP (ADVANCE CARE PLANNING): ICD-10-CM

## 2018-01-02 DIAGNOSIS — Z13.39 SCREENING FOR ALCOHOLISM: ICD-10-CM

## 2018-01-02 DIAGNOSIS — Z86.73 HISTORY OF CVA (CEREBROVASCULAR ACCIDENT): ICD-10-CM

## 2018-01-02 DIAGNOSIS — Z00.00 ENCOUNTER FOR MEDICARE ANNUAL WELLNESS EXAM: ICD-10-CM

## 2018-01-02 DIAGNOSIS — J45.40 MODERATE PERSISTENT ASTHMA WITHOUT COMPLICATION: Primary | ICD-10-CM

## 2018-01-02 DIAGNOSIS — K21.00 GASTROESOPHAGEAL REFLUX DISEASE WITH ESOPHAGITIS: ICD-10-CM

## 2018-01-02 PROBLEM — L03.90 CELLULITIS: Status: RESOLVED | Noted: 2017-12-06 | Resolved: 2018-01-02

## 2018-01-02 PROBLEM — M46.1 SACROILIITIS (HCC): Status: RESOLVED | Noted: 2017-04-20 | Resolved: 2018-01-02

## 2018-01-02 RX ORDER — RANITIDINE 150 MG/1
150 TABLET, FILM COATED ORAL
Qty: 60 TAB | Refills: 11 | Status: SHIPPED | OUTPATIENT
Start: 2018-01-02 | End: 2019-02-08 | Stop reason: SDUPTHER

## 2018-01-02 RX ORDER — ALBUTEROL SULFATE 90 UG/1
2 AEROSOL, METERED RESPIRATORY (INHALATION)
Qty: 1 INHALER | Refills: 11 | Status: SHIPPED | OUTPATIENT
Start: 2018-01-02 | End: 2019-02-08 | Stop reason: SDUPTHER

## 2018-01-02 RX ORDER — BUDESONIDE AND FORMOTEROL FUMARATE DIHYDRATE 160; 4.5 UG/1; UG/1
1 AEROSOL RESPIRATORY (INHALATION) 2 TIMES DAILY
Qty: 1 INHALER | Refills: 5 | Status: SHIPPED | OUTPATIENT
Start: 2018-01-02 | End: 2018-07-05 | Stop reason: SDUPTHER

## 2018-01-02 NOTE — PROGRESS NOTES
Chief Complaint   Patient presents with   [address] [place] [address] Annual Wellness Visit     1. Have you been to the ER, urgent care clinic since your last visit? Hospitalized since your last visit? No    2. Have you seen or consulted any other health care providers outside of the [address] [place] [address] since your last visit? Include any pap smears or colon screening.  No

## 2018-01-02 NOTE — MR AVS SNAPSHOT
Visit Information Date & Time Provider Department Dept. Phone Encounter #  
 1/2/2018  8:45 AM Aida Diaz [facility] [address] [phone] [id number] Follow-up Instructions Return in about 6 months (around 7/2/2018) for chronic disease routine care- 30 min. , Yearly Medicare Wellness-  done today. Elvira Dies Your Appointments 1/16/2018  9:05 AM  
Follow Up with Steve Tesfaye MD  
[facility] [address] [facility] - SPECIALTY [facility] ([facility]) Appt Note: ZEFERINO L3/4 Pezzella 12/21/2017 - pt r/s 1/17 appt due to work [address] [facility] [address]  
  
   
 [address] [facility] [address]  
  
    
 3/27/2018  8:30 AM  
Follow Up with Steve Tesfaye MD  
[facility] ([address] [place] [address]) Appt Note: 6 mo follow up  lower back/pat request Dr Silvina Rowley not NP  
 [address] [facility]  
[phone]  
  
    
 6/28/2018  8:30 AM  
XRAY Visit with [facility] ([address] [place] [address]) Appt Note: KUB  
 [address] 
[address] [place] [address]  
[address] [facility] [address] [place] [address]  
  
    
  
 6/28/2018  8:45 AM  
Any with Michael Dickinson MD  
[facility] ([address] [place] [address]) Appt Note: 6 mo fu KUB same day [address] [place] [address]  
[address] [place] [address] Upcoming Health Maintenance Date Due DTaP/Tdap/Td series (1 - Tdap) 1/2/2011 MEDICARE YEARLY EXAM 11/18/2017 Prostate-Specific Antigen 1/30/2018 COLONOSCOPY 8/12/2021 Allergies as of 1/2/2018  Review Complete On: 1/2/2018 By: Radames Crump Severity Noted Reaction Type Reactions Penicillins High 10/14/2012    Angioedema Penicillins High 05/15/2013    Anaphylaxis Vicodin [Hydrocodone-acetaminophen] High 10/14/2012    Nausea and Vomiting Azithromycin  06/02/2016    Nausea and Vomiting Hydrochlorothiazide  12/13/2017   Systemic Nausea Only Dizzy, lightheaded, blisters on his legs Vicodin [Hydrocodone-acetaminophen]  05/15/2013    Nausea and Vomiting Erythromycin Low 07/12/2016    Other (comments) Current Immunizations  Reviewed on 2/2/2017 Name Date Influenza Vaccine (Quad) PF 9/5/2017, 11/17/2016 Novel Influenza-H1N1-09, All Formulations 5/14/2010 12:00 AM  
 Pneumococcal Polysaccharide (PPSV-23) 2/2/2017 Td 1/1/2011 Not reviewed this visit You Were Diagnosed With   
  
 Codes Comments Moderate persistent asthma without complication     VPG-74-NN: J45.40 ICD-9-CM: 493.90 Gastroesophageal reflux disease with esophagitis     ICD-10-CM: K21.0 ICD-9-CM: 530.11 Vitals BP Pulse Temp Resp Height(growth percentile) Weight(growth percentile) 104/72 (BP 1 Location: Left arm, BP Patient Position: Sitting) 73 96.1 °F (35.6 °C) (Oral) 16 5' 10\" (1.778 m) 179 lb (81.2 kg) SpO2 BMI Smoking Status 96% 25.68 kg/m2 Former Smoker BMI and BSA Data Body Mass Index Body Surface Area  
 25.68 kg/m 2 2 m 2 Preferred Pharmacy Pharmacy Name Phone [facility] [address] [phone] Your Updated Medication List  
  
   
This list is accurate as of: 1/2/18  9:12 AM.  Always use your most recent med list.  
  
  
  
  
 albuterol 90 mcg/actuation inhaler Commonly known as:  PROVENTIL HFA, VENTOLIN HFA, PROAIR HFA Take 2 Puffs by inhalation every six (6) hours as needed for Wheezing or Shortness of Breath. ammonium lactate 12 % topical cream  
Commonly known as:  AMLACTIN Apply  to affected area two (2) times a day. rub in to affected area well  
  
 aspirin 81 mg chewable tablet Take 81 mg by mouth daily. budesonide-formoterol 160-4.5 mcg/actuation Hfaa Commonly known as:  SYMBICORT Take 1 Puff by inhalation two (2) times a day. cyclobenzaprine 10 mg tablet Commonly known as:  FLEXERIL Take 1 Tab by mouth three (3) times daily as needed for Muscle Spasm(s). gabapentin 800 mg tablet Commonly known as:  NEURONTIN Take 1 Tab by mouth three (3) times daily. pantoprazole 40 mg tablet Commonly known as:  PROTONIX Take 1 Tab by mouth daily. raNITIdine 150 mg tablet Commonly known as:  ZANTAC Take 1 Tab by mouth every twelve (12) hours as needed for Indigestion. triamcinolone acetonide 0.1 % ointment Commonly known as:  KENALOG Apply  to affected area two (2) times a day. use thin layer Prescriptions Sent to Pharmacy Refills  
 budesonide-formoterol (SYMBICORT) 160-4.5 mcg/actuation HFAA 5 Sig: Take 1 Puff by inhalation two (2) times a day. Class: Normal  
 Pharmacy: [place] [address]. Ph #: [phone] Route: Inhalation  
 albuterol (PROVENTIL HFA, VENTOLIN HFA, PROAIR HFA) 90 mcg/actuation inhaler 11 Sig: Take 2 Puffs by inhalation every six (6) hours as needed for Wheezing or Shortness of Breath. Class: Normal  
 Pharmacy: [place] [address]. Ph #: [phone] Route: Inhalation  
 raNITIdine (ZANTAC) 150 mg tablet 11 Sig: Take 1 Tab by mouth every twelve (12) hours as needed for Indigestion. Class: Normal  
 Pharmacy: [place] [address]. Ph #: [phone] Route: Oral  
  
Follow-up Instructions Return in about 6 months (around 7/2/2018) for chronic disease routine care- 30 min. , Yearly Medicare Wellness-  done today. Duc Bustos Patient Instructions Preventive Services Limitations Recommendation Preventative Services Limitations Recommendation Glaucoma Screening (no USPSTF recommendation) Diabetes mellitus, family history, , age 48 or over,  American, age 72 or over Recommended Periodontal Disease- Dentistry Services *May not be covered under Medicare Recommended Skin Cancer Screening Exam every year *May not be covered under Medicare Discussed self checks Hearing Impairment Pure Tone Test every 3 years *May not be covered under Medicare No hearing loss noted COPD and Lung Cancer Screening CT chest or chest xray yearly as deemed necessary *May not be covered under Medicare Former smoker 
  
Smoked for 30-35 years Consider screening in future- will possibly qualify around age 54years old Counseling to Prevent Tobacco Use 
- Counseling greater than 3 and up to 10 minutes - Counseling greater than 10 minutes Patients must be asymptomatic of tobacco-related conditions to receive as preventive service 
  
Up to 8 sessions/year Former smoker Alcohol Misuse Counseling 4 brief face to face counseling sessions/year Sober for 5 years Obesity Screening and Counseling BMI of 30 or more. Weekly visits for first month, then biweekly for months 2-6, then every month for months 7-12 if you lose 6.6 lbs in months 1-6 Body mass index is 25.68 kg/(m^2). Screening for STIs and Counseling Annually screenings- 2 face to face counseling sessions/year Declines 
  
Believes that he had Hep C drawn with GI Human Immunodeficiency Virus (HIV) Screening (annually for increased risk patients) HIV-1 and HIV-2 by EIA, DEVEN, rapid antibody test, or oral mucosa transudate Tests covered annually for patients at increased risk. Pregnant patients may receive up to 3 test during pregnancy. Declines Diabetes Screening Tests (at least every 3 years, Medicare covers annually or at 6-month intervals for prediabetic patients) 
  Fasting blood sugar (FBS) or glucose tolerance test (GTT) Diagnosed with one of the following: -Hypertension, Dyslipidemia, obesity, previous impaired FBS or GTT 
Or any two of the following: overweight, FH of diabetes, age ? 72, history of gestational diabetes, birth of baby weighing more than 9 pounds Glucose 130 12/2017 A1c 5.3% 5/2017 Cardiovascular Screening Blood Tests (every 5 years) Total cholesterol, HDL, Triglycerides Order as a panel if possible LDL 38.6 12/2017 Medical Nutrition Therapy (MNT) (for diabetes or renal disease not recommended schedule) Requires referral by treating physician for patient with diabetes or renal disease. Up to 3 hours for initial year and 2 hours in subsequent years. Not indicated Diabetes Self-Management Training (DSMT) (no USPSTF recommendation) Requires referral by treating physician for patient with diabetes or renal disease. 10 hours of initial DSMT session of no less than 30 minutes each in a continuous 12-month period. 2 hours of follow-up DSMT in subsequent years. Not indicated Behavioral Therapy for Cardiovascular Disease Annually BP controlled, no diabetes, cholesterol controlled, and former smoker On ASA Ultrasound Screening for Abdominal Aortic Aneurysm (AAA) (once) Patient must be referred through IPPE. Criteria: 
- Men who are 73-68 years old and smoked >100 cigarettes. -Anyone with FH of AAA 
-Or recommended for screening by USPSTF Consider at age of 72 years Prostate Cancer Screening (annually up to age 76) - Digital rectal exam (KARLA) - Prostate specific antigen (PSA) Annually (age 48 or over), KARLA not paid separately when covered E/M service is provided on same date PSA 1.8 1/2017 Will order with next labs Colorectal Cancer Screening -Fecal occult blood test (annual) -Flexible sigmoidoscopy (5y) 
-Screening colonoscopy (10y) -Barium Enema Colonoscopy 8/2016 Repeat in 5 years Bone Mass Measurement 
(age 72 & older, biennial) Requires diagnosis related to osteoporosis or estrogen deficiency. History of long-term glucocorticoid tx or baseline is needed. Not indicated Screening Mammography (biennial age 54-69) Annually (age 36 or over) N/A Screening Pap Tests and Pelvic Examination (up to age 79 and after 79 if unknown history or abnormal study last 10 years) Every 24 months except high risk N/A Hepatitis B Vaccinations (if medium/high risk) Medium/high risk factors:  End-stage renal disease, Hemophiliacs who received Factor VIII or IX concentrates, Clients of institutions for the mentally retarded, Persons who live in the same house as a HepB virus carrier, Homosexual men, Illicit injectable drug abusers. Not indicated Seasonal Influenza Vaccination (annually)   Had 17/18 season Pneumococcal Vaccination (once after 65)   PPSV 23 done 2/2017 Up to date Herpes Zoster (Shingles) Vaccination (once after age 61) [de-identified] to persons at age 48 years in specific conditions *May not be covered by Psychiatric Will do Rx in future once Shingrix to market Tetanus Vaccine Td booster every 10 years- Tdap if exposed to children under 1 year) *May not be covered by Medicare Last booster in 2011 Optometry/Ophthalmology [employer] [address] Phone: [phone] [address] [place] [address] [place] Phone: [phone] [facility] [address], [address] Phone: [phone] [address]  [facility] [address] Phone: [phone] [employer] [address] [facility] [address] Phone: [phone] [employer] [facility] [address] [facility], [address] Phone: [phone] Please provide this summary of care documentation to your next provider. Your primary care clinician is listed as Chris Choi. If you have any questions after today's visit, please call [phone].

## 2018-01-02 NOTE — PROGRESS NOTES
1/2/2018  9:00 AM    Chief Complaint   Patient presents with    Annual Wellness Visit         This is a Subsequent Medicare Annual Wellness Visit providing Personalized Prevention Plan Services (PPPS) (Performed 12 months after initial AWV and PPPS )    I have reviewed the patient's medical history in detail and updated the computerized patient record. History     Past Medical History:   Diagnosis Date    Arthritis of knee, right 12/2017    advanced degen changes noted, CT right LE    Asthma     Cellulitis 12/2017    DDD (degenerative disc disease), lumbar 2013    noted on MRI with annular tears    Diverticulitis 2016    GERD (gastroesophageal reflux disease) 2016    with esophagitis according to endscopy    Kidney stone     Lumbar post-laminectomy syndrome     Sacroiliitis ([address]     Stroke [facility]) may 14 2010    Unspecified rotator cuff tear or rupture of left shoulder, not specified as traumatic 03/2016      Past Surgical History:   Procedure Laterality Date    COLONOSCOPY N/A 8/12/2016    COLONOSCOPY with polypectomy performed by Moustapha Alba MD at [address]  2002, 2004, 2006    L5-S1 fusion, laminectomies    HX ENDOSCOPY  10/2016    grade 1 espohagitis    HX ENDOSCOPY  11/2016    mild esophagitis    HX KNEE ARTHROSCOPY Right     knee    HX MOHS PROCEDURES      right    HX ORTHOPAEDIC      right shoulder     Current Outpatient Prescriptions   Medication Sig Dispense Refill    budesonide-formoterol (SYMBICORT) 160-4.5 mcg/actuation HFAA Take 1 Puff by inhalation two (2) times a day. 1 Inhaler 5    albuterol (PROVENTIL HFA, VENTOLIN HFA, PROAIR HFA) 90 mcg/actuation inhaler Take 2 Puffs by inhalation every six (6) hours as needed for Wheezing or Shortness of Breath. 1 Inhaler 11    raNITIdine (ZANTAC) 150 mg tablet Take 1 Tab by mouth every twelve (12) hours as needed for Indigestion.  60 Tab 11    ammonium lactate (AMLACTIN) 12 % topical cream Apply  to affected area two (2) times a day. rub in to affected area well 280 g 0    triamcinolone acetonide (KENALOG) 0.1 % ointment Apply  to affected area two (2) times a day. use thin layer 30 g 0    cyclobenzaprine (FLEXERIL) 10 mg tablet Take 1 Tab by mouth three (3) times daily as needed for Muscle Spasm(s). 30 Tab 6    gabapentin (NEURONTIN) 800 mg tablet Take 1 Tab by mouth three (3) times daily. 90 Tab 5    aspirin 81 mg chewable tablet Take 81 mg by mouth daily.  pantoprazole (PROTONIX) 40 mg tablet Take 1 Tab by mouth daily.  30 Tab 3     Allergies   Allergen Reactions    Penicillins Angioedema    Penicillins Anaphylaxis    Vicodin [Hydrocodone-Acetaminophen] Nausea and Vomiting    Azithromycin Nausea and Vomiting    Hydrochlorothiazide Nausea Only     Dizzy, lightheaded, blisters on his legs    Vicodin [Hydrocodone-Acetaminophen] Nausea and Vomiting    Erythromycin Other (comments)     Family History   Problem Relation Age of Onset    Other Brother     Cancer Maternal Grandmother      Social History   Substance Use Topics    Smoking status: Former Smoker     Packs/day: 2.00     Types: Cigarettes    Smokeless tobacco: Never Used    Alcohol use No     Patient Active Problem List   Diagnosis Code    Degenerative disc disease, lumbar M51.36    S/P spinal fusion Z98.1    Lumbar postlaminectomy syndrome M96.1    Lumbar neuritis M54.16    Radiculopathy, lumbar region M54.16    History of CVA (cerebrovascular accident) Z86.73    Lumbar herniated disc M51.26    Sacroiliitis (HCC) M46.1    Gastroesophageal reflux disease with esophagitis K21.0    Moderate persistent asthma without complication F88.67    Postlaminectomy syndrome of lumbar region M96.1    Other intervertebral disc degeneration, lumbar region M51.36    Cellulitis L03.90    Primary osteoarthritis of right knee M17.11    HNP (herniated nucleus pulposus), lumbar M51.26       Depression Risk Factor Screening:     PHQ over the last two weeks 1/2/2018   Little interest or pleasure in doing things Not at all   Feeling down, depressed or hopeless Not at all   Total Score PHQ 2 0       Alcohol Risk Factor Screening: You do not drink alcohol or very rarely. Functional Ability and Level of Safety:     Hearing Loss   Hearing is good. Activities of Daily Living   Self-care. Requires assistance with: no ADLs    Fall Risk     Fall Risk Assessment, last 12 mths 11/17/2016   Able to walk? Yes   Fall in past 12 months? No       Abuse Screen   Patient is not abused  Denies financial, physical, or verbal abuse    Review of Systems   Constitutional: negative for fevers, chills and weight loss  Respiratory: negative for cough, sputum, wheezing or dyspnea on exertion  Cardiovascular: negative for chest pain, palpitations, syncope, lower extremity edema  Gastrointestinal: negative for nausea, vomiting and change in bowel habits  Neurological: negative for headaches, dizziness and weakness      Labwork/Imaging     Lab Results   Component Value Date/Time    Hemoglobin A1c (POC) 5.3 05/02/2017 08:30 AM       Lab Results   Component Value Date/Time    Glucose 130 12/09/2017 09:45 AM       Lab Results   Component Value Date/Time    Cholesterol, total 97 12/07/2017 03:08 AM    HDL Cholesterol 43 12/07/2017 03:08 AM    LDL, calculated 38.6 12/07/2017 03:08 AM    VLDL, calculated 15.4 12/07/2017 03:08 AM    Triglyceride 77 12/07/2017 03:08 AM    CHOL/HDL Ratio 2.3 12/07/2017 03:08 AM       Physical Examination     Evaluation of Cognitive Function:  Mood/affect:  neutral  Appearance: age appropriate and casually dressed  Family member/caregiver input: none present    Blood pressure 104/72, pulse 73, temperature 96.1 °F (35.6 °C), temperature source Oral, resp. rate 16, height 5' 10\" (1.778 m), weight 179 lb (81.2 kg), SpO2 96 %. Body mass index is 25.68 kg/(m^2).     General appearance: alert, cooperative, no distress  Lungs: clear to auscultation bilaterally  Heart: regular rate and rhythm, S1, S2 normal  Abdomen: soft, non-tender. Bowel sounds normal. No masses,  no organomegaly  Extremities: extremities normal, atraumatic, no cyanosis or edema      Patient Care Team:  Dorcas Bernheim, NP as PCP - General (Nurse Practitioner)  Briseida Randhawa MD (Orthopedic Surgery)  German Aviles MD (Physiatry)  Shade Head MD (Gastroenterology)  Jay Hamilton MD (Urology)  Michelle Hernandez RN as Ambulatory Care Navigator    Advice/Referrals/Counseling   Education and counseling provided:  Are appropriate based on today's review and evaluation  End-of-Life planning (with patient's consent)  Prostate cancer screening tests (PSA, covered annually)  Screening for glaucoma    Assessment/Plan   Diagnoses and all orders for this visit:    Encounter for Medicare annual wellness exam  *Medicare wellness completed. Education and counseling provided, recommendations made- see Medicare chart in patient instructions and see below. *I have reviewed/discussed the above normal BMI with the patient. I have recommended the following interventions: dietary management education, guidance, and counseling, encourage exercise and monitor weight .   -Screening for depression  -Screening for alcoholism    ACP (advanced care planning)  *See ACP note. *Plan of care reviewed with patient. Patient in agreement with plan and expresses understanding. All questions answered and patient encouraged to call or RTO if further questions or concerns. Follow-up Disposition:  Return in about 6 months (around 7/2/2018) for chronic disease routine care- 30 min. , Yearly Medicare Wellness-  done today. ..

## 2018-01-02 NOTE — ACP (ADVANCE CARE PLANNING)
Advance Care Planning (ACP) Provider Note - Comprehensive     Date of ACP Conversation: 01/02/18  Persons included in Conversation:  patient  Length of ACP Conversation in minutes:  <16 minutes (Non-Billable)    Authorized Decision Maker (if patient is incapable of making informed decisions): This person is: Other Legally Authorized Decision Maker (e.g. Next of Kin)          General ACP for ALL Patients with Decision Making Capacity:   Importance of advance care planning, including choosing a healthcare agent to communicate patient's healthcare decisions if patient lost the ability to make decisions, such as after a sudden illness or accident  Understanding of the healthcare agent role was assessed and information provided    Review of Existing Advance Directive:  N/A    For Serious or Chronic Illness:  Understanding of medical condition    Understanding of CPR, goals and expected outcomes, benefits and burdens discussed.     Interventions Provided:  Recommended completion of Advance Directive form after review of ACP materials and conversation with prospective healthcare agent

## 2018-01-02 NOTE — PATIENT INSTRUCTIONS
Preventive Services Limitations Recommendation Preventative Services Limitations Recommendation   Glaucoma Screening (no USPSTF recommendation) Diabetes mellitus, family history, , age 48 or over,  American, age 72 or over Recommended Periodontal Disease- Dentistry Services *May not be covered under Medicare Recommended   Skin Cancer Screening Exam every year  *May not be covered under Medicare Discussed self checks Hearing Impairment Pure Tone Test every 3 years  *May not be covered under Medicare No hearing loss noted   COPD and Lung Cancer Screening CT chest or chest xray yearly as deemed necessary  *May not be covered under Medicare Former smoker     Smoked for 30-35 years    Consider screening in future- will possibly qualify around age 54years old Counseling to Prevent Tobacco Use  - Counseling greater than 3 and up to 10 minutes  - Counseling greater than 10 minutes Patients must be asymptomatic of tobacco-related conditions to receive as preventive service     Up to 8 sessions/year Former smoker   Alcohol Misuse Counseling 4 brief face to face counseling sessions/year Sober for 5 years Obesity Screening and Counseling BMI of 30 or more. Weekly visits for first month, then biweekly for months 2-6, then every month for months 7-12 if you lose 6.6 lbs in months 1-6 Body mass index is 25.68 kg/(m^2). Screening for STIs and Counseling Annually screenings- 2 face to face counseling sessions/year Declines     Believes that he had Hep C drawn with GI Human Immunodeficiency Virus (HIV) Screening (annually for increased risk patients)  HIV-1 and HIV-2 by EIA, DEVEN, rapid antibody test, or oral mucosa transudate Tests covered annually for patients at increased risk. Pregnant patients may receive up to 3 test during pregnancy.  Declines   Diabetes Screening Tests (at least every 3 years, Medicare covers annually or at 6-month intervals for prediabetic patients)     Fasting blood sugar (FBS) or glucose tolerance test (GTT) Diagnosed with one of the following:  -Hypertension, Dyslipidemia, obesity, previous impaired FBS or GTT  Or any two of the following: overweight, FH of diabetes, age ? 72, history of gestational diabetes, birth of baby weighing more than 9 pounds Glucose 130 12/2017    A1c 5.3% 5/2017 Cardiovascular Screening Blood Tests (every 5 years)  Total cholesterol, HDL, Triglycerides Order as a panel if possible LDL 38.6 12/2017   Medical Nutrition Therapy (MNT) (for diabetes or renal disease not recommended schedule) Requires referral by treating physician for patient with diabetes or renal disease. Up to 3 hours for initial year and 2 hours in subsequent years. Not indicated Diabetes Self-Management Training (DSMT) (no USPSTF recommendation) Requires referral by treating physician for patient with diabetes or renal disease. 10 hours of initial DSMT session of no less than 30 minutes each in a continuous 12-month period. 2 hours of follow-up DSMT in subsequent years. Not indicated   Behavioral Therapy for Cardiovascular Disease Annually BP controlled, no diabetes, cholesterol controlled, and former smoker    On ASA Ultrasound Screening for Abdominal Aortic Aneurysm (AAA) (once) Patient must be referred through IPPE. Criteria:  - Men who are 73-68 years old and smoked >100 cigarettes.   -Anyone with FH of AAA  -Or recommended for screening by USPSTF Consider at age of 72 years   Prostate Cancer Screening (annually up to age 76)  - Digital rectal exam (KARLA)  - Prostate specific antigen (PSA) Annually (age 48 or over), KARLA not paid separately when covered E/M service is provided on same date PSA 1.8 1/2017    Will order with next labs Colorectal Cancer Screening -Fecal occult blood test (annual)  -Flexible sigmoidoscopy (5y)  -Screening colonoscopy (10y)  -Barium Enema Colonoscopy 8/2016    Repeat in 5 years   Bone Mass Measurement  (age 72 & older, biennial) Requires diagnosis related to osteoporosis or estrogen deficiency. History of long-term glucocorticoid tx or baseline is needed. Not indicated Screening Mammography (biennial age 54-69) Annually (age 36 or over) N/A   Screening Pap Tests and Pelvic Examination (up to age 79 and after 79 if unknown history or abnormal study last 10 years) Every 25 months except high risk N/A Hepatitis B Vaccinations (if medium/high risk) Medium/high risk factors:  End-stage renal disease,  Hemophiliacs who received Factor VIII or IX concentrates, Clients of institutions for the mentally retarded, Persons who live in the same house as a HepB virus carrier, Homosexual men, Illicit injectable drug abusers.  Not indicated   Seasonal Influenza Vaccination (annually)   Had 17/18 season Pneumococcal Vaccination (once after 72)   PPSV 23 done 2/2017    Up to date   Herpes Zoster (Shingles) Vaccination (once after age 61) [de-identified] to persons at age 48 years in specific conditions  *May not be covered by Medicare   Will do Rx in future once Shingrix to market Tetanus Vaccine Td booster every 10 years- Tdap if exposed to children under 1 year)  *May not be covered by Medicare Last booster in 2011     Optometry/Ophthalmology    [address]  Phone: [phone] [address], [address] [place] [address]  Phone: [phone]    [address], [address]  Phone: [phone]    [facility]  [address] [facility], [address]  Phone: [phone]    [facility]  [address] [facility], [address]  Phone: [phone]    [address] [place] [address] and Surgeons  [address] [facility], [address]  Phone: [phone]

## 2018-01-02 NOTE — PROGRESS NOTES
OFFICE NOTE    Isabelle Jauregui is a 46 y.o. male presenting today for office visit. 1/2/2018  9:10 AM    Chief Complaint   Patient presents with    Annual Wellness Visit       HPI: Here today for follow up chronic disease. Last labs done 12/2017. Following with GI and the spine center (DDD lumbar, post-laminectomy syndrome). Asthma: Has been using Symbicort- has been managing symptoms. Has been using Albuterol inhaler PRN about 2-3 times per week. Allergies make asthma control worse. No new complaints. CVA history: History in 2010- has been on ASA since. No residual. Does not follow with neurology. GERD with esophagitis: EGD done 10/2016 showing Grade 1 esophagitis. Wife reports that they have seen GI afterwards and there was no new plans made. Was on PPI therapy, but has since stopped and is using H2 blocker PRN- usually at night time. Using Zofran PRN for severe nausea. Still has some breakthrough symptoms. Regular BMs without blood. History diverticulitis. Review of Systems   Constitutional: Negative for chills, fatigue and fever. Respiratory: Negative for cough, shortness of breath and wheezing. Cardiovascular: Negative for chest pain, palpitations and leg swelling. Gastrointestinal: Negative for abdominal pain, constipation, diarrhea, nausea and vomiting. Genitourinary: Negative for difficulty urinating and frequency. Musculoskeletal: Positive for back pain. Negative for arthralgias and myalgias. +chronic back pain   Skin: Negative for rash. Neurological: Negative for dizziness and headaches.          PHQ Screening   PHQ over the last two weeks 1/2/2018   Little interest or pleasure in doing things Not at all   Feeling down, depressed or hopeless Not at all   Total Score PHQ 2 0         History  Past Medical History:   Diagnosis Date    Arthritis of knee, right 12/2017    advanced degen changes noted, CT right LE    Asthma     Cellulitis 12/2017    DDD (degenerative disc disease), lumbar 2013    noted on MRI with annular tears    Diverticulitis 2016    GERD (gastroesophageal reflux disease) 2016    with esophagitis according to endscopy    Kidney stone     Lumbar post-laminectomy syndrome     Sacroiliitis ([address]     Stroke [facility]) may 14 2010    Unspecified rotator cuff tear or rupture of left shoulder, not specified as traumatic 03/2016       Past Surgical History:   Procedure Laterality Date    COLONOSCOPY N/A 8/12/2016    COLONOSCOPY with polypectomy performed by Chris Amaya MD at [address] [place]  2002, 2004, 2006    L5-S1 fusion, laminectomies    HX ENDOSCOPY  10/2016    grade 1 espohagitis    HX ENDOSCOPY  11/2016    mild esophagitis    HX KNEE ARTHROSCOPY Right     knee    HX MOHS PROCEDURES      right    HX ORTHOPAEDIC      right shoulder       Social History     Social History    Marital status: SINGLE     Spouse name: N/A    Number of children: N/A    Years of education: N/A     Occupational History    Not on file.      Social History Main Topics    Smoking status: Former Smoker     Packs/day: 2.00     Types: Cigarettes    Smokeless tobacco: Never Used    Alcohol use No    Drug use: No    Sexual activity: Yes     Partners: Female     Other Topics Concern    Not on file     Social History Narrative    ** Merged History Encounter **            Family History   Problem Relation Age of Onset    Other Brother     Cancer Maternal Grandmother        Allergies   Allergen Reactions    Penicillins Angioedema    Penicillins Anaphylaxis    Vicodin [Hydrocodone-Acetaminophen] Nausea and Vomiting    Azithromycin Nausea and Vomiting    Hydrochlorothiazide Nausea Only     Dizzy, lightheaded, blisters on his legs    Vicodin [Hydrocodone-Acetaminophen] Nausea and Vomiting    Erythromycin Other (comments)       Current Outpatient Prescriptions   Medication Sig Dispense Refill    budesonide-formoterol (SYMBICORT) 160-4.5 mcg/actuation HFAA Take 1 Puff by inhalation two (2) times a day. 1 Inhaler 5    albuterol (PROVENTIL HFA, VENTOLIN HFA, PROAIR HFA) 90 mcg/actuation inhaler Take 2 Puffs by inhalation every six (6) hours as needed for Wheezing or Shortness of Breath. 1 Inhaler 11    raNITIdine (ZANTAC) 150 mg tablet Take 1 Tab by mouth every twelve (12) hours as needed for Indigestion. 60 Tab 11    ammonium lactate (AMLACTIN) 12 % topical cream Apply  to affected area two (2) times a day. rub in to affected area well 280 g 0    triamcinolone acetonide (KENALOG) 0.1 % ointment Apply  to affected area two (2) times a day. use thin layer 30 g 0    cyclobenzaprine (FLEXERIL) 10 mg tablet Take 1 Tab by mouth three (3) times daily as needed for Muscle Spasm(s). 30 Tab 6    gabapentin (NEURONTIN) 800 mg tablet Take 1 Tab by mouth three (3) times daily. 90 Tab 5    aspirin 81 mg chewable tablet Take 81 mg by mouth daily.  pantoprazole (PROTONIX) 40 mg tablet Take 1 Tab by mouth daily. 30 Tab 3       Health Maintenance and Screenings  *Medicare Wellness done today 1/2018      Advance Care Planning:   Patient was offered the opportunity to discuss advance care planning YES   Does patient have an Advance Directive:  NO   If no, did you provide information on Caring Connections? YES       Patient Care Team:  Patient Care Team:  Caroline Sherwood NP as PCP - General (Nurse Practitioner)  Cristina Perez MD (Orthopedic Surgery)  Sriram Carpio MD (Physiatry)  Ashley Marr MD (Gastroenterology)  Allison Bernstein MD (Urology)  Ricky Rizvi, RN as Ambulatory Care Navigator        LABS:  None new to review    RADIOLOGY:  None new to review      Physical Exam   Constitutional: He is oriented to person, place, and time. He appears well-developed and well-nourished. No distress. Neck: Normal range of motion. Neck supple. No thyromegaly present. Cardiovascular: Normal rate, regular rhythm and normal heart sounds.     No murmur heard.  Pulmonary/Chest: Effort normal and breath sounds normal. No respiratory distress. Abdominal: Soft. Bowel sounds are normal. There is no tenderness. Musculoskeletal: He exhibits no edema.   -musculoskeletal exam deferred; chronic without acute complaints   Neurological: He is alert and oriented to person, place, and time. He exhibits normal muscle tone. Coordination and gait normal.   Skin: Skin is warm and dry. Vitals:    01/02/18 0851   BP: 104/72   Pulse: 73   Resp: 16   Temp: 96.1 °F (35.6 °C)   TempSrc: Oral   SpO2: 96%   Weight: 179 lb (81.2 kg)   Height: 5' 10\" (1.778 m)   PainSc:   4   PainLoc: Foot         Assessment and Plan    Moderate persistent asthma without complication  *Controlled, continue current maintenance inhaler and PRN Albuterol.  - budesonide-formoterol (SYMBICORT) 160-4.5 mcg/actuation HFAA; Take 1 Puff by inhalation two (2) times a day. Dispense: 1 Inhaler; Refill: 5  - albuterol (PROVENTIL HFA, VENTOLIN HFA, PROAIR HFA) 90 mcg/actuation inhaler; Take 2 Puffs by inhalation every six (6) hours as needed for Wheezing or Shortness of Breath. Dispense: 1 Inhaler; Refill: 11    History of CVA (cerebrovascular accident)  *Continue on ASA- may be contributing to below; however, due to history of CVA, continuance recommended. Gastroesophageal reflux disease with esophagitis  *Improved symptoms. Wife reported in past that he has seen GI and no further orders were needed. He has been taking PRN H2 blocker which is effective in managing symptoms. - raNITIdine (ZANTAC) 150 mg tablet; Take 1 Tab by mouth every twelve (12) hours as needed for Indigestion. Dispense: 60 Tab; Refill: 11      *Plan of care reviewed with patient. Patient in agreement with plan and expresses understanding. All questions answered and patient encouraged to call or RTO if further questions or concerns.        Follow-up Disposition:  Return in about 6 months (around 7/2/2018) for chronic disease routine care- 30 min. , Yearly Medicare Wellness-  done today. Cindi Cr

## 2018-01-30 ENCOUNTER — OFFICE VISIT (OUTPATIENT)
Dept: ORTHOPEDIC SURGERY | Age: 53
End: 2018-01-30

## 2018-01-30 ENCOUNTER — DOCUMENTATION ONLY (OUTPATIENT)
Dept: ORTHOPEDIC SURGERY | Age: 53
End: 2018-01-30

## 2018-01-30 VITALS
DIASTOLIC BLOOD PRESSURE: 67 MMHG | HEIGHT: 70 IN | SYSTOLIC BLOOD PRESSURE: 104 MMHG | BODY MASS INDEX: 26.11 KG/M2 | WEIGHT: 182.4 LBS | HEART RATE: 79 BPM | RESPIRATION RATE: 14 BRPM

## 2018-01-30 DIAGNOSIS — M96.1 POSTLAMINECTOMY SYNDROME OF LUMBAR REGION: Primary | ICD-10-CM

## 2018-01-30 DIAGNOSIS — M54.16 LUMBAR RADICULOPATHY: ICD-10-CM

## 2018-01-30 RX ORDER — GABAPENTIN 800 MG/1
800 TABLET ORAL 3 TIMES DAILY
Qty: 90 TAB | Refills: 5 | Status: SHIPPED | OUTPATIENT
Start: 2018-01-30 | End: 2018-01-30 | Stop reason: SDUPTHER

## 2018-01-30 RX ORDER — PANTOPRAZOLE SODIUM 40 MG/1
TABLET, DELAYED RELEASE ORAL
COMMUNITY
Start: 2017-11-29 | End: 2018-07-05 | Stop reason: ALTCHOICE

## 2018-01-30 RX ORDER — GABAPENTIN 800 MG/1
800 TABLET ORAL 3 TIMES DAILY
Qty: 90 TAB | Refills: 2 | Status: SHIPPED | OUTPATIENT
Start: 2018-01-30 | End: 2018-10-29 | Stop reason: SDUPTHER

## 2018-01-30 NOTE — MR AVS SNAPSHOT
Reilly Lora 
 
 
 [address] 200 [facility] 
[phone] Patient: Nohemy Love MRN: [id number] :1965 Visit Information Date & Time Provider Department Dept. Phone Encounter #  
 2018  8:40 AM Dianne Rust  [facility], [address] and [facility] [place] [phone] Follow-up Instructions Return for after EMG. Your Appointments 3/27/2018  8:30 AM  
Follow Up with Dianne Rust MD  
[facility] [place] ([address] [place] [address]) Appt Note: 6 mo follow up  lower back/pat request Dr Lorette Oppenheim not NP  
 [address]  
  
    
 2018  8:30 AM  
[facility] with [facility] ([address] [place] [address]) Appt Note: KUB  
 [address] 
[address] [facility] [address]  
[address] [address]  
  
    
 7/3/2018  8:15 AM  
ROUTINE CARE with Kylee Duran NP [address] [facility] ([address] [place] [address]) Appt Note: chronic disease routine care- 30 min [address] [id number] [address]  
  
   
 [address] 700 [facility]  
  
    
  
 2018  8:45 AM  
Any with Obey Santizo MD  
[facility] ([address] [place] [address]) Appt Note: 6 mo fu KUB same day [address] [place] [address]  
[address] [facility] [address] 3b [facility] [address] Upcoming Health Maintenance Date Due DTaP/Tdap/Td series (1 - Tdap) 2011 Prostate-Specific Antigen 2018 MEDICARE YEARLY EXAM 1/3/2019 COLONOSCOPY 2021 Allergies as of 2018  Review Complete On: 2018 By: Dianne Rust MD  
  
 Severity Noted Reaction Type Reactions Penicillins High 10/14/2012    Angioedema Penicillins High 05/15/2013    Anaphylaxis Vicodin [Hydrocodone-acetaminophen] High 10/14/2012    Nausea and Vomiting Azithromycin  06/02/2016    Nausea and Vomiting Hydrochlorothiazide  12/13/2017   Systemic Nausea Only Dizzy, lightheaded, blisters on his legs Vicodin [Hydrocodone-acetaminophen]  05/15/2013    Nausea and Vomiting Erythromycin Low 07/12/2016    Other (comments) Current Immunizations  Reviewed on 2/2/2017 Name Date Influenza Vaccine (Quad) PF 9/5/2017, 11/17/2016 Novel Influenza-H1N1-09, All Formulations 5/14/2010 12:00 AM  
 Pneumococcal Polysaccharide (PPSV-23) 2/2/2017 Td 1/1/2011 Not reviewed this visit You Were Diagnosed With   
  
 Codes Comments Postlaminectomy syndrome of lumbar region    -  Primary ICD-10-CM: M96.1 ICD-9-CM: 722.83 Lumbar radiculopathy     ICD-10-CM: M54.16 
ICD-9-CM: 724.4 Vitals BP Pulse Resp Height(growth percentile) Weight(growth percentile) BMI  
 104/67 79 14 5' 10\" (1.778 m) 182 lb 6.4 oz (82.7 kg) 26.17 kg/m2 Smoking Status Former Smoker BMI and BSA Data Body Mass Index Body Surface Area  
 26.17 kg/m 2 2.02 m 2 Preferred Pharmacy Pharmacy Name Phone [address] [place] [address]. [phone] Your Updated Medication List  
  
   
This list is accurate as of: 1/30/18  9:19 AM.  Always use your most recent med list.  
  
  
  
  
 albuterol 90 mcg/actuation inhaler Commonly known as:  PROVENTIL HFA, VENTOLIN HFA, PROAIR HFA Take 2 Puffs by inhalation every six (6) hours as needed for Wheezing or Shortness of Breath. ammonium lactate 12 % topical cream  
Commonly known as:  AMLACTIN Apply  to affected area two (2) times a day. rub in to affected area well  
  
 aspirin 81 mg chewable tablet Take 81 mg by mouth daily. budesonide-formoterol 160-4.5 mcg/actuation Hfaa Commonly known as:  SYMBICORT  
 Take 1 Puff by inhalation two (2) times a day. cyclobenzaprine 10 mg tablet Commonly known as:  FLEXERIL Take 1 Tab by mouth three (3) times daily as needed for Muscle Spasm(s). gabapentin 800 mg tablet Commonly known as:  NEURONTIN Take 1 Tab by mouth three (3) times daily. pantoprazole 40 mg tablet Commonly known as:  PROTONIX  
  
 raNITIdine 150 mg tablet Commonly known as:  ZANTAC Take 1 Tab by mouth every twelve (12) hours as needed for Indigestion. triamcinolone acetonide 0.1 % ointment Commonly known as:  KENALOG Apply  to affected area two (2) times a day. use thin layer Prescriptions Sent to Pharmacy Refills  
 gabapentin (NEURONTIN) 800 mg tablet 2 Sig: Take 1 Tab by mouth three (3) times daily. Class: Normal  
 Pharmacy: [facility] DR MARINA MALIN [address]  #: [phone] Route: Oral  
  
Follow-up Instructions Return for after EMG. To-Do List   
 02/06/2018 Neurology:  EMG ONE EXTREMITY LOWER LT Please provide this summary of care documentation to your next provider. Your primary care clinician is listed as Patience Miller. If you have any questions after today's visit, please call [phone].

## 2018-01-30 NOTE — PROGRESS NOTES
EMG LLE is scheduled with Dr. Bisi Martel, [address] [place] [address], [address], [facility], [phone] on 02/15/18, arrive 8:30AM, test 8:45AM. No Medicare pre-authorization required.
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
## 2018-02-01 ENCOUNTER — PATIENT OUTREACH (OUTPATIENT)
Dept: FAMILY MEDICINE CLINIC | Age: 53
End: 2018-02-01

## 2018-02-01 NOTE — PROGRESS NOTES
Transition of Care    Patient has not been admitted to the hospital, with the exception of a planned same day procedure since discharge date of 12/9/18. Patient attended PCP appointment with NATALIE Christensen  Next follow up appointment to be scheduled in apx. 6 months. Transition of care episode to be closed.

## 2018-02-01 NOTE — ACP (ADVANCE CARE PLANNING)
Advanced Care Planning    Please refer to office visit note dated 1-2-2018 by   Virginie Stoddard NP

## 2018-02-15 ENCOUNTER — OFFICE VISIT (OUTPATIENT)
Dept: FAMILY MEDICINE CLINIC | Age: 53
End: 2018-02-15

## 2018-02-15 VITALS
SYSTOLIC BLOOD PRESSURE: 112 MMHG | TEMPERATURE: 95.7 F | OXYGEN SATURATION: 97 % | RESPIRATION RATE: 20 BRPM | DIASTOLIC BLOOD PRESSURE: 64 MMHG | HEIGHT: 70 IN | HEART RATE: 65 BPM | WEIGHT: 182 LBS | BODY MASS INDEX: 26.05 KG/M2

## 2018-02-15 DIAGNOSIS — M79.672 LEFT FOOT PAIN: Primary | ICD-10-CM

## 2018-02-15 DIAGNOSIS — M25.572 CHRONIC PAIN OF LEFT ANKLE: ICD-10-CM

## 2018-02-15 DIAGNOSIS — G89.29 CHRONIC PAIN OF LEFT ANKLE: ICD-10-CM

## 2018-02-15 NOTE — MR AVS SNAPSHOT
[facility] [address] [facility] 
[phone] Patient: Shari Marquez MRN: [id number] :1965 Visit Information Date & Time Provider Department Dept. Phone Encounter #  
 2/15/2018 10:15 AM Johnathon Chester [phone] [id number] Follow-up Instructions Return for already scheduled appointment 2018. Your Appointments 2/15/2018 10:15 AM  
SAME DAY with [place] Chester ([facility]) Appt Note: foot pain [address] [facility]  
[phone]  
  
   
 [address]  
  
    
 3/7/2018  8:40 AM  
DIAG TEST F/U with Padmini Jean Baptiste MD  
4 [facility], [address] and [facility] [place] ([address] [place] [address]) Appt Note: review emg lle dr Gattis Mcburney 02/15/18  
 2012 [facility] [address]  
  
   
 [address] [facility] [address]  
  
    
 3/27/2018  8:30 AM  
Follow Up with Padmini Jean Baptiste MD  
[facility] [place] ([address] [place] [address]) Appt Note: 6 mo follow up  lower back/pat request Dr Chad Singh not NP  
 [address] [facility] 350 [facility]  
  
    
 2018  8:30 AM  
[facility] with [facility] ([address] [place] [address]) Appt Note: KUB  
 [address] 
[address] [facility] [address]  
14[address] [facility] [address] [facility] [address]  
  
    
 7/3/2018  8:15 AM  
ROUTINE CARE with [place] Chester ([address]) Appt Note: chronic disease routine care- 30 min [address] [facility] 49  
  
   
 [address] [facility]  
  
    
  
 2018  8:45 AM  
Any with Harshad Hammer MD  
 [facility] ([facility]) Appt Note: 6 mo fu KUB same day [address] [place] [address]  
[address] [facility] [address] [place] [address] Upcoming Health Maintenance Date Due DTaP/Tdap/Td series (1 - Tdap) 1/2/2011 Prostate-Specific Antigen 1/30/2018 MEDICARE YEARLY EXAM 1/3/2019 COLONOSCOPY 8/12/2021 Allergies as of 2/15/2018  Review Complete On: 2/15/2018 By: Annabel Guo, NP Severity Noted Reaction Type Reactions Penicillins High 10/14/2012    Angioedema Penicillins High 05/15/2013    Anaphylaxis Vicodin [Hydrocodone-acetaminophen] High 10/14/2012    Nausea and Vomiting Azithromycin  06/02/2016    Nausea and Vomiting Hydrochlorothiazide  12/13/2017   Systemic Nausea Only Dizzy, lightheaded, blisters on his legs Vicodin [Hydrocodone-acetaminophen]  05/15/2013    Nausea and Vomiting Erythromycin Low 07/12/2016    Other (comments) Current Immunizations  Reviewed on 2/2/2017 Name Date Influenza Vaccine (Quad) PF 9/5/2017, 11/17/2016 Novel Influenza-H1N1-09, All Formulations 5/14/2010 12:00 AM  
 Pneumococcal Polysaccharide (PPSV-23) 2/2/2017 Td 1/1/2011 Not reviewed this visit You Were Diagnosed With   
  
 Codes Comments Left foot pain    -  Primary ICD-10-CM: P75.004 ICD-9-CM: 729.5 Chronic pain of left ankle     ICD-10-CM: M25.572, G89.29 ICD-9-CM: 719.47, 338.29 Vitals BP Pulse Temp Resp Height(growth percentile) Weight(growth percentile) 112/64 (BP 1 Location: Left arm, BP Patient Position: Sitting) 65 95.7 °F (35.4 °C) (Oral) 20 5' 10\" (1.778 m) 182 lb (82.6 kg) SpO2 BMI Smoking Status 97% 26.11 kg/m2 Former Smoker Vitals History BMI and BSA Data Body Mass Index Body Surface Area  
 26.11 kg/m 2 2.02 m 2 Pref[facility] Pharm[facility] Name Phone [facility] [address] [place] [address]. [phone] Your Updated Medication List  
  
   
This list is accurate as of: 2/15/18 10:06 AM.  Always use your most recent med list.  
  
  
  
  
 albuterol 90 mcg/actuation inhaler Commonly known as:  PROVENTIL HFA, VENTOLIN HFA, PROAIR HFA Take 2 Puffs by inhalation every six (6) hours as needed for Wheezing or Shortness of Breath. ammonium lactate 12 % topical cream  
Commonly known as:  AMLACTIN Apply  to affected area two (2) times a day. rub in to affected area well  
  
 aspirin 81 mg chewable tablet Take 81 mg by mouth daily. budesonide-formoterol 160-4.5 mcg/actuation Hfaa Commonly known as:  SYMBICORT Take 1 Puff by inhalation two (2) times a day. cyclobenzaprine 10 mg tablet Commonly known as:  FLEXERIL Take 1 Tab by mouth three (3) times daily as needed for Muscle Spasm(s). gabapentin 800 mg tablet Commonly known as:  NEURONTIN Take 1 Tab by mouth three (3) times daily. pantoprazole 40 mg tablet Commonly known as:  PROTONIX  
  
 raNITIdine 150 mg tablet Commonly known as:  ZANTAC Take 1 Tab by mouth every twelve (12) hours as needed for Indigestion. triamcinolone acetonide 0.1 % ointment Commonly known as:  KENALOG Apply  to affected area two (2) times a day. use thin layer Follow-up Instructions Return for already scheduled appointment 7/2018. To-Do List   
 02/15/2018 Imaging:  XR ANKLE LT MIN 3 V   
  
 02/15/2018 Imaging:  XR FOOT LT MIN 3 V Please provide this summary of care documentation to your next provider. Your primary care clinician is listed as Patience Miller. If you have any questions after today's visit, please call [phone].

## 2018-02-15 NOTE — PROGRESS NOTES
OFFICE NOTE    Tony Craig is a 46 y.o. male presenting today for office visit. 2/15/2018  9:45 AM      Chief Complaint   Patient presents with    Foot Pain     pt c/o pain in left foot         HPI: Here today for complaints of left foot/ankle pain that has been happening chronically, but feels that it is getting worse. Does follow with the spine center and recently had an EMG as part of the work up for left lower leg pain from mid-thigh to foot- had done today and told preliminary looked normal. He feels that it is in his foot, not his leg or back. Medication regimen that he is currently on does help, does not want to change anything. Just looking to have something further done with his foot/ankle. Review of Systems   Constitutional: Negative for chills and fatigue. Musculoskeletal: Positive for arthralgias and joint swelling. Neurological: Negative for numbness.          PHQ Screening   PHQ over the last two weeks 2/15/2018   Little interest or pleasure in doing things Not at all   Feeling down, depressed or hopeless Not at all   Total Score PHQ 2 0         History  Past Medical History:   Diagnosis Date    Arthritis of knee, right 12/2017    advanced degen changes noted, CT right LE    Asthma     Cellulitis 12/2017    DDD (degenerative disc disease), lumbar 2013    noted on MRI with annular tears    Diverticulitis 2016    GERD (gastroesophageal reflux disease) 2016    with esophagitis according to endscopy    Kidney stone     Lumbar post-laminectomy syndrome     Sacroiliitis ([address]     Stroke [facility]) may 14 2010    Unspecified rotator cuff tear or rupture of left shoulder, not specified as traumatic 03/2016       Past Surgical History:   Procedure Laterality Date    COLONOSCOPY N/A 8/12/2016    COLONOSCOPY with polypectomy performed by Juancho Roblero MD at [address]  2002, 2004, 2006    L5-S1 fusion, laminectomies    HX ENDOSCOPY  10/2016    grade 1 espohagitis    HX ENDOSCOPY  11/2016    mild esophagitis    HX KNEE ARTHROSCOPY Right     knee    HX MOHS PROCEDURES      right    HX ORTHOPAEDIC      right shoulder       Social History     Social History    Marital status: SINGLE     Spouse name: N/A    Number of children: N/A    Years of education: N/A     Occupational History    Not on file. Social History Main Topics    Smoking status: Former Smoker     Packs/day: 2.00     Types: Cigarettes     Quit date: 07/2016    Smokeless tobacco: Never Used    Alcohol use No    Drug use: No    Sexual activity: Yes     Partners: Female     Other Topics Concern    Not on file     Social History Narrative    ** Merged History Encounter **            Allergies   Allergen Reactions    Penicillins Angioedema    Penicillins Anaphylaxis    Vicodin [Hydrocodone-Acetaminophen] Nausea and Vomiting    Azithromycin Nausea and Vomiting    Hydrochlorothiazide Nausea Only     Dizzy, lightheaded, blisters on his legs    Vicodin [Hydrocodone-Acetaminophen] Nausea and Vomiting    Erythromycin Other (comments)       Current Outpatient Prescriptions   Medication Sig Dispense Refill    gabapentin (NEURONTIN) 800 mg tablet Take 1 Tab by mouth three (3) times daily. 90 Tab 2    budesonide-formoterol (SYMBICORT) 160-4.5 mcg/actuation HFAA Take 1 Puff by inhalation two (2) times a day. 1 Inhaler 5    albuterol (PROVENTIL HFA, VENTOLIN HFA, PROAIR HFA) 90 mcg/actuation inhaler Take 2 Puffs by inhalation every six (6) hours as needed for Wheezing or Shortness of Breath. 1 Inhaler 11    raNITIdine (ZANTAC) 150 mg tablet Take 1 Tab by mouth every twelve (12) hours as needed for Indigestion. 60 Tab 11    ammonium lactate (AMLACTIN) 12 % topical cream Apply  to affected area two (2) times a day. rub in to affected area well 280 g 0    triamcinolone acetonide (KENALOG) 0.1 % ointment Apply  to affected area two (2) times a day.  use thin layer 30 g 0    cyclobenzaprine (FLEXERIL) 10 mg tablet Take 1 Tab by mouth three (3) times daily as needed for Muscle Spasm(s). 30 Tab 6    aspirin 81 mg chewable tablet Take 81 mg by mouth daily.  pantoprazole (PROTONIX) 40 mg tablet            Patient Care Team:  Patient Care Team:  Rhett Wan NP as PCP - General (Nurse Practitioner)  Sim Ng MD (Orthopedic Surgery)  Marychuy Harris MD (Physiatry)  Neelima Toribio MD (Gastroenterology)  Autumn Alonso MD (Urology)        LABS:  None new to review    RADIOLOGY:    *Xrays done today in office- being sent for radiology reading      Physical Exam   Constitutional: He is oriented to person, place, and time. He appears well-developed and well-nourished. No distress. Pulmonary/Chest: Effort normal. No respiratory distress. Musculoskeletal:        Left ankle: He exhibits decreased range of motion and swelling. He exhibits no ecchymosis and normal pulse. Tenderness. Achilles tendon exhibits no pain. Left foot: There is decreased range of motion, tenderness and swelling. There is normal capillary refill. Feet:    +limping gait on left foot  -anterior/dorsal aspect of left foot and ankle with mild to moderate swelling and reports of tenderness on palpation with pain. Decreased ROM due to reports of pain. No erythema noted. Neurological: He is alert and oriented to person, place, and time. He exhibits normal muscle tone. Coordination normal.   Skin: Skin is warm and dry. Vitals:    02/15/18 0933   BP: 112/64   Pulse: 65   Resp: 20   Temp: 95.7 °F (35.4 °C)   TempSrc: Oral   SpO2: 97%   Weight: 182 lb (82.6 kg)   Height: 5' 10\" (1.778 m)   PainSc:   8   PainLoc: Foot         Assessment and Plan    Left foot pain/ Chronic pain of left ankle  *Discussed with patient. Xrays completed today in office and further management plan to follow once radiology reading completed. Continue with PRN medications and chronic medications.  Ice and heat to area PRN and keep elevated. - XR ANKLE LT MIN 3 V; Future  - XR FOOT LT MIN 3 V; Future      *Plan of care reviewed with patient. Patient in agreement with plan and expresses understanding. All questions answered and patient encouraged to call or RTO if further questions or concerns. Follow-up Disposition:  Return for already scheduled appointment 7/2018.

## 2018-02-16 ENCOUNTER — TELEPHONE (OUTPATIENT)
Dept: FAMILY MEDICINE CLINIC | Age: 53
End: 2018-02-16

## 2018-02-16 NOTE — TELEPHONE ENCOUNTER
2/16/2018  8:20 AM    Chief Complaint   Patient presents with    Results       Noted results of left foot and ankle xrays- normal findings other than some swelling. Called and spoke with patient. Informed of findings and that xray could be repeated in a few weeks to re-evaluate. Also advised that he could see a foot and ankle specialist. He will consider options and let us know if he decides to move forward with something. Left Foot Complete  History: Pain   Technique: AP, Lateral, and oblique views of the foot were obtained.   Comparison: None   Findings:   Normal bone mineralization. No radiopaque foreign body. Minimal degenerative changes first MTP joint. No evidence of  fracture or dislocation identified. No joint space abnormality or soft tissue injury appreciated.   IMPRESSION  Impression:   No acute displaced fracture identified. EXAM:  XR ANKLE LT MIN 3 V   INDICATION:  left foot and ankle pain   COMPARISON: None.   FINDINGS: Three views of the left ankle demonstrate no acute displaced fracture  or disruption. Ankle mortise and talar dome are intact. Small amount of soft  tissue swelling laterally. Normal bone mineralization.   IMPRESSION  IMPRESSION: Mild soft tissue swelling.  If pain persists, recommend repeat  imaging in 10-14 days.

## 2018-02-27 ENCOUNTER — OFFICE VISIT (OUTPATIENT)
Dept: ORTHOPEDIC SURGERY | Age: 53
End: 2018-02-27

## 2018-02-27 VITALS
HEIGHT: 70 IN | DIASTOLIC BLOOD PRESSURE: 76 MMHG | HEART RATE: 60 BPM | RESPIRATION RATE: 18 BRPM | WEIGHT: 184 LBS | SYSTOLIC BLOOD PRESSURE: 119 MMHG | BODY MASS INDEX: 26.34 KG/M2

## 2018-02-27 DIAGNOSIS — M96.1 POSTLAMINECTOMY SYNDROME OF LUMBAR REGION: Primary | ICD-10-CM

## 2018-02-27 DIAGNOSIS — G57.32 PERONEAL NEUROPATHY AT KNEE, LEFT: ICD-10-CM

## 2018-02-27 DIAGNOSIS — M54.16 LUMBAR RADICULOPATHY: ICD-10-CM
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
## 2018-02-27 NOTE — MR AVS SNAPSHOT
[address] [facility] 
 
 
 [address] [facility] [address] 
[phone] Patient: Yohana Spence MRN: [id number] :[id number] Visit Information Date & Time Provider Department Dept. Phone Encounter #  
 2018  8:30 AM Ben Delgado MD  [facility] [place] [phone] [id number] Follow-up Instructions Return in about 6 weeks (around 4/10/2018). Your Appointments 3/13/2018  8:30 AM  
New Patient with Merlene Martel MD  
[address] [place] [address], [address] and [facility] ([address]) Appt Note: lt foot/ankle pain [address], [address] [facility] [address]  
[phone] [address] [facility], [address]  
  
    
 3/27/2018  8:30 AM  
Follow Up with Ben Delgado MD  
[address] [place] [address], [address] and [facility] [place] ([address]) Appt Note: 6 mo follow up  lower back/pat request Dr Hill May not NP  
 [address] [place]  [address] [facility] [address]  
  
    
 2018  8:30 AM  
XRAY Visit with Avelina Syed [facility] ([address]) Appt Note: KUB  
 [address] [place]  [address] [facility] [address]  
  
    
 7/3/2018  8:15 AM  
ROUTINE CARE with [place] Naylor ([address]) Appt Note: chronic disease routine care- 30 min [address] [facility] [address]  
  
   
 [address]  
  
    
  
 2018  8:45 AM  
Any with Miguel Angel Young MD  
[facility] ([facility]) Appt Note: 6 mo fu KUB same day [address] [facility] [place]  [address]  
  
   
 [address] [facility] [place] [address] Upcoming Health Maintenance Date Due DTaP/Tdap/Td series (1 - Tdap) 1/2/2011 Prostate-Specific Antigen 1/30/2018 MEDICARE YEARLY EXAM 1/3/2019 COLONOSCOPY 8/12/2021 Allergies as of 2/27/2018  Review Complete On: 2/27/2018 By: Zulma Caceres MD  
  
 Severity Noted Reaction Type Reactions Penicillins High 10/14/2012    Angioedema Penicillins High 05/15/2013    Anaphylaxis Vicodin [Hydrocodone-acetaminophen] High 10/14/2012    Nausea and Vomiting Azithromycin  06/02/2016    Nausea and Vomiting Hydrochlorothiazide  12/13/2017   Systemic Nausea Only Dizzy, lightheaded, blisters on his legs Vicodin [Hydrocodone-acetaminophen]  05/15/2013    Nausea and Vomiting Erythromycin Low 07/12/2016    Other (comments) Current Immunizations  Reviewed on 2/2/2017 Name Date Influenza Vaccine (Quad) PF 9/5/2017, 11/17/2016 Novel Influenza-H1N1-09, All Formulations 5/14/2010 12:00 AM  
 Pneumococcal Polysaccharide (PPSV-23) 2/2/2017 Td 1/1/2011 Not reviewed this visit You Were Diagnosed With   
  
 Codes Comments Postlaminectomy syndrome of lumbar region    -  Primary ICD-10-CM: M96.1 ICD-9-CM: 722.83 Lumbar radiculopathy     ICD-10-CM: M54.16 
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 
ICD-9-CM: 355. 3 Vitals BP Pulse Resp Height(growth percentile) Weight(growth percentile) BMI  
 119/76 60 18 5' 10\" (1.778 m) 184 lb (83.5 kg) 26.4 kg/m2 Smoking Status Former Smoker BMI and BSA Data Body Mass Index Body Surface Area  
 26.4 kg/m 2 2.03 m 2 Preferred Pharmacy Pharmacy Name Phone [facility]endo[facility] [address] [place] [address]. [phone] Your Updated Medication List  
  
   
This list is accurate as of 2/27/18  9:46 AM.  Always use your most recent med list.  
  
  
  
  
 albuterol 90 mcg/actuation inhaler Commonly known as:  PROVENTIL HFA, VENTOLIN HFA, PROAIR HFA Take 2 Puffs by inhalation every six (6) hours as needed for Wheezing or Shortness of Breath. ammonium lactate 12 % topical cream  
Commonly known as:  AMLACTIN Apply  to affected area two (2) times a day. rub in to affected area well  
  
 aspirin 81 mg chewable tablet Take 81 mg by mouth daily. budesonide-formoterol 160-4.5 mcg/actuation Hfaa Commonly known as:  SYMBICORT Take 1 Puff by inhalation two (2) times a day. cyclobenzaprine 10 mg tablet Commonly known as:  FLEXERIL Take 1 Tab by mouth three (3) times daily as needed for Muscle Spasm(s). gabapentin 800 mg tablet Commonly known as:  NEURONTIN Take 1 Tab by mouth three (3) times daily. pantoprazole 40 mg tablet Commonly known as:  PROTONIX  
  
 raNITIdine 150 mg tablet Commonly known as:  ZANTAC Take 1 Tab by mouth every twelve (12) hours as needed for Indigestion. triamcinolone acetonide 0.1 % ointment Commonly known as:  KENALOG Apply  to affected area two (2) times a day. use thin layer We Performed the Following REFERRAL TO PHYSICAL THERAPY [BRT93 Custom] Comments:  
 DX:Peroneal Neuropathy Eval and Treat HEP 
LOCATION:[facility] 
2-3 visits/ 2-3 weeks Follow-up Instructions Return in about 6 weeks (around 4/10/2018). Referral Information Referral ID Referred By Referred To  
  
 [id number] ILEANA LYONS III Not Available Visits Status Start Date End Date 1 New Request 2/27/18 2/27/19 If your referral has a status of pending review or denied, additional information will be sent to support the outcome of this decision. Please provide this summary of care documentation to your next provider. Your primary care clinician is listed as Tank Rothman. If you have any questions after today's visit, please call [phone].

## 2018-03-08 ENCOUNTER — HOSPITAL ENCOUNTER (OUTPATIENT)
Dept: PHYSICAL THERAPY | Age: 53
Discharge: HOME | End: 2018-03-08
Payer: MEDICARE

## 2018-03-08 PROCEDURE — G8979 MOBILITY GOAL STATUS: HCPCS

## 2018-03-08 PROCEDURE — 97110 THERAPEUTIC EXERCISES: CPT

## 2018-03-08 PROCEDURE — G8978 MOBILITY CURRENT STATUS: HCPCS

## 2018-03-08 PROCEDURE — 97161 PT EVAL LOW COMPLEX 20 MIN: CPT

## 2018-03-08 NOTE — PROGRESS NOTES
PT DAILY TREATMENT NOTE 8-    Patient Name: Joanne Sender  Date:3/8/2018  : 1965  [x]  Patient  Verified  Payor: [place] MEDICARE / Plan: [place] MEDICARE PART A & B / Product Type: Medicare /    In time:810  Out time:845  Total Treatment Time (min): 35  Total Timed Codes (min): 8  1:1 Treatment Time (MC only): 35   Visit #: 1 of 6    Treatment Area: Low back pain [M54.5]  Pain in left knee [M25.562]  Postlaminectomy syndrome, not elsewhere classified [M96.1]  Lesion of lateral popliteal nerve, left lower limb [G57.32]    SUBJECTIVE  Pain Level (0-10 scale): 5-6  Any medication changes, allergies to medications, adverse drug reactions, diagnosis change, or new procedure performed?: [x] No    [] Yes (see summary sheet for update)  Subjective functional status/changes:   [x] See Eval form in paper chart     OBJECTIVE      27 min [x]Eval                  []Re-Eval         8 min Therapeutic Exercise:  [x] See flow sheet :HEP   Rationale: increase ROM, increase strength, improve coordination, improve balance and increase proprioception to improve the patients ability to perform ADLs. With   [] TE   [] TA   [] neuro   [] other: Patient Education: [x] Review HEP    [] Progressed/Changed HEP based on:   [] positioning   [] body mechanics   [] transfers   [] heat/ice application    [] other:           Pain Level (0-10 scale) post treatment: 5-6    ASSESSMENT:   [x]  See Evaluation          Goals:  Short Term Goals: To be accomplished in 1 weeks:  1. Establish HEP for ROM & Strengthening. Long Term Goals: To be accomplished in 3 weeks:  1. Patient will be independent with HEP for ROM & Strengthening. Eval Status:na  2. Pt will report decrease in average pain rating on VAS to <5/10 to improve ease with daily activities. Eval Status:4-10/10  3. Pt will increase LE  FOTO score to 44 points to demonstrate increased ease with ADLs. Eval Status: FOTO: 10  4.  Pt will increase L ankle DF/PF strength with MMT to 4-/5 to improve ease & safety of gait.      Eval Status:2-/5      PLAN      [x]  Continue plan of care           Driss Onofre PT 3/8/2018  7:49 AM

## 2018-03-08 NOTE — PROGRESS NOTES
[facility] [address], [address] [phone] fax    Plan of Care/ Statement of Necessity for Physical Therapy Services           Patient name: Erin Granados Start of Care: 3/8/2018   Referral source: Panchito Lopez MD : 1965    Medical Diagnosis: Low back pain [M54.5]  Pain in left knee [M25.562]  Postlaminectomy syndrome, not elsewhere classified [M96.1]  Lesion of lateral popliteal nerve, left lower limb [G57.32]   Onset Date:3 months    Treatment Diagnosis: back pain   Prior Hospitalization: see medical history Provider#: [id number]   Medications: Verified on Patient summary List    Comorbidities: chronic back pain, per EMR hx of CVA   Prior Level of Function: retired log , 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 Independent with ADLs, has been limited with walking tolerance due to back pain for 10 + years    The Plan of Care and following information is based on the information from the initial evaluation. Assessment/ key information: Patient is a 46 y.o.male presenting with Low back pain [M54.5]  Pain in left knee [M25.562]  Postlaminectomy syndrome, not elsewhere classified [M96.1]  Lesion of lateral popliteal nerve, left lower limb [G57.32]. Mr. Vitaliy Pérez presents to initial PT evaluation with c/o continued L LE weakness since epidural for back pain ~ 3 months ago. He reports good relief of back pain, but states he continues to have weakness and tingling into the L lower leg & foot that was present prior to epidural. Pt reports he had EMG 3 weeks ago showing peroneal neuropathy. He displays limited activation of the L ankle musculature, with noted drop foot and poor foot clearance with ambulation. Patient refuses to use AD despite reports of falls as he does not wish to return to wheelchair use. Discussed that a SPC may be more appropriate at least in the short term for safety.  We will work to address strength, balance & ambulation for improved mobility and ease of ADLs. Patient will benefit from skilled PT services to address deficits and facilitate return to premorbid activity level and promote improved quality of life. Evaluation Complexity History MEDIUM  Complexity : 1-2 comorbidities / personal factors will impact the outcome/ POC ; Examination LOW Complexity : 1-2 Standardized tests and measures addressing body structure, function, activity limitation and / or participation in recreation  ;Presentation MEDIUM Complexity : Evolving with changing characteristics  ; Clinical Decision Making MEDIUM Complexity : FOTO score of 26-74  Overall Complexity Rating: LOW   Problem List: pain affecting function, decrease ROM, decrease strength, edema affecting function, impaired gait/ balance, decrease ADL/ functional abilitiies, decrease activity tolerance, decrease flexibility/ joint mobility and decrease transfer abilities   Treatment Plan may include any combination of the following: Therapeutic exercise, Therapeutic activities, Neuromuscular re-education, Physical agent/modality, Gait/balance training, Manual therapy, Aquatic therapy, Patient education, Self Care training, Functional mobility training, Home safety training and Stair training  Patient / Family readiness to learn indicated by: asking questions, trying to perform skills and interest  Persons(s) to be included in education: patient (P)  Barriers to Learning/Limitations: None  Patient Goal (s): less pain, more comfortable walking  Patient Self Reported Health Status: good  Rehabilitation Potential: good  Short Term Goals: To be accomplished in 1 weeks:  1. Establish HEP for ROM & Strengthening. Long Term Goals: To be accomplished in 3 weeks:  1. Patient will be independent with HEP for ROM & Strengthening. Eval Status:na  2. Pt will report decrease in average pain rating on VAS to <5/10 to improve ease with daily activities.    Eval Status:4-10/10  3. Pt will increase LE  FOTO score to 44 points to demonstrate increased ease with ADLs. Eval Status: FOTO: 10  4. Pt will increase L ankle DF/PF strength with MMT to 4-/5 to improve ease & safety of gait. Eval Status:2-/5    Frequency / Duration: Patient to be seen 2 times per week for 3 weeks. G-Codes (GP)  Mobility  [id number] Current  CM= 80-99%  [id number] Goal  CK= 40-59%      The severity rating is based on clinical judgment and the FOTO Score score. Certification Period: 3/8/18 - 4/6/18    Patient/ Caregiver education and instruction: Diagnosis, prognosis, self care, activity modification and exercises   [x]  Plan of care has been reviewed with JINNY Hernández, PT 3/8/2018 7:47 AM    ________________________________________________________________________    I certify that the above Therapy Services are being furnished while the patient is under my care. I agree with the treatment plan and certify that this therapy is necessary.     Physician's Signature:____________________  Date:____________Time: _________    Please sign and return to [facility] [place] [address]  [address] [place], [address] [phone] fax

## 2018-03-15 ENCOUNTER — HOSPITAL ENCOUNTER (OUTPATIENT)
Dept: PHYSICAL THERAPY | Age: 53
Discharge: HOME | End: 2018-03-15
Payer: MEDICARE

## 2018-03-15 PROCEDURE — 97032 APPL MODALITY 1+ESTIM EA 15: CPT

## 2018-03-15 PROCEDURE — 97112 NEUROMUSCULAR REEDUCATION: CPT

## 2018-03-15 PROCEDURE — 97110 THERAPEUTIC EXERCISES: CPT

## 2018-03-15 NOTE — PROGRESS NOTES
PT DAILY TREATMENT NOTE - [facility]     Patient Name: Crystal Dorman  Date:3/15/2018  : 1965  [x]  Patient  Verified  Payor: VA MEDICARE / Plan: VA MEDICARE PART A & B / Product Type: Medicare /    In time:730  Out time:818  Total Treatment Time (min): 48  Total Timed Codes (min): 38  1:1 Treatment Time ( only): 45   Visit #: 2 of 6    Treatment Area: Low back pain [M54.5]  Pain in left knee [M25.562]  Postlaminectomy syndrome, not elsewhere classified [M96.1]  Lesion of lateral popliteal nerve, left lower limb [G57.32]    SUBJECTIVE  Pain Level (0-10 scale): 5  Any medication changes, allergies to medications, adverse drug reactions, diagnosis change, or new procedure performed?: [x] No    [] Yes (see summary sheet for update)  Subjective functional status/changes:   [] No changes reported  \"the pain is in the back of the leg and I have numbness on the top of my foot. \"    OBJECTIVE    Modality rationale: increase muscle contraction/control to improve the patients ability to normalize gait. Min Type Additional Details    [] Estim:  []Unatt       []IFC  []Premod                        []Other:  []w/ice   []w/heat  Position:  Location:   8 [x] Estim: [x]Att    []TENS instruct  [x]NMES                    []Other: on: off 5:10 \", biphasic symmetrical, intensity to motor contraction within patient tolerance.   []w/US   []w/ice   []w/heat  Position:seated  Location:L anterior tibialis muscle belly    []  Traction: [] Cervical       []Lumbar                       [] Prone          []Supine                       []Intermittent   []Continuous Lbs:  [] before manual  [] after manual    []  Ultrasound: []Continuous   [] Pulsed                           []1MHz   []3MHz W/cm2:  Location:    []  Iontophoresis with dexamethasone         Location: [] Take home patch   [] In clinic    []  Ice     []  heat  []  Ice massage  []  Laser   []  Anodyne Position:  Location:    []  Laser with stim  []  Other: Position:  Location:    []  Vasopneumatic Device Pressure:       [] lo [] med [] hi   Temperature: [] lo [] med [] hi   [x] Skin assessment post-treatment:  [x]intact []redness- no adverse reaction    []redness  adverse reaction:       25 min Therapeutic Exercise:  [x] See flow sheet :   Rationale: increase ROM, increase strength and improve coordination to improve the patients ability to perform ADLs. 15 min Neuromuscular Re-education:  [x]  See flow sheet :ankle DF re-ed balance training. Rationale: increase ROM, increase strength, improve coordination, improve balance and increase proprioception  to improve the patients ability to normalize gait & balance. With   [] TE   [] TA   [] neuro   [] other: Patient Education: [x] Review HEP    [] Progressed/Changed HEP based on:   [] positioning   [] body mechanics   [] transfers   [] heat/ice application    [] other:      Other Objective/Functional Measures:      Pain Level (0-10 scale) post treatment: 5    ASSESSMENT/Changes in Function: Mr. Naresh Rogel was painful with weight bearing activities today but did tolerate NMES well with improved DF activation following treatment. Patient will continue to benefit from skilled PT services to modify and progress therapeutic interventions, address functional mobility deficits, address ROM deficits, address strength deficits, analyze and address soft tissue restrictions, analyze and cue movement patterns, analyze and modify body mechanics/ergonomics, assess and modify postural abnormalities, address imbalance/dizziness and instruct in home and community integration to attain remaining goals. []  See Plan of Care  []  See progress note/recertification  []  See Discharge Summary         Progress towards goals / Updated goals:  Short Term Goals: To be accomplished in 1 weeks:  1. Establish HEP for ROM & Strengthening. issued     Long Term Goals: To be accomplished in 3 weeks:  1.  Patient will be independent with HEP for ROM & Strengthening. Eval Status:na  2. Pt will report decrease in average pain rating on VAS to <5/10 to improve ease with daily activities. Eval Status:4-10/10  3. Pt will increase LE  FOTO score to 44 points to demonstrate increased ease with ADLs. Eval Status: FOTO: 10  4. Pt will increase L ankle DF/PF strength with MMT to 4-/5 to improve ease & safety of gait.                           Eval Status:2-/5       PLAN  []  Upgrade activities as tolerated     [x]  Continue plan of care  []  Update interventions per flow sheet       []  Discharge due to:_  []  Other:_      Lavelle Cheadle, PT 3/15/2018  7:38 AM    Future Appointments  Date Time Provider Mikhail Goodrich   3/19/2018 7:30 AM Natalio Ayers PTA [facility]   3/22/2018 7:30 AM Natalio Ayers PTA [facility]   3/26/2018 7:30 AM Lavelle Cheadle, PT [facility]   3/29/2018 7:30 AM Natalio Ayers PTA [facility]   4/10/2018 8:30 AM MD GABRIEL Kingsley CINDI SCHED   6/28/2018 8:30 AM JACK Wallace C[facility] SCHED   6/28/2018 8:45 AM Cecil Mancini MD [id number] [facility]   7/3/2018 8:15 AM Faina Mathew NP [address]

## 2018-03-19 ENCOUNTER — APPOINTMENT (OUTPATIENT)
Dept: PHYSICAL THERAPY | Age: 53
End: 2018-03-19
Payer: MEDICARE

## 2018-03-22 ENCOUNTER — HOSPITAL ENCOUNTER (OUTPATIENT)
Dept: PHYSICAL THERAPY | Age: 53
Discharge: HOME | End: 2018-03-22
Payer: MEDICARE

## 2018-03-22 PROCEDURE — 97110 THERAPEUTIC EXERCISES: CPT

## 2018-03-22 PROCEDURE — 97032 APPL MODALITY 1+ESTIM EA 15: CPT

## 2018-03-22 PROCEDURE — 97112 NEUROMUSCULAR REEDUCATION: CPT

## 2018-03-22 NOTE — PROGRESS NOTES
PT DAILY TREATMENT NOTE - [facility]     Patient Name: Crystal Dorman  Date:3/22/2018  : 1965  [x]  Patient  Verified  Payor: VA MEDICARE / Plan: VA MEDICARE PART A & B / Product Type: Medicare /    In time:732  Out time:810  Total Treatment Time (min): 38  Total Timed Codes (min): 38  1:1 Treatment Time ( only): 45   Visit #: 3 of 6    Treatment Area: Low back pain [M54.5]  Pain in left knee [M25.562]  Postlaminectomy syndrome, not elsewhere classified [M96.1]  Lesion of lateral popliteal nerve, left lower limb [G57.32]    SUBJECTIVE  Pain Level (0-10 scale): 5-6  Any medication changes, allergies to medications, adverse drug reactions, diagnosis change, or new procedure performed?: [x] No    [] Yes (see summary sheet for update)  Subjective functional status/changes:   [] No changes reported  \"I still can't feel my foot. \"    OBJECTIVE    Modality rationale: increase muscle contraction/control to improve the patients ability to normalize gait   Min Type Additional Details    [] Estim:  []Unatt       []IFC  []Premod                        []Other:  []w/ice   []w/heat  Position:  Location:   8 [x] Estim: [x]Att    []TENS instruct  [x]NMES                    []Other:  []w/US   []w/ice   []w/heat  Position: seated   Location: left anterior tibialis muscle belly    []  Traction: [] Cervical       []Lumbar                       [] Prone          []Supine                       []Intermittent   []Continuous Lbs:  [] before manual  [] after manual    []  Ultrasound: []Continuous   [] Pulsed                           []1MHz   []3MHz W/cm2:  Location:    []  Iontophoresis with dexamethasone         Location: [] Take home patch   [] In clinic    []  Ice     []  heat  []  Ice massage  []  Laser   []  Anodyne Position:  Location:    []  Laser with stim  []  Other:  Position:  Location:    []  Vasopneumatic Device Pressure:       [] lo [] med [] hi   Temperature: [] lo [] med [] hi   [x] Skin assessment post-treatment:  [x]intact []redness- no adverse reaction    []redness  adverse reaction:     15 min Therapeutic Exercise:  [x] See flow sheet :   Rationale: increase ROM and increase strength to improve the patients ability to perform ADLs    15 min Neuromuscular Re-education:  [x]  See flow sheet : ankle DF re-ed balance training   Rationale: increase ROM, increase strength, improve coordination, improve balance and increase proprioception  to improve the patients ability to normalize gait and balance        With   [x] TE   [] TA   [x] neuro   [] other: Patient Education: [x] Review HEP    [] Progressed/Changed HEP based on:   [x] positioning   [x] body mechanics   [] transfers   [] heat/ice application    [] other:      Other Objective/Functional Measures:      Pain Level (0-10 scale) post treatment: 6    ASSESSMENT/Changes in Function: Pt continues to have poor DF activation. Demonstrates an altered gait with lack of heelstrike and push off. Balance was challenged with EC and during 1/2 stance. Patient will continue to benefit from skilled PT services to modify and progress therapeutic interventions, address functional mobility deficits, address ROM deficits, address strength deficits, analyze and address soft tissue restrictions, analyze and cue movement patterns, analyze and modify body mechanics/ergonomics, assess and modify postural abnormalities, address imbalance/dizziness and instruct in home and community integration to attain remaining goals. [x]  See Plan of Care  []  See progress note/recertification  []  See Discharge Summary         Progress towards goals / Updated goals:  Short Term Goals: To be accomplished in 1 weeks:  1. Establish HEP for ROM & Strengthening. MET      Long Term Goals: To be accomplished in 3 weeks:  1. Patient will be independent with HEP for ROM & Strengthening. Eval Status:na   Reports compliance  2.  Pt will report decrease in average pain rating on VAS to <5/10 to improve ease with daily activities. Eval Status:4-10/10   No change  3. Pt will increase LE  FOTO score to 44 points to demonstrate increased ease with ADLs.     Eval Status: FOTO: 10   Assess at 6th visit  4.  Pt will increase L ankle DF/PF strength with MMT to 4-/5 to improve ease & safety of gait.     Eval Status:2-/5   No change to ntoe    PLAN  []  Upgrade activities as tolerated     [x]  Continue plan of care  []  Update interventions per flow sheet       []  Discharge due to:_  []  Other:_      Normie Councilman, PTA, [facility] 3/22/2018  8:15 AM    Future Appointments  Date Time Provider Mikhail Joleen   3/26/2018 7:30 AM Normie Councilman, PTA [facility]   3/29/2018 7:30 AM Normie Councilman, PTA [facility]   4/10/2018 8:30 AM Nate Burger MD [facility] SCHED   6/28/2018 8:30 AM JACK Bolivar CINDI SCHED   6/28/2018 8:45 AM MD Andreina Moran   7/3/2018 8:15 AM Annabel Guo, NP [address]

## 2018-03-29 ENCOUNTER — HOSPITAL ENCOUNTER (OUTPATIENT)
Dept: PHYSICAL THERAPY | Age: 53
Discharge: HOME | End: 2018-03-29
Payer: MEDICARE

## 2018-03-29 ENCOUNTER — APPOINTMENT (OUTPATIENT)
Dept: PHYSICAL THERAPY | Age: 53
End: 2018-03-29
Payer: MEDICARE

## 2018-03-29 PROCEDURE — 97112 NEUROMUSCULAR REEDUCATION: CPT

## 2018-03-29 PROCEDURE — 97032 APPL MODALITY 1+ESTIM EA 15: CPT

## 2018-03-29 PROCEDURE — 97110 THERAPEUTIC EXERCISES: CPT

## 2018-03-29 NOTE — PROGRESS NOTES
PT DAILY TREATMENT NOTE - [facility]     Patient Name: Radha Townsend  Date:3/29/2018  : 1965  [x]  Patient  Verified  Payor: VA MEDICARE / Plan: VA MEDICARE PART A & B / Product Type: Medicare /    In time:735  Out time:823  Total Treatment Time (min): 48  Total Timed Codes (min): 48  1:1 Treatment Time ([address] only): 50   Visit #: 4 of 6    Treatment Area: Low back pain [M54.5]  Pain in left knee [M25.562]  Postlaminectomy syndrome, not elsewhere classified [M96.1]  Lesion of lateral popliteal nerve, left lower limb [G57.32]    SUBJECTIVE  Pain Level (0-10 scale): 6-7  Any medication changes, allergies to medications, adverse drug reactions, diagnosis change, or new procedure performed?: [x] No    [] Yes (see summary sheet for update)  Subjective functional status/changes:   [] No changes reported  \"I still hurt. \"    OBJECTIVE    Modality rationale: increase muscle contraction/control to improve the patients ability to improve gait    Min Type Additional Details    [] Estim:  []Unatt       []IFC  []Premod                        []Other:  []w/ice   []w/heat  Position:  Location:   8 [x] Estim: [x]Att    []TENS instruct  [x]NMES                    []Other:  []w/US   []w/ice   []w/heat  Position: supine with wedge   Location: left anterior tibialis    []  Traction: [] Cervical       []Lumbar                       [] Prone          []Supine                       []Intermittent   []Continuous Lbs:  [] before manual  [] after manual    []  Ultrasound: []Continuous   [] Pulsed                           []1MHz   []3MHz W/cm2:  Location:    []  Iontophoresis with dexamethasone         Location: [] Take home patch   [] In clinic    []  Ice     []  heat  []  Ice massage  []  Laser   []  Anodyne Position:  Location:    []  Laser with stim  []  Other:  Position:  Location:    []  Vasopneumatic Device Pressure:       [] lo [] med [] hi   Temperature: [] lo [] med [] hi   [] Skin assessment post-treatment:  []intact []redness- no adverse reaction    []redness  adverse reaction:     15 min Therapeutic Exercise:  [x] See flow sheet :   Rationale: increase ROM and increase strength to improve the patients ability to perform ADLs    25 min Neuromuscular Re-education:  [x]  See flow sheet : ankle stabilization activities    Rationale: increase ROM, increase strength, improve coordination, improve balance and increase proprioception  to improve the patients ability to improve mobility, stance stability, and gait         With   [x] TE   [] TA   [x] neuro   [] other: Patient Education: [x] Review HEP    [] Progressed/Changed HEP based on:   [x] positioning   [x] body mechanics   [] transfers   [] heat/ice application    [] other:      Other Objective/Functional Measures:      Pain Level (0-10 scale) post treatment: 6    ASSESSMENT/Changes in Function: Pt continues to have pain and numbness in his left foot/ankle. Needs quite a bit of cuing for excessive UE use in the parallel bars. He was unable to  any marbles in a two minute period. Patient will continue to benefit from skilled PT services to modify and progress therapeutic interventions, address functional mobility deficits, address ROM deficits, address strength deficits, analyze and address soft tissue restrictions, analyze and cue movement patterns, analyze and modify body mechanics/ergonomics, assess and modify postural abnormalities, address imbalance/dizziness and instruct in home and community integration to attain remaining goals. [x]  See Plan of Care  []  See progress note/recertification  []  See Discharge Summary         Progress towards goals / Updated goals:  Short Term Goals: To be accomplished in 1 weeks:  1. Establish HEP for ROM & Strengthening. MET      Long Term Goals: To be accomplished in 3 weeks:  1. Patient will be independent with HEP for ROM & Strengthening. Eval Status:na   Reports compliance  2.  Pt will report decrease in average pain rating on VAS to <5/10 to improve ease with daily activities. Eval Status:4-10/10   No change  3. Pt will increase LE  FOTO score to 44 points to demonstrate increased ease with ADLs.     Eval Status: FOTO: 10   Assess at 6th visit  4.  Pt will increase L ankle DF/PF strength with MMT to 4-/5 to improve ease & safety of gait.     Eval Status:2-/5   No change to note    PLAN  []  Upgrade activities as tolerated     [x]  Continue plan of care  []  Update interventions per flow sheet       []  Discharge due to:_  []  Other:_      Crystal Fetch, PTA, CSCS 3/29/2018  8:34 AM    Future Appointments  Date Time Provider Mikhail Joleen   4/2/2018 7:30 AM Crystal Fetch, PTA [facility]   4/4/2018 8:30 AM Nico Dillon MD [place] [address]   4/5/2018 7:30 AM Radha Seen, PT [facility]   4/9/2018 7:30 AM Crystal Fetch, PTA [facility]   4/10/2018 8:30 AM [facility]   4/12/2018 7:30 AM Radha Seen, PT [facility]   6/28/2018 8:30 AM [facility]on[facility] CIND[facility] SCHED   6/28/2018 8:45 AM MD Andreina Collins   7/3/2018 8:15 AM [place] [facility]

## 2018-04-02 ENCOUNTER — HOSPITAL ENCOUNTER (OUTPATIENT)
Dept: PHYSICAL THERAPY | Age: 53
Discharge: HOME | End: 2018-04-02
Payer: MEDICARE

## 2018-04-02 PROCEDURE — 97112 NEUROMUSCULAR REEDUCATION: CPT

## 2018-04-02 PROCEDURE — 97032 APPL MODALITY 1+ESTIM EA 15: CPT

## 2018-04-02 PROCEDURE — 97110 THERAPEUTIC EXERCISES: CPT

## 2018-04-02 NOTE — PROGRESS NOTES
PT DAILY TREATMENT NOTE - [facility]     Patient Name: Kriss Stoll  Date:2018  : 1965  [x]  Patient  Verified  Payor: [place] MEDICARE / Plan: [place] MEDICARE PART A & B / Product Type: Medicare /    In time:732  Out time:816  Total Treatment Time (min): 44  Total Timed Codes (min): 44  1:1 Treatment Time ( only): 40   Visit #: 5 of 6    Treatment Area: Low back pain [M54.5]  Pain in left knee [M25.562]  Postlaminectomy syndrome, not elsewhere classified [M96.1]  Lesion of lateral popliteal nerve, left lower limb [G57.32]    SUBJECTIVE  Pain Level (0-10 scale): 4  Any medication changes, allergies to medications, adverse drug reactions, diagnosis change, or new procedure performed?: [x] No    [] Yes (see summary sheet for update)  Subjective functional status/changes:   [] No changes reported  \"I can feel a little bit in my foot this morning which is the most I have felt there in a while. \"    OBJECTIVE    Modality rationale: increase muscle contraction/control to improve the patients ability to improve foot clearance for gait   Min Type Additional Details    [] Estim:  []Unatt       []IFC  []Premod                        []Other:  []w/ice   []w/heat  Position:  Location:   8 [x] Estim: [x]Att    []TENS instruct  [x]NMES                    []Other:  []w/US   []w/ice   []w/heat  Position: seated   Location: left anterior tibialis muscle belly     []  Traction: [] Cervical       []Lumbar                       [] Prone          []Supine                       []Intermittent   []Continuous Lbs:  [] before manual  [] after manual    []  Ultrasound: []Continuous   [] Pulsed                           []1MHz   []3MHz W/cm2:  Location:    []  Iontophoresis with dexamethasone         Location: [] Take home patch   [] In clinic    []  Ice     []  heat  []  Ice massage  []  Laser   []  Anodyne Position:  Location:    []  Laser with stim  []  Other:  Position:  Location:    []  Vasopneumatic Device Pressure:       [] lo [] med [] hi   Temperature: [] lo [] med [] hi   [x] Skin assessment post-treatment:  [x]intact []redness- no adverse reaction    []redness  adverse reaction:     10 min Therapeutic Exercise:  [x] See flow sheet :   Rationale: increase ROM and increase strength to improve the patients ability to perform ADLs    26 min Neuromuscular Re-education:  [x]  See flow sheet : ankle stabilization activities    Rationale: increase ROM, increase strength, improve coordination, improve balance and increase proprioception  to improve the patients ability to improve mobility, stance stability, and gait         With   [x] TE   [] TA   [x] neuro   [] other: Patient Education: [x] Review HEP    [] Progressed/Changed HEP based on:   [x] positioning   [x] body mechanics   [] transfers   [] heat/ice application    [] other:      Other Objective/Functional Measures:      Pain Level (0-10 scale) post treatment: 4    ASSESSMENT/Changes in Function: Pt reports improved feeling in distal 1/4 of his foot this morning. He was able to  one marble today compared to none at his last visit. Balance was improved on an unstable surface. Pt starting to see some improvements with feeling and pain relief. Pt to be reassessed at his NV. Patient will continue to benefit from skilled PT services to modify and progress therapeutic interventions, address functional mobility deficits, address ROM deficits, address strength deficits, analyze and address soft tissue restrictions, analyze and cue movement patterns, analyze and modify body mechanics/ergonomics, assess and modify postural abnormalities, address imbalance/dizziness and instruct in home and community integration to attain remaining goals. [x]  See Plan of Care  []  See progress note/recertification  []  See Discharge Summary         Progress towards goals / Updated goals:  Short Term Goals: To be accomplished in 1 weeks:  1. Establish HEP for ROM & Strengthening.    MET      Long Term Goals: To be accomplished in 3 weeks:  1. Patient will be independent with HEP for ROM & Strengthening. Eval Status:na   Reports compliance  2. Pt will report decrease in average pain rating on VAS to <5/10 to improve ease with daily activities. Eval Status:4-10/10   No change  3. Pt will increase LE  FOTO score to 44 points to demonstrate increased ease with ADLs.     Eval Status: FOTO: 10   Assess at 6th visit  4.  Pt will increase L ankle DF/PF strength with MMT to 4-/5 to improve ease & safety of gait.     Eval Status:2-/5   No change to note    PLAN  []  Upgrade activities as tolerated     [x]  Continue plan of care  []  Update interventions per flow sheet       []  Discharge due to:_  []  Other:_      Kekena Cervantes, PTA, CSCS 4/2/2018  8:17 AM    Future Appointments  Date Time Provider Mikhail Goodrich   4/4/2018 8:30 AM Lady Tari MD [address]   4/5/2018 7:30 AM Bina Georges, PT [facility]   4/9/2018 7:30 AM Keke Cervantes PTA [facility]   4/10/2018 8:30 AM MD GABRIEL Casillas CINDI SCHED   4/12/2018 7:30 AM Bina Georges, PT [facility]   6/28/2018 8:30 AM JACK Lauraing CINDI SCHED   6/28/2018 8:45 AM MD Andreina Lao   7/3/2018 8:15 AM [place] Seals

## 2018-04-04 ENCOUNTER — OFFICE VISIT (OUTPATIENT)
Dept: ORTHOPEDIC SURGERY | Age: 53
End: 2018-04-04

## 2018-04-04 VITALS
BODY MASS INDEX: 26 KG/M2 | HEART RATE: 60 BPM | HEIGHT: 70 IN | OXYGEN SATURATION: 95 % | TEMPERATURE: 96.2 F | RESPIRATION RATE: 16 BRPM | DIASTOLIC BLOOD PRESSURE: 65 MMHG | SYSTOLIC BLOOD PRESSURE: 116 MMHG | WEIGHT: 181.6 LBS

## 2018-04-04 DIAGNOSIS — G57.32 PERONEAL NEURITIS, LEFT: Primary | ICD-10-CM

## 2018-04-04 DIAGNOSIS — M21.372 LEFT FOOT DROP: ICD-10-CM

## 2018-04-04 NOTE — MR AVS SNAPSHOT
[address], [address] 200 [facility] 
[phone] Patient: Flip Howell MRN: [id number] :1965 Visit Information Date & Time Provider Department Dept. Phone Encounter #  
 2018  8:30 AM Shanel Irene, [address] [facility] [phone] Your Appointments 4/10/2018  8:30 AM  
Follow Up with Lokesh Mccord MD  
[facility] - SPECIALTY [facility] ([address] [place] [address] Road) Appt Note: 6 mo follow up  lower back/pat request Dr Jesica Lopez not NP; peroneal neuropathy 6wk fu after physical therapy at [facility].  
  [facility] 350 [facility]  
  
    
 2018  8:30 AM  
XRAY Visit with Roberto Gould [facility] ([address]) Appt Note: KUB  
 [address] [facility] 3828 [facility] [address]  
  
    
 7/3/2018  8:15 AM  
ROUTINE CARE with Crystal Nelson NP [facility] ([address]) Appt Note: chronic disease routine care- 30 min [address] [id number] [address]  
  
   
 [address] 700 [facility]  
  
    
  
 2018  8:45 AM  
Any with Jodeane Apley, MD  
[facility] ([address] [place] [address]) Appt Note: 6 mo fu KUB same day 709 [facility] [address]  
  
   
 709 [facility] [address] [address] [address] Upcoming Health Maintenance Date Due DTaP/Tdap/Td series (1 - Tdap) 2011 Prostate-Specific Antigen 2018 MEDICARE YEARLY EXAM 1/3/2019 COLONOSCOPY 2021 Allergies as of 2018  Review Complete On: 2018 By: Shanel Irene MD  
  
 Severity Noted Reaction Type Reactions Penicillins High 10/14/2012    Angioedema Penicillins High 05/15/2013    Anaphylaxis Vicodin [Hydrocodone-acetaminophen] High 10/14/2012    Nausea and Vomiting Azithromycin  06/02/2016    Nausea and Vomiting Hydrochlorothiazide  12/13/2017   Systemic Nausea Only Dizzy, lightheaded, blisters on his legs Vicodin [Hydrocodone-acetaminophen]  05/15/2013    Nausea and Vomiting Erythromycin Low 07/12/2016    Other (comments) Current Immunizations  Reviewed on 2/2/2017 Name Date Influenza Vaccine (Quad) PF 9/5/2017, 11/17/2016 Novel Influenza-H1N1-09, All Formulations 5/14/2010 12:00 AM  
 Pneumococcal Polysaccharide (PPSV-23) 2/2/2017 Td 1/1/2011 Not reviewed this visit You Were Diagnosed With   
  
 Codes Comments Peroneal neuritis, left    -  Primary ICD-10-CM: G58.8 ICD-9-CM: 242. 3 Left foot drop     ICD-10-CM: E67.409 ICD-9-CM: 736.79 Vitals BP Pulse Temp Resp Height(growth percentile) Weight(growth percentile) 116/65 60 96.2 °F (35.7 °C) (Oral) 16 5' 10\" (1.778 m) 181 lb 9.6 oz (82.4 kg) SpO2 BMI Smoking Status 95% 26.06 kg/m2 Former Smoker BMI and BSA Data Body Mass Index Body Surface Area 26.06 kg/m 2 2.02 m 2 Preferred Pharmacy Pharmacy Name Phone [address] [phone] Your Updated Medication List  
  
   
This list is accurate as of 4/4/18  9:20 AM.  Always use your most recent med list.  
  
  
  
  
 albuterol 90 mcg/actuation inhaler Commonly known as:  PROVENTIL HFA, VENTOLIN HFA, PROAIR HFA Take 2 Puffs by inhalation every six (6) hours as needed for Wheezing or Shortness of Breath. ammonium lactate 12 % topical cream  
Commonly known as:  AMLACTIN Apply  to affected area two (2) times a day. rub in to affected area well  
  
 aspirin 81 mg chewable tablet Take 81 mg by mouth daily. budesonide-formoterol 160-4.5 mcg/actuation Hfaa Commonly known as:  SYMBICORT Take 1 Puff by inhalation two (2) times a day. cyclobenzaprine 10 mg tablet Commonly known as:  FLEXERIL Take 1 Tab by mouth three (3) times daily as needed for Muscle Spasm(s). gabapentin 800 mg tablet Commonly known as:  NEURONTIN Take 1 Tab by mouth three (3) times daily. pantoprazole 40 mg tablet Commonly known as:  PROTONIX  
  
 raNITIdine 150 mg tablet Commonly known as:  ZANTAC Take 1 Tab by mouth every twelve (12) hours as needed for Indigestion. triamcinolone acetonide 0.1 % ointment Commonly known as:  KENALOG Apply  to affected area two (2) times a day. use thin layer We Performed the Following AMB SUPPLY ORDER [[id number] Custom] Comments:  
 Dorsiflexion assisted Hinged AFO brace REFERRAL TO PHYSICAL THERAPY [IPJ26 Custom] Comments:  
 Evaluation and treatment of left foot drop and numbness. Please treat two to three times a week for four weeks. Please utilize the following: low frequency US of the LLE, and gentle massaging proximal anterior compartment following peroneal nerve along lateral left knee. Nate Lute To-Do List   
 04/05/2018 7:30 AM  
  Appointment with Agustina Sandhu PTA at [facility] PT [address] [place] [address] ([phone])  
  
 04/09/2018 7:30 AM  
  Appointment with Agustina Sandhu PTA at [facility] PT [address] [place] [address] ([phone])  
  
 04/12/2018 7:30 AM  
  Appointment with Soto Sow PT at [facility] PT [address] [place] [address] ([phone]) Referral Information Referral ID Referred By Referred To  
  
 [id number] EDUARDO VIDAL [address]   
   4888 [facility] [address], 9392 [facility] Phone: [phone] Fax: [phone] Visits Status Start Date End Date 1 New Request 4/4/18 4/4/19 If your referral has a status of pending review or denied, additional information will be sent to support the outcome of this decision. Patient Instructions Please follow up in 6 weeks. You are advised to contact us if your condition worsens. You have been provided with an order for durable medical equipment that you may  at an outside facility as our office does not carry the equipment you need. You may pick it up at any medical supply company you like. Listed below are a few different locations for your convenience: 
 
 
Dorsiflexion Assisted Hinged AFO brace [facility] 2[address] [facility], [address] [facility] [address] Phone: [phone] [address] [place], [address] [facility] Phone: [phone] [address] Phone: [phone] [place]:  
[address] [place], [address] [place]: 
[address] [place], [address] [place]: 
1600 [facility], [address] Sciatica: Exercises Your Care Instructions Here are some examples of typical rehabilitation exercises for your condition. Start each exercise slowly. Ease off the exercise if you start to have pain. Your doctor or physical therapist will tell you when you can start these exercises and which ones will work best for you. When you are not being active, find a comfortable position for rest. Some people are comfortable on the floor or a medium-firm bed with a small pillow under their head and another under their knees. Some people prefer to lie on their side with a pillow between their knees. Don't stay in one position for too long. Take short walks (10 to 20 minutes) every 2 to 3 hours. Avoid slopes, hills, and stairs until you feel better. Walk only distances you can manage without pain, especially leg pain. How to do the exercises Back stretches 1. Get down on your hands and knees on the floor. 2. Relax your head and allow it to droop. Round your back up toward the ceiling until you feel a nice stretch in your upper, middle, and lower back. Hold this stretch for as long as it feels comfortable, or about 15 to 30 seconds. 3. Return to the starting position with a flat back while you are on your hands and knees. 4. Let your back sway by pressing your stomach toward the floor. Lift your buttocks toward the ceiling. 5. Hold this position for 15 to 30 seconds. 6. Repeat 2 to 4 times. Follow-up care is a key part of your treatment and safety. Be sure to make and go to all appointments, and call your doctor if you are having problems. It's also a good idea to know your test results and keep a list of the medicines you take. Where can you learn more? Go to http://lola-conenr.info/. Enter F503 in the search box to learn more about \"Sciatica: Exercises. \" Current as of: March 21, 2017 Content Version: 11.4 © 3813-9647 [facility]. Care instructions adapted under license by [employer] (which disclaims liability or warranty for this information). If you have questions about a medical condition or this instruction, always ask your healthcare professional. [address] any warranty or liability for your use of this information. Please provide this summary of care documentation to your next provider. Your primary care clinician is listed as Valeen Signs. If you have any questions after today's visit, please call [phone].

## 2018-04-04 NOTE — PATIENT INSTRUCTIONS
Please follow up in 6 weeks. You are advised to contact us if your condition worsens. You have been provided with an order for durable medical equipment that you may  at an outside facility as our office does not carry the equipment you need. You may pick it up at any medical supply company you like. Listed below are a few different locations for your convenience:      Dorsiflexion Assisted Hinged AFO brace    [facility]  [address] [place] [address], [address] [facility] [address]  Phone: [phone]    [address] [place], [address] [facility]  Phone: [phone] Prosthetics  Phone: [phone]  [place]:   [address] [facility] [address] [place]  [place], [address] [place]/[facility]:  [address], [address]  [place]:  [facility] [place], [address]    Sciatica: Exercises  Your Care Instructions  Here are some examples of typical rehabilitation exercises for your condition. Start each exercise slowly. Ease off the exercise if you start to have pain. Your doctor or physical therapist will tell you when you can start these exercises and which ones will work best for you. When you are not being active, find a comfortable position for rest. Some people are comfortable on the floor or a medium-firm bed with a small pillow under their head and another under their knees. Some people prefer to lie on their side with a pillow between their knees. Don't stay in one position for too long. Take short walks (10 to 20 minutes) every 2 to 3 hours. Avoid slopes, hills, and stairs until you feel better. Walk only distances you can manage without pain, especially leg pain. How to do the exercises  Back stretches    1. Get down on your hands and knees on the floor. 2. Relax your head and allow it to droop. Round your back up toward the ceiling until you feel a nice stretch in your upper, middle, and lower back.  Hold this stretch for as long as it feels comfortable, or about 15 to 30 seconds. 3. Return to the starting position with a flat back while you are on your hands and knees. 4. Let your back sway by pressing your stomach toward the floor. Lift your buttocks toward the ceiling. 5. Hold this position for 15 to 30 seconds. 6. Repeat 2 to 4 times. Follow-up care is a key part of your treatment and safety. Be sure to make and go to all appointments, and call your doctor if you are having problems. It's also a good idea to know your test results and keep a list of the medicines you take. Where can you learn more? Go to http://lola-conner.info/. Enter Y332 in the search box to learn more about \"Sciatica: Exercises. \"  Current as of: March 21, 2017  Content Version: 11.4  © 9361-3814 [employer]. Care instructions adapted under license by [employer] (which disclaims liability or warranty for this information). If you have questions about a medical condition or this instruction, always ask your healthcare professional. [place] [address] any warranty or liability for your use of this information.

## 2018-04-04 NOTE — PROGRESS NOTES
AMBULATORY PROGRESS NOTE      Patient: Tika Gonzalez             MRN: [id number]     SSN: [id number] Body mass index is 26.06 kg/(m^2). YOB: 1965     AGE: 46 y.o. EX: male    PCP: Jacques Moctezuma NP    IMPRESSION/DIAGNOSIS AND TREATMENT PLAN     DIAGNOSES  1. Peroneal neuritis, left    2. Left foot drop        Orders Placed This Encounter    AMB SUPPLY ORDER    REFERRAL TO PHYSICAL THERAPY      Tika Gonzalez understands his diagnoses and the proposed plan. I had a lengthy discussion with this gentleman. He has had three back surgeries, has persistent peroneal neuritis, and has a history of what he describes as a foot slap when he walks. He again has numbness on the bottom of his foot. My impression is L4, L5, and S1 neuritis, for which he has profound numbness on the plantar part of his foot, sole of his foot, in the S1 nerve distribution. He still has 4+/5 ankle dorsiflexion and 5/5 plantar flexion, 4+ to 5/5 inversion and 5/5 eversion. I could not get him to slap his foot when he walked today, however. Recommendation is for a hinged dorsiflexion assisted AFO-type brace. I did talk to him about surgical intervention. His EMG nerve conduction study by Dr. Aileen Roberts on February 15, 2018, confirms the following: suggestion of peroneal neuropathy of the left lower leg with chronic L5-S1 radiculopathy, although no significant findings are noted except H-reflex latency. I had a lengthy discussion with him. Options would be nonoperative initially and then probably a release of the nerve at the knee. I cannot guarantee that his symptoms will be better, i.e. Symptoms meaning the numbness to his foot. I cannot guarantee that what he describes as a foot slap would be better. I do not think he needs any type of tendon transfers at this point in time. It is to be noted that he had x-rays of the left ankle and left foot. Each of these studies were performed on February 15, 2018.  These studies show mild soft tissue swelling to the ankle. No acute fracture, subluxation or dislocation seen on the x-rays. Foot films showed no fracture, subluxation or dislocation. There are some minimal degenerative changes across the first MTP joint, however. Plan:    1) DME Order: Dorsiflexion Assisted Hinged AFO brace  2) Referral for Physical Therapy: low frequency US of the LLE, and gentle massaging proximal anterior compartment following peroneal nerve along lateral left knee. RTO - 6 weeks    HPI AND EXAMINATION     Olayinka Miles IS A 46 y.o. male who presents to my outpatient office complaining of left foot pain. Patient reports that he can experience numbness along his left foot intermittently. At this time, he reports his foot is numb. Denies falls, twists, or trauma. Patient notes a h/o back surgeries x 3 for herniated discs. He states the surgery helped, and he is followed by Dr. Sofie Siemens who last did an epidural injection several weeks ago. Additionally, he is in physical therapy for a pinched nerve of his left knee. Patient currently takes Neurontin. His area of numbness follows the L5-S1 dermatomal distribution. Denies h/o gout. He can experience intermittent burning and tingling of the left knee, but it is not as persistent as the left foot numbness and discomfort. Of note, he has intermittent left foot drop and falls. Patient was admitted in December of last year for Cellulitis of the RLE. Olayinka Miles is alert/oriented (name, location, time) and follows commands well. he  is in no acute distress and his affect and mood are appropriate. Left ANKLE and FOOT       Gait: slow  Tenderness: no tenderness to palpation at this time. Cutaneous: No rashes, skin patches, wounds, or abrasions to the lower legs           Warm and Normal color. No regions of expressible drainage.            Medial Border of Tibia Region: absent           Skin color, texture, turgor normal. Normal.    The foot was quite odiferous  Joint Motion: ROM Ankle:Normal , Hindfoot: (ST,TN,CC Normal}, Forefoot toes:Normal  Neurologic Exam: Neuro: Motor: normal 5/5 strength in all tested muscle groups             Sensory : no sensation along L5-S1 dermatomal distribution. Contractures: Gastrocnemius or Achilles Contractures absent. Joint / Tendon Stability: No Ankle or Subtalar instability or joint laxity. No peroneal sublux ability or dislocation. Alignment:  Normal Foot Alignment  Vascular: Normal Pulses/ NL Capillary refill, No evidence of DVT seen on physical exam.   No calf swelling, no tenderness to calf muscles. Lymphatic:  No Evidence of Lymphedema. CHART REVIEW     Past Medical History:   Diagnosis Date    Arthritis of knee, right 12/2017    advanced degen changes noted, CT right LE    Asthma     Cellulitis 12/2017    DDD (degenerative disc disease), lumbar 2013    noted on MRI with annular tears    Diverticulitis 2016    GERD (gastroesophageal reflux disease) 2016    with esophagitis according to endscopy    Kidney stone     Lumbar post-laminectomy syndrome     Sacroiliitis ([facility] [address]     Stroke [facility]) may 14 2010    Unspecified rotator cuff tear or rupture of left shoulder, not specified as traumatic 03/2016     Current Outpatient Prescriptions   Medication Sig    gabapentin (NEURONTIN) 800 mg tablet Take 1 Tab by mouth three (3) times daily.  budesonide-formoterol (SYMBICORT) 160-4.5 mcg/actuation HFAA Take 1 Puff by inhalation two (2) times a day.  albuterol (PROVENTIL HFA, VENTOLIN HFA, PROAIR HFA) 90 mcg/actuation inhaler Take 2 Puffs by inhalation every six (6) hours as needed for Wheezing or Shortness of Breath.  raNITIdine (ZANTAC) 150 mg tablet Take 1 Tab by mouth every twelve (12) hours as needed for Indigestion.  cyclobenzaprine (FLEXERIL) 10 mg tablet Take 1 Tab by mouth three (3) times daily as needed for Muscle Spasm(s).     aspirin 81 mg chewable tablet Take 81 mg by mouth daily.  pantoprazole (PROTONIX) 40 mg tablet     ammonium lactate (AMLACTIN) 12 % topical cream Apply  to affected area two (2) times a day. rub in to affected area well    triamcinolone acetonide (KENALOG) 0.1 % ointment Apply  to affected area two (2) times a day. use thin layer     No current facility-administered medications for this visit. Allergies   Allergen Reactions    Penicillins Angioedema    Penicillins Anaphylaxis    Vicodin [Hydrocodone-Acetaminophen] Nausea and Vomiting    Azithromycin Nausea and Vomiting    Hydrochlorothiazide Nausea Only     Dizzy, lightheaded, blisters on his legs    Vicodin [Hydrocodone-Acetaminophen] Nausea and Vomiting    Erythromycin Other (comments)     Past Surgical History:   Procedure Laterality Date    COLONOSCOPY N/A 8/12/2016    COLONOSCOPY with polypectomy performed by Taye Sky MD at [address] [place] [address]  2002, 2004, 2006    L5-S1 fusion, laminectomies    HX ENDOSCOPY  10/2016    grade 1 espohagitis    HX ENDOSCOPY  11/2016    mild esophagitis    HX KNEE ARTHROSCOPY Right     knee    HX MOHS PROCEDURES      right    HX ORTHOPAEDIC      right shoulder     Social History     Occupational History    Not on file. Social History Main Topics    Smoking status: Former Smoker     Packs/day: 2.00     Types: Cigarettes     Quit date: 07/2016    Smokeless tobacco: Never Used    Alcohol use No    Drug use: No    Sexual activity: Yes     Partners: Female     Family History   Problem Relation Age of Onset    Other Brother     Cancer Maternal Grandmother     No Known Problems Mother     No Known Problems Father        REVIEW OF SYSTEMS : 4/4/2018  ALL BELOW ARE Negative except : SEE HPI       Constitutional: Negative for fever, chills and weight loss. Neg Weigh Loss  Cardiovascular: Negative for chest pain, claudication and leg swelling.  SOB, VILLA   Gastrointestinal: Negative for  pain, N/V/D/C, Blood in stool or urine,dysuria, hematuria,        Incontinence, pelvic pain  Musculoskeletal: see HPI. Neurological: Negative for dizziness and weakness. Negative for headaches,Visual Changes, Confusion, Seizures,   Psychiatric/Behavioral: Negative for depression, memory loss and substance abuse. Extremities:  Negative for  hair changes, rash or skin lesion changes. Hematologic: Negative for Bleeding problems, bruising, pallor or swollen lymph nodes. Peripheral Vascular: No calf pain, vascular vein tenderness to calf pain              No calf throbbing, posterior knee throbbing pain    DIAGNOSTIC IMAGING     No notes on file    Written by Jackson Wynn, as dictated by Imelda Kelly MD. I, , Imelda Kelly MD, confirm that all documentation is accurate.

## 2018-04-05 ENCOUNTER — HOSPITAL ENCOUNTER (OUTPATIENT)
Dept: PHYSICAL THERAPY | Age: 53
Discharge: HOME | End: 2018-04-05
Payer: MEDICARE

## 2018-04-05 PROCEDURE — 97140 MANUAL THERAPY 1/> REGIONS: CPT

## 2018-04-05 PROCEDURE — G8979 MOBILITY GOAL STATUS: HCPCS

## 2018-04-05 PROCEDURE — G8978 MOBILITY CURRENT STATUS: HCPCS

## 2018-04-05 PROCEDURE — 97110 THERAPEUTIC EXERCISES: CPT

## 2018-04-05 PROCEDURE — 97035 APP MDLTY 1+ULTRASOUND EA 15: CPT

## 2018-04-05 NOTE — PROGRESS NOTES
PT DAILY TREATMENT NOTE 8    Patient Name: Jaspal Reddy  Date:2018  : 1965  [x]  Patient  Verified  Payor: [place] MEDICARE / Plan: [place] MEDICARE PART A & B / Product Type: Medicare /    In time:730  Out time:820  Total Treatment Time (min): 50  Total Timed Codes (min): 40  1:1 Treatment Time ( only): 40   Visit #: 6 of 6    Treatment Area: Low back pain [M54.5]  Pain in left knee [M25.562]  Postlaminectomy syndrome, not elsewhere classified [M96.1]  Lesion of lateral popliteal nerve, left lower limb [G57.32]    SUBJECTIVE  Pain Level (0-10 scale): 4  Any medication changes, allergies to medications, adverse drug reactions, diagnosis change, or new procedure performed?: [x] No    [] Yes (see summary sheet for update)  Subjective functional status/changes:   [x] See Eval form in paper chart     OBJECTIVE      22 min Therapeutic Exercise:  [x] See flow sheet :   Rationale: increase ROM, increase strength and improve coordination to improve the patients ability to perform ADLs. 8 min Manual Therapy:  STM to left anterior calf musculature   Rationale: decrease pain, increase ROM, increase tissue extensibility and decrease trigger points to improve ease with gait. Modality rationale: decrease inflammation, decrease pain and increase tissue extensibility to improve the patients ability to perform ADLs.     Min Type Additional Details    [] Estim:  []Unatt       []IFC  []Premod                        []Other:  []w/ice   []w/heat  Position:  Location:    [] Estim: []Att    []TENS instruct  []NMES                    []Other:  []w/US   []w/ice   []w/heat  Position:  Location:    []  Traction: [] Cervical       []Lumbar                       [] Prone          []Supine                       []Intermittent   []Continuous Lbs:  [] before manual  [] after manual   8 [x]  Ultrasound: [x]Continuous   [] Pulsed                           [x]1MHz   []3MHz Location:left perfoneal musculature  W/cm2:1.0    [] Iontophoresis with dexamethasone         Location: [] Take home patch   [] In clinic    []  Ice     []  heat  []  Ice massage  []  Laser   []  Anodyne Position:  Location:    []  Laser with stim  []  Other: Position:  Location:    []  Vasopneumatic Device Pressure:       [] lo [] med [] hi   Temperature: [] lo [] med [] hi   [x] Skin assessment post-treatment:  [x]intact []redness- no adverse reaction    []redness  adverse reaction:             With   [] TE   [] TA   [] neuro   [] other: Patient Education: [x] Review HEP    [] Progressed/Changed HEP based on:   [] positioning   [] body mechanics   [] transfers   [] heat/ice application    [] other:           Pain Level (0-10 scale) post treatment: 4    ASSESSMENT:   [x]  See Evaluation          Goals:  Goals for this certification period to be accomplished in 4 weeks:  1. Pt will report decrease in average pain rating on VAS to <3/10 to improve ease with daily activities. recert status: 3/09  2. Pt will increase LE  FOTO score to 44 points to demonstrate increased ease with ADLs.                          recert XNDLII::77  3.  Pt will increase walking tolerance to >30 min to improve ease with daily mobility.                          recert GHNMAS:95-97 min    PLAN      [x]  Continue plan of care           Lele Thapa, PT 4/5/2018  7:34 AM

## 2018-04-05 NOTE — PROGRESS NOTES
[facility] [address], [address] [phone] fax    Continued Plan of Care/ Re-certification for Physical Therapy Services    Patient name: Rober Jorge Start of Care: 3/8/2018   Referral source: Vijaya Rasheed MD : 1965                          Medical Diagnosis: Low back pain [M54.5]  Pain in left knee [M25.562]  Postlaminectomy syndrome, not elsewhere classified [M96.1]  Lesion of lateral popliteal nerve, left lower limb [G57.32] Onset Date:3 months                          Treatment Diagnosis: back pain   Prior Hospitalization: see medical history Provider#: [id number]   Medications: Verified on Patient summary List    Comorbidities: chronic back pain, per EMR hx of CVA   Prior Level of Function: retired log , 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 Independent with ADLs, has been limited with walking tolerance due to back pain for 10 + years    Visits from Start of Care: 6    Missed Visits: 2    The Plan of Care and following information is based on the patient's current status:  Short Term Goals: To be accomplished in 1 weeks:  1. Establish HEP for ROM & Strengthening. MET      Long Term Goals: To be accomplished in 3 weeks:  1. Patient will be independent with HEP for ROM & Strengthening. Eval Status:na                       MET:  Reports compliance  2. Pt will report decrease in average pain rating on VAS to <5/10 to improve ease with daily activities. Eval Status:4-10/10                       MET: 10  3. Pt will increase LE  FOTO score to 44 points to demonstrate increased ease with ADLs.                          Eval Status: FOTO: 10                        PROGRESSIN  4.  Pt will increase L ankle DF/PF strength with MMT to 4-/5 to improve ease & safety of gait.                          Eval Status:2-/5 MET:4-/5     Key functional changes:   Functional Gains: more mobility, better muscle activation  Functional Deficits: prolonged walking > 20 min  % improvement: 25%  Pain   Average: 4/10       Best: 4/10     Worst: 6-7/10  Patient Goal: \"walk better. \"        Problems/ barriers to goal attainment: weakness, altered sensation    Problem List: pain affecting function, decrease ROM, decrease strength, edema affecting function, impaired gait/ balance, decrease ADL/ functional abilitiies, decrease activity tolerance, decrease flexibility/ joint mobility and decrease transfer abilities    Treatment Plan: Therapeutic exercise, Therapeutic activities, Neuromuscular re-education, Physical agent/modality, Gait/balance training, Manual therapy, Aquatic therapy, Patient education, Self Care training, Functional mobility training, Home safety training and Stair training     Goals for this certification period to be accomplished in 4 weeks:  1. Pt will report decrease in average pain rating on VAS to <3/10 to improve ease with daily activities. recert status: 4/39  2. Pt will increase LE  FOTO score to 44 points to demonstrate increased ease with ADLs.                          recert DCCPDW::69  3. Pt will increase walking tolerance to >30 min to improve ease with daily mobility.                          recert GHIIDL:83-59 min    Frequency / Duration: Patient to be seen 2 times per week for 4 weeks:    Assessment / Recommendations:Mr. Audelia Judd has responded well to PT for improved pain, improved, muscle activation and improved sensation of the left foot. He does continue to c/o foot drop with prolonged walking, as well as diminished sensation to ligth touch. Pt has f/u with Dr. Abdi Matos for the foot, who has recommended he transition to US and massage of anterior calf muscle compartment. PT will f/u with Dr. Monica Ramirez' office to discuss plan of care .  Recommend continued PT to address remaining functional limitations. G-Codes (GP)  Mobility  [id number] Current  CL= 60-79%  [id number] Goal  CK= 40-59%    The severity rating is based on clinical judgment and the FOTO score. Certification Period: 4/5/18 - 5/3/18    Jose Campos, PT 4/5/2018 7:32 AM    ________________________________________________________________________  I certify that the above Therapy Services are being furnished while the patient is under my care. I agree with the treatment plan and certify that this therapy is necessary. [] I have read the above and request that my patient continue as recommended.   [] I have read the above report and request that my patient continue therapy with the following changes/special instructions: _____________________________________________  [] I have read the above report and request that my patient be discharged from therapy    Physician's Signature:____________________________________Date:___________Time:__________    Please sign and return to [facility] [address], [address]  [phone] fax

## 2018-04-09 ENCOUNTER — HOSPITAL ENCOUNTER (OUTPATIENT)
Dept: PHYSICAL THERAPY | Age: 53
Discharge: HOME | End: 2018-04-09
Payer: MEDICARE

## 2018-04-09 PROCEDURE — 97035 APP MDLTY 1+ULTRASOUND EA 15: CPT

## 2018-04-09 PROCEDURE — 97110 THERAPEUTIC EXERCISES: CPT

## 2018-04-09 NOTE — PROGRESS NOTES
PT DAILY TREATMENT NOTE - [facility]     Patient Name: Andrew Wynn  Date:2018  : 1965  [x]  Patient  Verified  Payor: VA MEDICARE / Plan: VA MEDICARE PART A & B / Product Type: Medicare /    In time:730  Out time:805  Total Treatment Time (min): 35  Total Timed Codes (min): 35  1:1 Treatment Time ( only): 35   Visit #: 1 of 8    Treatment Area: Low back pain [M54.5]  Pain in left knee [M25.562]  Postlaminectomy syndrome, not elsewhere classified [M96.1]  Lesion of lateral popliteal nerve, left lower limb [G57.32]    SUBJECTIVE  Pain Level (0-10 scale): 3-4  Any medication changes, allergies to medications, adverse drug reactions, diagnosis change, or new procedure performed?: [x] No    [] Yes (see summary sheet for update)  Subjective functional status/changes:   [] No changes reported  \"I was sore after last time, but I think it did me some good. \"    OBJECTIVE    Modality rationale: decrease pain and increase tissue extensibility to improve the patients ability to improve mobility and gait   Min Type Additional Details    [] Estim:  []Unatt       []IFC  []Premod                        []Other:  []w/ice   []w/heat  Position:  Location:    [] Estim: []Att    []TENS instruct  []NMES                    []Other:  []w/US   []w/ice   []w/heat  Position:  Location:    []  Traction: [] Cervical       []Lumbar                       [] Prone          []Supine                       []Intermittent   []Continuous Lbs:  [] before manual  [] after manual   8 [x]  Ultrasound: [x]Continuous   [] Pulsed                           [x]1MHz   []3MHz W/cm2: 1.0  Location: left peroneal musculature    []  Iontophoresis with dexamethasone         Location: [] Take home patch   [] In clinic    []  Ice     []  heat  []  Ice massage  []  Laser   []  Anodyne Position:  Location:    []  Laser with stim  []  Other:  Position:  Location:    []  Vasopneumatic Device Pressure:       [] lo [] med [] hi   Temperature: [] lo [] med [] hi   [] Skin assessment post-treatment:  []intact []redness- no adverse reaction    []redness  adverse reaction:     15 min Therapeutic Exercise:  [x] See flow sheet :   Rationale: increase ROM and increase strength to improve the patients ability to perform ADLs    8 min Manual Therapy:  STM to left anterior calf musculature   Rationale: decrease pain, increase ROM, increase tissue extensibility, decrease trigger points and increase postural awareness to improve mobility and gait        With   [x] TE   [] TA   [] neuro   [] other: Patient Education: [x] Review HEP    [] Progressed/Changed HEP based on:   [] positioning   [] body mechanics   [] transfers   [] heat/ice application    [] other:      Other Objective/Functional Measures:      Pain Level (0-10 scale) post treatment: 4    ASSESSMENT/Changes in Function: Pt reports increased soreness following his last visit, but reports improvements with pain the next day. Remains limited with sensation in left LE. Patient will continue to benefit from skilled PT services to modify and progress therapeutic interventions, address functional mobility deficits, address ROM deficits, address strength deficits, analyze and address soft tissue restrictions, analyze and cue movement patterns, analyze and modify body mechanics/ergonomics, assess and modify postural abnormalities, address imbalance/dizziness and instruct in home and community integration to attain remaining goals. [x]  See Plan of Care  []  See progress note/recertification  []  See Discharge Summary         Progress towards goals / Updated goals:  1. Pt will report decrease in average pain rating on VAS to <3/10 to improve ease with daily activities. recert status: 3/61  2. Pt will increase LE  FOTO score to 44 points to demonstrate increased ease with ADLs.     [facility]  3.  Pt will increase walking tolerance to >30 min to improve ease with daily mobility.     recert PCSMTB:82-64 min    PLAN  []  Upgrade activities as tolerated     [x]  Continue plan of care  []  Update interventions per flow sheet       []  Discharge due to:_  []  Other:_      Calderon Aguillon PTA, CSCS 4/9/2018  8:12 AM    Future Appointments  Date Time Provider Mikhail Goodrich   4/10/2018 8:30 AM MD Sal Lopez   4/12/2018 7:30 AM Sommer Slice, PT [facility]   4/16/2018 7:30 AM Calderon Aguillon PTA [facility]   4/23/2018 7:30 AM Calderon Aguillon PTA [facility]   4/26/2018 7:30 AM Sommer Slice, PT [facility]   4/30/2018 7:30 AM Sommer Slice, PT [facility]   6/28/2018 8:30 AM JACK BURNETTE   6/28/2018 8:45 AM MD Andreina Lagunas   7/3/2018 8:15 AM [place] Lu

## 2018-04-10 ENCOUNTER — OFFICE VISIT (OUTPATIENT)
Dept: ORTHOPEDIC SURGERY | Age: 53
End: 2018-04-10

## 2018-04-10 VITALS
HEIGHT: 70 IN | WEIGHT: 183 LBS | HEART RATE: 64 BPM | SYSTOLIC BLOOD PRESSURE: 108 MMHG | DIASTOLIC BLOOD PRESSURE: 68 MMHG | BODY MASS INDEX: 26.2 KG/M2

## 2018-04-10 DIAGNOSIS — M51.26 LUMBAR HERNIATED DISC: ICD-10-CM

## 2018-04-10 DIAGNOSIS — M96.1 POSTLAMINECTOMY SYNDROME OF LUMBAR REGION: Primary | ICD-10-CM

## 2018-04-10 DIAGNOSIS — M54.16 LUMBAR RADICULOPATHY: ICD-10-CM

## 2018-04-10 DIAGNOSIS — M51.36 OTHER INTERVERTEBRAL DISC DEGENERATION, LUMBAR REGION: ICD-10-CM
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
## 2018-04-10 NOTE — MR AVS SNAPSHOT
Rosa Hernandez 
 
 
 [address] [facility] 
[phone] Patient: Sheba Tilley MRN: [id number] :1965 Visit Information Date & Time Provider Department Dept. Phone Encounter #  
 4/10/2018  8:30 AM Ling Dave, 27 [facility] - [facility] [phone] [id number] Follow-up Instructions Return in about 3 months (around 7/10/2018). Your Appointments 2018  8:30 AM  
[facility] with Cristian Lopez [facility] ([address] Appt Note: KUB  
 [address] [facility] [address]  
  
    
 7/3/2018  8:15 AM  
ROUTINE CARE with [place] Riley ([address]) Appt Note: chronic disease routine care- 30 min [address]  
  
   
 [address]  
  
    
  
 2018  8:45 AM  
Any with Alisha Perez MD  
[facility] ([address] [place] [address] Road) Appt Note: 6 mo fu KUB same day [address] [facility] [address] [facility]  
  
   
 [address] [facility] [address] Upcoming Health Maintenance Date Due DTaP/Tdap/Td series (1 - Tdap) 2011 Prostate-Specific Antigen 2018 MEDICARE YEARLY EXAM 1/3/2019 COLONOSCOPY 2021 Allergies as of 4/10/2018  Review Complete On: 4/10/2018 By: Ling Dave MD  
  
 Severity Noted Reaction Type Reactions Penicillins High 10/14/2012    Angioedema Penicillins High 05/15/2013    Anaphylaxis Vicodin [Hydrocodone-acetaminophen] High 10/14/2012    Nausea and Vomiting Azithromycin  2016    Nausea and Vomiting Hydrochlorothiazide  2017   Systemic Nausea Only Dizzy, lightheaded, blisters on his legs Vicodin [Hydrocodone-acetaminophen]  05/15/2013    Nausea and Vomiting Erythromycin Low 07/12/2016    Other (comments) Current Immunizations  Reviewed on 2/2/2017 Name Date Influenza Vaccine (Quad) PF 9/5/2017, 11/17/2016 Novel Influenza-H1N1-09, All Formulations 5/14/2010 12:00 AM  
 Pneumococcal Polysaccharide (PPSV-23) 2/2/2017 Td 1/1/2011 Not reviewed this visit You Were Diagnosed With   
  
 Codes Comments Postlaminectomy syndrome of lumbar region    -  Primary ICD-10-CM: M96.1 ICD-9-CM: 722.83 Lumbar herniated disc     ICD-10-CM: M51.26 
ICD-9-CM: 722.10 Other intervertebral disc degeneration, lumbar region     ICD-10-CM: M51.36 
ICD-9-CM: 722.52 Lumbar radiculopathy     ICD-10-CM: M54.16 
ICD-9-CM: 724.4 Vitals BP Pulse Height(growth percentile) Weight(growth percentile) BMI Smoking Status 108/68 64 5' 10\" (1.778 m) 183 lb (83 kg) 26.26 kg/m2 Former Smoker Vitals History BMI and BSA Data Body Mass Index Body Surface Area  
 26.26 kg/m 2 2.02 m 2 Preferred Pharmacy Pharmacy Name Phone [address] [place] [address]. [phone] Your Updated Medication List  
  
   
This list is accurate as of 4/10/18  9:09 AM.  Always use your most recent med list.  
  
  
  
  
 albuterol 90 mcg/actuation inhaler Commonly known as:  PROVENTIL HFA, VENTOLIN HFA, PROAIR HFA Take 2 Puffs by inhalation every six (6) hours as needed for Wheezing or Shortness of Breath. ammonium lactate 12 % topical cream  
Commonly known as:  AMLACTIN Apply  to affected area two (2) times a day. rub in to affected area well  
  
 aspirin 81 mg chewable tablet Take 81 mg by mouth daily. budesonide-formoterol 160-4.5 mcg/actuation Hfaa Commonly known as:  SYMBICORT Take 1 Puff by inhalation two (2) times a day. cyclobenzaprine 10 mg tablet Commonly known as:  FLEXERIL Take 1 Tab by mouth three (3) times daily as needed for Muscle Spasm(s). gabapentin 800 mg tablet Commonly known as:  NEURONTIN Take 1 Tab by mouth three (3) times daily. pantoprazole 40 mg tablet Commonly known as:  PROTONIX  
  
 raNITIdine 150 mg tablet Commonly known as:  ZANTAC Take 1 Tab by mouth every twelve (12) hours as needed for Indigestion. triamcinolone acetonide 0.1 % ointment Commonly known as:  KENALOG Apply  to affected area two (2) times a day. use thin layer Follow-up Instructions Return in about 3 months (around 7/10/2018). To-Do List   
 04/12/2018 7:30 AM  
  Appointment with David Perez PT at [facility]  [address] ([phone])  
  
 04/16/2018 7:30 AM  
  Appointment with Bianca Kaye PTA at [facility]  [address] ([phone])  
  
 04/23/2018 7:30 AM  
  Appointment with Bianca Kaye PTA at [facility]  [address] ([phone])  
  
 04/26/2018 7:30 AM  
  Appointment with David Perez PT at [facility]  [address] ([phone])  
  
 04/30/2018 7:30 AM  
  Appointment with David Perez PT at [facility]  [address] ([phone]) Please provide this summary of care documentation to your next provider. Your primary care clinician is listed as Merrick Prajapati. If you have any questions after today's visit, please call [phone].

## 2018-04-12 ENCOUNTER — HOSPITAL ENCOUNTER (OUTPATIENT)
Dept: PHYSICAL THERAPY | Age: 53
Discharge: HOME | End: 2018-04-12
Payer: MEDICARE

## 2018-04-12 PROCEDURE — 97110 THERAPEUTIC EXERCISES: CPT

## 2018-04-12 PROCEDURE — 97035 APP MDLTY 1+ULTRASOUND EA 15: CPT

## 2018-04-12 NOTE — PROGRESS NOTES
PT DAILY TREATMENT NOTE - [facility]     Patient Name: Jaspal Reddy  Date:2018  : 1965  [x]  Patient  Verified  Payor: [place] MEDICARE / Plan: [place] MEDICARE PART A & B / Product Type: Medicare /    In time:737  Out time:805  Total Treatment Time (min): 28  Total Timed Codes (min): 28  1:1 Treatment Time ( only): 28   Visit #: 2 of 8    Treatment Area: Low back pain [M54.5]  Pain in left knee [M25.562]  Postlaminectomy syndrome, not elsewhere classified [M96.1]  Lesion of lateral popliteal nerve, left lower limb [G57.32]    SUBJECTIVE  Pain Level (0-10 scale): 3  Any medication changes, allergies to medications, adverse drug reactions, diagnosis change, or new procedure performed?: [x] No    [] Yes (see summary sheet for update)  Subjective functional status/changes:   [] No changes reported  \"Whatever we're doing is helping. \"    OBJECTIVE    Modality rationale: decrease inflammation, decrease pain and increase tissue extensibility to improve the patients ability to improve mobility and gait   Min Type Additional Details    [] Estim:  []Unatt       []IFC  []Premod                        []Other:  []w/ice   []w/heat  Position:  Location:    [] Estim: []Att    []TENS instruct  []NMES                    []Other:  []w/US   []w/ice   []w/heat  Position:  Location:    []  Traction: [] Cervical       []Lumbar                       [] Prone          []Supine                       []Intermittent   []Continuous Lbs:  [] before manual  [] after manual   8 [x]  Ultrasound: [x]Continuous   [] Pulsed                           [x]1MHz   []3MHz W/cm2: 1.0  Location: left peroneal musculature    []  Iontophoresis with dexamethasone         Location: [] Take home patch   [] In clinic    []  Ice     []  heat  []  Ice massage  []  Laser   []  Anodyne Position:  Location:    []  Laser with stim  []  Other:  Position:  Location:    []  Vasopneumatic Device Pressure:       [] lo [] med [] hi   Temperature: [] lo [] med [] hi [x] Skin assessment post-treatment:  [x]intact []redness- no adverse reaction    []redness  adverse reaction:     12 min Therapeutic Exercise:  [x] See flow sheet :   Rationale: increase ROM and increase strength to improve the patients ability to perform ADLs    8 min Manual Therapy:  STM to left anterior calf musculature   Rationale: decrease pain, increase ROM, increase tissue extensibility, decrease trigger points and increase postural awareness to improve mobility and gait        With   [x] TE   [] TA   [x] neuro   [] other: Patient Education: [x] Review HEP    [] Progressed/Changed HEP based on:   [x] positioning   [x] body mechanics   [] transfers   [] heat/ice application    [] other:      Other Objective/Functional Measures:      Pain Level (0-10 scale) post treatment: 3    ASSESSMENT/Changes in Function: Pt reports increased sensation and function since changing up his POC a few visits ago. He demonstrates improved DF activation as evident from decreased foot drag. Patient will continue to benefit from skilled PT services to modify and progress therapeutic interventions, address functional mobility deficits, address ROM deficits, address strength deficits, analyze and address soft tissue restrictions, analyze and cue movement patterns, analyze and modify body mechanics/ergonomics, assess and modify postural abnormalities, address imbalance/dizziness and instruct in home and community integration to attain remaining goals. [x]  See Plan of Care  []  See progress note/recertification  []  See Discharge Summary         Progress towards goals / Updated goals:  1. Pt will report decrease in average pain rating on VAS to <3/10 to improve ease with daily activities. recert status: 5/51   Pain appears to be declining  2. Pt will increase LE  FOTO score to 44 points to demonstrate increased ease with ADLs.     recert FPMNEJ::43   Assess at 4th visit  3.  Pt will increase walking tolerance to >30 min to improve ease with daily mobility.     recert JVQQAF:50-11 min   Reports increased walking tolerance recently    PLAN  []  Upgrade activities as tolerated     [x]  Continue plan of care  []  Update interventions per flow sheet       []  Discharge due to:_  []  Other:_      Rowena Lineyuan, PTA, CSCS 4/12/2018  8:07 AM    Future Appointments  Date Time Provider Mikhail Goodrich   4/16/2018 7:30 AM Falgunie Talon, PTA [facility]   4/23/2018 7:30 AM Falgunie Talon, PTA [facility]   4/26/2018 7:30 AM Earlis Icelandic, PT [facility]   4/30/2018 7:30 AM Earlis Icelandic, PT [facility]   6/28/2018 8:30 AM [facility] Yazmin Marshall Rollene Backbone CINDI SCHED   6/28/2018 8:45 AM Tadeo Lyn MD [facility]   7/3/2018 8:15 AM [place] Sanders CINDI SCHED   7/11/2018 8:30 AM Mesfin Mariscal MD [facility]

## 2018-04-16 ENCOUNTER — HOSPITAL ENCOUNTER (OUTPATIENT)
Dept: PHYSICAL THERAPY | Age: 53
Discharge: HOME | End: 2018-04-16
Payer: MEDICARE

## 2018-04-16 PROCEDURE — 97110 THERAPEUTIC EXERCISES: CPT

## 2018-04-16 PROCEDURE — 97035 APP MDLTY 1+ULTRASOUND EA 15: CPT

## 2018-04-16 NOTE — PROGRESS NOTES
PT DAILY TREATMENT NOTE - [facility]     Patient Name: Nakul Stoddard  Date:2018  : 1965  [x]  Patient  Verified  Payor: Samanta Olguin / Plan: [place] MEDICARE PART A & B / Product Type: Medicare /    In time:730  Out time:805  Total Treatment Time (min): 35  Total Timed Codes (min): 35  1:1 Treatment Time ( only): 35   Visit #: 3 of 8    Treatment Area: Low back pain [M54.5]  Pain in left knee [M25.562]  Postlaminectomy syndrome, not elsewhere classified [M96.1]  Lesion of lateral popliteal nerve, left lower limb [G57.32]    SUBJECTIVE  Pain Level (0-10 scale): 2  Any medication changes, allergies to medications, adverse drug reactions, diagnosis change, or new procedure performed?: [x] No    [] Yes (see summary sheet for update)  Subjective functional status/changes:   [] No changes reported  \"I was able to walk around [facility] this weekend without the cart. \"    OBJECTIVE    Modality rationale: decrease inflammation, decrease pain and increase tissue extensibility to improve the patients ability to improve mobility and gait   Min Type Additional Details    [] Estim:  []Unatt       []IFC  []Premod                        []Other:  []w/ice   []w/heat  Position:  Location:    [] Estim: []Att    []TENS instruct  []NMES                    []Other:  []w/US   []w/ice   []w/heat  Position:  Location:    []  Traction: [] Cervical       []Lumbar                       [] Prone          []Supine                       []Intermittent   []Continuous Lbs:  [] before manual  [] after manual   8 [x]  Ultrasound: [x]Continuous   [] Pulsed                           [x]1MHz   []3MHz W/cm2: 1.0  Location: left peroneal musculature     []  Iontophoresis with dexamethasone         Location: [] Take home patch   [] In clinic    []  Ice     []  heat  []  Ice massage  []  Laser   []  Anodyne Position:  Location:    []  Laser with stim  []  Other:  Position:  Location:    []  Vasopneumatic Device Pressure:       [] lo [] med [] hi Temperature: [] lo [] med [] hi   [x] Skin assessment post-treatment:  [x]intact []redness- no adverse reaction    []redness  adverse reaction:     19 min Therapeutic Exercise:  [x] See flow sheet :   Rationale: increase ROM and increase strength to improve the patients ability to perform ADLs    8 min Manual Therapy:  STM to left anterior calf musculature   Rationale: decrease pain, increase ROM, increase tissue extensibility, decrease trigger points and increase postural awareness to improve mobility and gait        With   [x] TE   [] TA   [x] neuro   [] other: Patient Education: [x] Review HEP    [] Progressed/Changed HEP based on:   [x] positioning   [x] body mechanics   [] transfers   [] heat/ice application    [] other:      Other Objective/Functional Measures:      Pain Level (0-10 scale) post treatment: 2    ASSESSMENT/Changes in Function: Pt continues to report improvements with sensation and function recently. He was able to ambulate around [facility] without a cart over the weekend and has been able to complete his marble  in the clinic today whereas he wasn't able to  one marble last week. Patient will continue to benefit from skilled PT services to modify and progress therapeutic interventions, address functional mobility deficits, address ROM deficits, address strength deficits, analyze and address soft tissue restrictions, analyze and cue movement patterns, analyze and modify body mechanics/ergonomics, assess and modify postural abnormalities, address imbalance/dizziness and instruct in home and community integration to attain remaining goals. [x]  See Plan of Care  []  See progress note/recertification  []  See Discharge Summary         Progress towards goals / Updated goals:  1. Pt will report decrease in average pain rating on VAS to <3/10 to improve ease with daily activities. recert status: 9/87   Pain appears to be declining  2.  Pt will increase LE  FOTO score to 44 points to demonstrate increased ease with ADLs.     recert HJVUDL::26   Assess NV  3.  Pt will increase walking tolerance to >30 min to improve ease with daily mobility.     recert EHTSPE:45-43 min   Reports increased walking tolerance recently    PLAN  []  Upgrade activities as tolerated     [x]  Continue plan of care  []  Update interventions per flow sheet       []  Discharge due to:_  []  Other:_      Mark oMctezuma PTA, CSCS 4/16/2018  8:11 AM    Future Appointments  Date Time Provider Mikhail Goodrich   4/23/2018 7:30 AM Mark Moctezuma PTA [facility] [address]   4/26/2018 7:30 AM Lissy Cunha, PT [facility] [address]   4/30/2018 7:30 AM Lissy Cunha, PT [facility] [address]   6/28/2018 8:30 AM [facility] Janki [facility] CINDI SCHED   6/28/2018 8:45 AM Emma Yanez MD [facility]   7/3/2018 8:15 AM Alejo Dinero NP [facility] CINDI SCHED   7/11/2018 8:30 AM Venkatesh Leung MD [facility]

## 2018-04-17 ENCOUNTER — TELEPHONE (OUTPATIENT)
Dept: FAMILY MEDICINE CLINIC | Age: 53
End: 2018-04-17

## 2018-04-17 DIAGNOSIS — R11.2 NAUSEA AND VOMITING, INTRACTABILITY OF VOMITING NOT SPECIFIED, UNSPECIFIED VOMITING TYPE: ICD-10-CM

## 2018-04-17 RX ORDER — ONDANSETRON 4 MG/1
4 TABLET, ORALLY DISINTEGRATING ORAL
Qty: 20 TAB | Refills: 0 | Status: SHIPPED | OUTPATIENT
Start: 2018-04-17 | End: 2018-07-05 | Stop reason: SDUPTHER

## 2018-04-17 NOTE — TELEPHONE ENCOUNTER
4/17/2018  4:32 PM    .  Chief Complaint   Patient presents with    Medication Refill       Request for Zofran received for use PRN. Diagnosed with GERD with esophagitis and has severe nausea intermittently. He is to be following with GI. Refill completed. Upcoming appointment 7/2018.

## 2018-04-23 ENCOUNTER — HOSPITAL ENCOUNTER (OUTPATIENT)
Dept: PHYSICAL THERAPY | Age: 53
Discharge: HOME | End: 2018-04-23
Payer: MEDICARE

## 2018-04-23 PROCEDURE — 97110 THERAPEUTIC EXERCISES: CPT

## 2018-04-23 PROCEDURE — 97035 APP MDLTY 1+ULTRASOUND EA 15: CPT

## 2018-04-23 NOTE — PROGRESS NOTES
PT DAILY TREATMENT NOTE - [facility]     Patient Name: Sheba Tilley  Date:2018  : 1965  [x]  Patient  Verified  Payor: [place] MEDICARE / Plan: [place] MEDICARE PART A & B / Product Type: Medicare /    In time:730  Out time:800  Total Treatment Time (min): 30  Total Timed Codes (min): 30  1:1 Treatment Time ( only): 30   Visit #: 4 of 8    Treatment Area: Low back pain [M54.5]  Pain in left knee [M25.562]  Postlaminectomy syndrome, not elsewhere classified [M96.1]  Lesion of lateral popliteal nerve, left lower limb [G57.32]    SUBJECTIVE  Pain Level (0-10 scale): 1  Any medication changes, allergies to medications, adverse drug reactions, diagnosis change, or new procedure performed?: [x] No    [] Yes (see summary sheet for update)  Subjective functional status/changes:   [] No changes reported  \"I keep getting better. \"    OBJECTIVE    Modality rationale: decrease inflammation, decrease pain and increase tissue extensibility to improve the patients ability to improve mobility and gait   Min Type Additional Details    [] Estim:  []Unatt       []IFC  []Premod                        []Other:  []w/ice   []w/heat  Position:  Location:    [] Estim: []Att    []TENS instruct  []NMES                    []Other:  []w/US   []w/ice   []w/heat  Position:  Location:    []  Traction: [] Cervical       []Lumbar                       [] Prone          []Supine                       []Intermittent   []Continuous Lbs:  [] before manual  [] after manual   8 [x]  Ultrasound: [x]Continuous   [] Pulsed                           [x]1MHz   []3MHz W/cm2: 1.0  Location: left peroneal musculature     []  Iontophoresis with dexamethasone         Location: [] Take home patch   [] In clinic    []  Ice     []  heat  []  Ice massage  []  Laser   []  Anodyne Position:  Location:    []  Laser with stim  []  Other:  Position:  Location:    []  Vasopneumatic Device Pressure:       [] lo [] med [] hi   Temperature: [] lo [] med [] hi   [x] Skin assessment post-treatment:  [x]intact []redness- no adverse reaction    []redness  adverse reaction:     14 min Therapeutic Exercise:  [x] See flow sheet :   Rationale: increase ROM and increase strength to improve the patients ability to perform ADLs    8 min Manual Therapy:  STM to left anterior calf musculature   Rationale: decrease pain, increase ROM, increase tissue extensibility, decrease trigger points and increase postural awareness to improve mobility and gait        With   [x] TE   [] TA   [] neuro   [] other: Patient Education: [x] Review HEP    [] Progressed/Changed HEP based on:   [x] positioning   [x] body mechanics   [] transfers   [] heat/ice application    [] other:      Other Objective/Functional Measures:      Pain Level (0-10 scale) post treatment: 1    ASSESSMENT/Changes in Function: Pt is making wonderful progress. He reports that he was able to do more yardwork over the weekend and reports improved sensation and decreased pain. Patient will continue to benefit from skilled PT services to modify and progress therapeutic interventions, address functional mobility deficits, address ROM deficits, address strength deficits, analyze and address soft tissue restrictions, analyze and cue movement patterns, analyze and modify body mechanics/ergonomics, assess and modify postural abnormalities, address imbalance/dizziness and instruct in home and community integration to attain remaining goals. [x]  See Plan of Care  []  See progress note/recertification  []  See Discharge Summary         Progress towards goals / Updated goals:  1. Pt will report decrease in average pain rating on VAS to <3/10 to improve ease with daily activities. recert status: 0/81   Pain appears to be declining  2. Pt will increase LE  FOTO score to 44 points to demonstrate increased ease with ADLs.     recert ZEUMIF::28   Assess NV as therapist didn't capture  3.  Pt will increase walking tolerance to >30 min to improve ease with daily mobility.     recert BUARXP:75-14 min   Reports increased walking tolerance recently and increased yardwork    PLAN  []  Upgrade activities as tolerated     [x]  Continue plan of care  []  Update interventions per flow sheet       []  Discharge due to:_  []  Other:_      Fozia Vargas, PTA, CSCS 4/23/2018  8:07 AM    Future Appointments  Date Time Provider Mikhail Goodrich   4/26/2018 7:30 AM Milo Souza PT [facility]   4/30/2018 7:30 AM [facility]   6/28/2018 8:30 AM [address]   6/28/2018 8:45 AM MD Devin Ybarra   7/3/2018 8:15 AM [place] Jiménez   7/11/2018 8:30 AM Jake Vázquez MD [facility]

## 2018-04-26 ENCOUNTER — HOSPITAL ENCOUNTER (OUTPATIENT)
Dept: PHYSICAL THERAPY | Age: 53
Discharge: HOME | End: 2018-04-26
Payer: MEDICARE

## 2018-04-26 PROCEDURE — 97140 MANUAL THERAPY 1/> REGIONS: CPT

## 2018-04-26 PROCEDURE — 97035 APP MDLTY 1+ULTRASOUND EA 15: CPT

## 2018-04-26 NOTE — PROGRESS NOTES
PT DAILY TREATMENT NOTE - [facility]     Patient Name: Sarahi Aguero  Date:2018  : 1965  [x]  Patient  Verified  Payor: VA MEDICARE / Plan: VA MEDICARE PART A & B / Product Type: Medicare /    In time:735  Out time:800  Total Treatment Time (min): 25  Total Timed Codes (min): 25  1:1 Treatment Time ([facility] only): 25   Visit #: 5 of 8    Treatment Area: Low back pain [M54.5]  Pain in left knee [M25.562]  Postlaminectomy syndrome, not elsewhere classified [M96.1]  Lesion of lateral popliteal nerve, left lower limb [G57.32]    SUBJECTIVE  Pain Level (0-10 scale): 0  Any medication changes, allergies to medications, adverse drug reactions, diagnosis change, or new procedure performed?: [x] No    [] Yes (see summary sheet for update)  Subjective functional status/changes:   [] No changes reported  \"I'm doing so much better with walking and I can feel my walk more. \"    OBJECTIVE    Modality rationale: decrease edema, decrease inflammation and decrease pain to improve the patients ability to improve ease with mobility.    Min Type Additional Details    [] Estim:  []Unatt       []IFC  []Premod                        []Other:  []w/ice   []w/heat  Position:  Location:    [] Estim: []Att    []TENS instruct  []NMES                    []Other:  []w/US   []w/ice   []w/heat  Position:  Location:    []  Traction: [] Cervical       []Lumbar                       [] Prone          []Supine                       []Intermittent   []Continuous Lbs:  [] before manual  [] after manual   8 [x]  Ultrasound: [x]Continuous   [] Pulsed                           [x]1MHz   []3MHz W/cm2: 1.0  Location: left peroneal musculature     []  Iontophoresis with dexamethasone         Location: [] Take home patch   [] In clinic    []  Ice     []  heat  []  Ice massage  []  Laser   []  Anodyne Position:  Location:    []  Laser with stim  []  Other:  Position:  Location:    []  Vasopneumatic Device Pressure:       [] lo [] med [] hi Temperature: [] lo [] med [] hi   [x] Skin assessment post-treatment:  [x]intact []redness- no adverse reaction    []redness  adverse reaction:       9 min Therapeutic Exercise:  [x] See flow sheet :   Rationale: increase ROM, increase strength and improve coordination to improve the patients ability to perform ADLs. 8 min Manual Therapy:  STM to left anterior calf musculature   Rationale: decrease pain, increase ROM, increase tissue extensibility, decrease trigger points and increase postural awareness to improved ease with gait. With   [] TE   [] TA   [] neuro   [] other: Patient Education: [x] Review HEP    [] Progressed/Changed HEP based on:   [] positioning   [] body mechanics   [] transfers   [] heat/ice application    [] other:      Other Objective/Functional Measures:      Pain Level (0-10 scale) post treatment: 0    ASSESSMENT/Changes in Function: Mr. Kalli Duff continues to see great improvement of pain, mobility & sensation. Does continue to report great toe sensation loss but lateral foot sensation returning. Patient will continue to benefit from skilled PT services to modify and progress therapeutic interventions, address functional mobility deficits, address ROM deficits, address strength deficits, analyze and address soft tissue restrictions, analyze and cue movement patterns, analyze and modify body mechanics/ergonomics, assess and modify postural abnormalities, address imbalance/dizziness and instruct in home and community integration to attain remaining goals. []  See Plan of Care  []  See progress note/recertification  []  See Discharge Summary         Progress towards goals / Updated goals:  1. Pt will report decrease in average pain rating on VAS to <3/10 to improve ease with daily activities. recert status: 8/05                        Pain appears to be declining  2.  Pt will increase LE  FOTO score to 44 points to demonstrate increased ease with ADLs.                          recert [id number]::34                        Assess NV as therapist didn't capture  3.  Pt will increase walking tolerance to >30 min to improve ease with daily mobility.                          recert WDRPZE:16-63 min                        Reports increased walking tolerance recently and increased yardwork    PLAN  []  Upgrade activities as tolerated     [x]  Continue plan of care  []  Update interventions per flow sheet       []  Discharge due to:_  []  Other:_      Sarai Strong PT 4/26/2018  8:44 AM    Future Appointments  Date Time Provider Mikhail Goodrich   4/30/2018 7:30 AM Sarai Strong PT [facility]   6/28/2018 8:30 AM Jazmin Funk   6/28/2018 8:45 AM MD Sandra Cuevasl Halbur   7/3/2018 8:15 AM [place] Vallecillo   7/11/2018 8:30 AM Efrem Vergara MD [facility]

## 2018-04-30 ENCOUNTER — HOSPITAL ENCOUNTER (OUTPATIENT)
Dept: PHYSICAL THERAPY | Age: 53
Discharge: HOME | End: 2018-04-30
Payer: MEDICARE

## 2018-04-30 PROCEDURE — 97140 MANUAL THERAPY 1/> REGIONS: CPT

## 2018-04-30 PROCEDURE — 97035 APP MDLTY 1+ULTRASOUND EA 15: CPT

## 2018-04-30 NOTE — PROGRESS NOTES
PT DAILY TREATMENT NOTE - [facility]     Patient Name: Aviva Mohan  Date:2018  : 1965  [x]  Patient  Verified  Payor: VA MEDICARE / Plan: VA MEDICARE PART A & B / Product Type: Medicare /    In time:736  Out time:800  Total Treatment Time (min): 24  Total Timed Codes (min): 24  1:1 Treatment Time ( only): 24   Visit #: 6 of 8    Treatment Area: Low back pain [M54.5]  Pain in left knee [M25.562]  Postlaminectomy syndrome, not elsewhere classified [M96.1]  Lesion of lateral popliteal nerve, left lower limb [G57.32]    SUBJECTIVE  Pain Level (0-10 scale): 0  Any medication changes, allergies to medications, adverse drug reactions, diagnosis change, or new procedure performed?: [x] No    [] Yes (see summary sheet for update)  Subjective functional status/changes:   [] No changes reported  \"I'm doing pretty well, I think I can take over after next time. \"    OBJECTIVE    Modality rationale: decrease edema, decrease inflammation and decrease pain to improve the patients ability to improve ease with mobility.    Min Type Additional Details     [] Estim:  []Unatt       []IFC  []Premod                        []Other:  []w/ice   []w/heat  Position:  Location:     [] Estim: []Att    []TENS instruct  []NMES                    []Other:  []w/US   []w/ice   []w/heat  Position:  Location:     []  Traction: [] Cervical       []Lumbar                       [] Prone          []Supine                       []Intermittent   []Continuous Lbs:  [] before manual  [] after manual   8 [x]  Ultrasound: [x]Continuous   [] Pulsed                           [x]1MHz   []3MHz W/cm2: 1.0  Location: left peroneal musculature      []  Iontophoresis with dexamethasone         Location: [] Take home patch   [] In clinic     []  Ice     []  heat  []  Ice massage  []  Laser   []  Anodyne Position:  Location:     []  Laser with stim  []  Other:  Position:  Location:     []  Vasopneumatic Device Pressure:       [] lo [] med [] hi Temperature: [] lo [] med [] hi   [x] Skin assessment post-treatment:  [x]intact []redness- no adverse reaction    []redness  adverse reaction:         8 min Therapeutic Exercise:  [x] See flow sheet :   Rationale: increase ROM, increase strength and improve coordination to improve the patients ability to perform ADLs.          8 min Manual Therapy:  STM to left anterior calf musculature   Rationale: decrease pain, increase ROM, increase tissue extensibility, decrease trigger points and increase postural awareness to improved ease with gait.                 With   [] TE   [] TA   [] neuro   [] other: Patient Education: [x] Review HEP    [] Progressed/Changed HEP based on:   [] positioning   [] body mechanics   [] transfers   [] heat/ice application    [] other:      Other Objective/Functional Measures:      Pain Level (0-10 scale) post treatment: 0    ASSESSMENT/Changes in Function: seeing great return of function and improved sensation. Plan to d/c to HEP next session. Patient will continue to benefit from skilled PT services to modify and progress therapeutic interventions, address functional mobility deficits, address ROM deficits, address strength deficits, analyze and address soft tissue restrictions, analyze and cue movement patterns, analyze and modify body mechanics/ergonomics, assess and modify postural abnormalities, address imbalance/dizziness and instruct in home and community integration to attain remaining goals. []  See Plan of Care  []  See progress note/recertification  []  See Discharge Summary         Progress towards goals / Updated goals:  1. Pt will report decrease in average pain rating on VAS to <3/10 to improve ease with daily activities.                       recert status: 1/96 5/87  2. Pt will increase LE  FOTO score to 44 points to demonstrate increased ease with ADLs.                           recert NSEXUD::77                        XYNUOT at end of POC  3.  Pt will increase walking tolerance to >30 min to improve ease with daily mobility.                          recert CNOGOI:35-27 min                        Reports increased walking tolerance recently and increased yardwork       PLAN  []  Upgrade activities as tolerated     [x]  Continue plan of care  []  Update interventions per flow sheet       []  Discharge due to:_  []  Other:_      Bisi Uribe, PT 4/30/2018  8:41 AM    Future Appointments  Date Time Provider Mikhail Goodrich   5/3/2018 8:00 AM Ty Clink, PTA [facility]   6/28/2018 8:30 AM [address]   6/28/2018 8:45 AM Les Li MD [facility]   7/3/2018 8:15 AM John Lopez NP [facility] CINDI BURNETTE   7/11/2018 8:30 AM Mariela Salas MD [facility]

## 2018-05-03 ENCOUNTER — HOSPITAL ENCOUNTER (OUTPATIENT)
Dept: PHYSICAL THERAPY | Age: 53
Discharge: HOME | End: 2018-05-03
Payer: MEDICARE

## 2018-05-03 PROCEDURE — 97110 THERAPEUTIC EXERCISES: CPT

## 2018-05-03 PROCEDURE — 97035 APP MDLTY 1+ULTRASOUND EA 15: CPT

## 2018-05-03 PROCEDURE — G8980 MOBILITY D/C STATUS: HCPCS

## 2018-05-03 PROCEDURE — G8979 MOBILITY GOAL STATUS: HCPCS

## 2018-05-03 NOTE — PROGRESS NOTES
PT DAILY TREATMENT NOTE - [facility]     Patient Name: Agapito Sales  Date:5/3/2018  : 1965  [x]  Patient  Verified  Payor: [place] MEDICARE / Plan: VA MEDICARE PART A & B / Product Type: Medicare /    In time:802  Out time:826  Total Treatment Time (min): 24  Total Timed Codes (min): 24  1:1 Treatment Time ( only): 24   Visit #: 7 of 8    Treatment Area: Low back pain [M54.5]  Pain in left knee [M25.562]  Postlaminectomy syndrome, not elsewhere classified [M96.1]  Lesion of lateral popliteal nerve, left lower limb [G57.32]    SUBJECTIVE  Pain Level (0-10 scale): 0  Any medication changes, allergies to medications, adverse drug reactions, diagnosis change, or new procedure performed?: [x] No    [] Yes (see summary sheet for update)  Subjective functional status/changes:   [] No changes reported  \"I feel good today. \"    OBJECTIVE    Modality rationale: decrease pain and increase tissue extensibility to improve the patients ability to improve mobility and gait   Min Type Additional Details    [] Estim:  []Unatt       []IFC  []Premod                        []Other:  []w/ice   []w/heat  Position:  Location:    [] Estim: []Att    []TENS instruct  []NMES                    []Other:  []w/US   []w/ice   []w/heat  Position:  Location:    []  Traction: [] Cervical       []Lumbar                       [] Prone          []Supine                       []Intermittent   []Continuous Lbs:  [] before manual  [] after manual   8 [x]  Ultrasound: [x]Continuous   [] Pulsed                           [x]1MHz   []3MHz W/cm2: 1.0  Location: left peroneal musculature    []  Iontophoresis with dexamethasone         Location: [] Take home patch   [] In clinic    []  Ice     []  heat  []  Ice massage  []  Laser   []  Anodyne Position:  Location:    []  Laser with stim  []  Other:  Position:  Location:    []  Vasopneumatic Device Pressure:       [] lo [] med [] hi   Temperature: [] lo [] med [] hi   [x] Skin assessment post-treatment: [x]intact []redness- no adverse reaction    []redness  adverse reaction:     8 min Therapeutic Exercise:  [x] See flow sheet :   Rationale: increase ROM and increase strength to improve the patients ability to perform ADLs    8 min Manual Therapy:  STM to left peroneal musculature and left lateral/anterior calf   Rationale: decrease pain, increase ROM, increase tissue extensibility, decrease trigger points and increase postural awareness to improve mobility and gait         With   [x] TE   [] TA   [x] neuro   [] other: Patient Education: [x] Review HEP    [] Progressed/Changed HEP based on:   [x] positioning   [x] body mechanics   [] transfers   [] heat/ice application    [] other:      Other Objective/Functional Measures:      Pain Level (0-10 scale) post treatment: 0    ASSESSMENT/Changes in Function: Mr. Robert Faulkner has been a pleasure to treat and reports 90-95% improvement since beginning therapy. He reports improvements with walking/standing tolerance, overall mobility, and sensation in his left foot. He reports still having some tingling into his left great toe. His strength has improved to 5/5 grossly in his left LE. We are discharging to his [facility] at this time. []  See Plan of Care  []  See progress note/recertification  [x]  See Discharge Summary         Progress towards goals / Updated goals:  1. Pt will report decrease in average pain rating on VAS to <3/10 to improve ease with daily activities.                       recert status: 2/68                        MET; 0/10  2. Pt will increase LE  FOTO score to 44 points to demonstrate increased ease with ADLs.                          recert NDMRLZ::47                        NOT MET; unable to assess as computer system was down  3.  Pt will increase walking tolerance to >30 min to improve ease with daily mobility.                          recert EPNFQL:28-42 min                        MET    Functional Gains: feeling in foot, mobility, walking/standing tolerance  Functional Deficits: big toe tingling  % improvement: 90-95%  Pain   Average: 3/10       Best: 0/10     Worst: 10/10  Patient Goal: \"be able to get around on my own. \"    PLAN  []  Upgrade activities as tolerated     []  Continue plan of care  []  Update interventions per flow sheet       [x]  Discharge due to: [address]  []  Other:_      Fozia Vargas, PTA, CSCS 5/3/2018  9:33 AM    Future Appointments  Date Time Provider Mikhail Goodrich   6/28/2018 8:30 AM [facility] CINDI SCHED   6/28/2018 8:45 AM MD Andreina Ybarra   7/3/2018 8:15 AM Jesús Marr NP [facility] CINDI SCHED   7/11/2018 8:30 AM Jake Vázquez MD [facility]

## 2018-07-05 ENCOUNTER — OFFICE VISIT (OUTPATIENT)
Dept: FAMILY MEDICINE CLINIC | Age: 53
End: 2018-07-05

## 2018-07-05 VITALS
RESPIRATION RATE: 20 BRPM | SYSTOLIC BLOOD PRESSURE: 111 MMHG | TEMPERATURE: 97.2 F | DIASTOLIC BLOOD PRESSURE: 76 MMHG | BODY MASS INDEX: 24.62 KG/M2 | HEIGHT: 70 IN | WEIGHT: 172 LBS | OXYGEN SATURATION: 97 % | HEART RATE: 64 BPM

## 2018-07-05 DIAGNOSIS — J45.40 MODERATE PERSISTENT ASTHMA WITHOUT COMPLICATION: Primary | ICD-10-CM

## 2018-07-05 DIAGNOSIS — J06.9 UPPER RESPIRATORY TRACT INFECTION, UNSPECIFIED TYPE: ICD-10-CM

## 2018-07-05 DIAGNOSIS — K21.00 GASTROESOPHAGEAL REFLUX DISEASE WITH ESOPHAGITIS: ICD-10-CM

## 2018-07-05 DIAGNOSIS — Z86.73 HISTORY OF CVA (CEREBROVASCULAR ACCIDENT): ICD-10-CM

## 2018-07-05 RX ORDER — BUDESONIDE AND FORMOTEROL FUMARATE DIHYDRATE 160; 4.5 UG/1; UG/1
1 AEROSOL RESPIRATORY (INHALATION) 2 TIMES DAILY
Qty: 1 INHALER | Refills: 5 | Status: SHIPPED | OUTPATIENT
Start: 2018-07-05 | End: 2019-02-08 | Stop reason: SDUPTHER

## 2018-07-05 RX ORDER — PREDNISONE 20 MG/1
60 TABLET ORAL
Qty: 9 TAB | Refills: 0 | Status: SHIPPED | OUTPATIENT
Start: 2018-07-05 | End: 2018-07-08

## 2018-07-05 RX ORDER — ONDANSETRON 4 MG/1
4 TABLET, ORALLY DISINTEGRATING ORAL
Qty: 20 TAB | Refills: 0 | Status: SHIPPED | OUTPATIENT
Start: 2018-07-05 | End: 2019-02-08 | Stop reason: SDUPTHER

## 2018-07-05 RX ORDER — CODEINE PHOSPHATE AND GUAIFENESIN 10; 100 MG/5ML; MG/5ML
5 SOLUTION ORAL
Qty: 118 ML | Refills: 0 | Status: SHIPPED | OUTPATIENT
Start: 2018-07-05 | End: 2019-02-08 | Stop reason: ALTCHOICE

## 2018-07-05 NOTE — MR AVS SNAPSHOT
[facility] [address] [place], [place] [id number] Patient: Ramakrishna Walker MRN: [id number] :[id number] Visit Information Date & Time Provider Department Dept. Phone Encounter #  
 2018  8:00 AM Gatito Armas NP [place] [phone] Follow-up Instructions Return in about 6 months (around 2019) for chronic disease routine care and Medicare Wellness- 45 min. Your Appointments 2018  8:30 AM  
Follow Up with Alfonso Lira MD  
[facility] [place] ([facility]) Appt Note: LOWER BACK 3 MO FU  
  [place] [address]  
[address] [facility] Upcoming Health Maintenance Date Due DTaP/Tdap/Td series (1 - Tdap) 2011 Prostate-Specific Antigen 2018 Influenza Age 5 to Adult 2018 MEDICARE YEARLY EXAM 1/3/2019 COLONOSCOPY 2021 Allergies as of 2018  Review Complete On: 2018 By: Gatito Armas NP Severity Noted Reaction Type Reactions Penicillins High 10/14/2012    Angioedema Penicillins High 05/15/2013    Anaphylaxis Vicodin [Hydrocodone-acetaminophen] High 10/14/2012    Nausea and Vomiting Azithromycin  2016    Nausea and Vomiting Hydrochlorothiazide  2017   Systemic Nausea Only Dizzy, lightheaded, blisters on his legs Vicodin [Hydrocodone-acetaminophen]  05/15/2013    Nausea and Vomiting Erythromycin Low 2016    Other (comments) Current Immunizations  Reviewed on 2017 Name Date Influenza Vaccine (Quad) PF 2017, 2016 Novel Influenza-H1N1-09, All Formulations 2010 12:00 AM  
 Pneumococcal Polysaccharide (PPSV-23) 2017 Td 2011 Not reviewed this visit You Were Diagnosed With   
  
 Codes Comments Moderate persistent asthma without complication    -  Primary ICD-10-CM: J45.40 ICD-9-CM: 493.90 Upper respiratory tract infection, unspecified type     ICD-10-CM: J06.9 ICD-9-CM: 465.9 History of CVA (cerebrovascular accident)     ICD-10-CM: Z86.73 
ICD-9-CM: V12.54 Gastroesophageal reflux disease with esophagitis     ICD-10-CM: K21.0 ICD-9-CM: 530.11 Vitals BP Pulse Temp Resp Height(growth percentile) Weight(growth percentile) 111/76 (BP 1 Location: Right arm, BP Patient Position: Sitting) 64 97.2 °F (36.2 °C) (Oral) 20 5' 10\" (1.778 m) 172 lb (78 kg) SpO2 BMI Smoking Status 97% 24.68 kg/m2 Former Smoker Vitals History BMI and BSA Data Body Mass Index Body Surface Area  
 24.68 kg/m 2 1.96 m 2 Preferred Pharmacy Pharmacy Name Phone [facility] [address] [place] [address]. [phone] Your Updated Medication List  
  
   
This list is accurate as of 7/5/18  8:53 AM.  Always use your most recent med list.  
  
  
  
  
 albuterol 90 mcg/actuation inhaler Commonly known as:  PROVENTIL HFA, VENTOLIN HFA, PROAIR HFA Take 2 Puffs by inhalation every six (6) hours as needed for Wheezing or Shortness of Breath. aspirin 81 mg chewable tablet Take 81 mg by mouth daily. budesonide-formoterol 160-4.5 mcg/actuation Hfaa Commonly known as:  SYMBICORT Take 1 Puff by inhalation two (2) times a day. cyclobenzaprine 10 mg tablet Commonly known as:  FLEXERIL Take 1 Tab by mouth three (3) times daily as needed for Muscle Spasm(s). gabapentin 800 mg tablet Commonly known as:  NEURONTIN Take 1 Tab by mouth three (3) times daily. guaiFENesin-codeine 100-10 mg/5 mL solution Commonly known as:  guaiFENesin AC Take 5 mL by mouth three (3) times daily as needed for Cough or Congestion. Max Daily Amount: 15 mL. ondansetron 4 mg disintegrating tablet Commonly known as:  ZOFRAN ODT Take 1 Tab by mouth every eight (8) hours as needed for Nausea. predniSONE 20 mg tablet Commonly known as:  Dianna Pound Take 3 Tabs by mouth daily (with breakfast) for 3 days. raNITIdine 150 mg tablet Commonly known as:  ZANTAC Take 1 Tab by mouth every twelve (12) hours as needed for Indigestion. Prescriptions Printed Refills  
 guaiFENesin-codeine (GUAIFENESIN AC) 100-10 mg/5 mL solution 0 Sig: Take 5 mL by mouth three (3) times daily as needed for Cough or Congestion. Max Daily Amount: 15 mL. Class: Print Route: Oral  
  
Prescriptions Sent to Pharmacy Refills  
 budesonide-formoterol (SYMBICORT) 160-4.5 mcg/actuation HFAA 5 Sig: Take 1 Puff by inhalation two (2) times a day. Class: Normal  
 Pharmacy: [facility] DR MARINA MALIN [address] [place] [address] 23. Ph #: [phone] Route: Inhalation  
 ondansetron (ZOFRAN ODT) 4 mg disintegrating tablet 0 Sig: Take 1 Tab by mouth every eight (8) hours as needed for Nausea. Class: Normal  
 Pharmacy: [facility] DR MARINA MALIN [address] [place] [address] 23. Ph #: [phone] Route: Oral  
 predniSONE (DELTASONE) 20 mg tablet 0 Sig: Take 3 Tabs by mouth daily (with breakfast) for 3 days. Class: Normal  
 Pharmacy: [facility] DR MARINA MALIN [address] [place] [address]. Ph #: [phone] Route: Oral  
  
Follow-up Instructions Return in about 6 months (around 1/5/2019) for chronic disease routine care and Medicare Wellness- 45 min. To-Do List   
 Around 07/06/2018 Imaging:  XR CHEST PA LAT Please provide this summary of care documentation to your next provider. Your primary care clinician is listed as Rosemarie Ken. If you have any questions after today's visit, please call [phone].

## 2018-07-05 NOTE — PROGRESS NOTES
OFFICE NOTE    Dennise Malcolm is a 46 y.o. male presenting today for office visit. 7/5/2018  8:41 AM      Chief Complaint   Patient presents with    Follow Up Chronic Condition     pt here for follow up chronic conditions       HPI: Here today for follow up chronic disease. Last labs done 12/2017. Following with GI and the spine center (DDD lumbar, post-laminectomy syndrome). Asthma: Using Symbicort for maintenance inhaler and has Albuterol. Allergies does make asthma worse. Recently has been having symptoms of cough productive of yellow sputum and a runny nose- has not had a fever or chills. Has been having to use Albuterol inhaler about 4-5 times per week due to feeling slightly SOB. CVA history: History in 2010- has been on ASA since. No residual. Does not follow with neurology. GERD with esophagitis: EGD done 10/2016 showing Grade 1 esophagitis. Wife reports that they have seen GI afterwards and there was no new plans made. Has not seen lately though. Was on PPI therapy, but has since stopped and is using H2 blocker PRN- usually at night time. Using Zofran PRN for severe nausea. Regular BMs without blood. History diverticulitis. Review of Systems   Constitutional: Negative for chills, fatigue and fever. Respiratory: Positive for cough and shortness of breath. Negative for wheezing. Cardiovascular: Negative for chest pain, palpitations and leg swelling. Gastrointestinal: Positive for nausea. Negative for abdominal pain, constipation, diarrhea and vomiting. Genitourinary: Negative for difficulty urinating and frequency. Musculoskeletal: Positive for back pain. Negative for arthralgias and myalgias. +chronic back pain   Skin: Negative for rash. Neurological: Negative for dizziness and headaches.          PHQ Screening   PHQ over the last two weeks 7/5/2018   Little interest or pleasure in doing things Not at all   Feeling down, depressed or hopeless Not at all   Total Score PHQ 2 0         History  Past Medical History:   Diagnosis Date    Arthritis of knee, right 12/2017    advanced degen changes noted, CT right LE    Asthma     Cellulitis 12/2017    DDD (degenerative disc disease), lumbar 2013    noted on MRI with annular tears    Diverticulitis 2016    GERD (gastroesophageal reflux disease) 2016    with esophagitis according to endscopy    Kidney stone     Lumbar post-laminectomy syndrome     Sacroiliitis ([address]     Stroke [facility]) may 14 2010    Unspecified rotator cuff tear or rupture of left shoulder, not specified as traumatic 03/2016       Past Surgical History:   Procedure Laterality Date    COLONOSCOPY N/A 8/12/2016    COLONOSCOPY with polypectomy performed by Deonte Damian MD at [address]  2002, 2004, 2006    L5-S1 fusion, laminectomies    HX ENDOSCOPY  10/2016    grade 1 espohagitis    HX ENDOSCOPY  11/2016    mild esophagitis    HX KNEE ARTHROSCOPY Right     knee    HX MOHS PROCEDURES      right    HX ORTHOPAEDIC      right shoulder       Social History     Social History    Marital status: SINGLE     Spouse name: N/A    Number of children: N/A    Years of education: N/A     Occupational History    Not on file.      Social History Main Topics    Smoking status: Former Smoker     Packs/day: 2.00     Types: Cigarettes     Quit date: 07/2016    Smokeless tobacco: Never Used    Alcohol use No    Drug use: No    Sexual activity: Yes     Partners: Female     Other Topics Concern    Not on file     Social History Narrative    ** Merged History Encounter **            Family History   Problem Relation Age of Onset    Other Brother     Cancer Maternal Grandmother     No Known Problems Mother     No Known Problems Father        Allergies   Allergen Reactions    Penicillins Angioedema    Penicillins Anaphylaxis    Vicodin [Hydrocodone-Acetaminophen] Nausea and Vomiting    Azithromycin Nausea and Vomiting    Hydrochlorothiazide Nausea Only     Dizzy, lightheaded, blisters on his legs    Vicodin [Hydrocodone-Acetaminophen] Nausea and Vomiting    Erythromycin Other (comments)       Current Outpatient Prescriptions   Medication Sig Dispense Refill    budesonide-formoterol (SYMBICORT) 160-4.5 mcg/actuation HFAA Take 1 Puff by inhalation two (2) times a day. 1 Inhaler 5    ondansetron (ZOFRAN ODT) 4 mg disintegrating tablet Take 1 Tab by mouth every eight (8) hours as needed for Nausea. 20 Tab 0    gabapentin (NEURONTIN) 800 mg tablet Take 1 Tab by mouth three (3) times daily. 90 Tab 2    albuterol (PROVENTIL HFA, VENTOLIN HFA, PROAIR HFA) 90 mcg/actuation inhaler Take 2 Puffs by inhalation every six (6) hours as needed for Wheezing or Shortness of Breath. 1 Inhaler 11    raNITIdine (ZANTAC) 150 mg tablet Take 1 Tab by mouth every twelve (12) hours as needed for Indigestion. 60 Tab 11    cyclobenzaprine (FLEXERIL) 10 mg tablet Take 1 Tab by mouth three (3) times daily as needed for Muscle Spasm(s). 30 Tab 6    aspirin 81 mg chewable tablet Take 81 mg by mouth daily. Advance Care Planning:   Patient was offered the opportunity to discuss advance care planning NO   Does patient have an Advance Directive: If no, did you provide information on Caring Connections? Patient Care Team:  Patient Care Team:  Quinn Bullard NP as PCP - General (Nurse Practitioner)  Mi Robledo MD (Orthopedic Surgery)  Anuja Dean MD (Physiatry)  Lilibeth Solomon MD (Gastroenterology)  Zoë Shrestha MD (Urology)        LABS:  None new to review    RADIOLOGY:  None new to review        Physical Exam   Constitutional: He is oriented to person, place, and time. He appears well-developed and well-nourished. No distress. Neck: Normal range of motion. Neck supple. No thyromegaly present. Cardiovascular: Normal rate, regular rhythm and normal heart sounds. No murmur heard.   Pulmonary/Chest: Effort normal and breath sounds normal. No respiratory distress. Abdominal: Soft. Bowel sounds are normal. He exhibits no distension and no ascites. There is no hepatosplenomegaly. There is no tenderness. There is no rigidity, no rebound, no guarding, no CVA tenderness, no tenderness at McBurney's point and negative Hope's sign. No hernia. Musculoskeletal: He exhibits no edema.   -musculoskeletal exam deferred; chronic without acute complaints   Neurological: He is alert and oriented to person, place, and time. He exhibits normal muscle tone. Coordination and gait normal.   Skin: Skin is warm and dry. Vitals:    07/05/18 0818   BP: 111/76   Pulse: 64   Resp: 20   Temp: 97.2 °F (36.2 °C)   TempSrc: Oral   SpO2: 97%   Weight: 172 lb (78 kg)   Height: 5' 10\" (1.778 m)   PainSc:   5   PainLoc: Back         Assessment and Plan      Moderate persistent asthma without complication  *Refilled. See below. - budesonide-formoterol (SYMBICORT) 160-4.5 mcg/actuation HFAA; Take 1 Puff by inhalation two (2) times a day. Dispense: 1 Inhaler; Refill: 5    Upper respiratory tract infection, unspecified type  *Discussed with patient about possible causes- appears to be URI in nature. Recommend cxray- patient [facility]. Start on Prednisone burst and PRN cough medication. Other symptom management discussed. - predniSONE (DELTASONE) 20 mg tablet; Take 3 Tabs by mouth daily (with breakfast) for 3 days. Dispense: 9 Tab; Refill: 0  - XR CHEST PA LAT; Future  - guaiFENesin-codeine (GUAIFENESIN AC) 100-10 mg/5 mL solution; Take 5 mL by mouth three (3) times daily as needed for Cough or Congestion. Max Daily Amount: 15 mL. Dispense: 118 mL; Refill: 0    History of CVA (cerebrovascular accident)  *Continue on ASA. Gastroesophageal reflux disease with esophagitis  *Discussed. Recommend GI re-evaluation for continued intermittent symptoms and re-evaluation of past esophagitis.    - ondansetron (ZOFRAN ODT) 4 mg disintegrating tablet; Take 1 Tab by mouth every eight (8) hours as needed for Nausea. Dispense: 20 Tab; Refill: 0        *Plan of care reviewed with patient. Patient in agreement with plan and expresses understanding. All questions answered and patient encouraged to call or RTO if further questions or concerns. Follow-up Disposition:  Return in about 6 months (around 1/5/2019) for chronic disease routine care and Medicare Wellness- 45 min.

## 2018-07-11 ENCOUNTER — OFFICE VISIT (OUTPATIENT)
Dept: ORTHOPEDIC SURGERY | Age: 53
End: 2018-07-11

## 2018-07-11 VITALS
OXYGEN SATURATION: 97 % | SYSTOLIC BLOOD PRESSURE: 100 MMHG | HEIGHT: 70 IN | WEIGHT: 177.4 LBS | DIASTOLIC BLOOD PRESSURE: 65 MMHG | BODY MASS INDEX: 25.4 KG/M2 | RESPIRATION RATE: 15 BRPM | HEART RATE: 58 BPM

## 2018-07-11 DIAGNOSIS — M51.36 DEGENERATIVE DISC DISEASE, LUMBAR: ICD-10-CM

## 2018-07-11 DIAGNOSIS — M51.36 OTHER INTERVERTEBRAL DISC DEGENERATION, LUMBAR REGION: Primary | ICD-10-CM

## 2018-07-11 DIAGNOSIS — M54.16 LUMBAR RADICULOPATHY: ICD-10-CM

## 2018-07-11 DIAGNOSIS — M96.1 POSTLAMINECTOMY SYNDROME OF LUMBAR REGION: ICD-10-CM
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
## 2018-07-11 NOTE — MR AVS SNAPSHOT
Neela Siegel 
 
 
 [address] [facility] 
[phone] Patient: Deysi Brar MRN: [id number] :[id number] Visit Information Date & Time Provider Department Dept. Phone Encounter #  
 2018  8:30 AM Prachi Ragland MD [facility] [place] [phone] [id number] Follow-up Instructions Return in about 3 months (around 10/11/2018). Your Appointments 2019  8:15 AM  
Office Visit with [place] [address] [facility] [place] ([facility]) Appt Note:  [facility] [place] [facility] [address] [id number] [address]  
  
   
 [address] [facility] [place] Upcoming Health Maintenance Date Due DTaP/Tdap/Td series (1 - Tdap) 2011 Prostate-Specific Antigen 2018 Influenza Age 5 to Adult 2018 MEDICARE YEARLY EXAM 1/3/2019 COLONOSCOPY 2021 Allergies as of 2018  Review Complete On: 2018 By: Prachi Ragland MD  
  
 Severity Noted Reaction Type Reactions Penicillins High 10/14/2012    Angioedema Penicillins High 05/15/2013    Anaphylaxis Vicodin [Hydrocodone-acetaminophen] High 10/14/2012    Nausea and Vomiting Azithromycin  2016    Nausea and Vomiting Hydrochlorothiazide  2017   Systemic Nausea Only Dizzy, lightheaded, blisters on his legs Vicodin [Hydrocodone-acetaminophen]  05/15/2013    Nausea and Vomiting Erythromycin Low 2016    Other (comments) Current Immunizations  Reviewed on 2017 Name Date Influenza Vaccine (Quad) PF 2017, 2016 Novel Influenza-H1N1-09, All Formulations 2010 12:00 AM  
 Pneumococcal Polysaccharide (PPSV-23) 2017 Td 2011 Not reviewed this visit You Were Diagnosed With   
  
 Codes Comments Other intervertebral disc degeneration, lumbar region    -  Primary ICD-10-CM: M51.36 
ICD-9-CM: 722.52 Degenerative disc disease, lumbar     ICD-10-CM: M51.36 
ICD-9-CM: 722.52 Lumbar radiculopathy     ICD-10-CM: M54.16 
ICD-9-CM: 724.4 Postlaminectomy syndrome of lumbar region     ICD-10-CM: M96.1 ICD-9-CM: 722.83 Vitals BP Pulse Resp Height(growth percentile) Weight(growth percentile) SpO2  
 100/65 (!) 58 15 5' 10\" (1.778 m) 177 lb 6.4 oz (80.5 kg) 97% BMI Smoking Status 25.45 kg/m2 Former Smoker BMI and BSA Data Body Mass Index Body Surface Area  
 25.45 kg/m 2 1.99 m 2 Preferred Pharmacy Pharmacy Name Phone [facility] [address] [place] [address]. [phone] Your Updated Medication List  
  
   
This list is accurate as of 7/11/18  9:12 AM.  Always use your most recent med list.  
  
  
  
  
 albuterol 90 mcg/actuation inhaler Commonly known as:  PROVENTIL HFA, VENTOLIN HFA, PROAIR HFA Take 2 Puffs by inhalation every six (6) hours as needed for Wheezing or Shortness of Breath. aspirin 81 mg chewable tablet Take 81 mg by mouth daily. budesonide-formoterol 160-4.5 mcg/actuation Hfaa Commonly known as:  SYMBICORT Take 1 Puff by inhalation two (2) times a day. cyclobenzaprine 10 mg tablet Commonly known as:  FLEXERIL Take 1 Tab by mouth three (3) times daily as needed for Muscle Spasm(s). gabapentin 800 mg tablet Commonly known as:  NEURONTIN Take 1 Tab by mouth three (3) times daily. guaiFENesin-codeine 100-10 mg/5 mL solution Commonly known as:  guaiFENesin AC Take 5 mL by mouth three (3) times daily as needed for Cough or Congestion. Max Daily Amount: 15 mL. ondansetron 4 mg disintegrating tablet Commonly known as:  ZOFRAN ODT Take 1 Tab by mouth every eight (8) hours as needed for Nausea. raNITIdine 150 mg tablet Commonly known as:  ZANTAC Take 1 Tab by mouth every twelve (12) hours as needed for Indigestion. Follow-up Instructions Return in about 3 months (around 10/11/2018). Please provide this summary of care documentation to your next provider. Your primary care clinician is listed as Rosemarie Ken. If you have any questions after today's visit, please call [phone].

## 2018-10-29 ENCOUNTER — OFFICE VISIT (OUTPATIENT)
Dept: ORTHOPEDIC SURGERY | Age: 53
End: 2018-10-29

## 2018-10-29 VITALS
WEIGHT: 175.8 LBS | RESPIRATION RATE: 16 BRPM | HEIGHT: 70 IN | OXYGEN SATURATION: 96 % | SYSTOLIC BLOOD PRESSURE: 113 MMHG | HEART RATE: 85 BPM | BODY MASS INDEX: 25.17 KG/M2 | DIASTOLIC BLOOD PRESSURE: 65 MMHG

## 2018-10-29 DIAGNOSIS — M54.16 LUMBAR RADICULOPATHY: Primary | ICD-10-CM

## 2018-10-29 DIAGNOSIS — M51.36 DEGENERATIVE DISC DISEASE, LUMBAR: ICD-10-CM

## 2018-10-29 DIAGNOSIS — M96.1 LUMBAR POSTLAMINECTOMY SYNDROME: ICD-10-CM

## 2018-10-29 RX ORDER — GABAPENTIN 800 MG/1
800 TABLET ORAL 2 TIMES DAILY
Qty: 90 TAB | Refills: 2 | Status: SHIPPED | OUTPATIENT
Start: 2018-10-29 | End: 2019-07-23 | Stop reason: SDUPTHER
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
## 2018-12-13 DIAGNOSIS — M51.36 DEGENERATIVE DISC DISEASE, LUMBAR: ICD-10-CM

## 2018-12-15 RX ORDER — CYCLOBENZAPRINE HCL 10 MG
TABLET ORAL
Qty: 30 TAB | Refills: 6 | Status: SHIPPED | OUTPATIENT
Start: 2018-12-15 | End: 2019-08-08 | Stop reason: SDUPTHER

## 2019-01-30 ENCOUNTER — OFFICE VISIT (OUTPATIENT)
Dept: ORTHOPEDIC SURGERY | Age: 54
End: 2019-01-30

## 2019-01-30 VITALS
SYSTOLIC BLOOD PRESSURE: 107 MMHG | TEMPERATURE: 97.7 F | HEIGHT: 70 IN | OXYGEN SATURATION: 99 % | RESPIRATION RATE: 16 BRPM | WEIGHT: 167 LBS | DIASTOLIC BLOOD PRESSURE: 71 MMHG | HEART RATE: 66 BPM | BODY MASS INDEX: 23.91 KG/M2

## 2019-01-30 DIAGNOSIS — M51.36 DEGENERATIVE DISC DISEASE, LUMBAR: ICD-10-CM

## 2019-01-30 DIAGNOSIS — M54.16 LUMBAR RADICULOPATHY: Primary | ICD-10-CM

## 2019-01-30 DIAGNOSIS — M96.1 LUMBAR POSTLAMINECTOMY SYNDROME: ICD-10-CM
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
## 2019-02-08 ENCOUNTER — OFFICE VISIT (OUTPATIENT)
Dept: FAMILY MEDICINE CLINIC | Age: 54
End: 2019-02-08

## 2019-02-08 ENCOUNTER — HOSPITAL ENCOUNTER (OUTPATIENT)
Dept: LAB | Age: 54
Discharge: HOME | End: 2019-02-08
Payer: MEDICARE

## 2019-02-08 VITALS
BODY MASS INDEX: 24.2 KG/M2 | SYSTOLIC BLOOD PRESSURE: 96 MMHG | RESPIRATION RATE: 18 BRPM | HEART RATE: 65 BPM | HEIGHT: 70 IN | WEIGHT: 169 LBS | OXYGEN SATURATION: 96 % | TEMPERATURE: 97.5 F | DIASTOLIC BLOOD PRESSURE: 65 MMHG

## 2019-02-08 DIAGNOSIS — Z13.220 ENCOUNTER FOR LIPID SCREENING FOR CARDIOVASCULAR DISEASE: ICD-10-CM

## 2019-02-08 DIAGNOSIS — Z23 NEED FOR SHINGLES VACCINE: ICD-10-CM

## 2019-02-08 DIAGNOSIS — K21.00 GASTROESOPHAGEAL REFLUX DISEASE WITH ESOPHAGITIS: ICD-10-CM

## 2019-02-08 DIAGNOSIS — Z00.00 ENCOUNTER FOR MEDICARE ANNUAL WELLNESS EXAM: ICD-10-CM

## 2019-02-08 DIAGNOSIS — Z12.5 SCREENING FOR PROSTATE CANCER: ICD-10-CM

## 2019-02-08 DIAGNOSIS — R73.01 ELEVATED FASTING GLUCOSE: ICD-10-CM

## 2019-02-08 DIAGNOSIS — Z13.39 SCREENING FOR ALCOHOLISM: ICD-10-CM

## 2019-02-08 DIAGNOSIS — L81.9 DISCOLORATION OF SKIN OF TOE: ICD-10-CM

## 2019-02-08 DIAGNOSIS — J45.40 MODERATE PERSISTENT ASTHMA WITHOUT COMPLICATION: Primary | ICD-10-CM

## 2019-02-08 DIAGNOSIS — Z13.31 SCREENING FOR DEPRESSION: ICD-10-CM

## 2019-02-08 DIAGNOSIS — Z13.29 SCREENING FOR ENDOCRINE, METABOLIC AND IMMUNITY DISORDER: ICD-10-CM

## 2019-02-08 DIAGNOSIS — D23.5 OTHER BENIGN NEOPLASM OF SKIN OF TRUNK: ICD-10-CM

## 2019-02-08 DIAGNOSIS — Z13.6 ENCOUNTER FOR LIPID SCREENING FOR CARDIOVASCULAR DISEASE: ICD-10-CM

## 2019-02-08 DIAGNOSIS — Z13.0 SCREENING FOR ENDOCRINE, METABOLIC AND IMMUNITY DISORDER: ICD-10-CM

## 2019-02-08 DIAGNOSIS — L98.9 SKIN LESION: ICD-10-CM

## 2019-02-08 DIAGNOSIS — Z23 ENCOUNTER FOR IMMUNIZATION: ICD-10-CM

## 2019-02-08 DIAGNOSIS — Z86.73 HISTORY OF CVA (CEREBROVASCULAR ACCIDENT): ICD-10-CM

## 2019-02-08 DIAGNOSIS — B35.1 ONYCHOMYCOSIS OF GREAT TOE: ICD-10-CM

## 2019-02-08 DIAGNOSIS — Z13.228 SCREENING FOR ENDOCRINE, METABOLIC AND IMMUNITY DISORDER: ICD-10-CM

## 2019-02-08 PROCEDURE — 88305 TISSUE EXAM BY PATHOLOGIST: CPT

## 2019-02-08 PROCEDURE — 88304 TISSUE EXAM BY PATHOLOGIST: CPT

## 2019-02-08 RX ORDER — RANITIDINE 150 MG/1
150 TABLET, FILM COATED ORAL
Qty: 60 TAB | Refills: 11 | Status: SHIPPED | OUTPATIENT
Start: 2019-02-08 | End: 2020-02-13 | Stop reason: ALTCHOICE

## 2019-02-08 RX ORDER — ONDANSETRON 4 MG/1
4 TABLET, ORALLY DISINTEGRATING ORAL
Qty: 20 TAB | Refills: 0 | Status: SHIPPED | OUTPATIENT
Start: 2019-02-08 | End: 2020-02-13 | Stop reason: SDUPTHER

## 2019-02-08 RX ORDER — IBUPROFEN 200 MG
TABLET ORAL
COMMUNITY
End: 2021-05-25

## 2019-02-08 RX ORDER — ALBUTEROL SULFATE 90 UG/1
2 AEROSOL, METERED RESPIRATORY (INHALATION)
Qty: 1 INHALER | Refills: 11 | Status: SHIPPED | OUTPATIENT
Start: 2019-02-08 | End: 2021-05-25 | Stop reason: SDUPTHER

## 2019-02-08 RX ORDER — BUDESONIDE AND FORMOTEROL FUMARATE DIHYDRATE 160; 4.5 UG/1; UG/1
1 AEROSOL RESPIRATORY (INHALATION) 2 TIMES DAILY
Qty: 1 INHALER | Refills: 5 | Status: SHIPPED | OUTPATIENT
Start: 2019-02-08 | End: 2019-08-08 | Stop reason: SDUPTHER

## 2019-02-08 RX ORDER — ACETAMINOPHEN 500 MG
TABLET ORAL
COMMUNITY
End: 2021-06-11

## 2019-02-08 NOTE — PROGRESS NOTES
OFFICE NOTE Brandin Kearney is a 48 y.o. male presenting today for office visit. 2/8/2019 
8:41 AM 
 
 
Chief Complaint Patient presents with  Follow-up [facility] Annual Wellness Visit  HPI: Here today for follow up chronic disease and Medicare Wellness (see other note). Last labs done 12/2017. Following with GI and the spine center (DDD lumbar, post-laminectomy syndrome). Asthma: Using Symbicort for maintenance inhaler and has Albuterol. Allergies do make asthma worse. Only using Albuterol about 1-2 times per week when not in allergy season. Doing well today. CVA history: History in 2010- has been on ASA since. No residual. Does not follow with neurology. GERD with esophagitis: EGD done 10/2016 showing Grade 1 esophagitis. Wife reports that they have seen GI afterwards and there was no new plans made. Has not seen lately though. Was on PPI therapy, but has since stopped and is using H2 blocker PRN- usually at night time. Using Zofran PRN for severe nausea. Regular BMs without blood. History diverticulitis. Complains of discoloration of bilateral great toenails- black in color. He reports that he has been told (he thinks by back specialist) that he may have vascular problems. He does have numbness/tingling in feet at times- has been told by spine center that it is not felt to be his back. Also complains of left rib skin lesion that has been present for long time but becoming bothersome. Has become painful. They have tried removing several times at home- not working. Review of Systems Constitutional: Negative for chills, fatigue and fever. Respiratory: Negative for cough, shortness of breath and wheezing. Cardiovascular: Negative for chest pain, palpitations and leg swelling. Gastrointestinal: Negative for abdominal pain, constipation, diarrhea, nausea and vomiting. Genitourinary: Negative for difficulty urinating and frequency. Musculoskeletal: Positive for back pain (chronic pain). Negative for myalgias. Skin: Negative for rash. +skin lesion 
+black toenails Neurological: Negative for dizziness and headaches. PHQ Screening PHQ over the last two weeks 2/8/2019 Little interest or pleasure in doing things Not at all Feeling down, depressed, irritable, or hopeless Not at all Total Score PHQ 2 0 History Past Medical History:  
Diagnosis Date  Arthritis of knee, right 12/2017  
 advanced degen changes noted, CT right LE  
 Asthma  Cellulitis 12/2017  DDD (degenerative disc disease), lumbar 2013  
 noted on MRI with annular tears  Diverticulitis 2016  GERD (gastroesophageal reflux disease) 2016  
 with esophagitis according to endscopy  Kidney stone  Lumbar post-laminectomy syndrome  Sacroiliitis ([facility] [address]  Stroke [facility]) may 14 2010  Unspecified rotator cuff tear or rupture of left shoulder, not specified as traumatic 03/2016 Past Surgical History:  
Procedure Laterality Date  COLONOSCOPY N/A 8/12/2016 COLONOSCOPY with polypectomy performed by Tasha Noriega MD at [address]  2002, 2004, 2006 L5-S1 fusion, laminectomies  HX ENDOSCOPY  10/2016  
 grade 1 espohagitis  HX ENDOSCOPY  11/2016  
 mild esophagitis  HX KNEE ARTHROSCOPY Right   
 knee  HX MOHS PROCEDURES    
 right  HX ORTHOPAEDIC    
 right shoulder Social History Socioeconomic History  Marital status: SINGLE Spouse name: Not on file  Number of children: Not on file  Years of education: Not on file  Highest education level: Not on file Social Needs  Financial resource strain: Not on file  Food insecurity - worry: Not on file  Food insecurity - inability: Not on file  Transportation needs - medical: Not on file  Transportation needs - non-medical: Not on file Occupational History  Not on file Tobacco Use  
  Smoking status: Former Smoker Packs/day: 2.00 Types: Cigarettes Last attempt to quit: 2016 Years since quittin.6  Smokeless tobacco: Never Used Substance and Sexual Activity  Alcohol use: No  
  Alcohol/week: 0.0 oz  Drug use: No  
 Sexual activity: Yes  
  Partners: Female Other Topics Concern  Not on file Social History Narrative ** Merged History Encounter ** Family History Problem Relation Age of Onset  Other Brother  Cancer Maternal Grandmother  No Known Problems Mother  No Known Problems Father Allergies Allergen Reactions  Penicillins Angioedema  Penicillins Anaphylaxis  Vicodin [Hydrocodone-Acetaminophen] Nausea and Vomiting  Azithromycin Nausea and Vomiting  Hydrochlorothiazide Nausea Only Dizzy, lightheaded, blisters on his legs  Vicodin [Hydrocodone-Acetaminophen] Nausea and Vomiting  Erythromycin Other (comments) Current Outpatient Medications Medication Sig Dispense Refill  ibuprofen (MOTRIN) 200 mg tablet Take  by mouth.  acetaminophen (TYLENOL) 500 mg tablet Take  by mouth every six (6) hours as needed for Pain.  varicella-zoster recombinant, PF, (SHINGRIX, PF,) 50 mcg/0.5 mL susr injection 0.5 mL by IntraMUSCular route once for 1 dose. Repeat dose in about 2-6 months 0.5 mL 1  raNITIdine (ZANTAC) 150 mg tablet Take 1 Tab by mouth every twelve (12) hours as needed for Indigestion. 60 Tab 11  
 albuterol (PROVENTIL HFA, VENTOLIN HFA, PROAIR HFA) 90 mcg/actuation inhaler Take 2 Puffs by inhalation every six (6) hours as needed for Wheezing or Shortness of Breath. 1 Inhaler 11  
 ondansetron (ZOFRAN ODT) 4 mg disintegrating tablet Take 1 Tab by mouth every eight (8) hours as needed for Nausea. 20 Tab 0  
 budesonide-formoterol (SYMBICORT) 160-4.5 mcg/actuation HFAA Take 1 Puff by inhalation two (2) times a day.  1 Inhaler 5  
  cyclobenzaprine (FLEXERIL) 10 mg tablet TAKE ONE TABLET BY MOUTH THREE TIMES DAILY AS NEEDED FOR MUSCLE SPASM 30 Tab 6  
 gabapentin (NEURONTIN) 800 mg tablet Take 1 Tab by mouth two (2) times a day. 90 Tab 2  
 aspirin 81 mg chewable tablet Take 81 mg by mouth daily.  albuterol (PROVENTIL VENTOLIN) 2.5 mg /3 mL (0.083 %) nebulizer solution 3 mL by Nebulization route once for 1 dose. 1 Package 0 Advance Care Planning:  
Patient was offered the opportunity to discuss advance care planning NO Does patient have an Advance Directive: If no, did you provide information on Caring Connections? Patient Care Team: 
Patient Care Team: 
Nilda Chiu NP as PCP - General (Nurse Practitioner) John Sheth MD (Orthopedic Surgery) Anu Bills MD (Physiatry) Hilario Villagran MD (Gastroenterology) Sharlene Freedman MD (Urology) LABS: 
None new to review RADIOLOGY: 
None new to review Physical Exam  
Constitutional: He is oriented to person, place, and time. He appears well-developed and well-nourished. No distress. Neck: Normal range of motion. Neck supple. Cardiovascular: Normal rate, regular rhythm and normal heart sounds. No murmur heard. Pulmonary/Chest: Effort normal and breath sounds normal. No respiratory distress. Abdominal: Soft. Bowel sounds are normal. He exhibits no distension and no ascites. There is no hepatosplenomegaly. There is no tenderness. There is no rigidity, no rebound, no guarding, no CVA tenderness, no tenderness at McBurney's point and negative Hope's sign. No hernia. Musculoskeletal: He exhibits no edema. Right lower leg: He exhibits no tenderness and no swelling. Left lower leg: He exhibits no tenderness and no swelling.  
-full musculoskeletal exam deferred; chronic LBP 
+blackened colored toenails with yellowing as well bilateral great toes; unable to fully assess nail bed coloration due to thickening of nails Neurological: He is alert and oriented to person, place, and time. He exhibits normal muscle tone. Coordination and gait normal.  
Skin: Skin is warm and dry. Vitals:  
 02/08/19 0831 BP: 96/65 Pulse: 65 Resp: 18 Temp: 97.5 °F (36.4 °C) TempSrc: Oral  
SpO2: 96% Weight: 169 lb (76.7 kg) Height: 5' 10\" (1.778 m) PainSc:   4 PainLoc: Back Assessment and Plan Moderate persistent asthma without complication 
- albuterol (PROVENTIL HFA, VENTOLIN HFA, PROAIR HFA) 90 mcg/actuation inhaler; Take 2 Puffs by inhalation every six (6) hours as needed for Wheezing or Shortness of Breath. Dispense: 1 Inhaler; Refill: 11 
- budesonide-formoterol (SYMBICORT) 160-4.5 mcg/actuation HFAA; Take 1 Puff by inhalation two (2) times a day. Dispense: 1 Inhaler; Refill: 5 History of CVA (cerebrovascular accident) *Continue on ASA Gastroesophageal reflux disease with esophagitis 
- raNITIdine (ZANTAC) 150 mg tablet; Take 1 Tab by mouth every twelve (12) hours as needed for Indigestion. Dispense: 60 Tab; Refill: 11 
- ondansetron (ZOFRAN ODT) 4 mg disintegrating tablet; Take 1 Tab by mouth every eight (8) hours as needed for Nausea. Dispense: 20 Tab; Refill: 0 Skin lesion/ Other benign neoplasm of skin of trunk  
- PATHOLOGY-SURGICAL 
- SHAV SKIN LES <5MM TRUNK,ARM,LEG Discoloration of skin of toe 
- DUPLEX LOWER EXT ARTERY BILAT; Future Onychomycosis of great toe *Appears to be nail discoloration but will rule out arterial disease due to red flag symptoms of numbness. If negative arterial study and LFTs WNL with labs, consider Lamisil PO.  
 
 
 
*Plan of care reviewed with patient. Patient in agreement with plan and expresses understanding. All questions answered and patient encouraged to call or RTO if further questions or concerns.   
 
 
Follow-up Disposition: 
Return in about 6 months (around 8/8/2019) for chronic disease routine care- 30 min. , Yearly Medicare Wellness-  done today. John Hamilton

## 2019-02-08 NOTE — PATIENT INSTRUCTIONS
Preventive Services Limitations Recommendation Preventative Services Limitations Recommendation Glaucoma Screening (no USPSTF recommendation) Diabetes mellitus, family history, , age 48 or over,  American, age 72 or over Recommended Periodontal Disease- Dentistry Services *May not be covered under Medicare Recommended Skin Cancer Screening Exam every year *May not be covered under Medicare Discussed self checks Hearing Impairment Pure Tone Test every 3 years *May not be covered under Medicare No hearing loss noted COPD and Lung Cancer Screening CT chest or chest xray yearly as deemed necessary *May not be covered under Medicare Former smoker 
  
Smoked for 30-35 years Consider screening in future- will possibly qualify around age 54years old Counseling to Prevent Tobacco Use 
- Counseling greater than 3 and up to 10 minutes - Counseling greater than 10 minutes Patients must be asymptomatic of tobacco-related conditions to receive as preventive service 
  
Up to 8 sessions/year Former smoker Alcohol Misuse Counseling 4 brief face to face counseling sessions/year Sober for 5 years Obesity Screening and Counseling BMI of 30 or more. Weekly visits for first month, then biweekly for months 2-6, then every month for months 7-12 if you lose 6.6 lbs in months 1-6 Body mass index is 24.25 kg/m². Screening for STIs and Counseling Annually screenings- 2 face to face counseling sessions/year Declines 
  
Believes that he had Hep C drawn with GI Human Immunodeficiency Virus (HIV) Screening (annually for increased risk patients) HIV-1 and HIV-2 by EIA, DEVEN, rapid antibody test, or oral mucosa transudate Tests covered annually for patients at increased risk.  Pregnant patients may receive up to 3 test during pregnancy. Declines Diabetes Screening Tests (at least every 3 years, Medicare covers annually or at 6-month intervals for prediabetic patients) 
  
 Fasting blood sugar (FBS) or glucose tolerance test (GTT) Diagnosed with one of the following: 
-Hypertension, Dyslipidemia, obesity, previous impaired FBS or GTT 
Or any two of the following: overweight, FH of diabetes, age ? 72, history of gestational diabetes, birth of baby weighing more than 9 pounds Glucose 130 12/2017 
  
A1c 5.3% 5/2017 Repeat ordered Cardiovascular Screening Blood Tests (every 5 years) Total cholesterol, HDL, Triglycerides Order as a panel if possible LDL 38.6 12/2017 Repeat ordered Medical Nutrition Therapy (MNT) (for diabetes or renal disease not recommended schedule) Requires referral by treating physician for patient with diabetes or renal disease. Up to 3 hours for initial year and 2 hours in subsequent years. Not indicated Diabetes Self-Management Training (DSMT) (no USPSTF recommendation) Requires referral by treating physician for patient with diabetes or renal disease. 10 hours of initial DSMT session of no less than 30 minutes each in a continuous 12-month period.  2 hours of follow-up DSMT in subsequent years. Not indicated Behavioral Therapy for Cardiovascular Disease Annually BP controlled, no diabetes, cholesterol controlled, and former smoker 
  
Has stopped taking ASA Recommend to restart Ultrasound Screening for Abdominal Aortic Aneurysm (AAA) (once) Patient must be referred through IPPE. Criteria: 
- Men who are 73-68 years old and smoked >100 cigarettes. -Anyone with FH of AAA 
-Or recommended for screening by USPSTF Consider at age of 72 years Prostate Cancer Screening (annually up to age 76) - Digital rectal exam (KARLA) - Prostate specific antigen (PSA) Annually (age 48 or over), KARLA not paid separately when covered E/M service is provided on same date PSA 1.8 1/2017 
  
Will order with next labs Colorectal Cancer Screening -Fecal occult blood test (annual) -Flexible sigmoidoscopy (5y) 
-Screening colonoscopy (10y) -Barium Enema Colonoscopy 8/2016 Repeat in 5 years Bone Mass Measurement 
(age 72 & older, biennial) Requires diagnosis related to osteoporosis or estrogen deficiency. History of long-term glucocorticoid tx or baseline is needed. Not indicated Screening Mammography (biennial age 54-69) Annually (age 36 or over) N/A Screening Pap Tests and Pelvic Examination (up to age 79 and after 79 if unknown history or abnormal study last 10 years) Every 24 months except high risk N/A Hepatitis B Vaccinations (if medium/high risk) Medium/high risk factors:  End-stage renal disease, Hemophiliacs who received Factor VIII or IX concentrates, Clients of institutions for the mentally retarded, Persons who live in the same house as a HepB virus carrier, Homosexual men, Illicit injectable drug abusers. Not indicated Seasonal Influenza Vaccination (annually)   Given flu shot today Pneumococcal Vaccination (once after 65)   PPSV 23 done 2/2017 
  
Up to date Herpes Zoster (Shingles) Vaccination (once after age 61) [de-identified] to persons at age 48 years in specific conditions *May not be covered by Medicare   
Rx given for Shingrix Tetanus Vaccine Td booster every 10 years- Tdap if exposed to children under 1 year) *May not be covered by Medicare Last booster in 2011

## 2019-02-08 NOTE — PROGRESS NOTES
Chief Complaint Patient presents with  Follow-up [facility] Annual Wellness Visit  1. Have you been to the ER, urgent care clinic since your last visit? Hospitalized since your last visit? No 
 
2. Have you seen or consulted any other health care providers outside of the [address] [place] [address] since your last visit? Include any pap smears or colon screening. No 
 
Patient was administered seasonal influenza vaccine via left deltoid without difficulty. Patient was informed of side effects and understanding verbalized. Patient waited and no side effects were observed. No distress noted or voiced.

## 2019-02-08 NOTE — PROGRESS NOTES
2/8/2019 
8:50 AM 
 
Chief Complaint Patient presents with  Follow-up [facility] Annual Wellness Visit  This is a Subsequent Medicare Annual Wellness Visit providing Personalized Prevention Plan Services (PPPS) (Performed 12 months after initial AWV and PPPS ) I have reviewed the patient's medical history in detail and updated the computerized patient record. History Past Medical History:  
Diagnosis Date  Arthritis of knee, right 12/2017  
 advanced degen changes noted, CT right LE  
 Asthma  Cellulitis 12/2017  DDD (degenerative disc disease), lumbar 2013  
 noted on MRI with annular tears  Diverticulitis 2016  GERD (gastroesophageal reflux disease) 2016  
 with esophagitis according to endscopy  Kidney stone  Lumbar post-laminectomy syndrome  Sacroiliitis ([facility] [address]  Stroke [facility]) may 14 2010  Unspecified rotator cuff tear or rupture of left shoulder, not specified as traumatic 03/2016 Past Surgical History:  
Procedure Laterality Date  COLONOSCOPY N/A 8/12/2016 COLONOSCOPY with polypectomy performed by Taye Sky MD at [address] [address] [facility]  2002, 2004, 2006 L5-S1 fusion, laminectomies  HX ENDOSCOPY  10/2016  
 grade 1 espohagitis  HX ENDOSCOPY  11/2016  
 mild esophagitis  HX KNEE ARTHROSCOPY Right   
 knee  HX MOHS PROCEDURES    
 right  HX ORTHOPAEDIC    
 right shoulder Current Outpatient Medications Medication Sig Dispense Refill  ibuprofen (MOTRIN) 200 mg tablet Take  by mouth.  acetaminophen (TYLENOL) 500 mg tablet Take  by mouth every six (6) hours as needed for Pain.  varicella-zoster recombinant, PF, (SHINGRIX, PF,) 50 mcg/0.5 mL susr injection 0.5 mL by IntraMUSCular route once for 1 dose. Repeat dose in about 2-6 months 0.5 mL 1  raNITIdine (ZANTAC) 150 mg tablet Take 1 Tab by mouth every twelve (12) hours as needed for Indigestion.  60 Tab 11  
  albuterol (PROVENTIL HFA, VENTOLIN HFA, PROAIR HFA) 90 mcg/actuation inhaler Take 2 Puffs by inhalation every six (6) hours as needed for Wheezing or Shortness of Breath. 1 Inhaler 11  
 ondansetron (ZOFRAN ODT) 4 mg disintegrating tablet Take 1 Tab by mouth every eight (8) hours as needed for Nausea. 20 Tab 0  
 budesonide-formoterol (SYMBICORT) 160-4.5 mcg/actuation HFAA Take 1 Puff by inhalation two (2) times a day. 1 Inhaler 5  cyclobenzaprine (FLEXERIL) 10 mg tablet TAKE ONE TABLET BY MOUTH THREE TIMES DAILY AS NEEDED FOR MUSCLE SPASM 30 Tab 6  
 gabapentin (NEURONTIN) 800 mg tablet Take 1 Tab by mouth two (2) times a day. 90 Tab 2  
 aspirin 81 mg chewable tablet Take 81 mg by mouth daily.  albuterol (PROVENTIL VENTOLIN) 2.5 mg /3 mL (0.083 %) nebulizer solution 3 mL by Nebulization route once for 1 dose. 1 Package 0 Allergies Allergen Reactions  Penicillins Angioedema  Penicillins Anaphylaxis  Vicodin [Hydrocodone-Acetaminophen] Nausea and Vomiting  Azithromycin Nausea and Vomiting  Hydrochlorothiazide Nausea Only Dizzy, lightheaded, blisters on his legs  Vicodin [Hydrocodone-Acetaminophen] Nausea and Vomiting  Erythromycin Other (comments) Family History Problem Relation Age of Onset  Other Brother  Cancer Maternal Grandmother  No Known Problems Mother  No Known Problems Father Social History Tobacco Use  Smoking status: Former Smoker Packs/day: 2.00 Types: Cigarettes Last attempt to quit: 2016 Years since quittin.6  Smokeless tobacco: Never Used Substance Use Topics  Alcohol use: No  
  Alcohol/week: 0.0 oz Patient Active Problem List  
Diagnosis Code  Degenerative disc disease, lumbar M51.36  
 S/P spinal fusion Z98.1  History of CVA (cerebrovascular accident) Z80.78  
 Lumbar herniated disc M51.26  
 Gastroesophageal reflux disease with esophagitis K21.0  Moderate persistent asthma without complication C15.51  Postlaminectomy syndrome of lumbar region M96.1  Other intervertebral disc degeneration, lumbar region M51.36  
 Primary osteoarthritis of right knee M17.11  
 HNP (herniated nucleus pulposus), lumbar M51.26  
 Lumbar radiculopathy M54.16 Depression Risk Factor Screening: PHQ over the last two weeks 2/8/2019 Little interest or pleasure in doing things Not at all Feeling down, depressed, irritable, or hopeless Not at all Total Score PHQ 2 0 Alcohol Risk Factor Screening: You do not drink alcohol or very rarely. Functional Ability and Level of Safety:  
 
Hearing Loss Hearing is good. Activities of Daily Living Self-care. Requires assistance with: no ADLs Fall Risk Fall Risk Assessment, last 12 mths 11/17/2016 Able to walk? Yes Fall in past 12 months? No  
 
 
Abuse Screen Patient is not abused Denies financial, physical, or verbal abuse Review of Systems Constitutional: negative for fevers, chills and weight loss Respiratory: negative for cough, sputum, wheezing or dyspnea on exertion Cardiovascular: negative for chest pain, palpitations, syncope, lower extremity edema Gastrointestinal: negative for nausea, vomiting and change in bowel habits Neurological: negative for headaches, dizziness and weakness Labwork/Imaging Lab Results Component Value Date/Time Hemoglobin A1c (POC) 5.3 05/02/2017 08:30 AM  
 
 
Lab Results Component Value Date/Time Glucose 130 (H) 12/09/2017 09:45 AM  
 
 
Lab Results Component Value Date/Time Cholesterol, total 97 12/07/2017 03:08 AM  
 HDL Cholesterol 43 12/07/2017 03:08 AM  
 LDL, calculated 38.6 12/07/2017 03:08 AM  
 VLDL, calculated 15.4 12/07/2017 03:08 AM  
 Triglyceride 77 12/07/2017 03:08 AM  
 CHOL/HDL Ratio 2.3 12/07/2017 03:08 AM  
 
 
 
Physical Examination Evaluation of Cognitive Function: 
Mood/affect:  neutral 
 Appearance: age appropriate and casually dressed Family member/caregiver input: girlfriend Blood pressure 96/65, pulse 65, temperature 97.5 °F (36.4 °C), temperature source Oral, resp. rate 18, height 5' 10\" (1.778 m), weight 169 lb (76.7 kg), SpO2 96 %. Body mass index is 24.25 kg/m². General appearance: alert, cooperative, no distress Lungs: clear to auscultation bilaterally Heart: regular rate and rhythm, S1, S2 normal 
Abdomen: soft, non-tender. Bowel sounds normal. No masses,  no organomegaly Extremities: extremities normal, atraumatic, no cyanosis or edema Patient Care Team: 
Liss Yusuf NP as PCP - General (Nurse Practitioner) Frida Castañeda MD (Orthopedic Surgery) Vijaya Rasheed MD (Physiatry) Shannon Dahl MD (Gastroenterology) Joselo Tafoya MD (Urology) Advice/Referrals/Counseling Education and counseling provided: 
Are appropriate based on today's review and evaluation Influenza Vaccine Prostate cancer screening tests (PSA, covered annually) Cardiovascular screening blood test 
Screening for glaucoma Diabetes screening test 
 
Assessment/Plan Diagnoses and all orders for this visit: 
 
 
Encounter for Medicare annual wellness exam 
*Medicare wellness completed. Education and counseling provided, recommendations made- see Medicare chart in patient instructions and see below.  
-Screening for depression 
-Screening for alcoholism Encounter for lipid screening for cardiovascular disease -     LIPID PANEL; Future Elevated fasting glucose -     METABOLIC PANEL, COMPREHENSIVE; Future 
-     HEMOGLOBIN A1C WITH EAG; Future Screening for endocrine, metabolic and immunity disorder 
-     CBC W/O DIFF; Future -     METABOLIC PANEL, COMPREHENSIVE; Future 
-     TSH 3RD GENERATION; Future -     URINALYSIS W/MICROSCOPIC; Future Screening for prostate cancer -     PSA SCREENING (SCREENING); Future Need for shingles vaccine -     varicella-zoster recombinant, PF, (SHINGRIX, PF,) 50 mcg/0.5 mL susr injection; 0.5 mL by IntraMUSCular route once for 1 dose. Repeat dose in about 2-6 months Encounter for immunization -     ADMIN INFLUENZA VIRUS VAC 
-     INFLUENZA VIRUS VAC QUAD,SPLIT,PRESV FREE SYRINGE IM 
 
 
*Plan of care reviewed with patient. Patient in agreement with plan and expresses understanding. All questions answered and patient encouraged to call or RTO if further questions or concerns. Follow-up Disposition: 
Return in about 6 months (around 8/8/2019) for chronic disease routine care- 30 min. , Yearly Medicare Wellness-  done today. ..

## 2019-02-11 ENCOUNTER — TELEPHONE (OUTPATIENT)
Dept: FAMILY MEDICINE CLINIC | Age: 54
End: 2019-02-11

## 2019-02-11 NOTE — TELEPHONE ENCOUNTER
Specimen order corrected at this time. Order was missing the location.  Corrected and will be sent with lab this evening

## 2019-02-11 NOTE — TELEPHONE ENCOUNTER
Lab returned Specimen to be label with  type of specimen. Specimen was labeled with requested information and it will go back to lab today with routine pickup.

## 2019-02-11 NOTE — TELEPHONE ENCOUNTER
Stephanie called regarding the patients most recent lab that was sent in. They said they're rejecting the lab and sending it back here, for it to be labled on the requisition as well as the specimen of where it was taken and what it was for. And then it can just be resent in.

## 2019-02-18 ENCOUNTER — HOSPITAL ENCOUNTER (OUTPATIENT)
Dept: VASCULAR SURGERY | Age: 54
Discharge: HOME | End: 2019-02-18
Attending: NURSE PRACTITIONER
Payer: MEDICARE

## 2019-02-18 ENCOUNTER — HOSPITAL ENCOUNTER (OUTPATIENT)
Dept: LAB | Age: 54
Discharge: HOME | End: 2019-02-18
Attending: NURSE PRACTITIONER
Payer: MEDICARE

## 2019-02-18 DIAGNOSIS — Z13.6 ENCOUNTER FOR LIPID SCREENING FOR CARDIOVASCULAR DISEASE: ICD-10-CM

## 2019-02-18 DIAGNOSIS — Z13.0 SCREENING FOR ENDOCRINE, METABOLIC AND IMMUNITY DISORDER: ICD-10-CM

## 2019-02-18 DIAGNOSIS — Z13.228 SCREENING FOR ENDOCRINE, METABOLIC AND IMMUNITY DISORDER: ICD-10-CM

## 2019-02-18 DIAGNOSIS — Z13.29 SCREENING FOR ENDOCRINE, METABOLIC AND IMMUNITY DISORDER: ICD-10-CM

## 2019-02-18 DIAGNOSIS — R73.01 ELEVATED FASTING GLUCOSE: ICD-10-CM

## 2019-02-18 DIAGNOSIS — L81.9 DISCOLORATION OF SKIN OF TOE: ICD-10-CM

## 2019-02-18 DIAGNOSIS — Z12.5 SCREENING FOR PROSTATE CANCER: ICD-10-CM

## 2019-02-18 DIAGNOSIS — Z13.220 ENCOUNTER FOR LIPID SCREENING FOR CARDIOVASCULAR DISEASE: ICD-10-CM

## 2019-02-18 LAB
ALBUMIN SERPL-MCNC: 3.9 G/DL (ref 3.4–5)
ALBUMIN/GLOB SERPL: 1.6 {RATIO} (ref 0.8–1.7)
ALP SERPL-CCNC: 84 U/L (ref 45–117)
ALT SERPL-CCNC: 17 U/L (ref 16–61)
ANION GAP SERPL CALC-SCNC: 5 MMOL/L (ref 3–18)
APPEARANCE UR: NORMAL
AST SERPL-CCNC: 12 U/L (ref 15–37)
BILIRUB SERPL-MCNC: 0.3 MG/DL (ref 0.2–1)
BILIRUB UR QL: NEGATIVE
BUN SERPL-MCNC: 21 MG/DL (ref 7–18)
BUN/CREAT SERPL: 23 (ref 12–20)
CALCIUM SERPL-MCNC: 8.9 MG/DL (ref 8.5–10.1)
CHLORIDE SERPL-SCNC: 111 MMOL/L (ref 100–108)
CHOLEST SERPL-MCNC: 156 MG/DL
CO2 SERPL-SCNC: 25 MMOL/L (ref 21–32)
COLOR UR: YELLOW
CREAT SERPL-MCNC: 0.92 MG/DL (ref 0.6–1.3)
ERYTHROCYTE [DISTWIDTH] IN BLOOD BY AUTOMATED COUNT: 12.4 % (ref 11.6–14.5)
EST. AVERAGE GLUCOSE BLD GHB EST-MCNC: 111 MG/DL
GLOBULIN SER CALC-MCNC: 2.5 G/DL (ref 2–4)
GLUCOSE SERPL-MCNC: 122 MG/DL (ref 74–99)
GLUCOSE UR STRIP.AUTO-MCNC: NEGATIVE MG/DL
HBA1C MFR BLD: 5.5 % (ref 4.2–5.6)
HCT VFR BLD AUTO: 44.6 % (ref 36–48)
HDLC SERPL-MCNC: 44 MG/DL (ref 40–60)
HDLC SERPL: 3.5 {RATIO} (ref 0–5)
HGB BLD-MCNC: 15.5 G/DL (ref 13–16)
HGB UR QL STRIP: NEGATIVE
KETONES UR QL STRIP.AUTO: NEGATIVE MG/DL
LDLC SERPL CALC-MCNC: 94.2 MG/DL (ref 0–100)
LEFT ABI: 1.28
LEFT ANTERIOR TIBIAL: 140 MMHG
LEFT ARM BP: 117 MMHG
LEFT CALF PRESSURE: 165 MMHG
LEFT POSTERIOR TIBIAL: 150 MMHG
LEUKOCYTE ESTERASE UR QL STRIP.AUTO: NEGATIVE
LIPID PROFILE,FLP: NORMAL
MCH RBC QN AUTO: 30.2 PG (ref 24–34)
MCHC RBC AUTO-ENTMCNC: 34.8 G/DL (ref 31–37)
MCV RBC AUTO: 86.8 FL (ref 74–97)
NITRITE UR QL STRIP.AUTO: NEGATIVE
PH UR STRIP: 7 [PH] (ref 5–8)
PLATELET # BLD AUTO: 214 K/UL (ref 135–420)
PMV BLD AUTO: 10.2 FL (ref 9.2–11.8)
POTASSIUM SERPL-SCNC: 4 MMOL/L (ref 3.5–5.5)
PROT SERPL-MCNC: 6.4 G/DL (ref 6.4–8.2)
PROT UR STRIP-MCNC: NEGATIVE MG/DL
PSA SERPL-MCNC: 2.8 NG/ML (ref 0–4)
RBC # BLD AUTO: 5.14 M/UL (ref 4.7–5.5)
RIGHT ABI: 1.26
RIGHT ANTERIOR TIBIAL: 147 MMHG
RIGHT ARM BP: 113 MMHG
RIGHT CALF PRESSURE: 135 MMHG
RIGHT POSTERIOR TIBIAL: 136 MMHG
SODIUM SERPL-SCNC: 141 MMOL/L (ref 136–145)
SP GR UR REFRACTOMETRY: 1.02 (ref 1–1.03)
TRIGL SERPL-MCNC: 89 MG/DL (ref ?–150)
TSH SERPL DL<=0.05 MIU/L-ACNC: 1.45 UIU/ML (ref 0.36–3.74)
UROBILINOGEN UR QL STRIP.AUTO: 1 EU/DL (ref 0.2–1)
VLDLC SERPL CALC-MCNC: 17.8 MG/DL
WBC # BLD AUTO: 6.3 K/UL (ref 4.6–13.2)

## 2019-02-18 PROCEDURE — 85027 COMPLETE CBC AUTOMATED: CPT

## 2019-02-18 PROCEDURE — 84443 ASSAY THYROID STIM HORMONE: CPT

## 2019-02-18 PROCEDURE — 84153 ASSAY OF PSA TOTAL: CPT

## 2019-02-18 PROCEDURE — 83036 HEMOGLOBIN GLYCOSYLATED A1C: CPT

## 2019-02-18 PROCEDURE — 80061 LIPID PANEL: CPT

## 2019-02-18 PROCEDURE — 93923 UPR/LXTR ART STDY 3+ LVLS: CPT

## 2019-02-18 PROCEDURE — 36415 COLL VENOUS BLD VENIPUNCTURE: CPT

## 2019-02-18 PROCEDURE — 81003 URINALYSIS AUTO W/O SCOPE: CPT

## 2019-02-18 PROCEDURE — 80053 COMPREHEN METABOLIC PANEL: CPT

## 2019-07-16 DIAGNOSIS — M51.36 DEGENERATIVE DISC DISEASE, LUMBAR: ICD-10-CM

## 2019-07-16 DIAGNOSIS — M54.16 LUMBAR RADICULOPATHY: ICD-10-CM

## 2019-07-16 DIAGNOSIS — M96.1 LUMBAR POSTLAMINECTOMY SYNDROME: ICD-10-CM

## 2019-07-16 RX ORDER — GABAPENTIN 800 MG/1
800 TABLET ORAL 2 TIMES DAILY
Qty: 60 TAB | Refills: 0 | Status: CANCELLED | OUTPATIENT
Start: 2019-07-16

## 2019-07-16 NOTE — TELEPHONE ENCOUNTER
Pharmacy is requesting a new Rx due to drug schedule change    Last Visit: 1/30/19 with MD Ximena Husain  Next Appointment: 7/23/19 with MD Ximena Husain  Previous Refill Encounter(s): 10/29/18 #90 with 2 refills    Requested Prescriptions     Pending Prescriptions Disp Refills    gabapentin (NEURONTIN) 800 mg tablet 60 Tab 0     Sig: Take 1 Tab by mouth two (2) times a day.

## 2019-07-16 NOTE — TELEPHONE ENCOUNTER
Patient has apt on 7/23. Can he wait until this visit for refill? If not, will need to be signed Wednesday by Josephine Young.

## 2019-07-23 ENCOUNTER — OFFICE VISIT (OUTPATIENT)
Dept: ORTHOPEDIC SURGERY | Age: 54
End: 2019-07-23

## 2019-07-23 VITALS
OXYGEN SATURATION: 97 % | HEART RATE: 72 BPM | BODY MASS INDEX: 24.34 KG/M2 | WEIGHT: 170 LBS | SYSTOLIC BLOOD PRESSURE: 120 MMHG | RESPIRATION RATE: 18 BRPM | TEMPERATURE: 95.3 F | HEIGHT: 70 IN | DIASTOLIC BLOOD PRESSURE: 75 MMHG

## 2019-07-23 DIAGNOSIS — M96.1 LUMBAR POSTLAMINECTOMY SYNDROME: ICD-10-CM

## 2019-07-23 DIAGNOSIS — M54.16 LUMBAR RADICULOPATHY: Primary | ICD-10-CM

## 2019-07-23 DIAGNOSIS — M51.36 DEGENERATIVE DISC DISEASE, LUMBAR: ICD-10-CM

## 2019-07-23 RX ORDER — GABAPENTIN 800 MG/1
800 TABLET ORAL
Qty: 90 TAB | Refills: 1 | Status: SHIPPED | OUTPATIENT
Start: 2019-07-23 | End: 2019-11-18 | Stop reason: SDUPTHER

## 2019-07-23 NOTE — LETTER
7/23/19 Patient: Alirio Templeton YOB: 1965 Date of Visit: 7/23/2019 Danita Saldivar NP 
[facility] [address] [id number] [address] [place] [id number] VIA In Basket Dear Danita Saldivar NP, Thank you for referring Mr. Justin Freeman to [address] [place] [address] for evaluation. My notes for this consultation are attached. If you have questions, please do not hesitate to call me. I look forward to following your patient along with you. Sincerely, Quita Vela MD
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
## 2019-07-25 ENCOUNTER — HOSPITAL ENCOUNTER (OUTPATIENT)
Age: 54
Setting detail: OUTPATIENT SURGERY
Discharge: HOME | End: 2019-07-25
Attending: PHYSICAL MEDICINE & REHABILITATION | Admitting: PHYSICAL MEDICINE & REHABILITATION
Payer: MEDICARE

## 2019-07-25 ENCOUNTER — APPOINTMENT (OUTPATIENT)
Dept: GENERAL RADIOLOGY | Age: 54
End: 2019-07-25
Attending: PHYSICAL MEDICINE & REHABILITATION
Payer: MEDICARE

## 2019-07-25 VITALS
DIASTOLIC BLOOD PRESSURE: 73 MMHG | SYSTOLIC BLOOD PRESSURE: 109 MMHG | OXYGEN SATURATION: 97 % | HEIGHT: 70 IN | HEART RATE: 63 BPM | BODY MASS INDEX: 24.34 KG/M2 | RESPIRATION RATE: 16 BRPM | TEMPERATURE: 98 F | WEIGHT: 170 LBS

## 2019-07-25 PROCEDURE — 76010000009 HC PAIN MGT 0 TO 30 MIN PROC: Performed by: PHYSICAL MEDICINE & REHABILITATION

## 2019-07-25 PROCEDURE — 77030014124 HC TY EPDRL BBMI -A: Performed by: PHYSICAL MEDICINE & REHABILITATION

## 2019-07-25 PROCEDURE — 74011250637 HC RX REV CODE- 250/637: Performed by: PHYSICAL MEDICINE & REHABILITATION

## 2019-07-25 PROCEDURE — 74011250636 HC RX REV CODE- 250/636: Performed by: PHYSICAL MEDICINE & REHABILITATION

## 2019-07-25 RX ORDER — LIDOCAINE HYDROCHLORIDE 10 MG/ML
INJECTION, SOLUTION EPIDURAL; INFILTRATION; INTRACAUDAL; PERINEURAL AS NEEDED
Status: DISCONTINUED | OUTPATIENT
Start: 2019-07-25 | End: 2019-07-25 | Stop reason: HOSPADM

## 2019-07-25 RX ORDER — DIAZEPAM 5 MG/1
5-20 TABLET ORAL ONCE
Status: COMPLETED | OUTPATIENT
Start: 2019-07-25 | End: 2019-07-25

## 2019-07-25 RX ORDER — DEXAMETHASONE SODIUM PHOSPHATE 100 MG/10ML
INJECTION INTRAMUSCULAR; INTRAVENOUS AS NEEDED
Status: DISCONTINUED | OUTPATIENT
Start: 2019-07-25 | End: 2019-07-25 | Stop reason: HOSPADM

## 2019-07-25 RX ADMIN — DIAZEPAM 10 MG: 5 TABLET ORAL at 12:52

## 2019-07-25 NOTE — DISCHARGE INSTRUCTIONS
[facility]   (GABRIEL)  Dr. Sera Lynn, Dr. See Marin, Dr. Deacon Rivera not drive a car, operate heavy machinery or dangerous equipment for 24 hours. * Activity as tolerated; rest for the remainder of the day. * Resume pre-block medications including those for your family doctor. * Do not drink alcoholic beverages for 24 hours. Alcohol and the medications you have received may interact and cause an adverse reaction. * You may feel better this evening and worse tomorrow, as the numbing medications wears off and the steroid has yet to begin to work. After 48 hrs the steroid should begin to release bringing you relief. * You may shower this evening and remove any bandages. * Avoid hot tubs and heating pads for 24 hours. You may use cold packs on the procedure site as tolerated for the first 24 hours. * If a headache develops, drink plenty of fluids and rest.  Take over the counter medications for headache if needed. If the headache continues longer than 24 hours, call MD at the [address] [place] [address]. [phone]    * Continue taking pain medications as needed. * You may resume your regular diet if tolerated. Otherwise, start with sips of water and advance slowly. * If Diabetic: check your blood sugar three times a day for the next 3 days. If your sugar is greater than 300 call your family doctor. If your sugar is greater than 400, have someone transport you to the nearest Emergency Room. * If you experience any of the following problems, Please Call the [address] [place] [address] at [phone].         * Shortness of Breath    * Fever of 101 or higher    * Nausea / Vomiting    * Severe Headache    * Weakness or numbness in arms or legs that is not      resolving    * Prolonged increase in pain greater than 4 days      DISCHARGE SUMMARY from Nurse      PATIENT INSTRUCTIONS:    After oral sedation, for 24 hours or while taking prescription Narcotics:  · Limit your activities  · Do not drive and operate hazardous machinery  · Do not make important personal or business decisions  · Do  not drink alcoholic beverages  · If you have not urinated within 8 hours after discharge, please contact your surgeon on call. Report the following to your surgeon:  · Excessive pain, swelling, redness or odor of or around the surgical area  · Temperature over 101  · Nausea and vomiting lasting longer than 4 hours or if unable to take medications  · Any signs of decreased circulation or nerve impairment to extremity: change in color, persistent  numbness, tingling, coldness or increase pain  · Any questions            What to do at Home:  Recommended activity: Activity as tolerated, NO DRIVING FOR 12 Hours post injection          *  Please give a list of your current medications to your Primary Care Provider. *  Please update this list whenever your medications are discontinued, doses are      changed, or new medications (including over-the-counter products) are added. *  Please carry medication information at all times in case of emergency situations. These are general instructions for a healthy lifestyle:    No smoking/ No tobacco products/ Avoid exposure to second hand smoke    Surgeon General's Warning:  Quitting smoking now greatly reduces serious risk to your health. Obesity, smoking, and sedentary lifestyle greatly increases your risk for illness    A healthy diet, regular physical exercise & weight monitoring are important for maintaining a healthy lifestyle    You may be retaining fluid if you have a history of heart failure or if you experience any of the following symptoms:  Weight gain of 3 pounds or more overnight or 5 pounds in a week, increased swelling in our hands or feet or shortness of breath while lying flat in bed.   Please call your doctor as soon as you notice any of these symptoms; do not wait until your next office visit. Recognize signs and symptoms of STROKE:    F-face looks uneven    A-arms unable to move or move unevenly    S-speech slurred or non-existent    T-time-call 911 as soon as signs and symptoms begin-DO NOT go       Back to bed or wait to see if you get better-TIME IS BRAIN.

## 2019-07-25 NOTE — PROCEDURES
Intralaminar Epidural Steroid Block Procedure Note      Patient Name: Kd Novak    Date of Procedure: July 25, 2019    Preoperative Diagnosis: RADICULOPATHY/DEG DISC DISEASE    Post Operative Diagnosis: RADICULOPATHY/DEG DISC DISEASE    Procedure: L3-L4 Epidural Block     PROCEDURE:  Lumbar Epidural Block    Consent:  Informed  Consent was obtained prior to the procedure. The patient was given the opportunity to ask questions regarding the procedure and its associated risks. In addition to the potential risks associated with the procedure itself, the patient was informed both verbally and in writing of potential side effects of the use glucocorticoids. The patient appeared to comprehend the informed consent and desired to have the procedure performed. The patient was placed in the prone position on the fluoroscopy table and the back prepped and draped in the usual sterile manner. The interlaminar space was identified using fluoroscopy and the skin anesthetized with 1% Lidocaine. A #18 Tuohy Epidural needle was advanced under fluoroscopic control from a paramedian approach to the  epidural space as noted above, using the loss of resistance to fluid technique. Then, 30 mg of preservative free Dexamethasone and 4 cc of Lidocaine was slowly injected. The patient tolerated the procedure well. The injection area was cleaned and band aids applied. No excessive bleeding was noted. Patient dressed and was discharged to home with instructions.

## 2019-08-08 ENCOUNTER — OFFICE VISIT (OUTPATIENT)
Dept: FAMILY MEDICINE CLINIC | Age: 54
End: 2019-08-08

## 2019-08-08 VITALS
DIASTOLIC BLOOD PRESSURE: 63 MMHG | TEMPERATURE: 95.5 F | HEIGHT: 70 IN | BODY MASS INDEX: 24.77 KG/M2 | WEIGHT: 173 LBS | OXYGEN SATURATION: 96 % | RESPIRATION RATE: 20 BRPM | HEART RATE: 60 BPM | SYSTOLIC BLOOD PRESSURE: 107 MMHG

## 2019-08-08 DIAGNOSIS — M51.26 LUMBAR HERNIATED DISC: ICD-10-CM

## 2019-08-08 DIAGNOSIS — M96.1 POSTLAMINECTOMY SYNDROME OF LUMBAR REGION: ICD-10-CM

## 2019-08-08 DIAGNOSIS — M51.36 DEGENERATIVE DISC DISEASE, LUMBAR: ICD-10-CM

## 2019-08-08 DIAGNOSIS — J45.40 MODERATE PERSISTENT ASTHMA WITHOUT COMPLICATION: Primary | ICD-10-CM

## 2019-08-08 DIAGNOSIS — K21.00 GASTROESOPHAGEAL REFLUX DISEASE WITH ESOPHAGITIS: ICD-10-CM

## 2019-08-08 DIAGNOSIS — Z86.73 HISTORY OF CVA (CEREBROVASCULAR ACCIDENT): ICD-10-CM

## 2019-08-08 DIAGNOSIS — Z98.1 S/P SPINAL FUSION: ICD-10-CM

## 2019-08-08 RX ORDER — GUAIFENESIN 100 MG/5ML
81 LIQUID (ML) ORAL DAILY
COMMUNITY

## 2019-08-08 RX ORDER — BUDESONIDE AND FORMOTEROL FUMARATE DIHYDRATE 160; 4.5 UG/1; UG/1
1 AEROSOL RESPIRATORY (INHALATION) 2 TIMES DAILY
Qty: 1 INHALER | Refills: 6 | Status: SHIPPED | OUTPATIENT
Start: 2019-08-08 | End: 2021-05-25 | Stop reason: SDUPTHER

## 2019-08-08 RX ORDER — CYCLOBENZAPRINE HCL 10 MG
10 TABLET ORAL
Qty: 90 TAB | Refills: 6 | Status: SHIPPED | OUTPATIENT
Start: 2019-08-08 | End: 2021-05-25 | Stop reason: SDUPTHER

## 2019-08-08 NOTE — PROGRESS NOTES
OFFICE NOTE    Carmella Arreaga is a 47 y.o. male presenting today for office visit. 8/8/2019  8:41 AM      Chief Complaint   Patient presents with    Follow Up Chronic Condition       HPI: Here today for follow up chronic disease. Last routine labs completed 2/2019. Following with GI and the spine center (DDD lumbar, post-laminectomy syndrome). Asthma: Using Symbicort for maintenance inhaler and has Albuterol. Allergies do make asthma worse. Only using Albuterol about 1-2 times per week when not in allergy season. Doing well today. CVA history: History in 2010- has been on ASA since. No residual. Does not follow with neurology. GERD with esophagitis: EGD done 10/2016 showing Grade 1 esophagitis. Wife reports that they have seen GI afterwards and there was no new plans made. Has not seen lately though. Was on PPI therapy, but has since stopped and is using H2 blocker PRN- usually at night time. Using Zofran PRN for severe nausea. Regular BMs without blood. History diverticulitis. Review of Systems   Constitutional: Negative for chills, fatigue and fever. Respiratory: Negative for cough, shortness of breath and wheezing. Cardiovascular: Negative for chest pain, palpitations and leg swelling. Gastrointestinal: Negative for abdominal pain, constipation, diarrhea, nausea and vomiting. Genitourinary: Negative for difficulty urinating and frequency. Musculoskeletal: Positive for back pain (chronic pain). Negative for myalgias. Skin: Negative for rash. Neurological: Negative for dizziness and headaches.          PHQ Screening   3 most recent PHQ Screens 8/8/2019   Little interest or pleasure in doing things Not at all   Feeling down, depressed, irritable, or hopeless Not at all   Total Score PHQ 2 0         History  Past Medical History:   Diagnosis Date    Arthritis of knee, right 12/2017    advanced degen changes noted, CT right LE    Asthma     Cellulitis 12/2017    DDD (degenerative disc disease), lumbar 2013    noted on MRI with annular tears    Diverticulitis 2016    GERD (gastroesophageal reflux disease) 2016    with esophagitis according to endscopy    Kidney stone     Lumbar post-laminectomy syndrome     Sacroiliitis ([facility] [address]     Stroke [facility]) may 14 2010    Unspecified rotator cuff tear or rupture of left shoulder, not specified as traumatic 03/2016       Past Surgical History:   Procedure Laterality Date    COLONOSCOPY N/A 8/12/2016    COLONOSCOPY with polypectomy performed by Tory Conley MD at [address] [place]  2002, 2004, 2006    L5-S1 fusion, laminectomies    HX ENDOSCOPY  10/2016    grade 1 espohagitis    HX ENDOSCOPY  11/2016    mild esophagitis    HX KNEE ARTHROSCOPY Right     knee    HX MOHS PROCEDURES      right    HX ORTHOPAEDIC      right shoulder       Social History     Socioeconomic History    Marital status: SINGLE     Spouse name: Not on file    Number of children: Not on file    Years of education: Not on file    Highest education level: Not on file   Occupational History    Not on file   Social Needs    Financial resource strain: Not on file    Food insecurity:     Worry: Not on file     Inability: Not on file    Transportation needs:     Medical: Not on file     Non-medical: Not on file   Tobacco Use    Smoking status: Former Smoker     Packs/day: 2.00     Types: Cigarettes     Last attempt to quit: 07/2016     Years since quitting: 3.1    Smokeless tobacco: Never Used   Substance and Sexual Activity    Alcohol use: No     Alcohol/week: 0.0 standard drinks    Drug use: No    Sexual activity: Yes     Partners: Female   Lifestyle    Physical activity:     Days per week: Not on file     Minutes per session: Not on file    Stress: Not on file   Relationships    Social connections:     Talks on phone: Not on file     Gets together: Not on file     Attends Spiritism service: Not on file     Active member of club or organization: Not on file     Attends meetings of clubs or organizations: Not on file     Relationship status: Not on file    Intimate partner violence:     Fear of current or ex partner: Not on file     Emotionally abused: Not on file     Physically abused: Not on file     Forced sexual activity: Not on file   Other Topics Concern    Not on file   Social History Narrative    ** Merged History Encounter **            Family History   Problem Relation Age of Onset    Other Brother     Cancer Maternal Grandmother     No Known Problems Mother     No Known Problems Father        Allergies   Allergen Reactions    Penicillins Angioedema    Penicillins Anaphylaxis    Vicodin [Hydrocodone-Acetaminophen] Nausea and Vomiting    Azithromycin Nausea and Vomiting    Hydrochlorothiazide Nausea Only     Dizzy, lightheaded, blisters on his legs    Vicodin [Hydrocodone-Acetaminophen] Nausea and Vomiting    Erythromycin Other (comments)       Current Outpatient Medications   Medication Sig Dispense Refill    cyclobenzaprine (FLEXERIL) 10 mg tablet Take 1 Tab by mouth three (3) times daily as needed for Muscle Spasm(s). 90 Tab 6    budesonide-formoterol (SYMBICORT) 160-4.5 mcg/actuation HFAA Take 1 Puff by inhalation two (2) times a day. 1 Inhaler 6    aspirin 81 mg chewable tablet Take 81 mg by mouth daily.  gabapentin (NEURONTIN) 800 mg tablet Take 1 Tab by mouth three (3) times daily (with meals). Max Daily Amount: 2,400 mg. Indications: Neuropathic Pain 90 Tab 1    ibuprofen (MOTRIN) 200 mg tablet Take  by mouth.  acetaminophen (TYLENOL) 500 mg tablet Take  by mouth every six (6) hours as needed for Pain.  raNITIdine (ZANTAC) 150 mg tablet Take 1 Tab by mouth every twelve (12) hours as needed for Indigestion. 60 Tab 11    albuterol (PROVENTIL HFA, VENTOLIN HFA, PROAIR HFA) 90 mcg/actuation inhaler Take 2 Puffs by inhalation every six (6) hours as needed for Wheezing or Shortness of Breath.  1 Inhaler 11    ondansetron (ZOFRAN ODT) 4 mg disintegrating tablet Take 1 Tab by mouth every eight (8) hours as needed for Nausea. 20 Tab 0    albuterol (PROVENTIL VENTOLIN) 2.5 mg /3 mL (0.083 %) nebulizer solution 3 mL by Nebulization route once for 1 dose. 1 Package 0         Advance Care Planning:   Patient was offered the opportunity to discuss advance care planning NO   Does patient have an Advance Directive: If no, did you provide information on [facility]? Patient Care Team:  Patient Care Team:  Theo Downing NP as PCP - General (Nurse Practitioner)  Rayo Gomez MD (Orthopedic Surgery)  Alexandra Teague MD (Physiatry)  Alcon Gomez MD (Gastroenterology)  Sahara Gray MD (Urology)        LABS:  None new to review    RADIOLOGY:  None new to review      Physical Exam   Constitutional: He is oriented to person, place, and time. He appears well-developed and well-nourished. No distress. Neck: Normal range of motion. Neck supple. Cardiovascular: Normal rate, regular rhythm and normal heart sounds. No murmur heard. Pulmonary/Chest: Effort normal and breath sounds normal. No respiratory distress. Abdominal: Soft. Bowel sounds are normal. He exhibits no distension and no ascites. There is no hepatosplenomegaly. There is no tenderness. There is no rigidity, no rebound, no guarding, no CVA tenderness, no tenderness at McBurney's point and negative Hope's sign. No hernia. Musculoskeletal: He exhibits no edema. Right lower leg: He exhibits no tenderness and no swelling. Left lower leg: He exhibits no tenderness and no swelling.   -full musculoskeletal exam deferred; chronic LBP   Neurological: He is alert and oriented to person, place, and time. He exhibits normal muscle tone. Coordination and gait normal.   Skin: Skin is warm and dry.          Vitals:    08/08/19 0824   BP: 107/63   Pulse: 60   Resp: 20   Temp: 95.5 °F (35.3 °C)   TempSrc: Oral   SpO2: 96%   Weight: 173 lb (78.5 kg)   Height: 5' 10\" (1.778 m)   PainSc:   4   PainLoc: Back         Assessment and Plan    Moderate persistent asthma without complication  *Refilled, stable  - budesonide-formoterol (SYMBICORT) 160-4.5 mcg/actuation HFAA; Take 1 Puff by inhalation two (2) times a day. Dispense: 1 Inhaler; Refill: 6    History of CVA (cerebrovascular accident)  *Continue ASA    Gastroesophageal reflux disease with esophagitis  *Continue H2 blocker. Encouraged to follow up with GI this year for check up. Avoid NSAIDS    Degenerative disc disease, lumbar/ S/P spinal fusion/ Lumbar herniated disc/ Postlaminectomy syndrome of lumbar region  *Refilled, stable. Continue with spine center  - cyclobenzaprine (FLEXERIL) 10 mg tablet; Take 1 Tab by mouth three (3) times daily as needed for Muscle Spasm(s). Dispense: 90 Tab; Refill: 6        *Plan of care reviewed with patient. Patient in agreement with plan and expresses understanding. All questions answered and patient encouraged to call or RTO if further questions or concerns. Follow-up and Dispositions    · Return in about 6 months (around 2/8/2020) for chronic disease routine care and Medicare Wellness- 30 mins. Shahana Cruz

## 2019-08-20 ENCOUNTER — OFFICE VISIT (OUTPATIENT)
Dept: ORTHOPEDIC SURGERY | Age: 54
End: 2019-08-20

## 2019-08-20 VITALS
HEART RATE: 61 BPM | BODY MASS INDEX: 24.02 KG/M2 | SYSTOLIC BLOOD PRESSURE: 108 MMHG | TEMPERATURE: 97.6 F | WEIGHT: 167.8 LBS | RESPIRATION RATE: 16 BRPM | HEIGHT: 70 IN | OXYGEN SATURATION: 98 % | DIASTOLIC BLOOD PRESSURE: 73 MMHG

## 2019-08-20 DIAGNOSIS — M51.36 DEGENERATIVE DISC DISEASE, LUMBAR: ICD-10-CM

## 2019-08-20 DIAGNOSIS — M54.16 LUMBAR RADICULOPATHY: Primary | ICD-10-CM

## 2019-08-20 RX ORDER — GABAPENTIN 800 MG/1
800 TABLET ORAL 3 TIMES DAILY
Qty: 90 TAB | Refills: 1 | Status: SHIPPED | OUTPATIENT
Start: 2019-08-20 | End: 2020-02-13 | Stop reason: SDUPTHER

## 2019-08-20 NOTE — LETTER
8/20/19 Patient: Zoila Delgado YOB: 1965 Date of Visit: 8/20/2019 Zelda Mckeon NP 
[facility] [address] [address] [place] [address] VIA In Basket Dear Zelda Mckeon NP, Thank you for referring Mr. Avis Vargas to [address] [place] [address] for evaluation. My notes for this consultation are attached. If you have questions, please do not hesitate to call me. I look forward to following your patient along with you. Sincerely, Fransisco Whaley MD
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
## 2019-10-04 ENCOUNTER — OFFICE VISIT (OUTPATIENT)
Dept: FAMILY MEDICINE CLINIC | Age: 54
End: 2019-10-04

## 2019-10-04 VITALS
RESPIRATION RATE: 20 BRPM | OXYGEN SATURATION: 97 % | HEART RATE: 73 BPM | WEIGHT: 167 LBS | BODY MASS INDEX: 23.91 KG/M2 | TEMPERATURE: 95.7 F | SYSTOLIC BLOOD PRESSURE: 97 MMHG | HEIGHT: 70 IN | DIASTOLIC BLOOD PRESSURE: 67 MMHG

## 2019-10-04 DIAGNOSIS — R22.32 LUMP OF AXILLA, LEFT: Primary | ICD-10-CM

## 2019-10-04 DIAGNOSIS — R19.09 LUMP IN THE GROIN: ICD-10-CM

## 2019-10-04 NOTE — PROGRESS NOTES
OFFICE NOTE    Pool Stark is a 47 y.o. male presenting today for office visit. 10/4/2019  8:40 AM      Chief Complaint   Patient presents with    Gland Swelling     c/o swelling under left armpit and right groin         HPI: Here today for complaints of lump in the left axilla and right groin. Left axilla lump has been enlarging the last week and been present for about 2-3 weeks. Left axilla lump is tender but not reddened. Right groin lump smaller and been present for almost a year- non-tender and no redness. No symptoms of fevers, chills, urinary symptoms, etc.       Review of Systems   Constitutional: Negative for chills, fatigue and fever. Respiratory: Negative for shortness of breath and wheezing. Cardiovascular: Negative for chest pain. Gastrointestinal: Negative for abdominal pain, constipation, diarrhea, nausea and vomiting. Genitourinary: Negative for difficulty urinating and frequency. Skin: Negative for rash. +lumps   Neurological: Negative for dizziness and headaches.          PHQ Screening   3 most recent PHQ Screens 8/20/2019   PHQ Not Done Patient Decline   Little interest or pleasure in doing things -   Feeling down, depressed, irritable, or hopeless -   Total Score PHQ 2 -         History  Past Medical History:   Diagnosis Date    Arthritis of knee, right 12/2017    advanced degen changes noted, CT right LE    Asthma     Cellulitis 12/2017    DDD (degenerative disc disease), lumbar 2013    noted on MRI with annular tears    Diverticulitis 2016    GERD (gastroesophageal reflux disease) 2016    with esophagitis according to endscopy    Kidney stone     Lumbar post-laminectomy syndrome     Sacroiliitis ([facility] [address]     Stroke [facility]) may 14 2010    Unspecified rotator cuff tear or rupture of left shoulder, not specified as traumatic 03/2016       Past Surgical History:   Procedure Laterality Date    COLONOSCOPY N/A 8/12/2016    COLONOSCOPY with polypectomy performed by Kenya Street MD at [address] [place] [address]  2002, 2004, 2006    L5-S1 fusion, laminectomies    HX ENDOSCOPY  10/2016    grade 1 espohagitis    HX ENDOSCOPY  11/2016    mild esophagitis    HX KNEE ARTHROSCOPY Right     knee    HX MOHS PROCEDURES      right    HX ORTHOPAEDIC      right shoulder       Social History     Socioeconomic History    Marital status: SINGLE     Spouse name: Not on file    Number of children: Not on file    Years of education: Not on file    Highest education level: Not on file   Occupational History    Not on file   Social Needs    Financial resource strain: Not on file    Food insecurity:     Worry: Not on file     Inability: Not on file    Transportation needs:     Medical: Not on file     Non-medical: Not on file   Tobacco Use    Smoking status: Former Smoker     Packs/day: 2.00     Types: Cigarettes     Last attempt to quit: 07/2016     Years since quitting: 3.2    Smokeless tobacco: Never Used   Substance and Sexual Activity    Alcohol use: No     Alcohol/week: 0.0 standard drinks    Drug use: No    Sexual activity: Yes     Partners: Female   Lifestyle    Physical activity:     Days per week: Not on file     Minutes per session: Not on file    Stress: Not on file   Relationships    Social connections:     Talks on phone: Not on file     Gets together: Not on file     Attends Latter day service: Not on file     Active member of club or organization: Not on file     Attends meetings of clubs or organizations: Not on file     Relationship status: Not on file    Intimate partner violence:     Fear of current or ex partner: Not on file     Emotionally abused: Not on file     Physically abused: Not on file     Forced sexual activity: Not on file   Other Topics Concern    Not on file   Social History Narrative    ** Merged History Encounter **            Allergies   Allergen Reactions    Penicillins Angioedema    Penicillins Anaphylaxis    Vicodin [Hydrocodone-Acetaminophen] Nausea and Vomiting    Azithromycin Nausea and Vomiting    Hydrochlorothiazide Nausea Only     Dizzy, lightheaded, blisters on his legs    Vicodin [Hydrocodone-Acetaminophen] Nausea and Vomiting    Erythromycin Other (comments)       Current Outpatient Medications   Medication Sig Dispense Refill    gabapentin (NEURONTIN) 800 mg tablet Take 1 Tab by mouth three (3) times daily. Max Daily Amount: 2,400 mg. 90 Tab 1    cyclobenzaprine (FLEXERIL) 10 mg tablet Take 1 Tab by mouth three (3) times daily as needed for Muscle Spasm(s). 90 Tab 6    budesonide-formoterol (SYMBICORT) 160-4.5 mcg/actuation HFAA Take 1 Puff by inhalation two (2) times a day. 1 Inhaler 6    gabapentin (NEURONTIN) 800 mg tablet Take 1 Tab by mouth three (3) times daily (with meals). Max Daily Amount: 2,400 mg. Indications: Neuropathic Pain 90 Tab 1    ibuprofen (MOTRIN) 200 mg tablet Take  by mouth.  acetaminophen (TYLENOL) 500 mg tablet Take  by mouth every six (6) hours as needed for Pain.  raNITIdine (ZANTAC) 150 mg tablet Take 1 Tab by mouth every twelve (12) hours as needed for Indigestion. 60 Tab 11    albuterol (PROVENTIL HFA, VENTOLIN HFA, PROAIR HFA) 90 mcg/actuation inhaler Take 2 Puffs by inhalation every six (6) hours as needed for Wheezing or Shortness of Breath. 1 Inhaler 11    ondansetron (ZOFRAN ODT) 4 mg disintegrating tablet Take 1 Tab by mouth every eight (8) hours as needed for Nausea. 20 Tab 0    albuterol (PROVENTIL VENTOLIN) 2.5 mg /3 mL (0.083 %) nebulizer solution 3 mL by Nebulization route once for 1 dose. 1 Package 0    aspirin 81 mg chewable tablet Take 81 mg by mouth daily.            Patient Care Team:  Patient Care Team:  Mamadou Chavez NP as PCP - General (Nurse Practitioner)  Vilma De Anda MD (Orthopedic Surgery)  Kushal Oliver MD (Physiatry)  Richmond Grider MD (Gastroenterology)  Petyon Trujillo MD (Urology)        LABS:  None new to review    RADIOLOGY:  None new to review      Physical Exam   Constitutional: He is oriented to person, place, and time. He appears well-developed and well-nourished. No distress. Pulmonary/Chest: Effort normal. No respiratory distress. Lymphadenopathy:     He has no cervical adenopathy. Right: No supraclavicular adenopathy present. Left: No supraclavicular adenopathy present. Neurological: He is alert and oriented to person, place, and time. He exhibits normal muscle tone. Coordination normal.   Skin: Skin is warm and dry.   -golf ball sized lump in center of left axilla; tender to palpation; movable and no erythema  -pea sized lump to right groin; non-tender, movable and no erythema   Psychiatric: He has a normal mood and affect. His behavior is normal.         Vitals:    10/04/19 0834   BP: 97/67   Pulse: 73   Resp: 20   Temp: 95.7 °F (35.4 °C)   TempSrc: Oral   SpO2: 97%   Weight: 167 lb (75.8 kg)   Height: 5' 10\" (1.778 m)   PainSc:   9   PainLoc: Arm         Assessment and Plan    Lump of axilla, left  *Will do US evaluation to determine etiology. Lipoma vs lymph node vs cyst. Will refer to general surgery or oncology depending on results. *Discussed with patient about symptoms that would require an ER visit. Patient understands symptoms that are an emergency and will go to the ER if they occur.   - US EXT NONVAS LT LTD; Future    Lump in the groin  *Again, will do US. Lipoma vs lymph node vs cyst most likely  - US EXT NONVAS RT LTD; Future        *Plan of care reviewed with patient. Patient in agreement with plan and expresses understanding. All questions answered and patient encouraged to call or RTO if further questions or concerns. Follow-up and Dispositions    · Return if symptoms worsen or fail to improve.

## 2019-10-07 ENCOUNTER — HOSPITAL ENCOUNTER (OUTPATIENT)
Dept: ULTRASOUND IMAGING | Age: 54
Discharge: HOME | End: 2019-10-07
Attending: NURSE PRACTITIONER
Payer: MEDICARE

## 2019-10-07 DIAGNOSIS — R19.09 LUMP IN THE GROIN: ICD-10-CM

## 2019-10-07 DIAGNOSIS — R22.32 LUMP OF AXILLA, LEFT: ICD-10-CM

## 2019-10-07 PROCEDURE — 76882 US LMTD JT/FCL EVL NVASC XTR: CPT

## 2019-10-09 ENCOUNTER — TELEPHONE (OUTPATIENT)
Dept: FAMILY MEDICINE CLINIC | Age: 54
End: 2019-10-09

## 2019-10-09 DIAGNOSIS — R19.09 GROIN LUMP: ICD-10-CM

## 2019-10-09 DIAGNOSIS — R22.32 AXILLARY LUMP, LEFT: ICD-10-CM

## 2019-10-09 DIAGNOSIS — R59.9 ENLARGED LYMPH NODE: Primary | ICD-10-CM

## 2019-10-10 NOTE — TELEPHONE ENCOUNTER
10/10/2019  12:30 PM    Chief Complaint   Patient presents with    Results     US results       Noted results of [address]. Called and spoke with patient and Zev Smith regarding results. Advised that still not very certain on causes. Refer to oncology and general surgery. CT ordered. *Plan of care reviewed with patient. Patient in agreement with plan and expresses understanding. All questions answered and patient encouraged to call or RTO if further questions or concerns. Result Information   Status: Final result (Exam End: 10/7/2019 14:13) Provider Status: Open   Study Result   EXAM:  Right Lower Extremity Nonvascular Limited Ultrasound (Right Groin).   IMPRESSION:     Abnormally enlarged right groin node. Reactive change vs. lymphoproliferative  disease. Result Information   Status: Final result (Exam End: 10/7/2019 14:13) Provider Status: Open   Study Result   EXAM:  Left Upper Extremity Nonvascular Limited Ultrasound (Left Axilla). IMPRESSION:    Two abnormal complex material collections in the left axilla. Query abscesses  vs. developing mass vs. hemorrhagic foci. Difficult to characterize  confidently. Recommend correlation with cross sectional imaging, i.e. CT  (without and with contrast) and/ or MR. Please correlate with breast exam as  well, i.e. looking for signs of gynecomastia and/or breast mass.

## 2019-10-13 ENCOUNTER — HOSPITAL ENCOUNTER (OUTPATIENT)
Dept: CT IMAGING | Age: 54
Discharge: HOME | End: 2019-10-13
Attending: NURSE PRACTITIONER
Payer: MEDICARE

## 2019-10-13 DIAGNOSIS — R22.32 AXILLARY LUMP, LEFT: ICD-10-CM

## 2019-10-13 DIAGNOSIS — R59.9 ENLARGED LYMPH NODE: ICD-10-CM

## 2019-10-13 PROCEDURE — 74011636320 HC RX REV CODE- 636/320: Performed by: NURSE PRACTITIONER

## 2019-10-13 PROCEDURE — 71270 CT THORAX DX C-/C+: CPT

## 2019-10-13 RX ADMIN — IOPAMIDOL 80 ML: 612 INJECTION, SOLUTION INTRAVENOUS at 12:42

## 2019-10-15 ENCOUNTER — TELEPHONE (OUTPATIENT)
Dept: FAMILY MEDICINE CLINIC | Age: 54
End: 2019-10-15

## 2019-10-16 NOTE — TELEPHONE ENCOUNTER
Returned patient's phone call and discussed CT results with patient and Zev Smith. Also released results to [facility] per patient's request.   Relayed 'impression' from CT results and advised patient following up with surgeon- appointment scheduled Monday 10/21.

## 2019-10-21 ENCOUNTER — HOSPITAL ENCOUNTER (OUTPATIENT)
Dept: PREADMISSION TESTING | Age: 54
Discharge: HOME | End: 2019-10-21
Payer: MEDICARE

## 2019-10-21 ENCOUNTER — ANESTHESIA EVENT (OUTPATIENT)
Dept: SURGERY | Age: 54
End: 2019-10-21
Payer: MEDICARE

## 2019-10-21 ENCOUNTER — OFFICE VISIT (OUTPATIENT)
Dept: SURGERY | Age: 54
End: 2019-10-21

## 2019-10-21 VITALS
RESPIRATION RATE: 16 BRPM | SYSTOLIC BLOOD PRESSURE: 107 MMHG | BODY MASS INDEX: 23.91 KG/M2 | OXYGEN SATURATION: 98 % | HEART RATE: 69 BPM | WEIGHT: 167 LBS | HEIGHT: 70 IN | DIASTOLIC BLOOD PRESSURE: 77 MMHG

## 2019-10-21 DIAGNOSIS — R22.32 AXILLARY MASS, LEFT: Primary | ICD-10-CM

## 2019-10-21 DIAGNOSIS — R22.32 AXILLARY MASS, LEFT: ICD-10-CM

## 2019-10-21 LAB
ANION GAP SERPL CALC-SCNC: 7 MMOL/L (ref 3–18)
ATRIAL RATE: 59 BPM
BASOPHILS # BLD: 0 K/UL (ref 0–0.1)
BASOPHILS NFR BLD: 0 % (ref 0–2)
BUN SERPL-MCNC: 23 MG/DL (ref 7–18)
BUN/CREAT SERPL: 29 (ref 12–20)
CALCIUM SERPL-MCNC: 9 MG/DL (ref 8.5–10.1)
CALCULATED P AXIS, ECG09: 10 DEGREES
CALCULATED R AXIS, ECG10: 65 DEGREES
CALCULATED T AXIS, ECG11: 55 DEGREES
CHLORIDE SERPL-SCNC: 108 MMOL/L (ref 100–111)
CO2 SERPL-SCNC: 26 MMOL/L (ref 21–32)
CREAT SERPL-MCNC: 0.8 MG/DL (ref 0.6–1.3)
DIAGNOSIS, 93000: NORMAL
DIFFERENTIAL METHOD BLD: ABNORMAL
EOSINOPHIL # BLD: 0.3 K/UL (ref 0–0.4)
EOSINOPHIL NFR BLD: 4 % (ref 0–5)
ERYTHROCYTE [DISTWIDTH] IN BLOOD BY AUTOMATED COUNT: 12.6 % (ref 11.6–14.5)
GLUCOSE SERPL-MCNC: 75 MG/DL (ref 74–99)
HCT VFR BLD AUTO: 44.5 % (ref 36–48)
HGB BLD-MCNC: 14.7 G/DL (ref 13–16)
LYMPHOCYTES # BLD: 2.1 K/UL (ref 0.9–3.6)
LYMPHOCYTES NFR BLD: 28 % (ref 21–52)
MCH RBC QN AUTO: 29.5 PG (ref 24–34)
MCHC RBC AUTO-ENTMCNC: 33 G/DL (ref 31–37)
MCV RBC AUTO: 89.4 FL (ref 74–97)
MONOCYTES # BLD: 1.1 K/UL (ref 0.05–1.2)
MONOCYTES NFR BLD: 15 % (ref 3–10)
NEUTS SEG # BLD: 3.9 K/UL (ref 1.8–8)
NEUTS SEG NFR BLD: 53 % (ref 40–73)
P-R INTERVAL, ECG05: 142 MS
PLATELET # BLD AUTO: 244 K/UL (ref 135–420)
PMV BLD AUTO: 10.2 FL (ref 9.2–11.8)
POTASSIUM SERPL-SCNC: 4.5 MMOL/L (ref 3.5–5.5)
Q-T INTERVAL, ECG07: 396 MS
QRS DURATION, ECG06: 82 MS
QTC CALCULATION (BEZET), ECG08: 392 MS
RBC # BLD AUTO: 4.98 M/UL (ref 4.7–5.5)
SODIUM SERPL-SCNC: 141 MMOL/L (ref 136–145)
VENTRICULAR RATE, ECG03: 59 BPM
WBC # BLD AUTO: 7.3 K/UL (ref 4.6–13.2)

## 2019-10-21 PROCEDURE — 36415 COLL VENOUS BLD VENIPUNCTURE: CPT

## 2019-10-21 PROCEDURE — 80048 BASIC METABOLIC PNL TOTAL CA: CPT

## 2019-10-21 PROCEDURE — 93005 ELECTROCARDIOGRAM TRACING: CPT

## 2019-10-21 PROCEDURE — 85025 COMPLETE CBC W/AUTO DIFF WBC: CPT

## 2019-10-21 NOTE — PROGRESS NOTES
Progress Note    Patient: Darlin Brennan  MRN: [id number]  SSN: [id number]   YOB: 1965  Age: 47 y.o. Sex: male     Chief Complaint   Patient presents with    Mass     left axillary       HPI    Mr. Marcus Herrera is a 51-year-old gentleman with about a month long history of a left axillary mass. This is not down in his axilla near the lymph nodes but more superficial than that. It is mildly tender but not erythematous. He has no cellulitis. He has had no history of infection or other. He does have cats. He has had a neck surgery in the past but otherwise is relatively healthy. In his chart it says he may have had a CVA in 2001 however this was never shown, proven or was never symptomatic.     Past Medical History:   Diagnosis Date    Arthritis of knee, right 12/2017    advanced degen changes noted, CT right LE    Asthma     Cellulitis 12/2017    DDD (degenerative disc disease), lumbar 2013    noted on MRI with annular tears    Diverticulitis 2016    GERD (gastroesophageal reflux disease) 2016    with esophagitis according to endscopy    Kidney stone     Lumbar post-laminectomy syndrome     Sacroiliitis ([address]     Stroke [facility]) may 14 2010    Unspecified rotator cuff tear or rupture of left shoulder, not specified as traumatic 03/2016     Past Surgical History:   Procedure Laterality Date    COLONOSCOPY N/A 8/12/2016    COLONOSCOPY with polypectomy performed by Cory Alegre MD at [address] [place] [address]  2002, 2004, 2006    L5-S1 fusion, laminectomies    HX ENDOSCOPY  10/2016    grade 1 espohagitis    HX ENDOSCOPY  11/2016    mild esophagitis    HX KNEE ARTHROSCOPY Right     knee    HX MOHS PROCEDURES      right    HX ORTHOPAEDIC      right shoulder     Allergies   Allergen Reactions    Penicillins Angioedema    Penicillins Anaphylaxis    Vicodin [Hydrocodone-Acetaminophen] Nausea and Vomiting    Azithromycin Nausea and Vomiting    Hydrochlorothiazide Nausea Only Dizzy, lightheaded, blisters on his legs    Vicodin [Hydrocodone-Acetaminophen] Nausea and Vomiting    Erythromycin Other (comments)     Current Outpatient Medications   Medication Sig Dispense Refill    gabapentin (NEURONTIN) 800 mg tablet Take 1 Tab by mouth three (3) times daily. Max Daily Amount: 2,400 mg. 90 Tab 1    cyclobenzaprine (FLEXERIL) 10 mg tablet Take 1 Tab by mouth three (3) times daily as needed for Muscle Spasm(s). 90 Tab 6    budesonide-formoterol (SYMBICORT) 160-4.5 mcg/actuation HFAA Take 1 Puff by inhalation two (2) times a day. 1 Inhaler 6    gabapentin (NEURONTIN) 800 mg tablet Take 1 Tab by mouth three (3) times daily (with meals). Max Daily Amount: 2,400 mg. Indications: Neuropathic Pain 90 Tab 1    raNITIdine (ZANTAC) 150 mg tablet Take 1 Tab by mouth every twelve (12) hours as needed for Indigestion. 60 Tab 11    albuterol (PROVENTIL HFA, VENTOLIN HFA, PROAIR HFA) 90 mcg/actuation inhaler Take 2 Puffs by inhalation every six (6) hours as needed for Wheezing or Shortness of Breath. 1 Inhaler 11    albuterol (PROVENTIL VENTOLIN) 2.5 mg /3 mL (0.083 %) nebulizer solution 3 mL by Nebulization route once for 1 dose. 1 Package 0    aspirin 81 mg chewable tablet Take 81 mg by mouth daily.  ibuprofen (MOTRIN) 200 mg tablet Take  by mouth.  acetaminophen (TYLENOL) 500 mg tablet Take  by mouth every six (6) hours as needed for Pain.  ondansetron (ZOFRAN ODT) 4 mg disintegrating tablet Take 1 Tab by mouth every eight (8) hours as needed for Nausea.  20 Tab 0     Social History     Socioeconomic History    Marital status: SINGLE     Spouse name: Not on file    Number of children: Not on file    Years of education: Not on file    Highest education level: Not on file   Occupational History    Not on file   Social Needs    Financial resource strain: Not on file    Food insecurity:     Worry: Not on file     Inability: Not on file    Transportation needs:     Medical: Not on file     Non-medical: Not on file   Tobacco Use    Smoking status: Former Smoker     Packs/day: 2.00     Types: Cigarettes     Last attempt to quit: 07/2016     Years since quitting: 3.3    Smokeless tobacco: Never Used   Substance and Sexual Activity    Alcohol use: No     Alcohol/week: 0.0 standard drinks    Drug use: No    Sexual activity: Yes     Partners: Female   Lifestyle    Physical activity:     Days per week: Not on file     Minutes per session: Not on file    Stress: Not on file   Relationships    Social connections:     Talks on phone: Not on file     Gets together: Not on file     Attends Nondenominational service: Not on file     Active member of club or organization: Not on file     Attends meetings of clubs or organizations: Not on file     Relationship status: Not on file    Intimate partner violence:     Fear of current or ex partner: Not on file     Emotionally abused: Not on file     Physically abused: Not on file     Forced sexual activity: Not on file   Other Topics Concern    Not on file   Social History Narrative    ** Merged History Encounter **          Family History   Problem Relation Age of Onset    Other Brother     Cancer Maternal Grandmother     No Known Problems Mother     No Known Problems Father          Review of systems:  Patient denies any reflux, emesis, abdominal pain, change in bowel habits, hematochezia, melena, fever, weight loss, fatigue chills, dermatitis, abnormal moles, change in vision, vertigo, epistaxis, dysphagia, hoarseness, chest pain, palpitations, hypertension, edema, cough, shortness of breath, wheezing, hemoptysis, snoring, hematuria, diabetes, thyroid disease, anemia, bruising, history of blood transfusion, dizziness, headache, or fainting.     Physical Examination    Well developed well nourished male in no apparent distress  Visit Vitals  /77   Pulse 69   Resp 16   Ht 5' 10\" (1.778 m)   Wt 75.8 kg (167 lb)   SpO2 98%   BMI 23.96 kg/m² Head: normocephalic, atraumatic  Mouth: Clear, no overt lesions, oral mucosa pink and moist  Neck: supple, no masses, no adenopathy or carotid bruits, trachea midline  Resp: clear to auscultation bilaterally, no wheeze, rhonchi or rales, excursions normal and symmetrical  Cardio: Regular rate and rhythm, no murmurs, clicks, gallops or rubs, no edema or varicosities  Abdomen: soft, nontender, nondistended, normoactive bowel sounds, no hernias, no hepatosplenomegaly,   Back: Deferred  Extremeties: warm, well-perfused, no tenderness or swelling, normal gait/station 3 x 3 cm relatively superficial mass left axillary skin  Neuro: sensation and strength grossly intact and symmetrical  Psych: alert and oriented to person, place and time  Breast exam deferred    IMPRESSION  Sebaceous cyst versus lipoma versus other infectious source. Doubt lymphadenopathy or neoplasm    PLAN  No orders of the defined types were placed in this encounter.     Excise lesion  Marycarmen Ward MD

## 2019-10-21 NOTE — H&P (VIEW-ONLY)
Progress Note Patient: Anselmo Tran  MRN: [id number]  SSN: [id number] YOB: 1965  Age: 47 y.o. Sex: male Chief Complaint Patient presents with  Mass  
  left axillary HPI Mr. Marilin Adam is a 22-year-old gentleman with about a month long history of a left axillary mass. This is not down in his axilla near the lymph nodes but more superficial than that. It is mildly tender but not erythematous. He has no cellulitis. He has had no history of infection or other. He does have cats. He has had a neck surgery in the past but otherwise is relatively healthy. In his chart it says he may have had a CVA in 2001 however this was never shown, proven or was never symptomatic. Past Medical History:  
Diagnosis Date  Arthritis of knee, right 12/2017  
 advanced degen changes noted, CT right LE  
 Asthma  Cellulitis 12/2017  DDD (degenerative disc disease), lumbar 2013  
 noted on MRI with annular tears  Diverticulitis 2016  GERD (gastroesophageal reflux disease) 2016  
 with esophagitis according to endscopy  Kidney stone  Lumbar post-laminectomy syndrome  Sacroiliitis ([address]  Stroke [facility]) may 14 2010  Unspecified rotator cuff tear or rupture of left shoulder, not specified as traumatic 03/2016 Past Surgical History:  
Procedure Laterality Date  COLONOSCOPY N/A 8/12/2016 COLONOSCOPY with polypectomy performed by Naida Diallo MD at [address] [place] [address]  2002, 2004, 2006 L5-S1 fusion, laminectomies  HX ENDOSCOPY  10/2016  
 grade 1 espohagitis  HX ENDOSCOPY  11/2016  
 mild esophagitis  HX KNEE ARTHROSCOPY Right   
 knee  HX MOHS PROCEDURES    
 right  HX ORTHOPAEDIC    
 right shoulder Allergies Allergen Reactions  Penicillins Angioedema  Penicillins Anaphylaxis  Vicodin [Hydrocodone-Acetaminophen] Nausea and Vomiting  Azithromycin Nausea and Vomiting  Hydrochlorothiazide Nausea Only Dizzy, lightheaded, blisters on his legs  Vicodin [Hydrocodone-Acetaminophen] Nausea and Vomiting  Erythromycin Other (comments) Current Outpatient Medications Medication Sig Dispense Refill  gabapentin (NEURONTIN) 800 mg tablet Take 1 Tab by mouth three (3) times daily. Max Daily Amount: 2,400 mg. 90 Tab 1  cyclobenzaprine (FLEXERIL) 10 mg tablet Take 1 Tab by mouth three (3) times daily as needed for Muscle Spasm(s). 90 Tab 6  
 budesonide-formoterol (SYMBICORT) 160-4.5 mcg/actuation HFAA Take 1 Puff by inhalation two (2) times a day. 1 Inhaler 6  
 gabapentin (NEURONTIN) 800 mg tablet Take 1 Tab by mouth three (3) times daily (with meals). Max Daily Amount: 2,400 mg. Indications: Neuropathic Pain 90 Tab 1  raNITIdine (ZANTAC) 150 mg tablet Take 1 Tab by mouth every twelve (12) hours as needed for Indigestion. 60 Tab 11  
 albuterol (PROVENTIL HFA, VENTOLIN HFA, PROAIR HFA) 90 mcg/actuation inhaler Take 2 Puffs by inhalation every six (6) hours as needed for Wheezing or Shortness of Breath. 1 Inhaler 11  
 albuterol (PROVENTIL VENTOLIN) 2.5 mg /3 mL (0.083 %) nebulizer solution 3 mL by Nebulization route once for 1 dose. 1 Package 0  
 aspirin 81 mg chewable tablet Take 81 mg by mouth daily.  ibuprofen (MOTRIN) 200 mg tablet Take  by mouth.  acetaminophen (TYLENOL) 500 mg tablet Take  by mouth every six (6) hours as needed for Pain.  ondansetron (ZOFRAN ODT) 4 mg disintegrating tablet Take 1 Tab by mouth every eight (8) hours as needed for Nausea. 20 Tab 0 Social History Socioeconomic History  Marital status: SINGLE Spouse name: Not on file  Number of children: Not on file  Years of education: Not on file  Highest education level: Not on file Occupational History  Not on file Social Needs  Financial resource strain: Not on file  Food insecurity:  
  Worry: Not on file Inability: Not on file  Transportation needs: Medical: Not on file Non-medical: Not on file Tobacco Use  Smoking status: Former Smoker Packs/day: 2.00 Types: Cigarettes Last attempt to quit: 07/2016 Years since quitting: 3.3  Smokeless tobacco: Never Used Substance and Sexual Activity  Alcohol use: No  
  Alcohol/week: 0.0 standard drinks  Drug use: No  
 Sexual activity: Yes  
  Partners: Female Lifestyle  Physical activity:  
  Days per week: Not on file Minutes per session: Not on file  Stress: Not on file Relationships  Social connections:  
  Talks on phone: Not on file Gets together: Not on file Attends Yarsani service: Not on file Active member of club or organization: Not on file Attends meetings of clubs or organizations: Not on file Relationship status: Not on file  Intimate partner violence:  
  Fear of current or ex partner: Not on file Emotionally abused: Not on file Physically abused: Not on file Forced sexual activity: Not on file Other Topics Concern  Not on file Social History Narrative ** Merged History Encounter ** Family History Problem Relation Age of Onset  Other Brother  Cancer Maternal Grandmother  No Known Problems Mother  No Known Problems Father Review of systems: 
Patient denies any reflux, emesis, abdominal pain, change in bowel habits, hematochezia, melena, fever, weight loss, fatigue chills, dermatitis, abnormal moles, change in vision, vertigo, epistaxis, dysphagia, hoarseness, chest pain, palpitations, hypertension, edema, cough, shortness of breath, wheezing, hemoptysis, snoring, hematuria, diabetes, thyroid disease, anemia, bruising, history of blood transfusion, dizziness, headache, or fainting. Physical Examination Well developed well nourished male in no apparent distress Visit Vitals /77 Pulse 69 Resp 16 Ht 5' 10\" (1.778 m) Wt 75.8 kg (167 lb) SpO2 98% BMI 23.96 kg/m² Head: normocephalic, atraumatic Mouth: Clear, no overt lesions, oral mucosa pink and moist 
Neck: supple, no masses, no adenopathy or carotid bruits, trachea midline Resp: clear to auscultation bilaterally, no wheeze, rhonchi or rales, excursions normal and symmetrical 
Cardio: Regular rate and rhythm, no murmurs, clicks, gallops or rubs, no edema or varicosities Abdomen: soft, nontender, nondistended, normoactive bowel sounds, no hernias, no hepatosplenomegaly,  
Back: Deferred Extremeties: warm, well-perfused, no tenderness or swelling, normal gait/station 3 x 3 cm relatively superficial mass left axillary skin Neuro: sensation and strength grossly intact and symmetrical 
Psych: alert and oriented to person, place and time Breast exam deferred IMPRESSION Sebaceous cyst versus lipoma versus other infectious source. Doubt lymphadenopathy or neoplasm PLAN No orders of the defined types were placed in this encounter. Excise lesion French Nino MD

## 2019-10-22 ENCOUNTER — ANESTHESIA (OUTPATIENT)
Dept: SURGERY | Age: 54
End: 2019-10-22
Payer: MEDICARE

## 2019-10-22 ENCOUNTER — HOSPITAL ENCOUNTER (OUTPATIENT)
Age: 54
Setting detail: OUTPATIENT SURGERY
Discharge: HOME | End: 2019-10-22
Payer: MEDICARE

## 2019-10-22 VITALS
OXYGEN SATURATION: 97 % | WEIGHT: 168 LBS | SYSTOLIC BLOOD PRESSURE: 100 MMHG | RESPIRATION RATE: 14 BRPM | DIASTOLIC BLOOD PRESSURE: 69 MMHG | HEIGHT: 70 IN | HEART RATE: 62 BPM | BODY MASS INDEX: 24.05 KG/M2 | TEMPERATURE: 96.8 F

## 2019-10-22 DIAGNOSIS — L02.91 ABSCESS: Primary | ICD-10-CM

## 2019-10-22 PROCEDURE — 77030013079 HC BLNKT BAIR HGGR 3M -A: Performed by: ANESTHESIOLOGY

## 2019-10-22 PROCEDURE — 88341 IMHCHEM/IMCYTCHM EA ADD ANTB: CPT

## 2019-10-22 PROCEDURE — 88342 IMHCHEM/IMCYTCHM 1ST ANTB: CPT

## 2019-10-22 PROCEDURE — 88312 SPECIAL STAINS GROUP 1: CPT

## 2019-10-22 PROCEDURE — 76210000020 HC REC RM PH II FIRST 0.5 HR

## 2019-10-22 PROCEDURE — 77030002933 HC SUT MCRYL J&J -A

## 2019-10-22 PROCEDURE — 77030010509 HC AIRWY LMA MSK TELE -A: Performed by: ANESTHESIOLOGY

## 2019-10-22 PROCEDURE — 77030040361 HC SLV COMPR DVT MDII -B

## 2019-10-22 PROCEDURE — 87070 CULTURE OTHR SPECIMN AEROBIC: CPT

## 2019-10-22 PROCEDURE — 76060000033 HC ANESTHESIA 1 TO 1.5 HR

## 2019-10-22 PROCEDURE — 74011250636 HC RX REV CODE- 250/636: Performed by: NURSE ANESTHETIST, CERTIFIED REGISTERED

## 2019-10-22 PROCEDURE — 74011000250 HC RX REV CODE- 250: Performed by: NURSE ANESTHETIST, CERTIFIED REGISTERED

## 2019-10-22 PROCEDURE — 88305 TISSUE EXAM BY PATHOLOGIST: CPT

## 2019-10-22 PROCEDURE — 77030013567 HC DRN WND RESERV BARD -A

## 2019-10-22 PROCEDURE — 87075 CULTR BACTERIA EXCEPT BLOOD: CPT

## 2019-10-22 PROCEDURE — 74011000250 HC RX REV CODE- 250

## 2019-10-22 PROCEDURE — 76010000149 HC OR TIME 1 TO 1.5 HR

## 2019-10-22 PROCEDURE — 76210000006 HC OR PH I REC 0.5 TO 1 HR

## 2019-10-22 PROCEDURE — 77030038552 HC DRN WND MDII -A

## 2019-10-22 PROCEDURE — 77030002916 HC SUT ETHLN J&J -A

## 2019-10-22 PROCEDURE — 74011250637 HC RX REV CODE- 250/637: Performed by: NURSE ANESTHETIST, CERTIFIED REGISTERED

## 2019-10-22 PROCEDURE — 77030011265 HC ELECTRD BLD HEX COVD -A

## 2019-10-22 PROCEDURE — 77030040922 HC BLNKT HYPOTHRM STRY -A

## 2019-10-22 PROCEDURE — 88304 TISSUE EXAM BY PATHOLOGIST: CPT

## 2019-10-22 PROCEDURE — 74011000258 HC RX REV CODE- 258

## 2019-10-22 PROCEDURE — 77030031139 HC SUT VCRL2 J&J -A

## 2019-10-22 PROCEDURE — 88307 TISSUE EXAM BY PATHOLOGIST: CPT

## 2019-10-22 PROCEDURE — 77030018836 HC SOL IRR NACL ICUM -A

## 2019-10-22 RX ORDER — FENTANYL CITRATE 50 UG/ML
INJECTION, SOLUTION INTRAMUSCULAR; INTRAVENOUS AS NEEDED
Status: DISCONTINUED | OUTPATIENT
Start: 2019-10-22 | End: 2019-10-22 | Stop reason: HOSPADM

## 2019-10-22 RX ORDER — INSULIN LISPRO 100 [IU]/ML
INJECTION, SOLUTION INTRAVENOUS; SUBCUTANEOUS ONCE
Status: DISCONTINUED | OUTPATIENT
Start: 2019-10-22 | End: 2019-10-22 | Stop reason: HOSPADM

## 2019-10-22 RX ORDER — MIDAZOLAM HYDROCHLORIDE 1 MG/ML
INJECTION, SOLUTION INTRAMUSCULAR; INTRAVENOUS AS NEEDED
Status: DISCONTINUED | OUTPATIENT
Start: 2019-10-22 | End: 2019-10-22 | Stop reason: HOSPADM

## 2019-10-22 RX ORDER — PROPOFOL 10 MG/ML
INJECTION, EMULSION INTRAVENOUS AS NEEDED
Status: DISCONTINUED | OUTPATIENT
Start: 2019-10-22 | End: 2019-10-22 | Stop reason: HOSPADM

## 2019-10-22 RX ORDER — LIDOCAINE HYDROCHLORIDE 10 MG/ML
0.1 INJECTION, SOLUTION EPIDURAL; INFILTRATION; INTRACAUDAL; PERINEURAL AS NEEDED
Status: DISCONTINUED | OUTPATIENT
Start: 2019-10-22 | End: 2019-10-22 | Stop reason: HOSPADM

## 2019-10-22 RX ORDER — OXYCODONE AND ACETAMINOPHEN 5; 325 MG/1; MG/1
1 TABLET ORAL
Qty: 20 TAB | Refills: 0 | Status: SHIPPED | OUTPATIENT
Start: 2019-10-22 | End: 2019-10-25

## 2019-10-22 RX ORDER — ONDANSETRON 2 MG/ML
4 INJECTION INTRAMUSCULAR; INTRAVENOUS ONCE
Status: DISCONTINUED | OUTPATIENT
Start: 2019-10-22 | End: 2019-10-22 | Stop reason: HOSPADM

## 2019-10-22 RX ORDER — SODIUM CHLORIDE 0.9 % (FLUSH) 0.9 %
5-40 SYRINGE (ML) INJECTION AS NEEDED
Status: DISCONTINUED | OUTPATIENT
Start: 2019-10-22 | End: 2019-10-22 | Stop reason: HOSPADM

## 2019-10-22 RX ORDER — FENTANYL CITRATE 50 UG/ML
25 INJECTION, SOLUTION INTRAMUSCULAR; INTRAVENOUS
Status: DISCONTINUED | OUTPATIENT
Start: 2019-10-22 | End: 2019-10-22 | Stop reason: HOSPADM

## 2019-10-22 RX ORDER — SODIUM CHLORIDE, SODIUM LACTATE, POTASSIUM CHLORIDE, CALCIUM CHLORIDE 600; 310; 30; 20 MG/100ML; MG/100ML; MG/100ML; MG/100ML
75 INJECTION, SOLUTION INTRAVENOUS CONTINUOUS
Status: DISCONTINUED | OUTPATIENT
Start: 2019-10-22 | End: 2019-10-22 | Stop reason: HOSPADM

## 2019-10-22 RX ORDER — SODIUM CHLORIDE 0.9 % (FLUSH) 0.9 %
5-40 SYRINGE (ML) INJECTION EVERY 8 HOURS
Status: DISCONTINUED | OUTPATIENT
Start: 2019-10-22 | End: 2019-10-22 | Stop reason: HOSPADM

## 2019-10-22 RX ORDER — DEXAMETHASONE SODIUM PHOSPHATE 4 MG/ML
INJECTION, SOLUTION INTRA-ARTICULAR; INTRALESIONAL; INTRAMUSCULAR; INTRAVENOUS; SOFT TISSUE AS NEEDED
Status: DISCONTINUED | OUTPATIENT
Start: 2019-10-22 | End: 2019-10-22 | Stop reason: HOSPADM

## 2019-10-22 RX ORDER — LIDOCAINE HYDROCHLORIDE 20 MG/ML
INJECTION, SOLUTION EPIDURAL; INFILTRATION; INTRACAUDAL; PERINEURAL AS NEEDED
Status: DISCONTINUED | OUTPATIENT
Start: 2019-10-22 | End: 2019-10-22 | Stop reason: HOSPADM

## 2019-10-22 RX ORDER — BUPIVACAINE HYDROCHLORIDE 2.5 MG/ML
INJECTION, SOLUTION EPIDURAL; INFILTRATION; INTRACAUDAL AS NEEDED
Status: DISCONTINUED | OUTPATIENT
Start: 2019-10-22 | End: 2019-10-22 | Stop reason: HOSPADM

## 2019-10-22 RX ORDER — ONDANSETRON 2 MG/ML
INJECTION INTRAMUSCULAR; INTRAVENOUS AS NEEDED
Status: DISCONTINUED | OUTPATIENT
Start: 2019-10-22 | End: 2019-10-22 | Stop reason: HOSPADM

## 2019-10-22 RX ORDER — FAMOTIDINE 20 MG/1
20 TABLET, FILM COATED ORAL ONCE
Status: COMPLETED | OUTPATIENT
Start: 2019-10-22 | End: 2019-10-22

## 2019-10-22 RX ORDER — SULFAMETHOXAZOLE AND TRIMETHOPRIM 400; 80 MG/1; MG/1
1 TABLET ORAL 2 TIMES DAILY
Qty: 20 TAB | Refills: 0 | Status: SHIPPED | OUTPATIENT
Start: 2019-10-22 | End: 2019-11-04 | Stop reason: SDUPTHER

## 2019-10-22 RX ADMIN — SODIUM CHLORIDE, SODIUM LACTATE, POTASSIUM CHLORIDE, AND CALCIUM CHLORIDE 75 ML/HR: 600; 310; 30; 20 INJECTION, SOLUTION INTRAVENOUS at 10:39

## 2019-10-22 RX ADMIN — FENTANYL CITRATE 50 MCG: 50 INJECTION INTRAMUSCULAR; INTRAVENOUS at 11:35

## 2019-10-22 RX ADMIN — ONDANSETRON 4 MG: 2 INJECTION INTRAMUSCULAR; INTRAVENOUS at 11:38

## 2019-10-22 RX ADMIN — LIDOCAINE HYDROCHLORIDE 70 MG: 20 INJECTION, SOLUTION EPIDURAL; INFILTRATION; INTRACAUDAL; PERINEURAL at 11:14

## 2019-10-22 RX ADMIN — FENTANYL CITRATE 50 MCG: 50 INJECTION INTRAMUSCULAR; INTRAVENOUS at 11:14

## 2019-10-22 RX ADMIN — FENTANYL CITRATE 50 MCG: 50 INJECTION INTRAMUSCULAR; INTRAVENOUS at 11:21

## 2019-10-22 RX ADMIN — PROPOFOL 180 MG: 10 INJECTION, EMULSION INTRAVENOUS at 11:14

## 2019-10-22 RX ADMIN — CLINDAMYCIN 900 MG: 150 INJECTION, SOLUTION INTRAMUSCULAR; INTRAVENOUS at 11:17

## 2019-10-22 RX ADMIN — DEXAMETHASONE SODIUM PHOSPHATE 4 MG: 4 INJECTION, SOLUTION INTRAMUSCULAR; INTRAVENOUS at 11:38

## 2019-10-22 RX ADMIN — MIDAZOLAM HYDROCHLORIDE 2 MG: 2 INJECTION, SOLUTION INTRAMUSCULAR; INTRAVENOUS at 11:07

## 2019-10-22 RX ADMIN — FAMOTIDINE 20 MG: 20 TABLET, FILM COATED ORAL at 10:39

## 2019-10-22 NOTE — ANESTHESIA POSTPROCEDURE EVALUATION
Procedure(s):  EXCISION OF 3X3CM LEFT ARM AXILLARY MASS. general    Anesthesia Post Evaluation      Multimodal analgesia: multimodal analgesia used between 6 hours prior to anesthesia start to PACU discharge  Patient location during evaluation: bedside  Patient participation: complete - patient participated  Level of consciousness: awake  Pain management: adequate  Airway patency: patent  Anesthetic complications: no  Cardiovascular status: stable  Respiratory status: acceptable  Hydration status: acceptable  Post anesthesia nausea and vomiting:  controlled      Vitals Value Taken Time   /72 10/22/2019 12:43 PM   Temp 36.4 °C (97.6 °F) 10/22/2019 12:16 PM   Pulse 72 10/22/2019 12:46 PM   Resp 12 10/22/2019 12:46 PM   SpO2 97 % 10/22/2019 12:46 PM   Vitals shown include unvalidated device data.

## 2019-10-22 NOTE — DISCHARGE INSTRUCTIONS
Skin Abscess: Care Instructions  Your Care Instructions    A skin abscess is a bacterial infection that forms a pocket of pus. A boil is a kind of skin abscess. The doctor may have cut an opening in the abscess so that the pus can drain out. You may have gauze in the cut so that the abscess will stay open and keep draining. You may need antibiotics. You will need to follow up with your doctor to make sure the infection has gone away. The doctor has checked you carefully, but problems can develop later. If you notice any problems or new symptoms, get medical treatment right away. Follow-up care is a key part of your treatment and safety. Be sure to make and go to all appointments, and call your doctor if you are having problems. It's also a good idea to know your test results and keep a list of the medicines you take. How can you care for yourself at home? · Apply warm and dry compresses, a heating pad set on low, or a hot water bottle 3 or 4 times a day for pain. Keep a cloth between the heat source and your skin. · If your doctor prescribed antibiotics, take them as directed. Do not stop taking them just because you feel better. You need to take the full course of antibiotics. · Take pain medicines exactly as directed. ? If the doctor gave you a prescription medicine for pain, take it as prescribed. ? If you are not taking a prescription pain medicine, ask your doctor if you can take an over-the-counter medicine. · Keep your bandage clean and dry. Change the bandage whenever it gets wet or dirty, or at least one time a day. · If the abscess was packed with gauze:  ? Keep follow-up appointments to have the gauze changed or removed. If the doctor instructed you to remove the gauze, follow the instructions you were given for how to remove it. ? After the gauze is removed, soak the area in warm water for 15 to 20 minutes 2 times a day, until the wound closes. When should you call for help?   Call your doctor now or seek immediate medical care if:    · You have signs of worsening infection, such as:  ? Increased pain, swelling, warmth, or redness. ? Red streaks leading from the infected skin. ? Pus draining from the wound. ? A fever.    Watch closely for changes in your health, and be sure to contact your doctor if:    · You do not get better as expected. Where can you learn more? Go to http://lola-conner.info/. Enter W474 in the search box to learn more about \"Skin Abscess: Care Instructions. \"  Current as of: April 1, 2019  Content Version: 12.2  © 3379-0894 [employer]. Care instructions adapted under license by [employer] (which disclaims liability or warranty for this information). If you have questions about a medical condition or this instruction, always ask your healthcare professional. [address] any warranty or liability for your use of this information. DISCHARGE SUMMARY from Nurse    PATIENT INSTRUCTIONS:    After general anesthesia or intravenous sedation, for 24 hours or while taking prescription Narcotics:  · Limit your activities  · Do not drive and operate hazardous machinery  · Do not make important personal or business decisions  · Do  not drink alcoholic beverages  · If you have not urinated within 8 hours after discharge, please contact your surgeon on call. Report the following to your surgeon:  · Excessive pain, swelling, redness or odor of or around the surgical area  · Temperature over 100.5  · Nausea and vomiting lasting longer than 4 hours or if unable to take medications  · Any signs of decreased circulation or nerve impairment to extremity: change in color, persistent  numbness, tingling, coldness or increase pain  · Any questions    *  Please give a list of your current medications to your Primary Care Provider.     *  Please update this list whenever your medications are discontinued, doses are      changed, or new medications (including over-the-counter products) are added. *  Please carry medication information at all times in case of emergency situations. These are general instructions for a healthy lifestyle:    No smoking/ No tobacco products/ Avoid exposure to second hand smoke  Surgeon General's Warning:  Quitting smoking now greatly reduces serious risk to your health. Obesity, smoking, and sedentary lifestyle greatly increases your risk for illness    A healthy diet, regular physical exercise & weight monitoring are important for maintaining a healthy lifestyle    You may be retaining fluid if you have a history of heart failure or if you experience any of the following symptoms:  Weight gain of 3 pounds or more overnight or 5 pounds in a week, increased swelling in our hands or feet or shortness of breath while lying flat in bed. Please call your doctor as soon as you notice any of these symptoms; do not wait until your next office visit. The discharge information has been reviewed with the patient and spouse. The patient and spouse verbalized understanding. Discharge medications reviewed with the patient and spouse and appropriate educational materials and side effects teaching were provided.   ___________________________________________________________________________________________________________________________________

## 2019-10-22 NOTE — ANESTHESIA PREPROCEDURE EVALUATION
Relevant Problems   No relevant active problems       Anesthetic History   No history of anesthetic complications            Review of Systems / Medical History  Patient summary reviewed and pertinent labs reviewed    Pulmonary            Asthma : well controlled       Neuro/Psych       CVA: no residual symptoms       Cardiovascular                  Exercise tolerance: >4 METS     GI/Hepatic/Renal     GERD: well controlled           Endo/Other        Arthritis     Other Findings              Physical Exam    Airway  Mallampati: II  TM Distance: 4 - 6 cm  Neck ROM: normal range of motion   Mouth opening: Normal     Cardiovascular  Regular rate and rhythm,  S1 and S2 normal,  no murmur, click, rub, or gallop             Dental    Dentition: Poor dentition  Comments: Several teeth chipped and broken   Pulmonary  Breath sounds clear to auscultation               Abdominal  GI exam deferred       Other Findings            Anesthetic Plan    ASA: 2  Anesthesia type: general          Induction: Intravenous  Anesthetic plan and risks discussed with: Patient

## 2019-10-22 NOTE — PERIOP NOTES
Discharge instructions reviewed with patient and wife. VALENTINA drain care demonstrated and discussed with patient and wife. All questions answered.

## 2019-10-22 NOTE — INTERVAL H&P NOTE
H&P Update: 
Judy Dickey was seen and examined. History and physical has been reviewed. The patient has been examined.  There have been no significant clinical changes since the completion of the originally dated History and Physical.

## 2019-10-23 ENCOUNTER — HOME HEALTH ADMISSION (OUTPATIENT)
Dept: HOME HEALTH SERVICES | Facility: HOME HEALTH | Age: 54
End: 2019-10-23

## 2019-10-23 NOTE — OP NOTES
[facility]  OPERATIVE REPORT    Name:  Long Muhammad  MR#:   [id number]  :  1965  ACCOUNT #:  [de-identified]  DATE OF SERVICE:  10/22/2019      PREOPERATIVE DIAGNOSIS:  Left axillary mass. POSTOPERATIVE DIAGNOSIS:  Left axillary abscess. PROCEDURE PERFORMED:  Drainage and excision of abscess. SURGEON:  Thiago Ritter MD    ASSISTANT:  .    ANESTHESIA:  General.    COMPLICATIONS:  None. SPECIMENS REMOVED:  Abscess cavity and cultures. IMPLANTS:  #10 flat VALENTINA drain. ESTIMATED BLOOD LOSS:  Minimal.    INDICATION:  The patient is a 55-year-old gentleman with a mass in his left axilla for exploration. DESCRIPTION OF PROCEDURE:  After appropriate antibiotics and sequential compression stockings, the patient was taken to the operating room and placed in supine position on the OR table. After adequate general anesthesia, his left chest and axilla were prepped and draped to [facility] standard. A standard axillary incision over the mass was performed, and the mass was dissected with sharp and blunt technique as well as low-level Bovie. I began to drain greenish purulent material, which was cultured. The abscess was completely removed from the superficial axilla, never entering the deep axilla. The abscess was approximately 3 cm x 3 cm. It was removed and copiously irrigated. Hemostasis was achieved with the Bovie, and a #10 flat VALENTINA drain was placed near the dissection site. It was brought out through a separate stab wound inferior to the wound and sutured into place with 2-0 nylon. The wound was copiously irrigated and closed with 3-0 Vicryl and 4-0 Monocryl. Steri-Strips and sterile dressings were applied. 0.25% Marcaine with epinephrine was used around the wound. He tolerated the procedure well, taken to Recovery in stable condition.       Poli Wells MD [email or web address]  D:  10/22/2019 12:59  T:  10/22/2019 22:28  JOB #:  [id number]

## 2019-10-24 ENCOUNTER — HOME CARE VISIT (OUTPATIENT)
Dept: HOME HEALTH SERVICES | Facility: HOME HEALTH | Age: 54
End: 2019-10-24

## 2019-10-25 LAB
BACTERIA SPEC CULT: NORMAL
GRAM STN SPEC: NORMAL
GRAM STN SPEC: NORMAL
SERVICE CMNT-IMP: NORMAL

## 2019-10-26 LAB
BACTERIA SPEC ANAEROBE CULT: NORMAL
SPECIMEN SOURCE: NORMAL

## 2019-10-27 ENCOUNTER — HOSPITAL ENCOUNTER (EMERGENCY)
Age: 54
Discharge: HOME | End: 2019-10-27
Attending: EMERGENCY MEDICINE
Payer: MEDICARE

## 2019-10-27 VITALS
DIASTOLIC BLOOD PRESSURE: 82 MMHG | RESPIRATION RATE: 20 BRPM | HEIGHT: 70 IN | TEMPERATURE: 98.3 F | BODY MASS INDEX: 24.34 KG/M2 | OXYGEN SATURATION: 98 % | WEIGHT: 170 LBS | HEART RATE: 65 BPM | SYSTOLIC BLOOD PRESSURE: 110 MMHG

## 2019-10-27 DIAGNOSIS — Z48.89 ENCOUNTER FOR POST SURGICAL WOUND CHECK: Primary | ICD-10-CM

## 2019-10-27 LAB
BASOPHILS # BLD: 0 K/UL (ref 0–0.1)
BASOPHILS NFR BLD: 1 % (ref 0–2)
DIFFERENTIAL METHOD BLD: ABNORMAL
EOSINOPHIL # BLD: 0.3 K/UL (ref 0–0.4)
EOSINOPHIL NFR BLD: 6 % (ref 0–5)
ERYTHROCYTE [DISTWIDTH] IN BLOOD BY AUTOMATED COUNT: 12.6 % (ref 11.6–14.5)
HCT VFR BLD AUTO: 44.1 % (ref 36–48)
HGB BLD-MCNC: 14.9 G/DL (ref 13–16)
LYMPHOCYTES # BLD: 1.6 K/UL (ref 0.9–3.6)
LYMPHOCYTES NFR BLD: 29 % (ref 21–52)
MCH RBC QN AUTO: 30 PG (ref 24–34)
MCHC RBC AUTO-ENTMCNC: 33.8 G/DL (ref 31–37)
MCV RBC AUTO: 88.7 FL (ref 74–97)
MONOCYTES # BLD: 0.9 K/UL (ref 0.05–1.2)
MONOCYTES NFR BLD: 16 % (ref 3–10)
NEUTS SEG # BLD: 2.7 K/UL (ref 1.8–8)
NEUTS SEG NFR BLD: 48 % (ref 40–73)
PLATELET # BLD AUTO: 237 K/UL (ref 135–420)
PMV BLD AUTO: 9.4 FL (ref 9.2–11.8)
RBC # BLD AUTO: 4.97 M/UL (ref 4.7–5.5)
WBC # BLD AUTO: 5.4 K/UL (ref 4.6–13.2)

## 2019-10-27 PROCEDURE — 99281 EMR DPT VST MAYX REQ PHY/QHP: CPT

## 2019-10-27 PROCEDURE — 85025 COMPLETE CBC W/AUTO DIFF WBC: CPT

## 2019-10-27 RX ORDER — OXYCODONE AND ACETAMINOPHEN 5; 325 MG/1; MG/1
1 TABLET ORAL
COMMUNITY
End: 2020-02-13 | Stop reason: ALTCHOICE

## 2019-10-27 NOTE — ED TRIAGE NOTES
Pt was admitted 10/22 for left axillary abscess. Was dc'd that same day. Abscess was drained by Dr. Juarez Hebert. Pt still has drain in place. Today he states he doesn't feel well and feels like drain is pulling.  Pt has not called the doctor

## 2019-10-27 NOTE — ED NOTES
Left axillary surgical site cleaned with wound cleanser. Recovered with 4x4 and island dressing. Sutures to drain tube intact. No drainage around tube insertion site.

## 2019-10-27 NOTE — ED PROVIDER NOTES
HPI   The surgical incision and drainage of an abscess in the left axilla 5 days ago. He has a drain remaining since the surgery. He comes in today saying the area is sore and tender and he wants to have it checked. His wife says that she has been cleaning the area but has not changed the dressing. Patient denies any history of fever chills nausea vomiting or any systemic symptoms.   No other complaints given at this time  Past Medical History:   Diagnosis Date    Arthritis of knee, right 12/2017    advanced degen changes noted, CT right LE    Asthma     Cellulitis 12/2017    DDD (degenerative disc disease), lumbar 2013    noted on MRI with annular tears    Diverticulitis 2016    GERD (gastroesophageal reflux disease) 2016    with esophagitis according to endscopy    Kidney stone     Lumbar post-laminectomy syndrome     Sacroiliitis ([address]     Stroke [facility]) may 14 2010    Unspecified rotator cuff tear or rupture of left shoulder, not specified as traumatic 03/2016       Past Surgical History:   Procedure Laterality Date    COLONOSCOPY N/A 8/12/2016    COLONOSCOPY with polypectomy performed by Lyssa Hawkins MD [address] [place]  2002, 2004, 2006    L5-S1 fusion, laminectomies    HX ENDOSCOPY  10/2016    grade 1 espohagitis    HX ENDOSCOPY  11/2016    mild esophagitis    HX KNEE ARTHROSCOPY Right     knee    HX MOHS PROCEDURES      right    HX ORTHOPAEDIC      right shoulder         Family History:   Problem Relation Age of Onset    Other Brother     Cancer Maternal Grandmother     No Known Problems Mother     No Known Problems Father        Social History     Socioeconomic History    Marital status: SINGLE     Spouse name: Not on file    Number of children: Not on file    Years of education: Not on file    Highest education level: Not on file   Occupational History    Not on file   Social Needs    Financial resource strain: Not on file    Food insecurity: Worry: Not on file     Inability: Not on file    Transportation needs:     Medical: Not on file     Non-medical: Not on file   Tobacco Use    Smoking status: Former Smoker     Packs/day: 2.00     Types: Cigarettes     Last attempt to quit: 07/2016     Years since quitting: 3.3    Smokeless tobacco: Never Used   Substance and Sexual Activity    Alcohol use: No     Alcohol/week: 0.0 standard drinks    Drug use: No    Sexual activity: Yes     Partners: Female   Lifestyle    Physical activity:     Days per week: Not on file     Minutes per session: Not on file    Stress: Not on file   Relationships    Social connections:     Talks on phone: Not on file     Gets together: Not on file     Attends Oriental orthodox service: Not on file     Active member of club or organization: Not on file     Attends meetings of clubs or organizations: Not on file     Relationship status: Not on file    Intimate partner violence:     Fear of current or ex partner: Not on file     Emotionally abused: Not on file     Physically abused: Not on file     Forced sexual activity: Not on file   Other Topics Concern    Not on file   Social History Narrative    ** Merged History Encounter **              ALLERGIES: Penicillins; Penicillins; Vicodin [hydrocodone-acetaminophen]; Azithromycin; Hydrochlorothiazide; Vicodin [hydrocodone-acetaminophen]; and Erythromycin    Review of Systems   Constitutional: Negative. HENT: Negative. Respiratory: Negative. Gastrointestinal: Negative. Genitourinary: Negative. Musculoskeletal: Negative. Neurological: Negative. Psychiatric/Behavioral: Negative. Vitals:    10/27/19 1243   BP: 110/82   Pulse: 65   Resp: 20   Temp: 98.3 °F (36.8 °C)   SpO2: 98%   Weight: 77.1 kg (170 lb)   Height: 5' 10\" (1.778 m)            Physical Exam   Constitutional: He is oriented to person, place, and time. He appears well-developed and well-nourished. HENT:   Head: Normocephalic and atraumatic. Mouth/Throat: Oropharynx is clear and moist.   Neck: Neck supple. Pulmonary/Chest: Effort normal.   Musculoskeletal: Normal range of motion. Neurological: He is alert and oriented to person, place, and time. Skin: Skin is warm and dry. Left axillary incision line is clean with Steri-Strips in place. Wound vacuum is functioning properly with serosanguineous fluid in the collection no evidence of cellulitis or infection. Psychiatric: He has a normal mood and affect. Nursing note and vitals reviewed.        MDM       Procedures

## 2019-11-03 LAB
BACTERIA SPEC CULT: NORMAL
SOURCE, RSRC56: NORMAL

## 2019-11-04 ENCOUNTER — OFFICE VISIT (OUTPATIENT)
Dept: SURGERY | Age: 54
End: 2019-11-04

## 2019-11-04 VITALS
HEART RATE: 66 BPM | WEIGHT: 170 LBS | DIASTOLIC BLOOD PRESSURE: 80 MMHG | SYSTOLIC BLOOD PRESSURE: 110 MMHG | HEIGHT: 70 IN | RESPIRATION RATE: 16 BRPM | OXYGEN SATURATION: 98 % | BODY MASS INDEX: 24.34 KG/M2

## 2019-11-04 DIAGNOSIS — R22.32 AXILLARY MASS, LEFT: Primary | ICD-10-CM

## 2019-11-04 RX ORDER — SULFAMETHOXAZOLE AND TRIMETHOPRIM 400; 80 MG/1; MG/1
1 TABLET ORAL 2 TIMES DAILY
Qty: 20 TAB | Refills: 0 | Status: SHIPPED | OUTPATIENT
Start: 2019-11-04 | End: 2020-02-13 | Stop reason: ALTCHOICE

## 2019-11-04 NOTE — PROGRESS NOTES
Progress Note    Patient: Shiv Singh  MRN: [id number]  SSN: [id number]   YOB: 1965  Age: 47 y.o. Sex: male     Chief Complaint   Patient presents with    Post OP Follow Up     excision of mass left axilla       HPI    Mr. Bran Polo is a 59-year-old gentleman who is status post left axillary mass removal.  He is doing well with a well-healed incision and we have pulled his drain out today. His pathology is negative for malignancy and shows follicular hyperplasia and granulomas. Granulomas were tested for AFB and other diseases and are all negative.     Past Medical History:   Diagnosis Date    Arthritis of knee, right 12/2017    advanced degen changes noted, CT right LE    Asthma     Cellulitis 12/2017    DDD (degenerative disc disease), lumbar 2013    noted on MRI with annular tears    Diverticulitis 2016    GERD (gastroesophageal reflux disease) 2016    with esophagitis according to endscopy    Kidney stone     Lumbar post-laminectomy syndrome     Sacroiliitis ([address]     Stroke [facility]) may 14 2010    Unspecified rotator cuff tear or rupture of left shoulder, not specified as traumatic 03/2016     Past Surgical History:   Procedure Laterality Date    COLONOSCOPY N/A 8/12/2016    COLONOSCOPY with polypectomy performed by Sandra Laura MD at [address] [place] [address]  2002, 2004, 2006    L5-S1 fusion, laminectomies    HX ENDOSCOPY  10/2016    grade 1 espohagitis    HX ENDOSCOPY  11/2016    mild esophagitis    HX KNEE ARTHROSCOPY Right     knee    HX MOHS PROCEDURES      right    HX ORTHOPAEDIC      right shoulder     Allergies   Allergen Reactions    Penicillins Angioedema    Penicillins Anaphylaxis    Vicodin [Hydrocodone-Acetaminophen] Nausea and Vomiting    Azithromycin Nausea and Vomiting    Hydrochlorothiazide Nausea Only     Dizzy, lightheaded, blisters on his legs    Vicodin [Hydrocodone-Acetaminophen] Nausea and Vomiting    Erythromycin Other (comments) Current Outpatient Medications   Medication Sig Dispense Refill    gabapentin (NEURONTIN) 800 mg tablet Take 1 Tab by mouth three (3) times daily. Max Daily Amount: 2,400 mg. 90 Tab 1    cyclobenzaprine (FLEXERIL) 10 mg tablet Take 1 Tab by mouth three (3) times daily as needed for Muscle Spasm(s). 90 Tab 6    budesonide-formoterol (SYMBICORT) 160-4.5 mcg/actuation HFAA Take 1 Puff by inhalation two (2) times a day. 1 Inhaler 6    aspirin 81 mg chewable tablet Take 81 mg by mouth daily.  gabapentin (NEURONTIN) 800 mg tablet Take 1 Tab by mouth three (3) times daily (with meals). Max Daily Amount: 2,400 mg. Indications: Neuropathic Pain 90 Tab 1    ibuprofen (MOTRIN) 200 mg tablet Take  by mouth.  acetaminophen (TYLENOL) 500 mg tablet Take  by mouth every six (6) hours as needed for Pain.  raNITIdine (ZANTAC) 150 mg tablet Take 1 Tab by mouth every twelve (12) hours as needed for Indigestion. 60 Tab 11    albuterol (PROVENTIL HFA, VENTOLIN HFA, PROAIR HFA) 90 mcg/actuation inhaler Take 2 Puffs by inhalation every six (6) hours as needed for Wheezing or Shortness of Breath. 1 Inhaler 11    albuterol (PROVENTIL VENTOLIN) 2.5 mg /3 mL (0.083 %) nebulizer solution 3 mL by Nebulization route once for 1 dose. 1 Package 0    trimethoprim-sulfamethoxazole (BACTRIM, SEPTRA)  mg per tablet Take 1 Tab by mouth two (2) times a day. 20 Tab 0    oxyCODONE-acetaminophen (PERCOCET) 5-325 mg per tablet Take 1 Tab by mouth every four (4) hours as needed for Pain.  ondansetron (ZOFRAN ODT) 4 mg disintegrating tablet Take 1 Tab by mouth every eight (8) hours as needed for Nausea.  20 Tab 0     Social History     Socioeconomic History    Marital status: SINGLE     Spouse name: Not on file    Number of children: Not on file    Years of education: Not on file    Highest education level: Not on file   Occupational History    Not on file   Social Needs    Financial resource strain: Not on file   Debra Diaz Food insecurity:     Worry: Not on file     Inability: Not on file    Transportation needs:     Medical: Not on file     Non-medical: Not on file   Tobacco Use    Smoking status: Former Smoker     Packs/day: 2.00     Types: Cigarettes     Last attempt to quit: 07/2016     Years since quitting: 3.3    Smokeless tobacco: Never Used   Substance and Sexual Activity    Alcohol use: No     Alcohol/week: 0.0 standard drinks    Drug use: No    Sexual activity: Yes     Partners: Female   Lifestyle    Physical activity:     Days per week: Not on file     Minutes per session: Not on file    Stress: Not on file   Relationships    Social connections:     Talks on phone: Not on file     Gets together: Not on file     Attends Latter day service: Not on file     Active member of club or organization: Not on file     Attends meetings of clubs or organizations: Not on file     Relationship status: Not on file    Intimate partner violence:     Fear of current or ex partner: Not on file     Emotionally abused: Not on file     Physically abused: Not on file     Forced sexual activity: Not on file   Other Topics Concern    Not on file   Social History Narrative    ** Merged History Encounter **          Family History   Problem Relation Age of Onset    Other Brother     Cancer Maternal Grandmother     No Known Problems Mother     No Known Problems Father          Review of systems:  Patient denies any reflux, emesis, abdominal pain, change in bowel habits, hematochezia, melena, fever, weight loss, fatigue chills, dermatitis, abnormal moles, change in vision, vertigo, epistaxis, dysphagia, hoarseness, chest pain, palpitations, hypertension, edema, cough, shortness of breath, wheezing, hemoptysis, snoring, hematuria, diabetes, thyroid disease, anemia, bruising, history of blood transfusion, dizziness, headache, or fainting.     Physical Examination    Well developed well nourished male in no apparent distress  Visit Vitals  BP 110/80   Pulse 66   Resp 16   Ht 5' 10\" (1.778 m)   Wt 77.1 kg (170 lb)   SpO2 98%   BMI 24.39 kg/m²      Head: normocephalic, atraumatic  Mouth: Clear, no overt lesions, oral mucosa pink and moist  Neck: supple, no masses, no adenopathy or carotid bruits, trachea midline  Resp: clear to auscultation bilaterally, no wheeze, rhonchi or rales, excursions normal and symmetrical  Cardio: Regular rate and rhythm, no murmurs, clicks, gallops or rubs, no edema or varicosities  Abdomen: soft, nontender, nondistended, normoactive bowel sounds, no hernias, no hepatosplenomegaly,   Back: Deferred  Extremeties: warm, well-perfused, no tenderness or swelling, normal gait/station  Neuro: sensation and strength grossly intact and symmetrical  Psych: alert and oriented to person, place and time  Breast exam deferred    IMPRESSION  Granulomatous disease, cannot rule out sarcoid or cat scratch. Discussed with patient    PLAN  Orders Placed This Encounter    trimethoprim-sulfamethoxazole (BACTRIM, SEPTRA)  mg per tablet     Sig: Take 1 Tab by mouth two (2) times a day.      Dispense:  20 Tab     Refill:  0     Follow-up PRN  Gunnar Stein MD

## 2019-11-18 ENCOUNTER — OFFICE VISIT (OUTPATIENT)
Dept: ORTHOPEDIC SURGERY | Age: 54
End: 2019-11-18

## 2019-11-18 VITALS
TEMPERATURE: 97.9 F | OXYGEN SATURATION: 96 % | SYSTOLIC BLOOD PRESSURE: 107 MMHG | DIASTOLIC BLOOD PRESSURE: 62 MMHG | RESPIRATION RATE: 16 BRPM | BODY MASS INDEX: 25.4 KG/M2 | HEIGHT: 70 IN | WEIGHT: 177.4 LBS | HEART RATE: 69 BPM

## 2019-11-18 DIAGNOSIS — M96.1 LUMBAR POSTLAMINECTOMY SYNDROME: ICD-10-CM

## 2019-11-18 DIAGNOSIS — M51.36 DEGENERATIVE DISC DISEASE, LUMBAR: ICD-10-CM

## 2019-11-18 DIAGNOSIS — M54.16 LUMBAR RADICULOPATHY: Primary | ICD-10-CM

## 2019-11-18 RX ORDER — FAMOTIDINE 40 MG/1
40 TABLET, FILM COATED ORAL DAILY
COMMUNITY
End: 2020-02-13 | Stop reason: SDUPTHER

## 2019-11-18 RX ORDER — GABAPENTIN 800 MG/1
800 TABLET ORAL
Qty: 270 TAB | Refills: 0 | Status: SHIPPED | OUTPATIENT
Start: 2019-11-18 | End: 2021-06-16

## 2019-11-18 NOTE — LETTER
11/18/19 Patient: Mike Sher YOB: 1965 Date of Visit: 11/18/2019 [place] [address] [address] [place] [address] VIA In Basket Dear Katrina De La Paz NP, Thank you for referring Mr. Amalia Loyd to [address] for evaluation. My notes for this consultation are attached. If you have questions, please do not hesitate to call me. I look forward to following your patient along with you. Sincerely, Zhanna Fonseca MD
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
## 2020-01-27 ENCOUNTER — PATIENT OUTREACH (OUTPATIENT)
Dept: FAMILY MEDICINE CLINIC | Age: 55
End: 2020-01-27
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
## 2020-02-03 ENCOUNTER — PATIENT OUTREACH (OUTPATIENT)
Dept: FAMILY MEDICINE CLINIC | Age: 55
End: 2020-02-03

## 2020-02-13 ENCOUNTER — OFFICE VISIT (OUTPATIENT)
Dept: FAMILY MEDICINE CLINIC | Age: 55
End: 2020-02-13

## 2020-02-13 ENCOUNTER — PATIENT OUTREACH (OUTPATIENT)
Dept: FAMILY MEDICINE CLINIC | Age: 55
End: 2020-02-13

## 2020-02-13 VITALS
RESPIRATION RATE: 16 BRPM | DIASTOLIC BLOOD PRESSURE: 67 MMHG | HEIGHT: 70 IN | HEART RATE: 68 BPM | SYSTOLIC BLOOD PRESSURE: 109 MMHG | TEMPERATURE: 96.8 F | WEIGHT: 178 LBS | BODY MASS INDEX: 25.48 KG/M2

## 2020-02-13 DIAGNOSIS — R11.0 NAUSEA: ICD-10-CM

## 2020-02-13 DIAGNOSIS — Z13.29 SCREENING FOR ENDOCRINE, METABOLIC AND IMMUNITY DISORDER: ICD-10-CM

## 2020-02-13 DIAGNOSIS — J45.40 MODERATE PERSISTENT ASTHMA WITHOUT COMPLICATION: ICD-10-CM

## 2020-02-13 DIAGNOSIS — Z13.0 SCREENING FOR ENDOCRINE, METABOLIC AND IMMUNITY DISORDER: ICD-10-CM

## 2020-02-13 DIAGNOSIS — Z12.5 PROSTATE CANCER SCREENING: ICD-10-CM

## 2020-02-13 DIAGNOSIS — Z00.00 ENCOUNTER FOR MEDICARE ANNUAL WELLNESS EXAM: Primary | ICD-10-CM

## 2020-02-13 DIAGNOSIS — Z13.228 SCREENING FOR ENDOCRINE, METABOLIC AND IMMUNITY DISORDER: ICD-10-CM

## 2020-02-13 DIAGNOSIS — R05.9 COUGH: ICD-10-CM

## 2020-02-13 DIAGNOSIS — Z13.6 ENCOUNTER FOR LIPID SCREENING FOR CARDIOVASCULAR DISEASE: ICD-10-CM

## 2020-02-13 DIAGNOSIS — K21.00 GASTROESOPHAGEAL REFLUX DISEASE WITH ESOPHAGITIS: ICD-10-CM

## 2020-02-13 DIAGNOSIS — Z23 ENCOUNTER FOR IMMUNIZATION: ICD-10-CM

## 2020-02-13 DIAGNOSIS — Z13.220 ENCOUNTER FOR LIPID SCREENING FOR CARDIOVASCULAR DISEASE: ICD-10-CM

## 2020-02-13 RX ORDER — ONDANSETRON 4 MG/1
4 TABLET, ORALLY DISINTEGRATING ORAL
Qty: 20 TAB | Refills: 0 | Status: SHIPPED | OUTPATIENT
Start: 2020-02-13 | End: 2021-05-25 | Stop reason: SDUPTHER

## 2020-02-13 RX ORDER — FAMOTIDINE 40 MG/1
40 TABLET, FILM COATED ORAL
Qty: 90 TAB | Refills: 0 | Status: SHIPPED | OUTPATIENT
Start: 2020-02-13

## 2020-02-13 RX ORDER — CODEINE PHOSPHATE AND GUAIFENESIN 10; 100 MG/5ML; MG/5ML
5 SOLUTION ORAL
Qty: 118 ML | Refills: 0 | Status: SHIPPED | OUTPATIENT
Start: 2020-02-13 | End: 2020-02-16

## 2020-02-13 RX ORDER — OMEPRAZOLE 40 MG/1
40 CAPSULE, DELAYED RELEASE ORAL DAILY
Qty: 90 CAP | Refills: 1 | Status: SHIPPED | OUTPATIENT
Start: 2020-02-13 | End: 2021-05-25 | Stop reason: ALTCHOICE

## 2020-02-13 NOTE — PATIENT INSTRUCTIONS
Medicare Part B Preventive Services Limitations Recommendation Scheduled   Bone Mass Measurement  (age 72 & older, biennial) Requires diagnosis related to osteoporosis or estrogen deficiency. Biennial benefit unless patient has history of long-term glucocorticoid tx or baseline is needed because initial test was by other method N/A    Cardiovascular Screening Blood Tests (every 5 years)  Total cholesterol, HDL, Triglycerides and ECG Order blood work  as a panel if possible and  for adults with routine risk  an electrocardiogram (ECG) at intervals determined by the provider. Ordered fasting labs today    Colorectal Cancer Screening  -Fecal occult blood test (annual)  -Flexible sigmoidoscopy (5y)  -Screening colonoscopy (10y)  -Barium Enema Colorectal cancer screening should be done for adults age 54-65 with no increased risk factors for colorectal cancer. There are a number of acceptable methods of screening for this type of cancer. Each test has its own benefits and drawbacks. Discuss with your provider what is most appropriate for you during your annual wellness visit.  The different tests include: colonoscopy (considered the best screening method), a fecal occult blood test, a fecal DNA test, and sigmoidoscopy Current    Counseling to Prevent Tobacco Use (up to 8 sessions per year)  - Counseling greater than 3 and up to 10 minutes  - Counseling greater than 10 minutes Patients must be asymptomatic of tobacco-related conditions to receive as preventive service N/A    Diabetes Screening Tests (at least every 3 years, Medicare covers annually or at 6-month intervals for prediabetic patients)    Fasting blood sugar (FBS) or glucose tolerance test (GTT) All adults age 38-68 who are overweight should have a diabetes screening test once every three years.   -Other screening tests & preventive services for persons with diabetes include: an eye exam to screen for diabetic retinopathy, a kidney function test, a foot exam, and stricter control over your cholesterol. Ordered fasting labs today    Diabetes Self-Management Training (DSMT) (no USPSTF recommendation) Requires referral by treating physician for patient with diabetes or renal disease. 10 hours of initial DSMT session of no less than 30 minutes each in a continuous 12-month period. 2 hours of follow-up DSMT in subsequent years. N/A    Glaucoma Screening (no USPSTF recommendation) Diabetes mellitus, family history, , age 48 or over,  American, age 72 or over N/A    Human Immunodeficiency Virus (HIV) Screening (annually for increased risk patients)  HIV-1 and HIV-2 by EIA, DEVEN, rapid antibody test, or oral mucosa transudate Patient must be at increased risk for HIV infection per USPSTF guidelines or pregnant. Tests covered annually for patients at increased risk. Pregnant patients may receive up to 3 test during pregnancy. N/A    Medical Nutrition Therapy (MNT) (for diabetes or renal disease not recommended schedule) Requires referral by treating physician for patient with diabetes or renal disease. Can be provided in same year as diabetes self-management training (DSMT), and CMS recommends medical nutrition therapy take place after DSMT. Up to 3 hours for initial year and 2 hours in subsequent years. N/A    Prostate Cancer Screening (annually up to age 76)  - Digital rectal exam (KARLA)  - Prostate specific antigen (PSA) Annually (age 48 or over), KARLA not paid separately when covered E/M service is provided on same date  Men up to age 76 may need a screening blood test for prostate cancer at certain intervals, depending on their personal and family history. This decision is between the patient and his provider.  Needs    Seasonal Influenza Vaccination (annually) All adults should have a flu vaccine yearly  Needs ordered today      Pneumococcal Vaccination (once after 72) All adults  over age 72 should receive the recommended pneumonia vaccines. Current USPSTF guidelines recommend a series of two vaccines for the best pneumonia protection. N/A- current    Hepatitis B Vaccinations (if medium/high risk) Medium/high risk factors:  End-stage renal disease,  Hemophiliacs who received Factor VIII or IX concentrates, Clients of institutions for the mentally retarded, Persons who live in the same house as a HepB virus carrier, Homosexual men, Illicit injectable drug abusers. N/A    Shingles Vaccination A shingles vaccine is also recommended once in a lifetime after age 48  Script written    Ultrasound Screening for Abdominal Aortic Aneurysm (AAA) (once) An Abdominal Aortic Aneurysm (AAA) Screening is recommended for men age 73-68 who has ever smoked in their lifetime.   of the following criteria:  - Men who are 73-68 years old and have smoked more than 100 cigarettes in their lifetime.  -Anyone with a FH of AAA  -Anyone recommended for screening by USPSTF N/A      Hep C All adults born between [facility] should be screened once for Hepatitis C Up to date    Tetanus  All adults should have a tetanus vaccine every 10 years Declined today

## 2020-02-13 NOTE — PROGRESS NOTES
This is the Subsequent Medicare Annual Wellness Exam, performed 12 months or more after the Initial AWV or the last Subsequent AWV    (also here for follow up routine chronic conditions and cough, see separate note)    I have reviewed the patient's medical history in detail and updated the computerized patient record.      History     Patient Active Problem List   Diagnosis Code    Degenerative disc disease, lumbar M51.36    S/P spinal fusion Z98.1    History of CVA (cerebrovascular accident) Z86.73    Lumbar herniated disc M51.26    Gastroesophageal reflux disease with esophagitis K21.0    Moderate persistent asthma without complication T14.74    Postlaminectomy syndrome of lumbar region M96.1    Other intervertebral disc degeneration, lumbar region M51.36    Primary osteoarthritis of right knee M17.11    HNP (herniated nucleus pulposus), lumbar M51.26    Lumbar radiculopathy M54.16     Past Medical History:   Diagnosis Date    Arthritis of knee, right 12/2017    advanced degen changes noted, CT right LE    Asthma     Cellulitis 12/2017    DDD (degenerative disc disease), lumbar 2013    noted on MRI with annular tears    Diverticulitis 2016    GERD (gastroesophageal reflux disease) 2016    with esophagitis according to endscopy    Kidney stone     Lumbar post-laminectomy syndrome     Sacroiliitis ([facility] [address]     Stroke [facility]) may 14 2010    Unspecified rotator cuff tear or rupture of left shoulder, not specified as traumatic 03/2016      Past Surgical History:   Procedure Laterality Date    COLONOSCOPY N/A 8/12/2016    COLONOSCOPY with polypectomy performed by Rosalino Gilliam MD at [address] [employer]  2002, 2004, 2006    L5-S1 fusion, laminectomies    HX ENDOSCOPY  10/2016    grade 1 espohagitis    HX ENDOSCOPY  11/2016    mild esophagitis    HX KNEE ARTHROSCOPY Right     knee    HX MOHS PROCEDURES      right    HX ORTHOPAEDIC      right shoulder     Current Outpatient Medications   Medication Sig Dispense Refill    famotidine (PEPCID) 40 mg tablet Take 40 mg by mouth daily.  gabapentin (NEURONTIN) 800 mg tablet Take 1 Tab by mouth three (3) times daily. Max Daily Amount: 2,400 mg. 90 Tab 1    cyclobenzaprine (FLEXERIL) 10 mg tablet Take 1 Tab by mouth three (3) times daily as needed for Muscle Spasm(s). 90 Tab 6    budesonide-formoterol (SYMBICORT) 160-4.5 mcg/actuation HFAA Take 1 Puff by inhalation two (2) times a day. 1 Inhaler 6    ibuprofen (MOTRIN) 200 mg tablet Take  by mouth.  acetaminophen (TYLENOL) 500 mg tablet Take  by mouth every six (6) hours as needed for Pain.  albuterol (PROVENTIL HFA, VENTOLIN HFA, PROAIR HFA) 90 mcg/actuation inhaler Take 2 Puffs by inhalation every six (6) hours as needed for Wheezing or Shortness of Breath. 1 Inhaler 11    gabapentin (NEURONTIN) 800 mg tablet Take 1 Tab by mouth three (3) times daily (with meals). Max Daily Amount: 2,400 mg. Indications: Neuropathic Pain 270 Tab 0    trimethoprim-sulfamethoxazole (BACTRIM, SEPTRA)  mg per tablet Take 1 Tab by mouth two (2) times a day. 20 Tab 0    oxyCODONE-acetaminophen (PERCOCET) 5-325 mg per tablet Take 1 Tab by mouth every four (4) hours as needed for Pain.  aspirin 81 mg chewable tablet Take 81 mg by mouth daily.  raNITIdine (ZANTAC) 150 mg tablet Take 1 Tab by mouth every twelve (12) hours as needed for Indigestion. 60 Tab 11    ondansetron (ZOFRAN ODT) 4 mg disintegrating tablet Take 1 Tab by mouth every eight (8) hours as needed for Nausea. 20 Tab 0    albuterol (PROVENTIL VENTOLIN) 2.5 mg /3 mL (0.083 %) nebulizer solution 3 mL by Nebulization route once for 1 dose.  1 Package 0     Allergies   Allergen Reactions    Penicillins Angioedema    Penicillins Anaphylaxis    Vicodin [Hydrocodone-Acetaminophen] Nausea and Vomiting    Azithromycin Nausea and Vomiting    Hydrochlorothiazide Nausea Only     Dizzy, lightheaded, blisters on his legs    Protonix [Pantoprazole] Itching and Contact Dermatitis    Vicodin [Hydrocodone-Acetaminophen] Nausea and Vomiting    Erythromycin Other (comments)       Family History   Problem Relation Age of Onset    Other Brother     Cancer Maternal Grandmother     No Known Problems Mother     No Known Problems Father      Social History     Tobacco Use    Smoking status: Former Smoker     Packs/day: 2.00     Types: Cigarettes     Last attempt to quit: 07/2016     Years since quitting: 3.6    Smokeless tobacco: Never Used   Substance Use Topics    Alcohol use: No     Alcohol/week: 0.0 standard drinks       Depression Risk Factor Screening:     3 most recent PHQ Screens 2/13/2020   PHQ Not Done -   Little interest or pleasure in doing things Not at all   Feeling down, depressed, irritable, or hopeless Not at all   Total Score PHQ 2 0   Trouble falling or staying asleep, or sleeping too much Not at all   Feeling tired or having little energy Not at all   Poor appetite, weight loss, or overeating Not at all   Feeling bad about yourself - or that you are a failure or have let yourself or your family down Not at all   Trouble concentrating on things such as school, work, reading, or watching TV Not at all   Moving or speaking so slowly that other people could have noticed; or the opposite being so fidgety that others notice Not at all   Thoughts of being better off dead, or hurting yourself in some way Not at all   PHQ 9 Score 0   How difficult have these problems made it for you to do your work, take care of your home and get along with others Not difficult at all       Alcohol Risk Factor Screening (MALE < 65): Do you average more than 2 drinks per night or 14 drinks a week: No    On any one occasion in the past three months have you have had more than 4 drinks containing alcohol:  No      Functional Ability and Level of Safety:   Hearing: Hearing is good. Activities of Daily Living:   The home contains: no safety equipment. Patient does total self care    Ambulation: with no difficulty    Fall Risk:  Fall Risk Assessment, last 12 mths 2/13/2020   Able to walk? Yes   Fall in past 12 months? No       Abuse Screen:  Patient is not abused    Cognitive Screening   Has your family/caregiver stated any concerns about your memory: no  Cognitive Screening: Normal - Verbal Fluency Test    Patient Care Team   Patient Care Team:  Stephany Arenas NP as PCP - General (Nurse Practitioner)  Nawaf Velasquez NP as PCP - [facility]  Frederick Dickey MD (Orthopedic Surgery)  Jair Colorado MD (Physiatry)  Osmani Mirza MD (Gastroenterology)  Karyn Lopez MD (Urology)  Stephen Miguel MD (Surgery)  Ulises Hernandez RN as Ambulatory Care Manager    Assessment/Plan   Education and counseling provided:  Are appropriate based on today's review and evaluation  Pneumococcal Vaccine  Influenza Vaccine  Prostate cancer screening tests (PSA, covered annually)  Colorectal cancer screening tests  Cardiovascular screening blood test       Health Maintenance Due   Topic Date Due    DTaP/Tdap/Td series (1 - Tdap) 07/18/1976    Shingrix Vaccine Age 50> (1 of 2) 07/18/2015    Influenza Age 5 to Adult  08/01/2019    Medicare Yearly Exam  02/09/2020     Patient declined teatnus booster today. Rx for shingrix written  Influenza given today  Yearly exam done today      Encounter for Medicare annual wellness exam    Encounter for lipid screening for cardiovascular disease    - LIPID PANEL; Future  - varicella-zoster recombinant, PF, (SHINGRIX, PF,) 50 mcg/0.5 mL susr injection; 0.5 mL by IntraMUSCular route once for 1 dose. Dispense: 0.5 mL; Refill: 0    Screening for endocrine, metabolic and immunity disorder    - CBC W/O DIFF; Future  - METABOLIC PANEL, COMPREHENSIVE; Future  - HEMOGLOBIN A1C WITH EAG; Future  - URINALYSIS W/MICROSCOPIC; Future  - TSH 3RD GENERATION;  Future    Prostate cancer screening    - PSA SCREENING (SCREENING); Future    Encounter for immunization    - INFLUENZA VIRUS VAC QUAD,SPLIT,PRESV FREE SYRINGE IM  - MN IMMUNIZ ADMIN,1 SINGLE/COMB VAC/TOXOID    Moderate persistent asthma without complication    Gastroesophageal reflux disease with esophagitis    - famotidine (PEPCID) 40 mg tablet; Take 1 Tab by mouth two (2) times daily as needed (indigestion/acid reflux). Dispense: 90 Tab; Refill: 0  - ondansetron (ZOFRAN ODT) 4 mg disintegrating tablet; Take 1 Tab by mouth every eight (8) hours as needed for Nausea. Dispense: 20 Tab; Refill: 0  - omeprazole (PRILOSEC) 40 mg capsule; Take 1 Cap by mouth daily. Dispense: 90 Cap; Refill: 1    Nausea    - ondansetron (ZOFRAN ODT) 4 mg disintegrating tablet; Take 1 Tab by mouth every eight (8) hours as needed for Nausea. Dispense: 20 Tab; Refill: 0    Cough    - guaiFENesin-codeine (ROBITUSSIN AC) 100-10 mg/5 mL solution; Take 5 mL by mouth three (3) times daily as needed for Cough for up to 3 days. Max Daily Amount: 15 mL.   Dispense: 118 mL; Refill: 0

## 2020-02-13 NOTE — PROGRESS NOTES
Toshia Dias presents today for   Chief Complaint   Patient presents with   [facility] Annual Wellness Visit         Cough     x 2-3 weeks       Is someone accompanying this pt? yes    Is the patient using any DME equipment during OV? no    Depression Screening:  3 most recent PHQ Screens 2/13/2020   PHQ Not Done -   Little interest or pleasure in doing things Not at all   Feeling down, depressed, irritable, or hopeless Not at all   Total Score PHQ 2 0   Trouble falling or staying asleep, or sleeping too much Not at all   Feeling tired or having little energy Not at all   Poor appetite, weight loss, or overeating Not at all   Feeling bad about yourself - or that you are a failure or have let yourself or your family down Not at all   Trouble concentrating on things such as school, work, reading, or watching TV Not at all   Moving or speaking so slowly that other people could have noticed; or the opposite being so fidgety that others notice Not at all   Thoughts of being better off dead, or hurting yourself in some way Not at all   PHQ 9 Score 0   How difficult have these problems made it for you to do your work, take care of your home and get along with others Not difficult at all       Learning Assessment:  Learning Assessment 10/21/2019   PRIMARY LEARNER Patient   HIGHEST LEVEL OF EDUCATION - PRIMARY LEARNER  -   BARRIERS PRIMARY LEARNER NONE   CO-LEARNER CAREGIVER -   PRIMARY LANGUAGE ENGLISH   LEARNER PREFERENCE PRIMARY DEMONSTRATION   ANSWERED BY self   RELATIONSHIP SELF       Abuse Screening:  Abuse Screening Questionnaire 2/13/2020   Do you ever feel afraid of your partner? N   Are you in a relationship with someone who physically or mentally threatens you? N   Is it safe for you to go home? Y       Fall Risk  Fall Risk Assessment, last 12 mths 2/13/2020   Able to walk? Yes   Fall in past 12 months? No       Health Maintenance reviewed and discussed and ordered per Provider.     Health Maintenance Due   Topic Date Due    DTaP/Tdap/Td series (1 - Tdap) 07/18/1976    Shingrix Vaccine Age 50> (1 of 2) 07/18/2015    Influenza Age 5 to Adult  08/01/2019    Medicare Yearly Exam  02/09/2020   . Coordination of Care:  1. Have you been to the ER, urgent care clinic since your last visit? Hospitalized since your last visit? [facility]    2. Have you seen or consulted any other health care providers outside of the [address] [place] [address] since your last visit? Include any pap smears or colon screening.  no

## 2020-02-13 NOTE — PROGRESS NOTES
OFFICE NOTE (SOAP)      2/13/2020  9:28 AM    Chief Complaint   Patient presents with   [facility] Annual Wellness Visit         Cough     x 2-3 weeks       SUBJECTIVE:    HPI:   Sunday Zaida is a 47 y.o. male presenting today for office visit. He is presenting today for routine chronic care as well as medicare wellness visit (see separate note for Pal Borden). Asthma- using symbicort daily. Doing well with this. Used albuterol. Has a cough currently. Some sputum production which is yellow. This has been present for a week. CVA- history in 2010. No residual noted. Not taking aspirin currently because giving it to his mom. Will start again. GERD with esophagitis- Only with GI when diagnosed. Was previously taking zantac every day and well managed, due to recall he has been without. Has been prilosec OTC but still with symptoms with acidic taste. Zofran for nausea occasionally. '    Last routine lab work feb 2019- will order again today. Current concerns for a cough- cough has been present for about a week, he states he also has some yellow sputum production. He denies any congestion, sore throat, ear pain, fever, wheezing, shortness of breath, chest pain, headache. Review of Systems   Constitutional: Negative for chills and fever. HENT: Negative for congestion, ear discharge, ear pain, sinus pressure, sinus pain, sneezing and sore throat. Respiratory: Positive for cough. Negative for shortness of breath and wheezing. Cardiovascular: Negative for chest pain and palpitations. Gastrointestinal: Negative for blood in stool, constipation, diarrhea, nausea and vomiting. Musculoskeletal: Positive for back pain. Neurological: Negative for headaches. Psychiatric/Behavioral: Negative for dysphoric mood. The patient is not nervous/anxious.           PHQ Screening   3 most recent PHQ Screens 2/13/2020   PHQ Not Done -   Little interest or pleasure in doing things Not at all   Feeling down, depressed, irritable, or hopeless Not at all   Total Score PHQ 2 0   Trouble falling or staying asleep, or sleeping too much Not at all   Feeling tired or having little energy Not at all   Poor appetite, weight loss, or overeating Not at all   Feeling bad about yourself - or that you are a failure or have let yourself or your family down Not at all   Trouble concentrating on things such as school, work, reading, or watching TV Not at all   Moving or speaking so slowly that other people could have noticed; or the opposite being so fidgety that others notice Not at all   Thoughts of being better off dead, or hurting yourself in some way Not at all   PHQ 9 Score 0   How difficult have these problems made it for you to do your work, take care of your home and get along with others Not difficult at all         History  Past Medical History:   Diagnosis Date    Arthritis of knee, right 12/2017    advanced degen changes noted, CT right LE    Asthma     Cellulitis 12/2017    DDD (degenerative disc disease), lumbar 2013    noted on MRI with annular tears    Diverticulitis 2016    GERD (gastroesophageal reflux disease) 2016    with esophagitis according to endscopy    Kidney stone     Lumbar post-laminectomy syndrome     [facility]roiliitis ([facility] [address]     Stroke [facility]) may 14 2010    Unspecified rotator cuff tear or rupture of left shoulder, not specified as traumatic 03/2016       Past Surgical History:   Procedure Laterality Date    COLONOSCOPY N/A 8/12/2016    COLONOSCOPY with polypectomy performed by Amrita Suarez MD at [place] [address] [address]  2002, 2004, 2006    L5-S1 fusion, laminectomies    HX ENDOSCOPY  10/2016    grade 1 espohagitis    HX ENDOSCOPY  11/2016    mild esophagitis    HX KNEE ARTHROSCOPY Right     knee    HX MOHS PROCEDURES      right    HX ORTHOPAEDIC      right shoulder       Social History     Socioeconomic History    Marital status: SINGLE     Spouse name: Not on file    Number of children: Not on file    Years of education: Not on file    Highest education level: Not on file   Occupational History    Not on file   Social Needs    Financial resource strain: Not on file    Food insecurity:     Worry: Not on file     Inability: Not on file    Transportation needs:     Medical: Not on file     Non-medical: Not on file   Tobacco Use    Smoking status: Former Smoker     Packs/day: 2.00     Types: Cigarettes     Last attempt to quit: 07/2016     Years since quitting: 3.6    Smokeless tobacco: Never Used   Substance and Sexual Activity    Alcohol use: No     Alcohol/week: 0.0 standard drinks    Drug use: No    Sexual activity: Yes     Partners: Female   Lifestyle    Physical activity:     Days per week: Not on file     Minutes per session: Not on file    Stress: Not on file   Relationships    Social connections:     Talks on phone: Not on file     Gets together: Not on file     Attends Tenriism service: Not on file     Active member of club or organization: Not on file     Attends meetings of clubs or organizations: Not on file     Relationship status: Not on file    Intimate partner violence:     Fear of current or ex partner: Not on file     Emotionally abused: Not on file     Physically abused: Not on file     Forced sexual activity: Not on file   Other Topics Concern    Not on file   Social History Narrative    ** Merged History Encounter **            Family History   Problem Relation Age of Onset    Other Brother     Cancer Maternal Grandmother     No Known Problems Mother     No Known Problems Father        Allergies   Allergen Reactions    Penicillins Angioedema    Penicillins Anaphylaxis    Vicodin [Hydrocodone-Acetaminophen] Nausea and Vomiting    Azithromycin Nausea and Vomiting    Hydrochlorothiazide Nausea Only     Dizzy, lightheaded, blisters on his legs    Protonix [Pantoprazole] Itching and Contact Dermatitis    Vicodin [Hydrocodone-Acetaminophen] Nausea and Vomiting    Erythromycin Other (comments)       Current Outpatient Medications   Medication Sig Dispense Refill    famotidine (PEPCID) 40 mg tablet Take 40 mg by mouth daily.  gabapentin (NEURONTIN) 800 mg tablet Take 1 Tab by mouth three (3) times daily. Max Daily Amount: 2,400 mg. 90 Tab 1    cyclobenzaprine (FLEXERIL) 10 mg tablet Take 1 Tab by mouth three (3) times daily as needed for Muscle Spasm(s). 90 Tab 6    budesonide-formoterol (SYMBICORT) 160-4.5 mcg/actuation HFAA Take 1 Puff by inhalation two (2) times a day. 1 Inhaler 6    ibuprofen (MOTRIN) 200 mg tablet Take  by mouth.  acetaminophen (TYLENOL) 500 mg tablet Take  by mouth every six (6) hours as needed for Pain.  albuterol (PROVENTIL HFA, VENTOLIN HFA, PROAIR HFA) 90 mcg/actuation inhaler Take 2 Puffs by inhalation every six (6) hours as needed for Wheezing or Shortness of Breath. 1 Inhaler 11    gabapentin (NEURONTIN) 800 mg tablet Take 1 Tab by mouth three (3) times daily (with meals). Max Daily Amount: 2,400 mg. Indications: Neuropathic Pain 270 Tab 0    trimethoprim-sulfamethoxazole (BACTRIM, SEPTRA)  mg per tablet Take 1 Tab by mouth two (2) times a day. 20 Tab 0    oxyCODONE-acetaminophen (PERCOCET) 5-325 mg per tablet Take 1 Tab by mouth every four (4) hours as needed for Pain.  aspirin 81 mg chewable tablet Take 81 mg by mouth daily.  raNITIdine (ZANTAC) 150 mg tablet Take 1 Tab by mouth every twelve (12) hours as needed for Indigestion. 60 Tab 11    ondansetron (ZOFRAN ODT) 4 mg disintegrating tablet Take 1 Tab by mouth every eight (8) hours as needed for Nausea. 20 Tab 0    albuterol (PROVENTIL VENTOLIN) 2.5 mg /3 mL (0.083 %) nebulizer solution 3 mL by Nebulization route once for 1 dose.  1 Package 0       Patient Care Team:  Patient Care Team:  Patsy Tovar NP as PCP - General (Nurse Practitioner)  Renee Prater NP as PCP - [facility] Empaneled Provider  Jodee Bentley MD (Orthopedic Surgery)  Viri Michel MD (Physiatry)  Yasmin Castaneda MD (Gastroenterology)  Jeni Juárez MD (Urology)  Santos Aranda MD (Surgery)  Anahy Alberts RN as Ambulatory Care Manager      LABS:  None new to review    RADIOLOGY:  None new to review      OBJECTIVE:      Physical Exam  Vitals signs and nursing note reviewed. Constitutional:       Appearance: He is well-developed. Neck:      Musculoskeletal: Neck supple. Thyroid: No thyromegaly. Cardiovascular:      Rate and Rhythm: Normal rate and regular rhythm. Heart sounds: No murmur. Pulmonary:      Effort: Pulmonary effort is normal. No respiratory distress. Breath sounds: Normal breath sounds. No wheezing. Musculoskeletal: Normal range of motion. Skin:     General: Skin is warm and dry. Neurological:      Mental Status: He is alert and oriented to person, place, and time. Psychiatric:         Mood and Affect: Mood normal.         Behavior: Behavior normal.           Vitals:    02/13/20 0911   BP: 109/67   Pulse: 68   Resp: 16   Temp: 96.8 °F (36 °C)   TempSrc: Oral   Weight: 178 lb (80.7 kg)   Height: 5' 10\" (1.778 m)   PainSc:   5   PainLoc: Back         Assessment & Plan: Moderate persistent asthma without complication  Continue with Symbicort and albuterol for rescue     Gastroesophageal reflux disease with esophagitis  - famotidine (PEPCID) 40 mg tablet; Take 1 Tab by mouth two (2) times daily as needed (indigestion/acid reflux). Dispense: 90 Tab; Refill: 0  - ondansetron (ZOFRAN ODT) 4 mg disintegrating tablet; Take 1 Tab by mouth every eight (8) hours as needed for Nausea. Dispense: 20 Tab; Refill: 0  - omeprazole (PRILOSEC) 40 mg capsule; Take 1 Cap by mouth daily. Dispense: 90 Cap; Refill: 1    Nausea  - ondansetron (ZOFRAN ODT) 4 mg disintegrating tablet; Take 1 Tab by mouth every eight (8) hours as needed for Nausea. Dispense: 20 Tab;  Refill: 0    Cough  - guaiFENesin-codeine (ROBITUSSIN AC) 100-10 mg/5 mL solution; Take 5 mL by mouth three (3) times daily as needed for Cough for up to 3 days. Max Daily Amount: 15 mL. Dispense: 118 mL; Refill: 0    Encounter for Medicare annual wellness exam    Encounter for lipid screening for cardiovascular disease  - LIPID PANEL; Future    Screening for endocrine, metabolic and immunity disorder  - CBC W/O DIFF; Future  - METABOLIC PANEL, COMPREHENSIVE; Future  - HEMOGLOBIN A1C WITH EAG; Future  - URINALYSIS W/MICROSCOPIC; Future  - TSH 3RD GENERATION; Future    Prostate cancer screening  - PSA SCREENING (SCREENING); Future    Encounter for immunization  - INFLUENZA VIRUS VAC QUAD,SPLIT,PRESV FREE SYRINGE IM  - VA IMMUNIZ ADMIN,1 SINGLE/COMB VAC/TOXOID  - varicella-zoster recombinant, PF, (SHINGRIX, PF,) 50 mcg/0.5 mL susr injection; 0.5 mL by IntraMUSCular route once for 1 dose. Dispense: 0.5 mL; Refill: 0      Orders Placed This Encounter    VA IMMUNIZ ADMIN,1 SINGLE/COMB VAC/TOXOID    INFLUENZA VIRUS VACCINE QUADRIVALENT, PRESERVATIVE FREE SYRINGE (96089)    CBC W/O DIFF     Standing Status:   Future     Standing Expiration Date:   2/33/2976    METABOLIC PANEL, COMPREHENSIVE     Standing Status:   Future     Standing Expiration Date:   5/13/2020    HEMOGLOBIN A1C WITH EAG     Standing Status:   Future     Standing Expiration Date:   5/13/2020    LIPID PANEL     Standing Status:   Future     Standing Expiration Date:   5/13/2020    URINALYSIS W/MICROSCOPIC     Standing Status:   Future     Standing Expiration Date:   5/13/2020    TSH 3RD GENERATION     Standing Status:   Future     Standing Expiration Date:   5/13/2020    PSA SCREENING (SCREENING)     Standing Status:   Future     Standing Expiration Date:   5/13/2020    famotidine (PEPCID) 40 mg tablet     Sig: Take 1 Tab by mouth two (2) times daily as needed (indigestion/acid reflux).      Dispense:  90 Tab     Refill:  0    ondansetron (ZOFRAN ODT) 4 mg disintegrating tablet     Sig: Take 1 Tab by mouth every eight (8) hours as needed for Nausea. Dispense:  20 Tab     Refill:  0    omeprazole (PRILOSEC) 40 mg capsule     Sig: Take 1 Cap by mouth daily. Dispense:  90 Cap     Refill:  1    varicella-zoster recombinant, PF, (SHINGRIX, PF,) 50 mcg/0.5 mL susr injection     Si.5 mL by IntraMUSCular route once for 1 dose. Dispense:  0.5 mL     Refill:  0    guaiFENesin-codeine (ROBITUSSIN AC) 100-10 mg/5 mL solution     Sig: Take 5 mL by mouth three (3) times daily as needed for Cough for up to 3 days. Max Daily Amount: 15 mL. Dispense:  118 mL     Refill:  0     NO ALCOHOL (patient sober)       Follow-up and Dispositions    · Return in about 6 months (around 2020), or if symptoms worsen or fail to improve, for 6 months for routine chronic care, 12 months for medicare wellness. Plan of care reviewed with patient. Patient in agreement with plan and expresses understanding. I have discussed when to anticipate results and how results will be communicated, if applicable. Anticipatory guidance given and questions answered, patient encouraged to call or RTO if further questions or concerns.     Paulo Lake NP  20

## 2020-02-13 NOTE — PROGRESS NOTES
Patient attended appointments with his PCP, Gavin Jolly NP on 2/13/20, Nurse Navigator reviewed EMR and will follow up on next scheduled outreach.

## 2020-02-13 NOTE — PROGRESS NOTES
Flu vaccine ordered at this time. Injection given to patient left deltoid at this time. Patient tolerated well.  No concerns noted at this time

## 2020-02-18 ENCOUNTER — PATIENT OUTREACH (OUTPATIENT)
Dept: CASE MANAGEMENT | Age: 55
End: 2020-02-18
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
## 2020-03-04 ENCOUNTER — OFFICE VISIT (OUTPATIENT)
Dept: ORTHOPEDIC SURGERY | Age: 55
End: 2020-03-04

## 2020-03-04 ENCOUNTER — PATIENT OUTREACH (OUTPATIENT)
Dept: CASE MANAGEMENT | Age: 55
End: 2020-03-04

## 2020-03-04 VITALS
RESPIRATION RATE: 16 BRPM | WEIGHT: 176.6 LBS | HEIGHT: 70 IN | TEMPERATURE: 96.3 F | SYSTOLIC BLOOD PRESSURE: 117 MMHG | HEART RATE: 65 BPM | OXYGEN SATURATION: 97 % | BODY MASS INDEX: 25.28 KG/M2 | DIASTOLIC BLOOD PRESSURE: 75 MMHG

## 2020-03-04 DIAGNOSIS — M51.36 DEGENERATIVE DISC DISEASE, LUMBAR: ICD-10-CM

## 2020-03-04 DIAGNOSIS — M96.1 LUMBAR POSTLAMINECTOMY SYNDROME: ICD-10-CM

## 2020-03-04 DIAGNOSIS — M54.16 LUMBAR RADICULOPATHY: Primary | ICD-10-CM

## 2020-03-04 RX ORDER — GABAPENTIN 800 MG/1
800 TABLET ORAL 3 TIMES DAILY
Qty: 90 TAB | Refills: 5 | Status: SHIPPED | OUTPATIENT
Start: 2020-03-04 | End: 2021-03-23 | Stop reason: SDUPTHER

## 2020-03-04 NOTE — LETTER
3/4/20 Patient: April Mitchell YOB: 1965 Date of Visit: 3/4/2020 [place] [address] [id number] [address] [address] VIA In Basket Dear Amarilys Shankar NP, Thank you for referring Mr. Key Bey to [address] for evaluation. My notes for this consultation are attached. If you have questions, please do not hesitate to call me. I look forward to following your patient along with you. Sincerely, Katie Rouse MD

## 2020-03-16 ENCOUNTER — PATIENT OUTREACH (OUTPATIENT)
Dept: CASE MANAGEMENT | Age: 55
End: 2020-03-16

## 2020-03-23 ENCOUNTER — PATIENT OUTREACH (OUTPATIENT)
Dept: CASE MANAGEMENT | Age: 55
End: 2020-03-23

## 2020-03-23 NOTE — PROGRESS NOTES
Patient has graduated from the Complex Case Management  program on 3/23/20. Patient's symptoms are stable at this time. Patient/family has the ability to self-manage. Care management goals have been completed at this time. No further nurse navigator follow up scheduled. Goals Addressed                 This Visit's Progress     COMPLETED: Attends follow up appointments on schedule        1/27/20 Patient will attend all scheduled appointments through 04/27/20       COMPLETED: Knowledge and adherence of prescribed medication (ie. action, side effects, missed dose, etc.). 1/27/20 Pt will take all medications prescribed to be evaluated on each outreach through  04/27/20            Pt has nurse navigator's contact information for any further questions, concerns, or needs.   Patients upcoming visits:    Future Appointments   Date Time Provider Mikhail Goodrich   6/18/2020  9:00 AM [facility]   6/18/2020  9:30 AM Jess Cool MD [facility]   8/13/2020  9:00 AM [place]   9/4/2020  8:30 AM MD Sal Garcia   2/15/2021  9:00 AM [place] Val[place] SCHED

## 2020-03-24 ENCOUNTER — TELEPHONE (OUTPATIENT)
Dept: FAMILY MEDICINE CLINIC | Age: 55
End: 2020-03-24

## 2020-03-24 DIAGNOSIS — J01.90 ACUTE NON-RECURRENT SINUSITIS, UNSPECIFIED LOCATION: Primary | ICD-10-CM

## 2020-03-24 RX ORDER — PSEUDOEPHEDRINE HYDROCHLORIDE 60 MG/1
60 TABLET ORAL
Qty: 12 TAB | Refills: 0 | Status: SHIPPED | OUTPATIENT
Start: 2020-03-24 | End: 2021-06-11

## 2020-03-24 RX ORDER — DOXYCYCLINE 100 MG/1
100 TABLET ORAL 2 TIMES DAILY
Qty: 10 TAB | Refills: 0 | Status: SHIPPED | OUTPATIENT
Start: 2020-03-24 | End: 2020-03-29

## 2020-03-24 RX ORDER — LORATADINE 10 MG/1
10 TABLET ORAL DAILY
Qty: 90 TAB | Refills: 3 | Status: SHIPPED | OUTPATIENT
Start: 2020-03-24 | End: 2021-12-21 | Stop reason: ALTCHOICE

## 2020-03-24 NOTE — TELEPHONE ENCOUNTER
Spoke with patient's partner, Yudi crabtree at this time. Confirmed patient's name and . She states he is having symptoms of a sinus infection including congestion, pain and tenderness, swelling to both eyes. This has been for past two days. No fever. Advised that likely viral or allergic in nature. She agrees. Advised on supportive care- they will trial this first.     She is requesting antibiotic in case he does not get better with supportive measures. She would like to avoid coming into office during corna virus outbreak as patient with underlying respiratory symptoms and trying to self-quarentine at home.

## 2020-05-26 ENCOUNTER — TELEPHONE (OUTPATIENT)
Dept: FAMILY MEDICINE CLINIC | Age: 55
End: 2020-05-26

## 2020-05-26 NOTE — TELEPHONE ENCOUNTER
Spoke with patients wife at this time. Asking for letter to excuse from mask wearing. Advised he does wear mask to protect self and others. If he needs to remove due to anxiety, may do so but then will make every effort to socially distance self.

## 2020-05-26 NOTE — LETTER
5/26/2020 5:21 PM 
 
Mr. Ellie Cantu [address] [facility] [address] To whom it may concern: 
 
Please be advised that Mr. Brooklyn Crespo is under the care of [facility]. He has asked that we provide a letter excusing him from wearing a mask while out in public due to panic and anxiety associated with this, as well as fear for triggering asthma attack. I have educated the patient that I do recommend he wear a mask while out in public, for the sake of protecting his own health as well as others. He understands the nature the respiratory spread of COVID-19 and that the best protection we have is covering of mouth and nose. Due to underlying anxiety and asthma, he will remove the mask if he feels he is having trouble breathing. However, if this is done, he will make every effort to remain socially distant from other people. He has been advised to begin practicing wearing his mask while he is safe at home in order to build a tolerance to this as it is looking like wearing masks while out in public is becoming the necessary reality in order to stop the spread of COVID-19. Sincerely, Sincerely, Oxana Milner NP

## 2020-07-01 ENCOUNTER — HOSPITAL ENCOUNTER (EMERGENCY)
Age: 55
Discharge: HOME | End: 2020-07-02
Attending: EMERGENCY MEDICINE
Payer: MEDICARE

## 2020-07-01 DIAGNOSIS — N20.0 KIDNEY STONE: Primary | ICD-10-CM

## 2020-07-01 PROCEDURE — 83690 ASSAY OF LIPASE: CPT

## 2020-07-01 PROCEDURE — 85025 COMPLETE CBC W/AUTO DIFF WBC: CPT

## 2020-07-01 PROCEDURE — 80053 COMPREHEN METABOLIC PANEL: CPT

## 2020-07-01 PROCEDURE — 81001 URINALYSIS AUTO W/SCOPE: CPT

## 2020-07-01 PROCEDURE — 99283 EMERGENCY DEPT VISIT LOW MDM: CPT

## 2020-07-01 PROCEDURE — 83605 ASSAY OF LACTIC ACID: CPT

## 2020-07-02 ENCOUNTER — APPOINTMENT (OUTPATIENT)
Dept: CT IMAGING | Age: 55
End: 2020-07-02
Attending: EMERGENCY MEDICINE
Payer: MEDICARE

## 2020-07-02 ENCOUNTER — PATIENT OUTREACH (OUTPATIENT)
Dept: CASE MANAGEMENT | Age: 55
End: 2020-07-02

## 2020-07-02 ENCOUNTER — APPOINTMENT (OUTPATIENT)
Dept: GENERAL RADIOLOGY | Age: 55
End: 2020-07-02
Attending: EMERGENCY MEDICINE
Payer: MEDICARE

## 2020-07-02 VITALS
OXYGEN SATURATION: 96 % | HEART RATE: 62 BPM | DIASTOLIC BLOOD PRESSURE: 84 MMHG | TEMPERATURE: 98.7 F | BODY MASS INDEX: 24.82 KG/M2 | RESPIRATION RATE: 16 BRPM | SYSTOLIC BLOOD PRESSURE: 132 MMHG | WEIGHT: 173 LBS

## 2020-07-02 LAB
ALBUMIN SERPL-MCNC: 3.7 G/DL (ref 3.4–5)
ALBUMIN/GLOB SERPL: 1.3 {RATIO} (ref 0.8–1.7)
ALP SERPL-CCNC: 74 U/L (ref 45–117)
ALT SERPL-CCNC: 19 U/L (ref 16–61)
AMORPH CRY URNS QL MICRO: ABNORMAL
ANION GAP SERPL CALC-SCNC: 8 MMOL/L (ref 3–18)
APPEARANCE UR: CLEAR
AST SERPL-CCNC: 11 U/L (ref 10–38)
BACTERIA URNS QL MICRO: ABNORMAL /HPF
BASOPHILS # BLD: 0 K/UL (ref 0–0.1)
BASOPHILS NFR BLD: 1 % (ref 0–2)
BILIRUB SERPL-MCNC: 0.4 MG/DL (ref 0.2–1)
BILIRUB UR QL: NEGATIVE
BUN SERPL-MCNC: 20 MG/DL (ref 7–18)
BUN/CREAT SERPL: 22 (ref 12–20)
CALCIUM SERPL-MCNC: 8.3 MG/DL (ref 8.5–10.1)
CHLORIDE SERPL-SCNC: 107 MMOL/L (ref 100–111)
CO2 SERPL-SCNC: 26 MMOL/L (ref 21–32)
COLOR UR: YELLOW
CREAT SERPL-MCNC: 0.9 MG/DL (ref 0.6–1.3)
DIFFERENTIAL METHOD BLD: ABNORMAL
EOSINOPHIL # BLD: 0.4 K/UL (ref 0–0.4)
EOSINOPHIL NFR BLD: 5 % (ref 0–5)
EPITH CASTS URNS QL MICRO: NEGATIVE /LPF (ref 0–5)
ERYTHROCYTE [DISTWIDTH] IN BLOOD BY AUTOMATED COUNT: 12.7 % (ref 11.6–14.5)
GLOBULIN SER CALC-MCNC: 2.9 G/DL (ref 2–4)
GLUCOSE SERPL-MCNC: 123 MG/DL (ref 74–99)
GLUCOSE UR STRIP.AUTO-MCNC: NEGATIVE MG/DL
HCT VFR BLD AUTO: 43.6 % (ref 36–48)
HGB BLD-MCNC: 14.8 G/DL (ref 13–16)
HGB UR QL STRIP: ABNORMAL
KETONES UR QL STRIP.AUTO: NEGATIVE MG/DL
LACTATE BLD-SCNC: 0.9 MMOL/L (ref 0.4–2)
LEUKOCYTE ESTERASE UR QL STRIP.AUTO: NEGATIVE
LIPASE SERPL-CCNC: 134 U/L (ref 73–393)
LYMPHOCYTES # BLD: 2 K/UL (ref 0.9–3.6)
LYMPHOCYTES NFR BLD: 28 % (ref 21–52)
MCH RBC QN AUTO: 29.7 PG (ref 24–34)
MCHC RBC AUTO-ENTMCNC: 33.9 G/DL (ref 31–37)
MCV RBC AUTO: 87.6 FL (ref 74–97)
MONOCYTES # BLD: 1 K/UL (ref 0.05–1.2)
MONOCYTES NFR BLD: 14 % (ref 3–10)
NEUTS SEG # BLD: 3.9 K/UL (ref 1.8–8)
NEUTS SEG NFR BLD: 52 % (ref 40–73)
NITRITE UR QL STRIP.AUTO: NEGATIVE
PH UR STRIP: 6.5 [PH] (ref 5–8)
PLATELET # BLD AUTO: 281 K/UL (ref 135–420)
PMV BLD AUTO: 9.5 FL (ref 9.2–11.8)
POTASSIUM SERPL-SCNC: 3.7 MMOL/L (ref 3.5–5.5)
PROT SERPL-MCNC: 6.6 G/DL (ref 6.4–8.2)
PROT UR STRIP-MCNC: NEGATIVE MG/DL
RBC # BLD AUTO: 4.98 M/UL (ref 4.7–5.5)
RBC #/AREA URNS HPF: ABNORMAL /HPF (ref 0–5)
SODIUM SERPL-SCNC: 141 MMOL/L (ref 136–145)
SP GR UR REFRACTOMETRY: 1.02 (ref 1–1.03)
UROBILINOGEN UR QL STRIP.AUTO: 1 EU/DL (ref 0.2–1)
WBC # BLD AUTO: 7.4 K/UL (ref 4.6–13.2)
WBC URNS QL MICRO: ABNORMAL /HPF (ref 0–4)

## 2020-07-02 PROCEDURE — 96374 THER/PROPH/DIAG INJ IV PUSH: CPT

## 2020-07-02 PROCEDURE — 96375 TX/PRO/DX INJ NEW DRUG ADDON: CPT

## 2020-07-02 PROCEDURE — 71101 X-RAY EXAM UNILAT RIBS/CHEST: CPT

## 2020-07-02 PROCEDURE — 74011250636 HC RX REV CODE- 250/636: Performed by: EMERGENCY MEDICINE

## 2020-07-02 PROCEDURE — 74176 CT ABD & PELVIS W/O CONTRAST: CPT

## 2020-07-02 RX ORDER — MORPHINE SULFATE 2 MG/ML
4 INJECTION, SOLUTION INTRAMUSCULAR; INTRAVENOUS ONCE
Status: COMPLETED | OUTPATIENT
Start: 2020-07-02 | End: 2020-07-02

## 2020-07-02 RX ORDER — ONDANSETRON 2 MG/ML
4 INJECTION INTRAMUSCULAR; INTRAVENOUS
Status: COMPLETED | OUTPATIENT
Start: 2020-07-02 | End: 2020-07-02

## 2020-07-02 RX ORDER — HYDROCODONE BITARTRATE AND ACETAMINOPHEN 5; 325 MG/1; MG/1
1 TABLET ORAL
Qty: 8 TAB | Refills: 0 | Status: SHIPPED | OUTPATIENT
Start: 2020-07-02 | End: 2020-07-04

## 2020-07-02 RX ORDER — IBUPROFEN 600 MG/1
600 TABLET ORAL
Qty: 20 TAB | Refills: 0 | Status: SHIPPED | OUTPATIENT
Start: 2020-07-02 | End: 2021-06-14

## 2020-07-02 RX ADMIN — ONDANSETRON 4 MG: 2 INJECTION INTRAMUSCULAR; INTRAVENOUS at 00:36

## 2020-07-02 RX ADMIN — SODIUM CHLORIDE 1000 ML: 900 INJECTION, SOLUTION INTRAVENOUS at 00:36

## 2020-07-02 RX ADMIN — MORPHINE SULFATE 4 MG: 2 INJECTION, SOLUTION INTRAMUSCULAR; INTRAVENOUS at 00:36

## 2020-07-02 NOTE — PROGRESS NOTES
Date/Time:  7/2/2020 3:35 PM  Attempted to reach patient by telephone. Left HIPPA compliant message requesting a return call. Will attempt to reach patient again.

## 2020-07-02 NOTE — ED NOTES
Patient up for discharge. Discharge results have been reviewed with patient by the Provider. Discharge Medications discussed with patient to   include the medication, dose, frequency and purpose, last dose administered and when the next dose is due to be taken, and side effects. Armband removed and shredded per policy. Encouraged to voice any concerns, and all concerns addressed. Patient discharged in stable condition with no apparent distress. Current Discharge Medication List      START taking these medications    Details   HYDROcodone-acetaminophen (Norco) 5-325 mg per tablet Take 1 Tab by mouth every six (6) hours as needed for Pain for up to 2 days. Max Daily Amount: 4 Tabs. Qty: 8 Tab, Refills: 0    Associated Diagnoses: Kidney stone      !! ibuprofen (MOTRIN) 600 mg tablet Take 1 Tab by mouth every six (6) hours as needed for Pain. Qty: 20 Tab, Refills: 0       !! - Potential duplicate medications found. Please discuss with provider. CONTINUE these medications which have NOT CHANGED    Details   !! ibuprofen (MOTRIN) 200 mg tablet Take  by mouth. !! - Potential duplicate medications found. Please discuss with provider.

## 2020-07-02 NOTE — ED PROVIDER NOTES
EMERGENCY DEPARTMENT HISTORY AND PHYSICAL EXAM    12:20 AM  Date: 7/1/2020  Patient Name: Mynor Robins    History of Presenting Illness     Chief Complaint   Patient presents with    Abdominal Pain     right side        History Provided By: Patient    HPI: Mynor Robins is a 47 y.o. male with history of multiple medical problems as below. Patient is presenting with right upper quadrant lower chest pain after he slipped and fell landing on his right side about a week ago. Reports that it is difficult for him to breathe due to pain but he does not feel like he is running out of breath. No history of urinary symptoms. Pain does not feel like his prior episodes of kidney stones. No flank pain. No nausea or vomiting. Location:  Severity:  Timing/course:    Onset/Duration:     PCP: Berenice Praasd NP    Past History     Past Medical History:  Past Medical History:   Diagnosis Date    Arthritis of knee, right 12/2017    advanced degen changes noted, CT right LE    Asthma     Cellulitis 12/2017    DDD (degenerative disc disease), lumbar 2013    noted on MRI with annular tears    Diverticulitis 2016    GERD (gastroesophageal reflux disease) 2016    with esophagitis according to endscopy    Kidney stone     Lumbar post-laminectomy syndrome     Sacroiliitis ([address]     Stroke [facility]) may 14 2010    Unspecified rotator cuff tear or rupture of left shoulder, not specified as traumatic 03/2016       Past Surgical History:  Past Surgical History:   Procedure Laterality Date    COLONOSCOPY N/A 8/12/2016    [place] [place] [facility] [place]    L5-S1 fusion, laminectomies    HX ENDOSCOPY  10/2016    grade 1 espohagitis    HX ENDOSCOPY  11/2016    mild esophagitis    HX KNEE ARTHROSCOPY Right     knee    HX MOHS PROCEDURES      right    HX ORTHOPAEDIC      right shoulder       Family History:  Family History   Problem Relation Age of Onset    Other Brother     Cancer Maternal Grandmother     No Known Problems Mother     No Known Problems Father        Social History:  Social History     Tobacco Use    Smoking status: Former Smoker     Packs/day: 2.00     Types: Cigarettes     Last attempt to quit: 2016     Years since quittin.0    Smokeless tobacco: Never Used   Substance Use Topics    Alcohol use: No     Alcohol/week: 0.0 standard drinks    Drug use: No       Allergies: Allergies   Allergen Reactions    Penicillins Angioedema    Penicillins Anaphylaxis    Vicodin [Hydrocodone-Acetaminophen] Nausea and Vomiting    Azithromycin Nausea and Vomiting    Hydrochlorothiazide Nausea Only     Dizzy, lightheaded, blisters on his legs    Protonix [Pantoprazole] Itching and Contact Dermatitis     Patient denies allergy    Vicodin [Hydrocodone-Acetaminophen] Nausea and Vomiting    Erythromycin Other (comments)       Review of Systems   Review of Systems   Cardiovascular: Positive for chest pain. Gastrointestinal: Positive for abdominal pain. All other systems reviewed and are negative. Physical Exam     Patient Vitals for the past 12 hrs:   Temp Pulse Resp BP SpO2   20 2334 98.7 °F (37.1 °C) 62 16 132/84 96 %       Physical Exam  Vitals signs and nursing note reviewed. Constitutional:       Appearance: He is well-developed. HENT:      Head: Normocephalic and atraumatic. Eyes:      Extraocular Movements: Extraocular movements intact. Cardiovascular:      Rate and Rhythm: Normal rate. Pulmonary:      Effort: Pulmonary effort is normal. No respiratory distress. Chest:      Chest wall: Tenderness present. Abdominal:      Palpations: Abdomen is soft. Skin:     General: Skin is warm and dry. Neurological:      General: No focal deficit present. Mental Status: He is alert and oriented to person, place, and time.    Psychiatric:         Mood and Affect: Mood normal.         Behavior: Behavior normal.         Diagnostic Study Results     Labs -  Recent Results (from the past 12 hour(s))   CBC WITH AUTOMATED DIFF    Collection Time: 07/01/20 11:53 PM   Result Value Ref Range    WBC 7.4 4.6 - 13.2 K/uL    RBC 4.98 4.70 - 5.50 M/uL    HGB 14.8 13.0 - 16.0 g/dL    HCT 43.6 36.0 - 48.0 %    MCV 87.6 74.0 - 97.0 FL    MCH 29.7 24.0 - 34.0 PG    MCHC 33.9 31.0 - 37.0 g/dL    RDW 12.7 11.6 - 14.5 %    PLATELET 914 681 - 251 K/uL    MPV 9.5 9.2 - 11.8 FL    NEUTROPHILS 52 40 - 73 %    LYMPHOCYTES 28 21 - 52 %    MONOCYTES 14 (H) 3 - 10 %    EOSINOPHILS 5 0 - 5 %    BASOPHILS 1 0 - 2 %    ABS. NEUTROPHILS 3.9 1.8 - 8.0 K/UL    ABS. LYMPHOCYTES 2.0 0.9 - 3.6 K/UL    ABS. MONOCYTES 1.0 0.05 - 1.2 K/UL    ABS. EOSINOPHILS 0.4 0.0 - 0.4 K/UL    ABS. BASOPHILS 0.0 0.0 - 0.1 K/UL    DF AUTOMATED     METABOLIC PANEL, COMPREHENSIVE    Collection Time: 07/01/20 11:53 PM   Result Value Ref Range    Sodium 141 136 - 145 mmol/L    Potassium 3.7 3.5 - 5.5 mmol/L    Chloride 107 100 - 111 mmol/L    CO2 26 21 - 32 mmol/L    Anion gap 8 3.0 - 18 mmol/L    Glucose 123 (H) 74 - 99 mg/dL    BUN 20 (H) 7.0 - 18 MG/DL    Creatinine 0.90 0.6 - 1.3 MG/DL    BUN/Creatinine ratio 22 (H) 12 - 20      GFR est AA >60 >60 ml/min/1.73m2    GFR est non-AA >60 >60 ml/min/1.73m2    Calcium 8.3 (L) 8.5 - 10.1 MG/DL    Bilirubin, total 0.4 0.2 - 1.0 MG/DL    ALT (SGPT) 19 16 - 61 U/L    AST (SGOT) 11 10 - 38 U/L    Alk.  phosphatase 74 45 - 117 U/L    Protein, total 6.6 6.4 - 8.2 g/dL    Albumin 3.7 3.4 - 5.0 g/dL    Globulin 2.9 2.0 - 4.0 g/dL    A-G Ratio 1.3 0.8 - 1.7     LIPASE    Collection Time: 07/01/20 11:53 PM   Result Value Ref Range    Lipase 134 73 - 393 U/L   URINALYSIS W/ RFLX MICROSCOPIC    Collection Time: 07/01/20 11:53 PM   Result Value Ref Range    Color YELLOW      Appearance CLEAR      Specific gravity 1.021 1.005 - 1.030      pH (UA) 6.5 5.0 - 8.0      Protein Negative NEG mg/dL    Glucose Negative NEG mg/dL    Ketone Negative NEG mg/dL    Bilirubin Negative NEG      Blood TRACE (A) NEG      Urobilinogen 1.0 0.2 - 1.0 EU/dL    Nitrites Negative NEG      Leukocyte Esterase Negative NEG     URINE MICROSCOPIC ONLY    Collection Time: 07/01/20 11:53 PM   Result Value Ref Range    WBC 0 to 3 0 - 4 /hpf    RBC 0 to 3 0 - 5 /hpf    Epithelial cells Negative 0 - 5 /lpf    Bacteria 1+ (A) NEG /hpf    Amorphous Crystals 2+ (A) NEG   POC LACTIC ACID    Collection Time: 07/01/20 11:54 PM   Result Value Ref Range    Lactic Acid (POC) 0.90 0.40 - 2.00 mmol/L       Radiologic Studies -   Ct Abd Pelv Wo Cont    Result Date: 7/2/2020  IMPRESSION: 1. Bilateral renal calculi with no obstruction or hydronephrosis. No hydroureter. 2. No acute abnormalities        Medical Decision Making     ED Course: Progress Notes, Reevaluation, and Consults:    12:20 AM Initial assessment performed. The patients presenting problems have been discussed, and they/their family are in agreement with the care plan formulated and outlined with them. I have encouraged them to ask questions as they arise throughout their visit. Provider Notes (Medical Decision Making): 42-year-old male presenting with atraumatic pain to the right rib cage/upper right abdomen. He is well-appearing on exam with tenderness over his rib cage area. Abdomen is benign otherwise. Will get screening labs and rib series. If chest x-ray did not reveal obvious rib fractures then he might need a CT scan to evaluate for kidney stone versus other traumatic injury. Patient feels better. Results discussed with them. I still think this could be more related to muscle contusion rather than renal colic given the tenderness. This was also discussed with the patient and he feels comfortable with the plan of outpatient follow-up with urology. He has a urologist that he sees every 6 months and said that he will call to make an appointment.     Procedures:     Critical Care Time:     Vital Signs-Reviewed the patient's vital signs. Reviewed pt's pulse ox reading. EKG: Interpreted by the EP. Time Interpreted:    Rate:    Rhythm:    Interpretation:   Comparison:     Records Reviewed: Nursing Notes (Time of Review: 12:20 AM)  -I am the first provider for this patient.  -I reviewed the vital signs, available nursing notes, past medical history, past surgical history, family history and social history. Current Outpatient Medications   Medication Sig Dispense Refill    HYDROcodone-acetaminophen (Norco) 5-325 mg per tablet Take 1 Tab by mouth every six (6) hours as needed for Pain for up to 2 days. Max Daily Amount: 4 Tabs. 8 Tab 0    ibuprofen (MOTRIN) 600 mg tablet Take 1 Tab by mouth every six (6) hours as needed for Pain. 20 Tab 0    loratadine (Claritin) 10 mg tablet Take 1 Tab by mouth daily. 90 Tab 3    pseudoephedrine (SUDAFED) 60 mg tablet Take 1 Tab by mouth every six (6) hours as needed for Congestion. 12 Tab 0    gabapentin (NEURONTIN) 800 mg tablet Take 1 Tab by mouth three (3) times daily. Max Daily Amount: 2,400 mg. 90 Tab 5    famotidine (PEPCID) 40 mg tablet Take 1 Tab by mouth two (2) times daily as needed (indigestion/acid reflux). 90 Tab 0    ondansetron (ZOFRAN ODT) 4 mg disintegrating tablet Take 1 Tab by mouth every eight (8) hours as needed for Nausea. 20 Tab 0    omeprazole (PRILOSEC) 40 mg capsule Take 1 Cap by mouth daily. 90 Cap 1    gabapentin (NEURONTIN) 800 mg tablet Take 1 Tab by mouth three (3) times daily (with meals). Max Daily Amount: 2,400 mg. Indications: Neuropathic Pain 270 Tab 0    cyclobenzaprine (FLEXERIL) 10 mg tablet Take 1 Tab by mouth three (3) times daily as needed for Muscle Spasm(s). 90 Tab 6    budesonide-formoterol (SYMBICORT) 160-4.5 mcg/actuation HFAA Take 1 Puff by inhalation two (2) times a day. 1 Inhaler 6    aspirin 81 mg chewable tablet Take 81 mg by mouth daily.  ibuprofen (MOTRIN) 200 mg tablet Take  by mouth.       acetaminophen (TYLENOL) 500 mg tablet Take  by mouth every six (6) hours as needed for Pain.  albuterol (PROVENTIL HFA, VENTOLIN HFA, PROAIR HFA) 90 mcg/actuation inhaler Take 2 Puffs by inhalation every six (6) hours as needed for Wheezing or Shortness of Breath. 1 Inhaler 11    albuterol (PROVENTIL VENTOLIN) 2.5 mg /3 mL (0.083 %) nebulizer solution 3 mL by Nebulization route once for 1 dose. 1 Package 0        Clinical Impression     Clinical Impression:   1. Kidney stone        Disposition: DC      DISCHARGE NOTE:     Pt has been reexamined. Patient has no new complaints, changes, or physical findings. Care plan outlined and precautions discussed. Results of labs and imaging were reviewed with the patient. All medications were reviewed with the patient; will d/c home with pain meds. All of pt's questions and concerns were addressed. Patient was instructed and agrees to follow up with PCP and urology, as well as to return to the ED upon further deterioration. Patient is ready to go home. This note was dictated utilizing voice recognition software which may lead to typographical errors. I apologize in advance if the situation occurs. If questions arise please do not hesitate to contact me or call our department.     Erick Gonzalez MD  12:20 AM

## 2020-07-02 NOTE — DISCHARGE INSTRUCTIONS
Patient Education        Kidney Stone: Care Instructions  Your Care Instructions     Kidney stones are formed when salts, minerals, and other substances normally found in the urine clump together. They can be as small as grains of sand or, rarely, as large as golf balls. While the stone is traveling through the ureter, which is the tube that carries urine from the kidney to the bladder, you will probably feel pain. The pain may be mild or very severe. You may also have some blood in your urine. As soon as the stone reaches the bladder, any intense pain should go away. If a stone is too large to pass on its own, you may need a medical procedure to help you pass the stone. The doctor has checked you carefully, but problems can develop later. If you notice any problems or new symptoms, get medical treatment right away. Follow-up care is a key part of your treatment and safety. Be sure to make and go to all appointments, and call your doctor if you are having problems. It's also a good idea to know your test results and keep a list of the medicines you take. How can you care for yourself at home? · Drink plenty of fluids, enough so that your urine is light yellow or clear like water. If you have kidney, heart, or liver disease and have to limit fluids, talk with your doctor before you increase the amount of fluids you drink. · Take pain medicines exactly as directed. Call your doctor if you think you are having a problem with your medicine. ? If the doctor gave you a prescription medicine for pain, take it as prescribed. ? If you are not taking a prescription pain medicine, ask your doctor if you can take an over-the-counter medicine. Read and follow all instructions on the label. · Your doctor may ask you to strain your urine so that you can collect your kidney stone when it passes. You can use a kitchen strainer or a tea strainer to catch the stone.  Store it in a plastic bag until you see your doctor again.  Preventing future kidney stones  Some changes in your diet may help prevent kidney stones. Depending on the cause of your stones, your doctor may recommend that you:  · Drink plenty of fluids, enough so that your urine is light yellow or clear like water. If you have kidney, heart, or liver disease and have to limit fluids, talk with your doctor before you increase the amount of fluids you drink. · Limit coffee, tea, and alcohol. Also avoid grapefruit juice. · Do not take more than the recommended daily dose of vitamins C and D.  · Avoid antacids such as Gaviscon, Maalox, Mylanta, or Tums. · Limit the amount of salt (sodium) in your diet. · Eat a balanced diet that is not too high in protein. · Limit foods that are high in a substance called oxalate, which can cause kidney stones. These foods include dark green vegetables, rhubarb, chocolate, wheat bran, nuts, cranberries, and beans. When should you call for help? Call your doctor now or seek immediate medical care if:  · You cannot keep down fluids. · Your pain gets worse. · You have a fever or chills. · You have new or worse pain in your back just below your rib cage (the flank area). · You have new or more blood in your urine. Watch closely for changes in your health, and be sure to contact your doctor if:  · You do not get better as expected. Where can you learn more? Go to http://www.gray.com/  Enter P398 in the search box to learn more about \"Kidney Stone: Care Instructions. \"  Current as of: August 12, 2019               Content Version: 12.5  © 3021-4741 [employer]. Care instructions adapted under license by [employer] (which disclaims liability or warranty for this information). If you have questions about a medical condition or this instruction, always ask your healthcare professional. [address] any warranty or liability for your use of this information.
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
## 2020-09-04 ENCOUNTER — OFFICE VISIT (OUTPATIENT)
Dept: ORTHOPEDIC SURGERY | Age: 55
End: 2020-09-04

## 2020-09-04 VITALS
DIASTOLIC BLOOD PRESSURE: 76 MMHG | OXYGEN SATURATION: 98 % | BODY MASS INDEX: 24.85 KG/M2 | SYSTOLIC BLOOD PRESSURE: 112 MMHG | TEMPERATURE: 97.3 F | HEIGHT: 70 IN | WEIGHT: 173.6 LBS | HEART RATE: 68 BPM | RESPIRATION RATE: 20 BRPM

## 2020-09-04 DIAGNOSIS — M54.16 LUMBAR RADICULOPATHY: Primary | ICD-10-CM

## 2020-09-04 DIAGNOSIS — M96.1 LUMBAR POSTLAMINECTOMY SYNDROME: ICD-10-CM

## 2020-09-04 DIAGNOSIS — M51.36 DEGENERATIVE DISC DISEASE, LUMBAR: ICD-10-CM

## 2020-09-04 RX ORDER — GABAPENTIN 800 MG/1
800 TABLET ORAL 3 TIMES DAILY
Qty: 270 TAB | Refills: 1 | Status: SHIPPED | OUTPATIENT
Start: 2020-09-04 | End: 2021-03-23 | Stop reason: SDUPTHER

## 2020-09-04 NOTE — LETTER
9/4/20 Patient: Jewell Marinelli YOB: 1965 Date of Visit: 9/4/2020 [address] [id number] [place] [address] VIA In Basket Dear Blank Altamirano NP, Thank you for referring Mr. Javier Mina to [address] for evaluation. My notes for this consultation are attached. If you have questions, please do not hesitate to call me. I look forward to following your patient along with you. Sincerely, Niki Du MD
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
## 2021-03-23 ENCOUNTER — OFFICE VISIT (OUTPATIENT)
Dept: ORTHOPEDIC SURGERY | Age: 56
End: 2021-03-23
Payer: MEDICARE

## 2021-03-23 VITALS
HEART RATE: 74 BPM | HEIGHT: 66 IN | TEMPERATURE: 97.6 F | SYSTOLIC BLOOD PRESSURE: 118 MMHG | DIASTOLIC BLOOD PRESSURE: 79 MMHG | BODY MASS INDEX: 27.64 KG/M2 | RESPIRATION RATE: 18 BRPM | WEIGHT: 172 LBS | OXYGEN SATURATION: 99 %

## 2021-03-23 DIAGNOSIS — M54.16 LUMBAR RADICULOPATHY: Primary | ICD-10-CM

## 2021-03-23 DIAGNOSIS — M96.1 LUMBAR POSTLAMINECTOMY SYNDROME: ICD-10-CM

## 2021-03-23 DIAGNOSIS — M51.36 DEGENERATIVE DISC DISEASE, LUMBAR: ICD-10-CM

## 2021-03-23 PROCEDURE — 3017F COLORECTAL CA SCREEN DOC REV: CPT | Performed by: PHYSICAL MEDICINE & REHABILITATION

## 2021-03-23 PROCEDURE — G8427 DOCREV CUR MEDS BY ELIG CLIN: HCPCS | Performed by: PHYSICAL MEDICINE & REHABILITATION

## 2021-03-23 PROCEDURE — G8419 CALC BMI OUT NRM PARAM NOF/U: HCPCS | Performed by: PHYSICAL MEDICINE & REHABILITATION

## 2021-03-23 PROCEDURE — G8432 DEP SCR NOT DOC, RNG: HCPCS | Performed by: PHYSICAL MEDICINE & REHABILITATION

## 2021-03-23 PROCEDURE — 99213 OFFICE O/P EST LOW 20 MIN: CPT | Performed by: PHYSICAL MEDICINE & REHABILITATION

## 2021-03-23 RX ORDER — GABAPENTIN 800 MG/1
800 TABLET ORAL 3 TIMES DAILY
Qty: 90 TAB | Refills: 5 | Status: SHIPPED | OUTPATIENT
Start: 2021-03-23 | End: 2021-05-25 | Stop reason: SDUPTHER

## 2021-03-23 NOTE — LETTER
3/23/2021 Patient: Beatriz Valdez YOB: 1965 Date of Visit: 3/23/2021 Kristin Akhtar NP 
[facility] [address] [address] [address] Via Fax: [phone] Dear Kristin Akhtar NP, Thank you for referring Mr. Cat Campos to [address] for evaluation. My notes for this consultation are attached. If you have questions, please do not hesitate to call me. I look forward to following your patient along with you. Sincerely, Devi Cedillo MD

## 2021-05-25 ENCOUNTER — TELEPHONE (OUTPATIENT)
Dept: FAMILY MEDICINE CLINIC | Age: 56
End: 2021-05-25

## 2021-05-25 ENCOUNTER — VIRTUAL VISIT (OUTPATIENT)
Dept: FAMILY MEDICINE CLINIC | Age: 56
End: 2021-05-25
Payer: MEDICARE

## 2021-05-25 DIAGNOSIS — M51.36 DEGENERATIVE DISC DISEASE, LUMBAR: ICD-10-CM

## 2021-05-25 DIAGNOSIS — Z71.89 ACP (ADVANCE CARE PLANNING): ICD-10-CM

## 2021-05-25 DIAGNOSIS — R11.0 NAUSEA: ICD-10-CM

## 2021-05-25 DIAGNOSIS — Z98.1 S/P SPINAL FUSION: ICD-10-CM

## 2021-05-25 DIAGNOSIS — E11.9 TYPE 2 DIABETES MELLITUS WITHOUT COMPLICATION, WITHOUT LONG-TERM CURRENT USE OF INSULIN (HCC): ICD-10-CM

## 2021-05-25 DIAGNOSIS — Z13.31 SCREENING FOR DEPRESSION: ICD-10-CM

## 2021-05-25 DIAGNOSIS — Z13.29 SCREENING FOR ENDOCRINE, METABOLIC AND IMMUNITY DISORDER: ICD-10-CM

## 2021-05-25 DIAGNOSIS — Z00.00 MEDICARE ANNUAL WELLNESS VISIT, SUBSEQUENT: ICD-10-CM

## 2021-05-25 DIAGNOSIS — Z13.0 SCREENING FOR ENDOCRINE, METABOLIC AND IMMUNITY DISORDER: ICD-10-CM

## 2021-05-25 DIAGNOSIS — J45.40 MODERATE PERSISTENT ASTHMA WITHOUT COMPLICATION: ICD-10-CM

## 2021-05-25 DIAGNOSIS — M96.1 POSTLAMINECTOMY SYNDROME OF LUMBAR REGION: ICD-10-CM

## 2021-05-25 DIAGNOSIS — Z13.228 SCREENING FOR ENDOCRINE, METABOLIC AND IMMUNITY DISORDER: ICD-10-CM

## 2021-05-25 DIAGNOSIS — M51.26 LUMBAR HERNIATED DISC: ICD-10-CM

## 2021-05-25 DIAGNOSIS — K21.00 GASTROESOPHAGEAL REFLUX DISEASE WITH ESOPHAGITIS: ICD-10-CM

## 2021-05-25 DIAGNOSIS — Z13.6 SCREENING FOR CARDIOVASCULAR CONDITION: ICD-10-CM

## 2021-05-25 DIAGNOSIS — Z12.5 SCREENING FOR PROSTATE CANCER: Primary | ICD-10-CM

## 2021-05-25 PROCEDURE — G8427 DOCREV CUR MEDS BY ELIG CLIN: HCPCS | Performed by: NURSE PRACTITIONER

## 2021-05-25 PROCEDURE — G8432 DEP SCR NOT DOC, RNG: HCPCS | Performed by: NURSE PRACTITIONER

## 2021-05-25 PROCEDURE — 2022F DILAT RTA XM EVC RTNOPTHY: CPT | Performed by: NURSE PRACTITIONER

## 2021-05-25 PROCEDURE — 99213 OFFICE O/P EST LOW 20 MIN: CPT | Performed by: NURSE PRACTITIONER

## 2021-05-25 PROCEDURE — 3046F HEMOGLOBIN A1C LEVEL >9.0%: CPT | Performed by: NURSE PRACTITIONER

## 2021-05-25 PROCEDURE — G8419 CALC BMI OUT NRM PARAM NOF/U: HCPCS | Performed by: NURSE PRACTITIONER

## 2021-05-25 PROCEDURE — 3017F COLORECTAL CA SCREEN DOC REV: CPT | Performed by: NURSE PRACTITIONER

## 2021-05-25 PROCEDURE — G0439 PPPS, SUBSEQ VISIT: HCPCS | Performed by: NURSE PRACTITIONER

## 2021-05-25 RX ORDER — CYCLOBENZAPRINE HCL 10 MG
10 TABLET ORAL
Qty: 90 TABLET | Refills: 6 | Status: SHIPPED | OUTPATIENT
Start: 2021-05-25 | End: 2021-12-17 | Stop reason: SDUPTHER

## 2021-05-25 RX ORDER — ONDANSETRON 4 MG/1
4 TABLET, ORALLY DISINTEGRATING ORAL
Qty: 20 TABLET | Refills: 0 | Status: SHIPPED | OUTPATIENT
Start: 2021-05-25 | End: 2021-06-14

## 2021-05-25 RX ORDER — ALBUTEROL SULFATE 90 UG/1
2 AEROSOL, METERED RESPIRATORY (INHALATION)
Qty: 1 INHALER | Refills: 11 | Status: SHIPPED | OUTPATIENT
Start: 2021-05-25

## 2021-05-25 RX ORDER — BUDESONIDE AND FORMOTEROL FUMARATE DIHYDRATE 160; 4.5 UG/1; UG/1
1 AEROSOL RESPIRATORY (INHALATION) 2 TIMES DAILY
Qty: 1 INHALER | Refills: 6 | Status: SHIPPED | OUTPATIENT
Start: 2021-05-25

## 2021-05-25 NOTE — ACP (ADVANCE CARE PLANNING)
Advance Care Planning     Advance Care Planning (ACP) Physician/NP/PA Conversation      Date of Conversation: 5/25/2021  Conducted with: Patient with Decision Making Capacity    Healthcare Decision Maker:     Click here to complete [employer] including [address] Relationship (ie \"Primary\")  Today we documented Decision Maker(s) consistent with ACP documents on file. Care Preferences:    Hospitalization: \"If your health worsens and it becomes clear that your chance of recovery is unlikely, what would be your preference regarding hospitalization? \"  The patient would prefer hospitalization. Ventilation: \"If you were unable to breathe on your own and your chance of recovery was unlikely, what would be your preference about the use of a ventilator (breathing machine) if it was available to you? \"   The patient would desire the use of a ventilator. Resuscitation: \"In the event your heart stopped as a result of an underlying serious health condition, would you want attempts to be made to restart your heart, or would you prefer a natural death? \"   Yes, attempt to resuscitate.     Additional topics discussed: treatment goals    Conversation Outcomes / Follow-Up Plan:   ACP complete - no further action today  Reviewed DNR/DNI and patient elects Full Code (Attempt Resuscitation)     Length of Voluntary ACP Conversation in minutes:  <16 minutes (Non-Billable)    Stew Fonseca NP

## 2021-05-25 NOTE — PROGRESS NOTES
This is the Subsequent Medicare Annual Wellness Exam, performed 12 months or more after the Initial AWV or the last Subsequent AWV    I have reviewed the patient's medical history in detail and updated the computerized patient record. Assessment/Plan   Education and counseling provided:  Are appropriate based on today's review and evaluation    1. Medicare annual wellness visit, subsequent  2. Screening for depression  3. ACP (advance care planning)      Depression Risk Factor Screening     3 most recent PHQ Screens 5/25/2021   PHQ Not Done -   Little interest or pleasure in doing things Not at all   Feeling down, depressed, irritable, or hopeless Not at all   Total Score PHQ 2 0   Trouble falling or staying asleep, or sleeping too much Not at all   Feeling tired or having little energy Not at all   Poor appetite, weight loss, or overeating Not at all   Feeling bad about yourself - or that you are a failure or have let yourself or your family down Not at all   Trouble concentrating on things such as school, work, reading, or watching TV Not at all   Moving or speaking so slowly that other people could have noticed; or the opposite being so fidgety that others notice Not at all   Thoughts of being better off dead, or hurting yourself in some way Not at all   PHQ 9 Score 0   How difficult have these problems made it for you to do your work, take care of your home and get along with others Not difficult at all       Alcohol Risk Screen    Do you average more than 2 drinks per night or 14 drinks a week: No    On any one occasion in the past three months have you have had more than 4 drinks containing alcohol:  No        Functional Ability and Level of Safety    Hearing: Hearing is good. Activities of Daily Living: The home contains: no safety equipment. Patient does total self care      Ambulation: with mild difficulty     Fall Risk:  Fall Risk Assessment, last 12 mths 5/25/2021   Able to walk?  Yes   Fall in past 12 months? 0   Do you feel unsteady? 1   Are you worried about falling 0   Is TUG test greater than 12 seconds?  0   Is the gait abnormal? 0      Abuse Screen:  Patient is not abused       Cognitive Screening    Has your family/caregiver stated any concerns about your memory: no     Cognitive Screening: Normal - MMSE (Mini Mental Status Exam)    Health Maintenance Due     Health Maintenance Due   Topic Date Due    COVID-19 Vaccine (1) Never done    DTaP/Tdap/Td series (1 - Tdap) 07/18/1986    Shingrix Vaccine Age 50> (1 of 2) Never done    Prostate-Specific Antigen  02/18/2020    Medicare Yearly Exam  02/13/2021       Patient Care Team   Patient Care Team:  Tao Hyman NP as PCP - General (Nurse Practitioner)  Tao Hyman NP as PCP - [facility]  Meche Valencia MD (Orthopedic Surgery)  Flora Sanchez MD (Physiatry)  Chrissie Manning MD (Gastroenterology)  Shanita Leone MD (Urology)  Ilene Tellez MD (Surgery)    History     Patient Active Problem List   Diagnosis Code    Degenerative disc disease, lumbar M51.36    S/P spinal fusion Z98.1    History of CVA (cerebrovascular accident) Z86.73    Lumbar herniated disc M51.26    Gastroesophageal reflux disease with esophagitis K21.00    Moderate persistent asthma without complication J50.43    Postlaminectomy syndrome of lumbar region M96.1    Other intervertebral disc degeneration, lumbar region M51.36    Primary osteoarthritis of right knee M17.11    HNP (herniated nucleus pulposus), lumbar M51.26    Lumbar radiculopathy M54.16     Past Medical History:   Diagnosis Date    Arthritis of knee, right 12/2017    advanced degen changes noted, CT right LE    Asthma     Cellulitis 12/2017    DDD (degenerative disc disease), lumbar 2013    noted on MRI with annular tears    Diverticulitis 2016    GERD (gastroesophageal reflux disease) 2016    with esophagitis according to endscopy    Kidney stone  Lumbar post-laminectomy syndrome     Sacroiliitis ([facility] [address]     Stroke [facility]) may 14 2010    Unspecified rotator cuff tear or rupture of left shoulder, not specified as traumatic 03/2016      Past Surgical History:   Procedure Laterality Date    COLONOSCOPY N/A 8/12/2016    COLONOSCOPY with polypectomy performed by Grace Kamara MD at [address] [place] [address]  2002, 2004, 2006    L5-S1 fusion, laminectomies    HX ENDOSCOPY  10/2016    grade 1 espohagitis    HX ENDOSCOPY  11/2016    mild esophagitis    HX KNEE ARTHROSCOPY Right     knee    HX MOHS PROCEDURES      right    HX ORTHOPAEDIC      right shoulder     Current Outpatient Medications   Medication Sig Dispense Refill    ibuprofen (MOTRIN) 600 mg tablet Take 1 Tab by mouth every six (6) hours as needed for Pain. 20 Tab 0    loratadine (Claritin) 10 mg tablet Take 1 Tab by mouth daily. 90 Tab 3    pseudoephedrine (SUDAFED) 60 mg tablet Take 1 Tab by mouth every six (6) hours as needed for Congestion. 12 Tab 0    famotidine (PEPCID) 40 mg tablet Take 1 Tab by mouth two (2) times daily as needed (indigestion/acid reflux). 90 Tab 0    gabapentin (NEURONTIN) 800 mg tablet Take 1 Tab by mouth three (3) times daily (with meals). Max Daily Amount: 2,400 mg. Indications: Neuropathic Pain 270 Tab 0    cyclobenzaprine (FLEXERIL) 10 mg tablet Take 1 Tab by mouth three (3) times daily as needed for Muscle Spasm(s). 90 Tab 6    budesonide-formoterol (SYMBICORT) 160-4.5 mcg/actuation HFAA Take 1 Puff by inhalation two (2) times a day. 1 Inhaler 6    aspirin 81 mg chewable tablet Take 81 mg by mouth daily.  acetaminophen (TYLENOL) 500 mg tablet Take  by mouth every six (6) hours as needed for Pain.  albuterol (PROVENTIL HFA, VENTOLIN HFA, PROAIR HFA) 90 mcg/actuation inhaler Take 2 Puffs by inhalation every six (6) hours as needed for Wheezing or Shortness of Breath.  1 Inhaler 11    ondansetron (ZOFRAN ODT) 4 mg disintegrating tablet Take 1 Tab by mouth every eight (8) hours as needed for Nausea. (Patient not taking: Reported on 2021) 20 Tab 0    albuterol (PROVENTIL VENTOLIN) 2.5 mg /3 mL (0.083 %) nebulizer solution 3 mL by Nebulization route once for 1 dose.  1 Package 0     Allergies   Allergen Reactions    Penicillins Angioedema    Penicillins Anaphylaxis    Vicodin [Hydrocodone-Acetaminophen] Nausea and Vomiting    Azithromycin Nausea and Vomiting    Hydrochlorothiazide Nausea Only     Dizzy, lightheaded, blisters on his legs    Protonix [Pantoprazole] Itching and Contact Dermatitis     Patient denies allergy    Vicodin [Hydrocodone-Acetaminophen] Nausea and Vomiting    Erythromycin Other (comments)       Family History   Problem Relation Age of Onset    Other Brother     Cancer Maternal Grandmother     No Known Problems Mother     No Known Problems Father      Social History     Tobacco Use    Smoking status: Former Smoker     Packs/day: 2.00     Types: Cigarettes     Quit date: 2016     Years since quittin.9    Smokeless tobacco: Never Used   Substance Use Topics    Alcohol use: No     Alcohol/week: 0.0 standard drinks         Darius Liriano

## 2021-05-25 NOTE — PROGRESS NOTES
Marco Frarar presents today for   Chief Complaint   Patient presents with    Annual Wellness Visit    Blood sugar problem       Is someone accompanying this pt? yes    Is the patient using any DME equipment during [address]? no    Depression Screening:  3 most recent PHQ Screens 5/25/2021   PHQ Not Done -   Little interest or pleasure in doing things Not at all   Feeling down, depressed, irritable, or hopeless Not at all   Total Score PHQ 2 0   Trouble falling or staying asleep, or sleeping too much Not at all   Feeling tired or having little energy Not at all   Poor appetite, weight loss, or overeating Not at all   Feeling bad about yourself - or that you are a failure or have let yourself or your family down Not at all   Trouble concentrating on things such as school, work, reading, or watching TV Not at all   Moving or speaking so slowly that other people could have noticed; or the opposite being so fidgety that others notice Not at all   Thoughts of being better off dead, or hurting yourself in some way Not at all   PHQ 9 Score 0   How difficult have these problems made it for you to do your work, take care of your home and get along with others Not difficult at all       Learning Assessment:  Learning Assessment 10/21/2019   PRIMARY LEARNER Patient   HIGHEST LEVEL OF EDUCATION - PRIMARY LEARNER  -   BARRIERS PRIMARY LEARNER [address] CAREGIVER -   PRIMARY LANGUAGE ENGLISH   LEARNER PREFERENCE PRIMARY DEMONSTRATION   ANSWERED BY self   RELATIONSHIP SELF       Abuse Screening:  Abuse Screening Questionnaire 5/25/2021   Do you ever feel afraid of your partner? N   Are you in a relationship with someone who physically or mentally threatens you? N   Is it safe for you to go home? Y       Fall Risk  Fall Risk Assessment, last 12 mths 5/25/2021   Able to walk? Yes   Fall in past 12 months? 0   Do you feel unsteady? 1   Are you worried about falling 0   Is TUG test greater than 12 seconds?  0   Is the gait abnormal? 0       Health Maintenance reviewed and discussed and ordered per Provider. Health Maintenance Due   Topic Date Due    COVID-19 Vaccine (1) Never done    DTaP/Tdap/Td series (1 - Tdap) 07/18/1986    Shingrix Vaccine Age 50> (1 of 2) Never done    Prostate-Specific Antigen  02/18/2020    Medicare Yearly Exam  02/13/2021   . Coordination of Care:  1. Have you been to the ER, urgent care clinic since your last visit? Hospitalized since your last visit? unknown    2. Have you seen or consulted any other health care providers outside of the [address] [place] [address] since your last visit? Include any pap smears or colon screening.  no

## 2021-05-25 NOTE — PROGRESS NOTES
Mariam Bean is a 54 y.o. male who was seen by synchronous (real-time) audio-video technology on 5/25/2021 for Annual Wellness Visit and Blood sugar problem        Assessment & Plan:   Diagnoses and all orders for this visit:    1. Gastroesophageal reflux disease with esophagitis  -     ondansetron (ZOFRAN ODT) 4 mg disintegrating tablet; Take 1 Tablet by mouth every eight (8) hours as needed for Nausea. 2. Nausea  -     ondansetron (ZOFRAN ODT) 4 mg disintegrating tablet; Take 1 Tablet by mouth every eight (8) hours as needed for Nausea. 3. Degenerative disc disease, lumbar  -     cyclobenzaprine (FLEXERIL) 10 mg tablet; Take 1 Tablet by mouth three (3) times daily as needed for Muscle Spasm(s). 4. S/P spinal fusion  -     cyclobenzaprine (FLEXERIL) 10 mg tablet; Take 1 Tablet by mouth three (3) times daily as needed for Muscle Spasm(s). 5. Lumbar herniated disc  -     cyclobenzaprine (FLEXERIL) 10 mg tablet; Take 1 Tablet by mouth three (3) times daily as needed for Muscle Spasm(s). 6. Postlaminectomy syndrome of lumbar region  -     cyclobenzaprine (FLEXERIL) 10 mg tablet; Take 1 Tablet by mouth three (3) times daily as needed for Muscle Spasm(s). 7. Moderate persistent asthma without complication  -     albuterol (PROVENTIL HFA, VENTOLIN HFA, PROAIR HFA) 90 mcg/actuation inhaler; Take 2 Puffs by inhalation every six (6) hours as needed for Wheezing or Shortness of Breath. 8. Type 2 diabetes mellitus without complication, without long-term current use of insulin ([address]    9. Screening for endocrine, metabolic and immunity disorder    10. Screening for cardiovascular condition        I spent at least 20 minutes on this visit with this established patient. 712  Subjective:   Patient is getting establish with me today. He was previous patient of Laureen Renetta. 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 Asthma  Patient using symbicort daily as needed.   Advised patient according to order he should be using twice daily. He will start taking per order. Patient states he is doing well on this medication. Denies side effects. CVA  Had in 2010. No residual per patient. He takes a daily 81 mg aspirin. Patient is doing well overall. GERD w/esophagitis  Patient is currently taking Famotidine. He is doing well on this medication and denies any side effects. Takes as prescribe. He states he rarely have any flare ups. Lumbar Postlaminectomy Syndrome/Chronic pain syndrome  He takes gabapentin 800 mg TID that is managed by Dr. Yarely Barron. Last visit was March 2021. Patient denies any side effects and take medication as prescribed. Hypoglycemic episodes  Patient states two weeks ago his blood sugar was 47. He have never been diagnosed with diabetes but have had these episodes a few years back. Patient does not take any medications related to diabetes. When he have these episodes he experience symptoms such as fatigue, cold sweats and slurring of speech. He have glucose tabs that he take. He states he never knows when this will happen and it is rare. Advised to take blood sugar once daily and have on next visit. Medications refill placed today  Lab work ordered today - Advise to have completed by next visit. Request a follow up in 4 weeks      Prior to Admission medications    Medication Sig Start Date End Date Taking? Authorizing Provider   ibuprofen (MOTRIN) 600 mg tablet Take 1 Tab by mouth every six (6) hours as needed for Pain. 7/2/20  Yes Erick Clemente MD   loratadine (Claritin) 10 mg tablet Take 1 Tab by mouth daily. 3/24/20  Yes Jonelle Floyd NP   pseudoephedrine (SUDAFED) 60 mg tablet Take 1 Tab by mouth every six (6) hours as needed for Congestion. 3/24/20  Yes Jonelle Floyd NP   famotidine (PEPCID) 40 mg tablet Take 1 Tab by mouth two (2) times daily as needed (indigestion/acid reflux).  2/13/20  Yes Jonelle Floyd NP   gabapentin (NEURONTIN) 800 mg tablet Take 1 Tab by mouth three (3) times daily (with meals). Max Daily Amount: 2,400 mg. Indications: Neuropathic Pain 11/18/19  Yes Catrachito Andres MD   cyclobenzaprine (FLEXERIL) 10 mg tablet Take 1 Tab by mouth three (3) times daily as needed for Muscle Spasm(s). 8/8/19  Yes Emilia Mariscal NP   budesonide-formoterol (SYMBICORT) 160-4.5 mcg/actuation HFAA Take 1 Puff by inhalation two (2) times a day. 8/8/19  Yes Gerri MARTIN NP   aspirin 81 mg chewable tablet Take 81 mg by mouth daily. Yes Provider, Historical   acetaminophen (TYLENOL) 500 mg tablet Take  by mouth every six (6) hours as needed for Pain. Yes Provider, Historical   albuterol (PROVENTIL HFA, VENTOLIN HFA, PROAIR HFA) 90 mcg/actuation inhaler Take 2 Puffs by inhalation every six (6) hours as needed for Wheezing or Shortness of Breath. 2/8/19  Yes Gerri MARTIN NP   gabapentin (Neurontin) 800 mg tablet Take 1 Tab by mouth three (3) times daily. Max Daily Amount: 2,400 mg. Patient not taking: Reported on 5/25/2021 3/23/21   Catrachito Andres MD   ondansetron (ZOFRAN ODT) 4 mg disintegrating tablet Take 1 Tab by mouth every eight (8) hours as needed for Nausea. Patient not taking: Reported on 5/25/2021 2/13/20   Sumaya Parrish NP   omeprazole (PRILOSEC) 40 mg capsule Take 1 Cap by mouth daily. Patient not taking: Reported on 5/25/2021 2/13/20   Sumaya Parrish NP   ibuprofen (MOTRIN) 200 mg tablet Take  by mouth. Patient not taking: Reported on 5/25/2021    Provider, Historical   albuterol (PROVENTIL VENTOLIN) 2.5 mg /3 mL (0.083 %) nebulizer solution 3 mL by Nebulization route once for 1 dose.  1/13/15 2/8/20  Faviola Guevara NP     Patient Active Problem List   Diagnosis Code    Degenerative disc disease, lumbar M51.36    S/P spinal fusion Z98.1    History of CVA (cerebrovascular accident) Z86.73    Lumbar herniated disc M51.26    Gastroesophageal reflux disease with esophagitis K21.00    Moderate persistent asthma without complication K94.20    Postlaminectomy syndrome of lumbar region M96.1    Other intervertebral disc degeneration, lumbar region M51.36    Primary osteoarthritis of right knee M17.11    HNP (herniated nucleus pulposus), lumbar M51.26    Lumbar radiculopathy M54.16     Current Outpatient Medications   Medication Sig Dispense Refill    ibuprofen (MOTRIN) 600 mg tablet Take 1 Tab by mouth every six (6) hours as needed for Pain. 20 Tab 0    loratadine (Claritin) 10 mg tablet Take 1 Tab by mouth daily. 90 Tab 3    pseudoephedrine (SUDAFED) 60 mg tablet Take 1 Tab by mouth every six (6) hours as needed for Congestion. 12 Tab 0    famotidine (PEPCID) 40 mg tablet Take 1 Tab by mouth two (2) times daily as needed (indigestion/acid reflux). 90 Tab 0    gabapentin (NEURONTIN) 800 mg tablet Take 1 Tab by mouth three (3) times daily (with meals). Max Daily Amount: 2,400 mg. Indications: Neuropathic Pain 270 Tab 0    cyclobenzaprine (FLEXERIL) 10 mg tablet Take 1 Tab by mouth three (3) times daily as needed for Muscle Spasm(s). 90 Tab 6    budesonide-formoterol (SYMBICORT) 160-4.5 mcg/actuation HFAA Take 1 Puff by inhalation two (2) times a day. 1 Inhaler 6    aspirin 81 mg chewable tablet Take 81 mg by mouth daily.  acetaminophen (TYLENOL) 500 mg tablet Take  by mouth every six (6) hours as needed for Pain.  albuterol (PROVENTIL HFA, VENTOLIN HFA, PROAIR HFA) 90 mcg/actuation inhaler Take 2 Puffs by inhalation every six (6) hours as needed for Wheezing or Shortness of Breath. 1 Inhaler 11    ondansetron (ZOFRAN ODT) 4 mg disintegrating tablet Take 1 Tab by mouth every eight (8) hours as needed for Nausea. (Patient not taking: Reported on 5/25/2021) 20 Tab 0    albuterol (PROVENTIL VENTOLIN) 2.5 mg /3 mL (0.083 %) nebulizer solution 3 mL by Nebulization route once for 1 dose.  1 Package 0     Past Medical History:   Diagnosis Date    Arthritis of knee, right 12/2017    advanced degen changes noted, CT right LE    Asthma     Cellulitis 12/2017    DDD (degenerative disc disease), lumbar 2013    noted on MRI with annular tears    Diverticulitis 2016    GERD (gastroesophageal reflux disease) 2016    with esophagitis according to endscopy    Kidney stone     Lumbar post-laminectomy syndrome     Sacroiliitis ([address]     Stroke [facility]) may 14 2010    Unspecified rotator cuff tear or rupture of left shoulder, not specified as traumatic 03/2016     Past Surgical History:   Procedure Laterality Date    COLONOSCOPY N/A 8/12/2016    [address]ONOSCOPY with [address]omy [address] [place]  2002, 2004, 2006    L5-S1 fusion, laminectomies    HX ENDOSCOPY  10/2016    grade 1 espohagitis    HX ENDOSCOPY  11/2016    mild esophagitis    HX KNEE ARTHROSCOPY Right     knee    HX MOHS PROCEDURES      right    HX ORTHOPAEDIC      right shoulder     Family History   Problem Relation Age of Onset    Other Brother     Cancer Maternal Grandmother     No Known Problems Mother     No Known Problems Father        Review of Systems   Constitutional: Negative for chills, fever and malaise/fatigue. Eyes: Negative. Respiratory: Negative for cough, shortness of breath and wheezing. Cardiovascular: Negative for chest pain, palpitations and leg swelling. Musculoskeletal: Negative. Skin: Negative. Neurological: Negative. Psychiatric/Behavioral: Negative. Objective:   No flowsheet data found. General: alert, cooperative, no distress   Mental  status: normal mood, behavior, speech, dress, motor activity, and thought processes, able to follow commands   HENT: NCAT   Neck: no visualized mass   Resp: no respiratory distress   Neuro: no gross deficits   Skin: no discoloration or lesions of concern on visible areas   Psychiatric: normal affect, consistent with stated mood, no evidence of hallucinations     Additional exam findings:        We discussed the expected course, resolution and complications of the diagnosis(es) in detail. Medication risks, benefits, costs, interactions, and alternatives were discussed as indicated. I advised him to contact the office if his condition worsens, changes or fails to improve as anticipated. He expressed understanding with the diagnosis(es) and plan. Lucho Das, who was evaluated through a patient-initiated, synchronous (real-time) audio-video encounter, and/or his healthcare decision maker, is aware that it is a billable service, with coverage as determined by his insurance carrier. He provided verbal consent to proceed: Yes, and patient identification was verified. It was conducted pursuant to the emergency declaration under the [address] [place] [address] authority and the [employer] General Act. A caregiver was present when appropriate. Ability to conduct physical exam was limited. I was in the office. The patient was at home.       Jo Ann Gil NP

## 2021-05-25 NOTE — PATIENT INSTRUCTIONS
Medicare Wellness Visit, Male The best way to live healthy is to have a lifestyle where you eat a well-balanced diet, exercise regularly, limit alcohol use, and quit all forms of tobacco/nicotine, if applicable. Regular preventive services are another way to keep healthy. Preventive services (vaccines, screening tests, monitoring & exams) can help personalize your care plan, which helps you manage your own care. Screening tests can find health problems at the earliest stages, when they are easiest to treat. [facility] follows the current, evidence-based guidelines published by the [facility] Steve Marco ([facility]) when recommending preventive services for our patients. Because we follow these guidelines, sometimes recommendations change over time as research supports it. (For example, a prostate screening blood test is no longer routinely recommended for men with no symptoms). Of course, you and your doctor may decide to screen more often for some diseases, based on your risk and co-morbidities (chronic disease you are already diagnosed with). Preventive services for you include: - Medicare offers their members a free annual wellness visit, which is time for you and your primary care provider to discuss and plan for your preventive service needs. Take advantage of this benefit every year! 
-All adults over age 72 should receive the recommended pneumonia vaccines. Current USPSTF guidelines recommend a series of two vaccines for the best pneumonia protection.  
-All adults should have a flu vaccine yearly and tetanus vaccine every 10 years. 
-All adults age 48 and older should receive the shingles vaccines (series of two vaccines).       
-All adults age 38-68 who are overweight should have a diabetes screening test once every three years.  
-Other screening tests & preventive services for persons with diabetes include: an eye exam to screen for diabetic retinopathy, a kidney function test, a foot exam, and stricter control over your cholesterol.  
-Cardiovascular screening for adults with routine risk involves an electrocardiogram (ECG) at intervals determined by the provider.  
-Colorectal cancer screening should be done for adults age 54-65 with no increased risk factors for colorectal cancer. There are a number of acceptable methods of screening for this type of cancer. Each test has its own benefits and drawbacks. Discuss with your provider what is most appropriate for you during your annual wellness visit. The different tests include: colonoscopy (considered the best screening method), a fecal occult blood test, a fecal DNA test, and sigmoidoscopy. 
-All adults born between [facility] should be screened once for Hepatitis C. 
-An Abdominal Aortic Aneurysm (AAA) Screening is recommended for men age 73-68 who has ever smoked in their lifetime. Here is a list of your current Health Maintenance items (your personalized list of preventive services) with a due date: 
Health Maintenance Due Topic Date Due  
 COVID-19 Vaccine (1) Never done  DTaP/Tdap/Td  (1 - Tdap) 07/18/1986  Shingles Vaccine (1 of 2) Never done  PSA blood test  02/18/2020 [facility] Annual Well Visit  02/13/2021

## 2021-05-25 NOTE — TELEPHONE ENCOUNTER
Called to schedule:    Return in about 4 weeks (around 6/22/2021), or if symptoms worsen or fail to improve, for lab work and vitals completed prior to 4 wk followup for hypoglycemia. Left message to call back.

## 2021-06-11 ENCOUNTER — HOSPITAL ENCOUNTER (INPATIENT)
Age: 56
LOS: 3 days | Discharge: HOME | DRG: 513 | End: 2021-06-14
Attending: EMERGENCY MEDICINE | Admitting: INTERNAL MEDICINE
Payer: MEDICARE

## 2021-06-11 ENCOUNTER — APPOINTMENT (OUTPATIENT)
Dept: GENERAL RADIOLOGY | Age: 56
DRG: 513 | End: 2021-06-11
Attending: EMERGENCY MEDICINE
Payer: MEDICARE

## 2021-06-11 ENCOUNTER — APPOINTMENT (OUTPATIENT)
Dept: CT IMAGING | Age: 56
DRG: 513 | End: 2021-06-11
Attending: EMERGENCY MEDICINE
Payer: MEDICARE

## 2021-06-11 DIAGNOSIS — L08.9 PUNCTURE WOUND OF LEFT HAND WITH INFECTION, INITIAL ENCOUNTER: ICD-10-CM

## 2021-06-11 DIAGNOSIS — M65.142 SUPPURATIVE TENOSYNOVITIS OF FLEXOR TENDON OF LEFT HAND: ICD-10-CM

## 2021-06-11 DIAGNOSIS — S61.432A PUNCTURE WOUND OF LEFT HAND WITH INFECTION, INITIAL ENCOUNTER: ICD-10-CM

## 2021-06-11 DIAGNOSIS — L03.012 CELLULITIS OF FINGER OF LEFT HAND: Primary | ICD-10-CM

## 2021-06-11 PROBLEM — S61.439A INFECTED PUNCTURE WOUND OF HAND: Status: ACTIVE | Noted: 2021-06-11

## 2021-06-11 LAB
ANION GAP SERPL CALC-SCNC: 8 MMOL/L (ref 3–18)
BASOPHILS # BLD: 0 K/UL (ref 0–0.1)
BASOPHILS NFR BLD: 0 % (ref 0–2)
BUN SERPL-MCNC: 15 MG/DL (ref 7–18)
BUN/CREAT SERPL: 21 (ref 12–20)
CALCIUM SERPL-MCNC: 8.2 MG/DL (ref 8.5–10.1)
CHLORIDE SERPL-SCNC: 107 MMOL/L (ref 100–111)
CO2 SERPL-SCNC: 26 MMOL/L (ref 21–32)
CREAT SERPL-MCNC: 0.71 MG/DL (ref 0.6–1.3)
DIFFERENTIAL METHOD BLD: ABNORMAL
EOSINOPHIL # BLD: 0.2 K/UL (ref 0–0.4)
EOSINOPHIL NFR BLD: 3 % (ref 0–5)
ERYTHROCYTE [DISTWIDTH] IN BLOOD BY AUTOMATED COUNT: 11.9 % (ref 11.6–14.5)
GLUCOSE SERPL-MCNC: 85 MG/DL (ref 74–99)
HCT VFR BLD AUTO: 40 % (ref 36–48)
HGB BLD-MCNC: 13.8 G/DL (ref 13–16)
LYMPHOCYTES # BLD: 1.7 K/UL (ref 0.9–3.6)
LYMPHOCYTES NFR BLD: 29 % (ref 21–52)
MCH RBC QN AUTO: 29.6 PG (ref 24–34)
MCHC RBC AUTO-ENTMCNC: 34.5 G/DL (ref 31–37)
MCV RBC AUTO: 85.7 FL (ref 74–97)
MONOCYTES # BLD: 0.9 K/UL (ref 0.05–1.2)
MONOCYTES NFR BLD: 14 % (ref 3–10)
NEUTS SEG # BLD: 3.2 K/UL (ref 1.8–8)
NEUTS SEG NFR BLD: 53 % (ref 40–73)
PLATELET # BLD AUTO: 220 K/UL (ref 135–420)
PMV BLD AUTO: 9.5 FL (ref 9.2–11.8)
POTASSIUM SERPL-SCNC: 3.4 MMOL/L (ref 3.5–5.5)
RBC # BLD AUTO: 4.67 M/UL (ref 4.35–5.65)
SODIUM SERPL-SCNC: 141 MMOL/L (ref 136–145)
WBC # BLD AUTO: 6 K/UL (ref 4.6–13.2)

## 2021-06-11 PROCEDURE — 90715 TDAP VACCINE 7 YRS/> IM: CPT | Performed by: EMERGENCY MEDICINE

## 2021-06-11 PROCEDURE — 74011250636 HC RX REV CODE- 250/636: Performed by: EMERGENCY MEDICINE

## 2021-06-11 PROCEDURE — 96374 THER/PROPH/DIAG INJ IV PUSH: CPT

## 2021-06-11 PROCEDURE — 65270000029 HC RM PRIVATE

## 2021-06-11 PROCEDURE — 86140 C-REACTIVE PROTEIN: CPT

## 2021-06-11 PROCEDURE — 73201 CT UPPER EXTREMITY W/DYE: CPT

## 2021-06-11 PROCEDURE — 99284 EMERGENCY DEPT VISIT MOD MDM: CPT

## 2021-06-11 PROCEDURE — 74011000636 HC RX REV CODE- 636: Performed by: INTERNAL MEDICINE

## 2021-06-11 PROCEDURE — 90471 IMMUNIZATION ADMIN: CPT

## 2021-06-11 PROCEDURE — 85025 COMPLETE CBC W/AUTO DIFF WBC: CPT

## 2021-06-11 PROCEDURE — 80048 BASIC METABOLIC PNL TOTAL CA: CPT

## 2021-06-11 PROCEDURE — 73130 X-RAY EXAM OF HAND: CPT

## 2021-06-11 RX ORDER — MORPHINE SULFATE 4 MG/ML
4 INJECTION INTRAVENOUS
Status: COMPLETED | OUTPATIENT
Start: 2021-06-11 | End: 2021-06-11

## 2021-06-11 RX ORDER — LEVOFLOXACIN 5 MG/ML
500 INJECTION, SOLUTION INTRAVENOUS ONCE
Status: COMPLETED | OUTPATIENT
Start: 2021-06-11 | End: 2021-06-12

## 2021-06-11 RX ADMIN — MORPHINE SULFATE 4 MG: 4 INJECTION, SOLUTION INTRAMUSCULAR; INTRAVENOUS at 22:20

## 2021-06-11 RX ADMIN — LEVOFLOXACIN 500 MG: 5 INJECTION, SOLUTION INTRAVENOUS at 22:45

## 2021-06-11 RX ADMIN — IOPAMIDOL 100 ML: 612 INJECTION, SOLUTION INTRAVENOUS at 23:23

## 2021-06-11 RX ADMIN — TETANUS TOXOID, REDUCED DIPHTHERIA TOXOID AND ACELLULAR PERTUSSIS VACCINE, ADSORBED 0.5 ML: 5; 2.5; 8; 8; 2.5 SUSPENSION INTRAMUSCULAR at 22:23

## 2021-06-12 ENCOUNTER — ANESTHESIA (OUTPATIENT)
Dept: SURGERY | Age: 56
DRG: 513 | End: 2021-06-12
Payer: MEDICARE

## 2021-06-12 ENCOUNTER — ANESTHESIA EVENT (OUTPATIENT)
Dept: SURGERY | Age: 56
DRG: 513 | End: 2021-06-12
Payer: MEDICARE

## 2021-06-12 PROBLEM — E87.6 HYPOKALEMIA: Status: ACTIVE | Noted: 2021-06-12

## 2021-06-12 PROBLEM — R00.1 BRADYCARDIA: Status: ACTIVE | Noted: 2021-06-12

## 2021-06-12 PROBLEM — M65.142 SUPPURATIVE TENOSYNOVITIS OF FLEXOR TENDON OF LEFT HAND: Status: ACTIVE | Noted: 2021-06-12

## 2021-06-12 LAB
ALBUMIN SERPL-MCNC: 3.5 G/DL (ref 3.4–5)
ALBUMIN/GLOB SERPL: 1.3 {RATIO} (ref 0.8–1.7)
ALP SERPL-CCNC: 68 U/L (ref 45–117)
ALT SERPL-CCNC: 17 U/L (ref 16–61)
ANION GAP SERPL CALC-SCNC: 2 MMOL/L (ref 3–18)
AST SERPL-CCNC: 8 U/L (ref 10–38)
BASOPHILS # BLD: 0 K/UL (ref 0–0.1)
BASOPHILS NFR BLD: 1 % (ref 0–2)
BILIRUB SERPL-MCNC: 0.4 MG/DL (ref 0.2–1)
BUN SERPL-MCNC: 16 MG/DL (ref 7–18)
BUN/CREAT SERPL: 25 (ref 12–20)
CALCIUM SERPL-MCNC: 8.1 MG/DL (ref 8.5–10.1)
CHLORIDE SERPL-SCNC: 112 MMOL/L (ref 100–111)
CO2 SERPL-SCNC: 27 MMOL/L (ref 21–32)
COVID-19 RAPID TEST, COVR: NOT DETECTED
CREAT SERPL-MCNC: 0.65 MG/DL (ref 0.6–1.3)
CRP SERPL HS-MCNC: 4.8 MG/L
CRP SERPL-MCNC: 0.5 MG/DL (ref 0–0.3)
DIFFERENTIAL METHOD BLD: ABNORMAL
EOSINOPHIL # BLD: 0.2 K/UL (ref 0–0.4)
EOSINOPHIL NFR BLD: 5 % (ref 0–5)
ERYTHROCYTE [DISTWIDTH] IN BLOOD BY AUTOMATED COUNT: 12 % (ref 11.6–14.5)
ERYTHROCYTE [SEDIMENTATION RATE] IN BLOOD: 5 MM/HR (ref 0–20)
GLOBULIN SER CALC-MCNC: 2.7 G/DL (ref 2–4)
GLUCOSE SERPL-MCNC: 104 MG/DL (ref 74–99)
HCT VFR BLD AUTO: 42.3 % (ref 36–48)
HGB BLD-MCNC: 13.8 G/DL (ref 13–16)
LYMPHOCYTES # BLD: 1 K/UL (ref 0.9–3.6)
LYMPHOCYTES NFR BLD: 23 % (ref 21–52)
MAGNESIUM SERPL-MCNC: 2.2 MG/DL (ref 1.6–2.6)
MCH RBC QN AUTO: 28.5 PG (ref 24–34)
MCHC RBC AUTO-ENTMCNC: 32.6 G/DL (ref 31–37)
MCV RBC AUTO: 87.2 FL (ref 74–97)
MONOCYTES # BLD: 0.6 K/UL (ref 0.05–1.2)
MONOCYTES NFR BLD: 14 % (ref 3–10)
NEUTS SEG # BLD: 2.5 K/UL (ref 1.8–8)
NEUTS SEG NFR BLD: 58 % (ref 40–73)
PLATELET # BLD AUTO: 206 K/UL (ref 135–420)
PMV BLD AUTO: 9.4 FL (ref 9.2–11.8)
POTASSIUM SERPL-SCNC: 3.9 MMOL/L (ref 3.5–5.5)
PROT SERPL-MCNC: 6.2 G/DL (ref 6.4–8.2)
RBC # BLD AUTO: 4.85 M/UL (ref 4.35–5.65)
SODIUM SERPL-SCNC: 141 MMOL/L (ref 136–145)
SOURCE, COVRS: NORMAL
WBC # BLD AUTO: 4.3 K/UL (ref 4.6–13.2)

## 2021-06-12 PROCEDURE — 80053 COMPREHEN METABOLIC PANEL: CPT

## 2021-06-12 PROCEDURE — 87635 SARS-COV-2 COVID-19 AMP PRB: CPT

## 2021-06-12 PROCEDURE — 74011250637 HC RX REV CODE- 250/637: Performed by: PHYSICIAN ASSISTANT

## 2021-06-12 PROCEDURE — 76010000149 HC OR TIME 1 TO 1.5 HR: Performed by: SPECIALIST

## 2021-06-12 PROCEDURE — 99223 1ST HOSP IP/OBS HIGH 75: CPT | Performed by: SPECIALIST

## 2021-06-12 PROCEDURE — 74011250637 HC RX REV CODE- 250/637: Performed by: INTERNAL MEDICINE

## 2021-06-12 PROCEDURE — 77030010813: Performed by: SPECIALIST

## 2021-06-12 PROCEDURE — 77030012422 HC DRN WND COVD -A: Performed by: SPECIALIST

## 2021-06-12 PROCEDURE — 99219 PR INITIAL OBSERVATION CARE/DAY 50 MINUTES: CPT | Performed by: INTERNAL MEDICINE

## 2021-06-12 PROCEDURE — 26020 DRAIN HAND TENDON SHEATH: CPT | Performed by: SPECIALIST

## 2021-06-12 PROCEDURE — 77030018723 HC ELCTRD BLD COVD -A: Performed by: SPECIALIST

## 2021-06-12 PROCEDURE — 99218 HC RM OBSERVATION: CPT

## 2021-06-12 PROCEDURE — 85025 COMPLETE CBC W/AUTO DIFF WBC: CPT

## 2021-06-12 PROCEDURE — 76210000006 HC OR PH I REC 0.5 TO 1 HR: Performed by: SPECIALIST

## 2021-06-12 PROCEDURE — 85652 RBC SED RATE AUTOMATED: CPT

## 2021-06-12 PROCEDURE — 99232 SBSQ HOSP IP/OBS MODERATE 35: CPT | Performed by: INTERNAL MEDICINE

## 2021-06-12 PROCEDURE — 0LD80ZZ EXTRACTION OF LEFT HAND TENDON, OPEN APPROACH: ICD-10-PCS | Performed by: SPECIALIST

## 2021-06-12 PROCEDURE — 74011250636 HC RX REV CODE- 250/636: Performed by: INTERNAL MEDICINE

## 2021-06-12 PROCEDURE — 2709999900 HC NON-CHARGEABLE SUPPLY: Performed by: SPECIALIST

## 2021-06-12 PROCEDURE — 99140 ANES COMP EMERGENCY COND: CPT | Performed by: NURSE ANESTHETIST, CERTIFIED REGISTERED

## 2021-06-12 PROCEDURE — 83735 ASSAY OF MAGNESIUM: CPT

## 2021-06-12 PROCEDURE — 01810 ANES PX NRV MUSC F/ARM WRST: CPT | Performed by: NURSE ANESTHETIST, CERTIFIED REGISTERED

## 2021-06-12 PROCEDURE — 74011250636 HC RX REV CODE- 250/636: Performed by: SPECIALIST

## 2021-06-12 PROCEDURE — 65270000029 HC RM PRIVATE

## 2021-06-12 PROCEDURE — 36415 COLL VENOUS BLD VENIPUNCTURE: CPT

## 2021-06-12 PROCEDURE — 01810 ANES PX NRV MUSC F/ARM WRST: CPT | Performed by: ANESTHESIOLOGY

## 2021-06-12 PROCEDURE — 87077 CULTURE AEROBIC IDENTIFY: CPT

## 2021-06-12 PROCEDURE — 77030018673: Performed by: SPECIALIST

## 2021-06-12 PROCEDURE — 77030002916 HC SUT ETHLN J&J -A: Performed by: SPECIALIST

## 2021-06-12 PROCEDURE — 74011250636 HC RX REV CODE- 250/636: Performed by: NURSE ANESTHETIST, CERTIFIED REGISTERED

## 2021-06-12 PROCEDURE — 74011000250 HC RX REV CODE- 250: Performed by: SPECIALIST

## 2021-06-12 PROCEDURE — 76060000033 HC ANESTHESIA 1 TO 1.5 HR: Performed by: SPECIALIST

## 2021-06-12 PROCEDURE — 86141 C-REACTIVE PROTEIN HS: CPT

## 2021-06-12 PROCEDURE — 77030011265 HC ELECTRD BLD HEX COVD -A: Performed by: SPECIALIST

## 2021-06-12 PROCEDURE — 97161 PT EVAL LOW COMPLEX 20 MIN: CPT

## 2021-06-12 PROCEDURE — 74011000258 HC RX REV CODE- 258: Performed by: NURSE ANESTHETIST, CERTIFIED REGISTERED

## 2021-06-12 PROCEDURE — 87070 CULTURE OTHR SPECIMN AEROBIC: CPT

## 2021-06-12 PROCEDURE — 74011250636 HC RX REV CODE- 250/636: Performed by: EMERGENCY MEDICINE

## 2021-06-12 PROCEDURE — 99140 ANES COMP EMERGENCY COND: CPT | Performed by: ANESTHESIOLOGY

## 2021-06-12 PROCEDURE — 74011000250 HC RX REV CODE- 250: Performed by: NURSE ANESTHETIST, CERTIFIED REGISTERED

## 2021-06-12 PROCEDURE — 74011250637 HC RX REV CODE- 250/637: Performed by: SPECIALIST

## 2021-06-12 RX ORDER — SODIUM CHLORIDE 0.9 % (FLUSH) 0.9 %
5-40 SYRINGE (ML) INJECTION EVERY 8 HOURS
Status: DISCONTINUED | OUTPATIENT
Start: 2021-06-12 | End: 2021-06-15 | Stop reason: HOSPADM

## 2021-06-12 RX ORDER — PREGABALIN 75 MG/1
75 CAPSULE ORAL
Status: COMPLETED | OUTPATIENT
Start: 2021-06-12 | End: 2021-06-12

## 2021-06-12 RX ORDER — VANCOMYCIN HYDROCHLORIDE
1250 EVERY 8 HOURS
Status: DISCONTINUED | OUTPATIENT
Start: 2021-06-12 | End: 2021-06-12

## 2021-06-12 RX ORDER — NALOXONE HYDROCHLORIDE 0.4 MG/ML
0.4 INJECTION, SOLUTION INTRAMUSCULAR; INTRAVENOUS; SUBCUTANEOUS AS NEEDED
Status: DISCONTINUED | OUTPATIENT
Start: 2021-06-12 | End: 2021-06-15 | Stop reason: HOSPADM

## 2021-06-12 RX ORDER — MORPHINE SULFATE 2 MG/ML
1 INJECTION, SOLUTION INTRAMUSCULAR; INTRAVENOUS ONCE
Status: ACTIVE | OUTPATIENT
Start: 2021-06-12 | End: 2021-06-12

## 2021-06-12 RX ORDER — MIDAZOLAM HYDROCHLORIDE 1 MG/ML
INJECTION, SOLUTION INTRAMUSCULAR; INTRAVENOUS AS NEEDED
Status: DISCONTINUED | OUTPATIENT
Start: 2021-06-12 | End: 2021-06-12 | Stop reason: HOSPADM

## 2021-06-12 RX ORDER — MORPHINE SULFATE 2 MG/ML
2 INJECTION, SOLUTION INTRAMUSCULAR; INTRAVENOUS
Status: DISCONTINUED | OUTPATIENT
Start: 2021-06-12 | End: 2021-06-15 | Stop reason: HOSPADM

## 2021-06-12 RX ORDER — ENOXAPARIN SODIUM 100 MG/ML
40 INJECTION SUBCUTANEOUS DAILY
Status: DISCONTINUED | OUTPATIENT
Start: 2021-06-12 | End: 2021-06-12

## 2021-06-12 RX ORDER — SODIUM CHLORIDE 0.9 % (FLUSH) 0.9 %
5-40 SYRINGE (ML) INJECTION AS NEEDED
Status: DISCONTINUED | OUTPATIENT
Start: 2021-06-12 | End: 2021-06-15 | Stop reason: HOSPADM

## 2021-06-12 RX ORDER — ONDANSETRON 4 MG/1
4 TABLET, ORALLY DISINTEGRATING ORAL
Status: DISCONTINUED | OUTPATIENT
Start: 2021-06-12 | End: 2021-06-15 | Stop reason: HOSPADM

## 2021-06-12 RX ORDER — ONDANSETRON 2 MG/ML
4 INJECTION INTRAMUSCULAR; INTRAVENOUS
Status: DISCONTINUED | OUTPATIENT
Start: 2021-06-12 | End: 2021-06-15 | Stop reason: HOSPADM

## 2021-06-12 RX ORDER — UREA 10 %
2 LOTION (ML) TOPICAL 2 TIMES DAILY
Status: DISCONTINUED | OUTPATIENT
Start: 2021-06-12 | End: 2021-06-15 | Stop reason: HOSPADM

## 2021-06-12 RX ORDER — FAMOTIDINE 20 MG/1
40 TABLET, FILM COATED ORAL
Status: DISCONTINUED | OUTPATIENT
Start: 2021-06-12 | End: 2021-06-15 | Stop reason: HOSPADM

## 2021-06-12 RX ORDER — OXYCODONE HYDROCHLORIDE 5 MG/1
5 TABLET ORAL
Status: DISCONTINUED | OUTPATIENT
Start: 2021-06-12 | End: 2021-06-15 | Stop reason: HOSPADM

## 2021-06-12 RX ORDER — ACETAMINOPHEN 500 MG
500 TABLET ORAL
Status: DISCONTINUED | OUTPATIENT
Start: 2021-06-12 | End: 2021-06-12 | Stop reason: HOSPADM

## 2021-06-12 RX ORDER — POTASSIUM CHLORIDE 20 MEQ/1
40 TABLET, EXTENDED RELEASE ORAL
Status: COMPLETED | OUTPATIENT
Start: 2021-06-12 | End: 2021-06-12

## 2021-06-12 RX ORDER — HYDROMORPHONE HYDROCHLORIDE 1 MG/ML
0.5 INJECTION, SOLUTION INTRAMUSCULAR; INTRAVENOUS; SUBCUTANEOUS
Status: DISCONTINUED | OUTPATIENT
Start: 2021-06-12 | End: 2021-06-12 | Stop reason: HOSPADM

## 2021-06-12 RX ORDER — LIDOCAINE HYDROCHLORIDE 20 MG/ML
INJECTION, SOLUTION EPIDURAL; INFILTRATION; INTRACAUDAL; PERINEURAL AS NEEDED
Status: DISCONTINUED | OUTPATIENT
Start: 2021-06-12 | End: 2021-06-12 | Stop reason: HOSPADM

## 2021-06-12 RX ORDER — DEXTROSE, SODIUM CHLORIDE, SODIUM LACTATE, POTASSIUM CHLORIDE, AND CALCIUM CHLORIDE 5; .6; .31; .03; .02 G/100ML; G/100ML; G/100ML; G/100ML; G/100ML
75 INJECTION, SOLUTION INTRAVENOUS CONTINUOUS
Status: DISPENSED | OUTPATIENT
Start: 2021-06-12 | End: 2021-06-13

## 2021-06-12 RX ORDER — POLYETHYLENE GLYCOL 3350 17 G/17G
17 POWDER, FOR SOLUTION ORAL DAILY PRN
Status: DISCONTINUED | OUTPATIENT
Start: 2021-06-12 | End: 2021-06-15 | Stop reason: HOSPADM

## 2021-06-12 RX ORDER — GUAIFENESIN 100 MG/5ML
81 LIQUID (ML) ORAL DAILY
Status: DISCONTINUED | OUTPATIENT
Start: 2021-06-12 | End: 2021-06-12

## 2021-06-12 RX ORDER — ACETAMINOPHEN 650 MG/1
650 SUPPOSITORY RECTAL
Status: DISCONTINUED | OUTPATIENT
Start: 2021-06-12 | End: 2021-06-15 | Stop reason: HOSPADM

## 2021-06-12 RX ORDER — ACETAMINOPHEN 325 MG/1
650 TABLET ORAL
Status: DISCONTINUED | OUTPATIENT
Start: 2021-06-12 | End: 2021-06-15 | Stop reason: HOSPADM

## 2021-06-12 RX ORDER — KETOROLAC TROMETHAMINE 15 MG/ML
15 INJECTION, SOLUTION INTRAMUSCULAR; INTRAVENOUS ONCE
Status: COMPLETED | OUTPATIENT
Start: 2021-06-12 | End: 2021-06-12

## 2021-06-12 RX ORDER — FENTANYL CITRATE 50 UG/ML
INJECTION, SOLUTION INTRAMUSCULAR; INTRAVENOUS AS NEEDED
Status: DISCONTINUED | OUTPATIENT
Start: 2021-06-12 | End: 2021-06-12 | Stop reason: HOSPADM

## 2021-06-12 RX ORDER — SODIUM CHLORIDE, SODIUM LACTATE, POTASSIUM CHLORIDE, CALCIUM CHLORIDE 600; 310; 30; 20 MG/100ML; MG/100ML; MG/100ML; MG/100ML
INJECTION, SOLUTION INTRAVENOUS
Status: DISCONTINUED | OUTPATIENT
Start: 2021-06-12 | End: 2021-06-12 | Stop reason: HOSPADM

## 2021-06-12 RX ORDER — SODIUM CHLORIDE, SODIUM LACTATE, POTASSIUM CHLORIDE, CALCIUM CHLORIDE 600; 310; 30; 20 MG/100ML; MG/100ML; MG/100ML; MG/100ML
50 INJECTION, SOLUTION INTRAVENOUS CONTINUOUS
Status: DISCONTINUED | OUTPATIENT
Start: 2021-06-12 | End: 2021-06-15 | Stop reason: HOSPADM

## 2021-06-12 RX ORDER — ZOLPIDEM TARTRATE 5 MG/1
5 TABLET ORAL
Status: DISCONTINUED | OUTPATIENT
Start: 2021-06-12 | End: 2021-06-15 | Stop reason: HOSPADM

## 2021-06-12 RX ORDER — IPRATROPIUM BROMIDE AND ALBUTEROL SULFATE 2.5; .5 MG/3ML; MG/3ML
3 SOLUTION RESPIRATORY (INHALATION)
Status: DISCONTINUED | OUTPATIENT
Start: 2021-06-12 | End: 2021-06-13

## 2021-06-12 RX ORDER — DIPHENHYDRAMINE HYDROCHLORIDE 50 MG/ML
12.5 INJECTION, SOLUTION INTRAMUSCULAR; INTRAVENOUS
Status: DISCONTINUED | OUTPATIENT
Start: 2021-06-12 | End: 2021-06-12 | Stop reason: HOSPADM

## 2021-06-12 RX ORDER — SODIUM CHLORIDE 9 MG/ML
INJECTION, SOLUTION INTRAVENOUS
Status: DISCONTINUED | OUTPATIENT
Start: 2021-06-12 | End: 2021-06-12 | Stop reason: HOSPADM

## 2021-06-12 RX ORDER — DEXAMETHASONE SODIUM PHOSPHATE 4 MG/ML
INJECTION, SOLUTION INTRA-ARTICULAR; INTRALESIONAL; INTRAMUSCULAR; INTRAVENOUS; SOFT TISSUE AS NEEDED
Status: DISCONTINUED | OUTPATIENT
Start: 2021-06-12 | End: 2021-06-12 | Stop reason: HOSPADM

## 2021-06-12 RX ORDER — HYDROMORPHONE HYDROCHLORIDE 1 MG/ML
1 INJECTION, SOLUTION INTRAMUSCULAR; INTRAVENOUS; SUBCUTANEOUS
Status: DISCONTINUED | OUTPATIENT
Start: 2021-06-12 | End: 2021-06-15 | Stop reason: HOSPADM

## 2021-06-12 RX ORDER — VANCOMYCIN HYDROCHLORIDE
1250 EVERY 12 HOURS
Status: DISCONTINUED | OUTPATIENT
Start: 2021-06-13 | End: 2021-06-14

## 2021-06-12 RX ORDER — ACETAMINOPHEN 325 MG/1
650 TABLET ORAL EVERY 6 HOURS
Status: DISCONTINUED | OUTPATIENT
Start: 2021-06-12 | End: 2021-06-15 | Stop reason: HOSPADM

## 2021-06-12 RX ORDER — ONDANSETRON 2 MG/ML
INJECTION INTRAMUSCULAR; INTRAVENOUS AS NEEDED
Status: DISCONTINUED | OUTPATIENT
Start: 2021-06-12 | End: 2021-06-12 | Stop reason: HOSPADM

## 2021-06-12 RX ORDER — LEVOFLOXACIN 5 MG/ML
500 INJECTION, SOLUTION INTRAVENOUS EVERY 24 HOURS
Status: DISCONTINUED | OUTPATIENT
Start: 2021-06-12 | End: 2021-06-14

## 2021-06-12 RX ORDER — HYDROMORPHONE HYDROCHLORIDE 1 MG/ML
0.2 INJECTION, SOLUTION INTRAMUSCULAR; INTRAVENOUS; SUBCUTANEOUS AS NEEDED
Status: DISCONTINUED | OUTPATIENT
Start: 2021-06-12 | End: 2021-06-12 | Stop reason: HOSPADM

## 2021-06-12 RX ORDER — AMOXICILLIN 250 MG
1 CAPSULE ORAL 2 TIMES DAILY
Status: DISCONTINUED | OUTPATIENT
Start: 2021-06-12 | End: 2021-06-15 | Stop reason: HOSPADM

## 2021-06-12 RX ORDER — PROPOFOL 10 MG/ML
INJECTION, EMULSION INTRAVENOUS AS NEEDED
Status: DISCONTINUED | OUTPATIENT
Start: 2021-06-12 | End: 2021-06-12 | Stop reason: HOSPADM

## 2021-06-12 RX ORDER — BUPIVACAINE HYDROCHLORIDE 2.5 MG/ML
INJECTION, SOLUTION EPIDURAL; INFILTRATION; INTRACAUDAL AS NEEDED
Status: DISCONTINUED | OUTPATIENT
Start: 2021-06-12 | End: 2021-06-12 | Stop reason: HOSPADM

## 2021-06-12 RX ORDER — VANCOMYCIN/0.9 % SOD CHLORIDE 1.5G/250ML
1500 PLASTIC BAG, INJECTION (ML) INTRAVENOUS ONCE
Status: COMPLETED | OUTPATIENT
Start: 2021-06-12 | End: 2021-06-12

## 2021-06-12 RX ORDER — KETOROLAC TROMETHAMINE 15 MG/ML
INJECTION, SOLUTION INTRAMUSCULAR; INTRAVENOUS AS NEEDED
Status: DISCONTINUED | OUTPATIENT
Start: 2021-06-12 | End: 2021-06-12 | Stop reason: HOSPADM

## 2021-06-12 RX ORDER — ONDANSETRON 2 MG/ML
4 INJECTION INTRAMUSCULAR; INTRAVENOUS ONCE
Status: DISCONTINUED | OUTPATIENT
Start: 2021-06-12 | End: 2021-06-12 | Stop reason: HOSPADM

## 2021-06-12 RX ORDER — NALOXONE HYDROCHLORIDE 0.4 MG/ML
0.1 INJECTION, SOLUTION INTRAMUSCULAR; INTRAVENOUS; SUBCUTANEOUS AS NEEDED
Status: DISCONTINUED | OUTPATIENT
Start: 2021-06-12 | End: 2021-06-12 | Stop reason: HOSPADM

## 2021-06-12 RX ORDER — OXYCODONE HYDROCHLORIDE 10 MG/1
10 TABLET ORAL
Status: DISCONTINUED | OUTPATIENT
Start: 2021-06-12 | End: 2021-06-15 | Stop reason: HOSPADM

## 2021-06-12 RX ORDER — EPHEDRINE SULFATE/0.9% NACL/PF 50 MG/5 ML
SYRINGE (ML) INTRAVENOUS AS NEEDED
Status: DISCONTINUED | OUTPATIENT
Start: 2021-06-12 | End: 2021-06-12 | Stop reason: HOSPADM

## 2021-06-12 RX ORDER — SODIUM CHLORIDE 9 MG/ML
50 INJECTION, SOLUTION INTRAVENOUS CONTINUOUS
Status: DISCONTINUED | OUTPATIENT
Start: 2021-06-12 | End: 2021-06-15 | Stop reason: HOSPADM

## 2021-06-12 RX ADMIN — SODIUM CHLORIDE, SODIUM LACTATE, POTASSIUM CHLORIDE, CALCIUM CHLORIDE, AND DEXTROSE MONOHYDRATE 75 ML/HR: 600; 310; 30; 20; 5 INJECTION, SOLUTION INTRAVENOUS at 15:00

## 2021-06-12 RX ADMIN — MIDAZOLAM HYDROCHLORIDE 2 MG: 2 INJECTION, SOLUTION INTRAMUSCULAR; INTRAVENOUS at 12:20

## 2021-06-12 RX ADMIN — VANCOMYCIN HYDROCHLORIDE 1000 MG: 1 INJECTION, POWDER, LYOPHILIZED, FOR SOLUTION INTRAVENOUS at 00:30

## 2021-06-12 RX ADMIN — LIDOCAINE HYDROCHLORIDE 60 MG: 20 INJECTION, SOLUTION EPIDURAL; INFILTRATION; INTRACAUDAL; PERINEURAL at 12:28

## 2021-06-12 RX ADMIN — Medication 10 ML: at 21:32

## 2021-06-12 RX ADMIN — KETOROLAC TROMETHAMINE 15 MG: 15 INJECTION, SOLUTION INTRAMUSCULAR; INTRAVENOUS at 12:39

## 2021-06-12 RX ADMIN — LEVOFLOXACIN 500 MG: 5 INJECTION, SOLUTION INTRAVENOUS at 21:31

## 2021-06-12 RX ADMIN — SODIUM CHLORIDE: 900 INJECTION, SOLUTION INTRAVENOUS at 12:17

## 2021-06-12 RX ADMIN — SODIUM CHLORIDE, SODIUM LACTATE, POTASSIUM CHLORIDE, CALCIUM CHLORIDE, AND DEXTROSE MONOHYDRATE 75 ML/HR: 600; 310; 30; 20; 5 INJECTION, SOLUTION INTRAVENOUS at 21:38

## 2021-06-12 RX ADMIN — DOCUSATE SODIUM 50MG AND SENNOSIDES 8.6MG 1 TABLET: 8.6; 5 TABLET, FILM COATED ORAL at 17:55

## 2021-06-12 RX ADMIN — ONDANSETRON 4 MG: 2 INJECTION INTRAMUSCULAR; INTRAVENOUS at 12:39

## 2021-06-12 RX ADMIN — SODIUM CHLORIDE 50 ML/HR: 900 INJECTION, SOLUTION INTRAVENOUS at 09:46

## 2021-06-12 RX ADMIN — SODIUM CHLORIDE, SODIUM LACTATE, POTASSIUM CHLORIDE, AND CALCIUM CHLORIDE: 600; 310; 30; 20 INJECTION, SOLUTION INTRAVENOUS at 13:08

## 2021-06-12 RX ADMIN — PROPOFOL 150 MG: 10 INJECTION, EMULSION INTRAVENOUS at 12:28

## 2021-06-12 RX ADMIN — MORPHINE SULFATE 2 MG: 2 INJECTION, SOLUTION INTRAMUSCULAR; INTRAVENOUS at 09:46

## 2021-06-12 RX ADMIN — Medication 10 ML: at 09:48

## 2021-06-12 RX ADMIN — POTASSIUM CHLORIDE 40 MEQ: 1500 TABLET, EXTENDED RELEASE ORAL at 09:47

## 2021-06-12 RX ADMIN — KETOROLAC TROMETHAMINE 15 MG: 15 INJECTION, SOLUTION INTRAMUSCULAR; INTRAVENOUS at 03:37

## 2021-06-12 RX ADMIN — ACETAMINOPHEN 650 MG: 325 TABLET ORAL at 17:54

## 2021-06-12 RX ADMIN — VANCOMYCIN HYDROCHLORIDE 1500 MG: 10 INJECTION, POWDER, LYOPHILIZED, FOR SOLUTION INTRAVENOUS at 12:23

## 2021-06-12 RX ADMIN — FENTANYL CITRATE 50 MCG: 50 INJECTION, SOLUTION INTRAMUSCULAR; INTRAVENOUS at 12:23

## 2021-06-12 RX ADMIN — PHENYLEPHRINE HYDROCHLORIDE 200 MCG: 10 INJECTION INTRAVENOUS at 12:32

## 2021-06-12 RX ADMIN — OXYCODONE HYDROCHLORIDE 10 MG: 10 TABLET ORAL at 17:54

## 2021-06-12 RX ADMIN — DEXAMETHASONE SODIUM PHOSPHATE 4 MG: 4 INJECTION, SOLUTION INTRAMUSCULAR; INTRAVENOUS at 12:39

## 2021-06-12 RX ADMIN — FENTANYL CITRATE 50 MCG: 50 INJECTION, SOLUTION INTRAMUSCULAR; INTRAVENOUS at 12:53

## 2021-06-12 RX ADMIN — PREGABALIN 75 MG: 75 CAPSULE ORAL at 12:01

## 2021-06-12 RX ADMIN — LACTOBACILLUS TAB 2 TABLET: TAB at 17:55

## 2021-06-12 RX ADMIN — Medication 20 MG: at 13:11

## 2021-06-12 NOTE — PROGRESS NOTES
TRANSFER - IN REPORT:    Verbal report received from [facility] RN(name) on Frederick Lu  being received from [employer]) for routine post - op      Report consisted of patients Situation, Background, Assessment and   Recommendations(SBAR). Information from the following report(s) SBAR, Procedure Summary, Intake/Output and MAR was reviewed with the receiving nurse. Opportunity for questions and clarification was provided. Assessment completed upon patients arrival to unit and care assumed.

## 2021-06-12 NOTE — ROUTINE PROCESS
Bedside and Verbal shift change report given to [employer] (oncoming nurse) by Angeles Tate RN (offgoing nurse). Report included the following information SBAR, Kardex, Intake/Output, MAR and Recent Results.

## 2021-06-12 NOTE — ANESTHESIA POSTPROCEDURE EVALUATION
Procedure(s):  INCISION AND DRAINAGE UPPER EXTREMITY/ WASHOUT LEFT INDEX FINGER. general    Anesthesia Post Evaluation      Multimodal analgesia: multimodal analgesia used between 6 hours prior to anesthesia start to PACU discharge  Patient location during evaluation: PACU  Patient participation: complete - patient participated  Level of consciousness: awake and alert  Pain management: adequate  Airway patency: patent  Anesthetic complications: no  Cardiovascular status: acceptable  Respiratory status: acceptable  Hydration status: acceptable  Post anesthesia nausea and vomiting:  controlled  Final Post Anesthesia Temperature Assessment:  Normothermia (36.0-37.5 degrees C)      INITIAL Post-op Vital signs:   Vitals Value Taken Time   /72 06/12/21 1345   Temp 36 °C (96.8 °F) 06/12/21 1328   Pulse 81 06/12/21 1351   Resp 13 06/12/21 1351   SpO2 97 % 06/12/21 1351   Vitals shown include unvalidated device data.

## 2021-06-12 NOTE — CONSULTS
Consult    Patient: Sima Iglesias MRN: [id number]  SSN: [id number]    YOB: 1965  Age: 54 y.o. Sex: male      Subjective:      Sima Iglesias is a 54 y.o. male referred by the ED provider for left hand & index finger swelling and pain. He sustained a nail gun injury to the volar radial base of his left index finger 2 days ago. His injury occurred while making a grab bar holding 2 dowels for a 90 degree mitered joint while his saranya shot a nail across the juncture. He states that the loaded nail was longer than expected so it went all the way across the dowel joint and into his hand. The nail bent and it pulled out as he pulled the wood away from his hand. He has noted increasing pain and swelling over the past 48 hours so he presented to [facility] ED last PM.  He was admitted to [facility] last PM for further care.     Past Medical History:   Diagnosis Date    Arthritis of knee, right 12/2017    advanced degen changes noted, CT right LE    Asthma     Cellulitis 12/2017    DDD (degenerative disc disease), lumbar 2013    noted on MRI with annular tears    Diverticulitis 2016    GERD (gastroesophageal reflux disease) 2016    with esophagitis according to endscopy    Kidney stone     Lumbar post-laminectomy syndrome     Sacroiliitis ([address]     Stroke [facility]) may 14 2010    Unspecified rotator cuff tear or rupture of left shoulder, not specified as traumatic 03/2016     Past Surgical History:   Procedure Laterality Date    COLONOSCOPY N/A 8/12/2016    COLONOSCOPY with polypectomy performed by Calton Galeazzi, MD at [address]  2002, 2004, 2006    L5-S1 fusion, laminectomies    HX ENDOSCOPY  10/2016    grade 1 espohagitis    HX ENDOSCOPY  11/2016    mild esophagitis    HX KNEE ARTHROSCOPY Right     knee    HX MOHS PROCEDURES      right    HX ORTHOPAEDIC      right shoulder      Family History   Problem Relation Age of Onset    Other Brother     Cancer Maternal Grandmother     No Known Problems Mother     No Known Problems Father      Social History     Tobacco Use    Smoking status: Former Smoker     Packs/day: 2.00     Types: Cigarettes     Quit date: 2016     Years since quittin.9    Smokeless tobacco: Never Used   Substance Use Topics    Alcohol use: No     Alcohol/week: 0.0 standard drinks      Current Facility-Administered Medications   Medication Dose Route Frequency Provider Last Rate Last Admin    morphine injection 1 mg  1 mg IntraVENous Michelle Cool MD        aspirin chewable tablet 81 mg  81 mg Oral DAILY Paola Landin MD        famotidine (PEPCID) tablet 40 mg  40 mg Oral BID PRN Paola Landin MD        sodium chloride (NS) flush 5-40 mL  5-40 mL IntraVENous Q8H Paola Landin MD   10 mL at 21 0948    sodium chloride (NS) flush 5-40 mL  5-40 mL IntraVENous PRN Paola Landin MD        acetaminophen (TYLENOL) tablet 650 mg  650 mg Oral Q6H PRN Paola Landin MD        Or   Shelli Ritter acetaminophen (TYLENOL) suppository 650 mg  650 mg Rectal Q6H PRN Paola Landin MD        polyethylene glycol (MIRALAX) packet 17 g  17 g Oral DAILY PRN Paola Landin MD        ondansetron (ZOFRAN ODT) tablet 4 mg  4 mg Oral Q8H PRN Paola Landin MD        Or    [facility]dansetron [facility] injection 4 mg  4 mg IntraVENous Q6H PRN Paola Landin MD        enoxaparin (LOVENOX) injection 40 mg  40 mg SubCUTAneous DAILY Paola Landin MD        albuterol-ipratropium (DUO-NEB) 2.5 MG-0.5 MG/3 ML  3 mL Nebulization Q4H RT Paola Landin MD        0.9% sodium chloride infusion  50 mL/hr IntraVENous CONTINUOUS Paola Landin MD 50 mL/hr at 21 0946 50 mL/hr at 21 0946    morphine injection 2 mg  2 mg IntraVENous Q3H PRN Mel Sarabia MD   2 mg at 21 0946    acetaminophen (TYLENOL) tablet 500 mg  500 mg Oral ON CALL Jacques Gandhi PA-C        levoFLOXacin (LEVAQUIN) 500 mg in D5W IVPB  500 mg IntraVENous Q24H Howard Duff MD        Lactobacillus Acidoph & Alfonzo [facility]) tablet 2 Tablet  2 Tablet Oral BID Tari Araujo MD        [START ON 6/13/2021] vancomycin (VANCOCIN) 1250 mg in  ml infusion  1,250 mg IntraVENous Q12H Tari Araujo MD        bupivacaine (PF) (MARCAINE) 0.25 % (2.5 mg/mL) injection    PRN Asad Castro MD   5 mL at 06/12/21 1309     Facility-Administered Medications Ordered in Other Encounters   Medication Dose Route Frequency Provider Last Rate Last Admin    midazolam (VERSED) injection   IntraVENous PRN Ruben Osgood, CRNA   2 mg at 06/12/21 1220    fentaNYL citrate (PF) injection   IntraVENous PRN Lancaster Osgood, CRNA   50 mcg at 06/12/21 1253    lidocaine (PF) (XYLOCAINE) 20 mg/mL (2 %) injection   IntraVENous PRN Lancaster Osgood, CRNA   60 mg at 06/12/21 1228    propofoL (DIPRIVAN) 10 mg/mL injection   IntraVENous PRN Lancaster Osgood, CRNA   150 mg at 06/12/21 1228    PHENYLephrine (SEB-SYNEPHRINE) 10,000 mcg in 0.9% sodium chloride 100 mL infusion   IntraVENous CONTINUOUS Lancaster Osgood, CRNA   200 mcg at 06/12/21 1232    ondansetron (ZOFRAN) injection   IntraVENous PRN Ruben Osgood, CRNA   4 mg at 06/12/21 1239    dexamethasone (DECADRON) 4 mg/mL injection   IntraVENous PRN Lancaster Osgood, CRNA   4 mg at 06/12/21 1239    ketorolac (TORADOL) injection   IntraVENous PRN Lancaster Osgood, CRNA   15 mg at 06/12/21 1239    0.9% sodium chloride infusion   IntraVENous CONTINUOUS Ruben Osgood, CRNA   Stopped at 06/12/21 1308    lactated Ringers infusion   IntraVENous CONTINUOUS Lancaster Osgood, CRNA   New Bag at 06/12/21 1308    ePHEDrine in NS (PF) (MISTOLE) 10 mg/mL in NS syringe   IntraVENous PRN Lancaster Osgood, CRNA   20 mg at 06/12/21 1311        Allergies   Allergen Reactions    Penicillins Angioedema    Penicillins Anaphylaxis    Vicodin [Hydrocodone-Acetaminophen] Nausea and Vomiting    Azithromycin Nausea and Vomiting    Hydrochlorothiazide Nausea Only     Dizzy, lightheaded, blisters on his legs    Protonix [Pantoprazole] Itching and Contact Dermatitis     Patient denies allergy    Vicodin [Hydrocodone-Acetaminophen] Nausea and Vomiting    Erythromycin Other (comments)       Review of Systems:  A comprehensive review of systems was negative except for that written in the History of Present Illness. Objective:     Vitals:    06/12/21 0219 06/12/21 0341 06/12/21 0823 06/12/21 1158   BP: 122/80 (!) 98/58 121/75 (!) 134/91   Pulse: (!) 57 71 61 (!) 57   Resp: 18 18 18 18   Temp: 96.9 °F (36.1 °C) 96.8 °F (36 °C) 96.9 °F (36.1 °C) 97.1 °F (36.2 °C)   SpO2: 98% 96% 96% 99%   Weight:       Height:          Appearance: Alert, well appearing and pleasant patient who is in no distress, oriented to person, place/time, and who follows commands. HEENT: Marina Ridley hears well, does not require hearing aids. His sclera of the eyes are non-icteric. He is breathing normally and no respiratory accessory muscle use is noted. No JVD present and Neck ROM within normal limits. Psychiatric: Affect and mood are appropriate. Oriented x3  Cardiovascular/Peripheral Vascular: Normal pulses to each foot. Integumentary: No rashes. Warm and normal color. No drainage.    Gait: normal  Sensory Exam: Intact/Normal Sensation    Lymphatic: No evidence of Lymphedema  Vascular:       Pulses: palpable  Varicosities none  Wounds/Abrasion: None Present  Neuro: Negative, no tremors  Physical Exam:  EXTREMITIES:    Examination Right Hand Left Hand   Skin Intact Puncture wound volar base left index finger, mild erythema, no drainage   Deformity - -   Swelling - ++ at site of wound and along flexor tendon sheath   Tenderness - ++ \"   Finger flexion Full Limited, pain with active flexion   Finger extension Full Limited, pain with passive extension   Sensation Normal Normal   Capillary refill Normal Normal   Heberden's nodes - -   Dupuytren's - -   Flexor superficialis and profundus tendon functions intact to left index finger    Results for Susie Saldaña (MRN [id number]) as of 6/12/2021 13:31   Ref. Range 6/12/2021 10:29   WBC Latest Ref Range: 4.6 - 13.2 K/uL 4.3 (L)     XR LT HAND [facility] 6/11/21  FINDINGS:  There is diffuse soft tissue swelling of the second digit. The joint spaces are  maintained. There is no fracture or dislocation. Mineralization is normal.  Metallic pin demonstrated at the fourth metacarpal head unchanged  I independently reviewed these images today. CT LT HAND 65 [facility] 6/11/21  IMPRESSION:   Secondary soft tissue findings suspicious for cellulitis and flexor  tenosynovitis. Flexor tendon tear difficult to exclude on CT. No radiopaque  foreign body and no acute osseous findings identified. I independently reviewed these images today. Assessment:     Hospital Problems  Date Reviewed: 3/23/2021        Codes Class Noted POA    Suppurative tenosynovitis of flexor tendon of left hand ICD-10-CM: M65.142  ICD-9-CM: 727.05  6/12/2021 Unknown        Hypokalemia ICD-10-CM: E87.6  ICD-9-CM: 276.8  6/12/2021 Unknown        Bradycardia ICD-10-CM: R00.1  ICD-9-CM: 427.89  6/12/2021 Unknown        * (Principal) Infected puncture wound of hand ICD-10-CM: G04.124C, L08.9  ICD-9-CM: 882.1  6/11/2021 Unknown        Cellulitis ICD-10-CM: L03.90  ICD-9-CM: 682.9  12/6/2017 Unknown              Plan: To OR for I&D left hand wound, catheter flexor space washout, probable A1 pulley release  cultures  Risks of surgery outlined and informed consent obtained.   Signed By: Yina Hoang MD     June 12, 2021

## 2021-06-12 NOTE — PROGRESS NOTES
Kinetic Dosing- Initial Progress Note    Pharmacy Consult ordered by Dr. Michael Ling MD     Indication: Skin and Soft Tissue Infection    Patient clinical status and labs ordered/reviewed. 54-year-old  male presenting as a transfer from [facility] emergency room for injury to left hand. Several days ago patient sustained nail gun injury to left hand. Nail was removed but wound became infected. Patient now admitted for antibiotics and evaluation by orthopedics. Patient notably allergic to penicillin. Pt Weight Weight: 78 kg (172 lb)   Serum Creatinine Lab Results   Component Value Date/Time    Creatinine 0.71 06/11/2021 10:10 PM       Creatinine Clearance Estimated Creatinine Clearance: 121.4 mL/min (based on SCr of 0.71 mg/dL). BUN Lab Results   Component Value Date/Time    BUN 15 06/11/2021 10:10 PM       WBC Lab Results   Component Value Date/Time    WBC 6.0 06/11/2021 10:10 PM      Temperature Temp: 96.9 °F (36.1 °C)   HR Pulse (Heart Rate): (!) 57     BP BP: 122/80           Kinetic Dosing Parameters:   Vd = 53 L     K = 0.1087              t ½ = 6.4    Drug Levels:   Vancomycin    No results for input(s): VANCP, VANCT, VANCR, VANRA in the last 72 hours.      Dose was adjusted to : Vancomycin 1250 mg IVPB q 8 h, having r'cd 1000 mg IVPB x 1 ~ 0030 at [facility] ED    Continue to monitor    Sign: Burnie Mcburney  Date: 6/12/2021  Time: 3:33 AM

## 2021-06-12 NOTE — H&P
GENERAL GENERIC H&P/CONSULT    CC: Cellulitis    Subjective:  55-year-old  male presenting as a transfer from [facility] emergency room for injury to left hand. Patient has past medical history of asthma and stroke. Several days ago patient sustained nail gun injury to left hand. Nail was removed but wound became infected. Patient now admitted for antibiotics and evaluation by orthopedics. Patient notably allergic to penicillin. Past Medical History:   Diagnosis Date    Arthritis of knee, right 12/2017    advanced degen changes noted, CT right LE    Asthma     Cellulitis 12/2017    DDD (degenerative disc disease), lumbar 2013    noted on MRI with annular tears    Diverticulitis 2016    GERD (gastroesophageal reflux disease) 2016    with esophagitis according to endscopy    Kidney stone     Lumbar post-laminectomy syndrome     Sacroiliitis ([address]     Stroke [facility]) may 14 2010    Unspecified rotator cuff tear or rupture of left shoulder, not specified as traumatic 03/2016      Past Surgical History:   Procedure Laterality Date    COLONOSCOPY N/A 8/12/2016    COLONOSCOPY with polypectomy performed by Clem Smith MD at [address]  2002, 2004, 2006    L5-S1 fusion, laminectomies    HX ENDOSCOPY  10/2016    grade 1 espohagitis    HX ENDOSCOPY  11/2016    mild esophagitis    HX KNEE ARTHROSCOPY Right     knee    HX MOHS PROCEDURES      right    HX ORTHOPAEDIC      right shoulder      Prior to Admission medications    Medication Sig Start Date End Date Taking? Authorizing Provider   ondansetron (ZOFRAN ODT) 4 mg disintegrating tablet Take 1 Tablet by mouth every eight (8) hours as needed for Nausea.  5/25/21  Yes Shanon Burris, NP   cyclobenzaprine (FLEXERIL) 10 mg tablet Take 1 Tablet by mouth three (3) times daily as needed for Muscle Spasm(s). 5/25/21  Yes Shanon Burris NP   albuterol (PROVENTIL HFA, VENTOLIN HFA, PROAIR HFA) 90 mcg/actuation inhaler Take 2 Puffs by inhalation every six (6) hours as needed for Wheezing or Shortness of Breath. 21  Yes Shanon Burris NP   budesonide-formoteroL (Symbicort) 160-4.5 mcg/actuation HFAA Take 1 Puff by inhalation two (2) times a day. 21  Yes Shanon Burris NP   ibuprofen (MOTRIN) 600 mg tablet Take 1 Tab by mouth every six (6) hours as needed for Pain. 20  Yes Erick Clemente MD   loratadine (Claritin) 10 mg tablet Take 1 Tab by mouth daily. 3/24/20  Yes Pooja Palma NP   famotidine (PEPCID) 40 mg tablet Take 1 Tab by mouth two (2) times daily as needed (indigestion/acid reflux). 20  Yes Pooja Palma NP   gabapentin (NEURONTIN) 800 mg tablet Take 1 Tab by mouth three (3) times daily (with meals). Max Daily Amount: 2,400 mg. Indications: Neuropathic Pain 19  Yes Amie Lundberg MD   aspirin 81 mg chewable tablet Take 81 mg by mouth daily. Yes Provider, Historical   albuterol (PROVENTIL VENTOLIN) 2.5 mg /3 mL (0.083 %) nebulizer solution 3 mL by Nebulization route once for 1 dose.  1/13/15 2/8/20  Kris Acuna NP     Allergies   Allergen Reactions    Penicillins Angioedema    Penicillins Anaphylaxis    Vicodin [Hydrocodone-Acetaminophen] Nausea and Vomiting    Azithromycin Nausea and Vomiting    Hydrochlorothiazide Nausea Only     Dizzy, lightheaded, blisters on his legs    Protonix [Pantoprazole] Itching and Contact Dermatitis     Patient denies allergy    Vicodin [Hydrocodone-Acetaminophen] Nausea and Vomiting    Erythromycin Other (comments)      Social History     Tobacco Use    Smoking status: Former Smoker     Packs/day: 2.00     Types: Cigarettes     Quit date: 2016     Years since quittin.9    Smokeless tobacco: Never Used   Substance Use Topics    Alcohol use: No     Alcohol/week: 0.0 standard drinks      Family History   Problem Relation Age of Onset    Other Brother     Cancer Maternal Grandmother     No Known Problems Mother     No Known Problems Father       Review of Systems   Constitutional: Positive for activity change. HENT: Negative for congestion. Eyes: Negative for discharge. Respiratory: Negative for apnea. Cardiovascular: Negative for chest pain. Gastrointestinal: Negative for abdominal distention. Endocrine: Negative for cold intolerance. Genitourinary: Negative for difficulty urinating. Musculoskeletal: Negative for arthralgias. Allergic/Immunologic: Negative for environmental allergies. Neurological: Negative for dizziness. Hematological: Negative for adenopathy. Psychiatric/Behavioral: Negative for agitation. Objective:    No intake/output data recorded. No intake/output data recorded. Patient Vitals for the past 8 hrs:   BP Temp Pulse Resp SpO2 Height Weight   06/12/21 0219 122/80 96.9 °F (36.1 °C) (!) 57 18 98 %     06/12/21 0045 112/69 97.9 °F (36.6 °C) 68  97 %     06/12/21 0030 110/74    99 %     06/12/21 0015 120/70    97 %     06/12/21 0000 114/75    99 %     06/11/21 2245 103/74    98 %     06/11/21 2230 113/80    97 %     06/11/21 2031 106/73 98.2 °F (36.8 °C) 64 18 99 % 5' 10\" (1.778 m) 78 kg (172 lb)     Physical Exam  HENT:      Head: Normocephalic. Nose: Nose normal.      Mouth/Throat:      Mouth: Mucous membranes are moist.   Eyes:      Pupils: Pupils are equal, round, and reactive to light. Cardiovascular:      Rate and Rhythm: Bradycardia present. Pulses: Normal pulses. Pulmonary:      Effort: Pulmonary effort is normal.   Abdominal:      General: There is no distension. Tenderness: There is no abdominal tenderness. Genitourinary:     Comments: deferred  Musculoskeletal:         General: Swelling and tenderness present. Cervical back: Normal range of motion. Skin:     General: Skin is warm. Capillary Refill: Capillary refill takes less than 2 seconds.    Neurological:      General: No focal deficit present. Mental Status: He is alert. Psychiatric:         Mood and Affect: Mood normal.          Labs:    Recent Results (from the past 24 hour(s))   CBC WITH AUTOMATED DIFF    Collection Time: 06/11/21 10:10 PM   Result Value Ref Range    WBC 6.0 4.6 - 13.2 K/uL    RBC 4.67 4.35 - 5.65 M/uL    HGB 13.8 13.0 - 16.0 g/dL    HCT 40.0 36.0 - 48.0 %    MCV 85.7 74.0 - 97.0 FL    MCH 29.6 24.0 - 34.0 PG    MCHC 34.5 31.0 - 37.0 g/dL    RDW 11.9 11.6 - 14.5 %    PLATELET 607 594 - 106 K/uL    MPV 9.5 9.2 - 11.8 FL    NEUTROPHILS 53 40 - 73 %    LYMPHOCYTES 29 21 - 52 %    MONOCYTES 14 (H) 3 - 10 %    EOSINOPHILS 3 0 - 5 %    BASOPHILS 0 0 - 2 %    ABS. NEUTROPHILS 3.2 1.8 - 8.0 K/UL    ABS. LYMPHOCYTES 1.7 0.9 - 3.6 K/UL    ABS. MONOCYTES 0.9 0.05 - 1.2 K/UL    ABS. EOSINOPHILS 0.2 0.0 - 0.4 K/UL    ABS. BASOPHILS 0.0 0.0 - 0.1 K/UL    DF AUTOMATED     METABOLIC PANEL, BASIC    Collection Time: 06/11/21 10:10 PM   Result Value Ref Range    Sodium 141 136 - 145 mmol/L    Potassium 3.4 (L) 3.5 - 5.5 mmol/L    Chloride 107 100 - 111 mmol/L    CO2 26 21 - 32 mmol/L    Anion gap 8 3.0 - 18 mmol/L    Glucose 85 74 - 99 mg/dL    BUN 15 7.0 - 18 MG/DL    Creatinine 0.71 0.6 - 1.3 MG/DL    BUN/Creatinine ratio 21 (H) 12 - 20      GFR est AA >60 >60 ml/min/1.73m2    GFR est non-AA >60 >60 ml/min/1.73m2    Calcium 8.2 (L) 8.5 - 10.1 MG/DL         Xray left hand:    Secondary soft tissue findings suspicious for cellulitis and flexor  tenosynovitis. Flexor tendon tear difficult to exclude on CT. No radiopaque  foreign body and no acute osseous findings identified.       Assessment:  Principal Problem:    Infected puncture wound of hand (6/11/2021)    Active Problems:    Cellulitis (12/6/2017)      Suppurative tenosynovitis of flexor tendon of left hand (6/12/2021)      Hypokalemia (6/12/2021)      Bradycardia (6/12/2021)        Plan:  Follow up ortho recommendations in AM  Continue antibiotics--PCN ALLERGY  Replete Potassium  Wound care  Pain management for wound PRN  DUOnebs PRN for SOB  IV Fluids    GLOBAL:  Admit to: medical floor  Cardiac Diet  DVT PPX: lovenox 40mg qD  Full Code  PT/OT  NSAID for pain, Morphine secondary--hold for HR<60  Optional Inpatient Sleep Regimen--melatonin  Anticipate DC 1-2 days with PO Antibiotics      Tete Te Britt MD  [employer]  [address] [facility]  [phone]    Signed:  Erik Chowdary MD 6/12/2021

## 2021-06-12 NOTE — PROGRESS NOTES
Problem: Mobility Impaired (Adult and Pediatric)  Goal: *Acute Goals and Plan of Care (Insert Text)  Outcome: Resolved/Met   PHYSICAL THERAPY EVALUATION AND DISCHARGE    Patient: Rosio Awad (89 y.o. male)  Date: 6/12/2021  Primary Diagnosis: Infected puncture wound of hand [S61.439A, L08.9]  Cellulitis [L03.90]     Precautions:      PLOF: I all areas, no AD use, does not drive, does not work. 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 ASSESSMENT :  Based on the objective data described below, the patient presents to skilled Physical Therapy with high level of I all areas of bed mobility, transfers and gait. He was supine in bed and cooperative for consult. He reports he is NPO and is scheduled for surgery. He was modified I all areas for supine to sit to supine , mod I/S for sit to stand to sit  S for ambulation in the room with no AD use. He was left in bed and all needs met. Patient does not require further skilled intervention at this level of care. PLAN :  Recommendations and Planned Interventions: none at this time  No formal PT needs identified at this time. Needs may change pending surgery  Discharge Recommendations: To Be Determined pending surgery  Further Equipment Recommendations for Discharge: none at this time. TBD pending surgery     SUBJECTIVE:   Patient stated I am supposed to have surgery today. I would be better if I was home.     OBJECTIVE DATA SUMMARY:     Past Medical History:   Diagnosis Date    Arthritis of knee, right 12/2017    advanced degen changes noted, CT right LE    Asthma     Cellulitis 12/2017    DDD (degenerative disc disease), lumbar 2013    noted on MRI with annular tears    Diverticulitis 2016    GERD (gastroesophageal reflux disease) 2016    with esophagitis according to endscopy    Kidney stone     Lumbar post-laminectomy syndrome     [facility] [facility]     Stroke ([facility] [address] may 14 2010    Unspecified rotator cuff tear or rupture of left shoulder, not specified as traumatic 03/2016     Past Surgical History:   Procedure Laterality Date    COLONOSCOPY N/A 8/12/2016    COLONOSCOPY with polypectomy performed by Marilu Woods MD at [address] [place] [address] Rd  2002, 2004, 2006    L5-S1 fusion, laminectomies    HX ENDOSCOPY  10/2016    grade 1 espohagitis    HX ENDOSCOPY  11/2016    mild esophagitis    HX KNEE ARTHROSCOPY Right     knee    HX MOHS PROCEDURES      right    HX ORTHOPAEDIC      right shoulder     Barriers to Learning/Limitations: None  Compensate with: N/A  Home Situation:   Home Situation  Home Environment: Private residence  # Steps to Enter: 3  Rails to Enter: Yes  Hand Rails : Left  One/Two Story Residence: One story  Living Alone: No  Support Systems: Family member(s), Friends \ neighbors  Patient Expects to be Discharged to[de-identified] Other (comment) (private residence)  Current DME Used/Available at Home: None  Critical Behavior:  Neurologic State: Alert  Orientation Level: Oriented X4  Cognition: Follows commands  Safety/Judgement: Fall prevention  Psychosocial  Patient Behaviors: Calm; Cooperative  Purposeful Interaction: Yes  Pt Identified Daily Priority: Clinical issues (comment)  Caring Interventions: Reassure  Skin Condition/Temp: Warm     Skin Integrity: Intact  Skin Integumentary  Skin Color: Appropriate for ethnicity  Skin Condition/Temp: Warm  Skin Integrity: Intact     Strength:    Strength: Within functional limits (B LE)   Tone & Sensation:   Tone: Normal     Sensation: Intact               Range Of Motion:  AROM: Within functional limits (B LE)   Posture:     Functional Mobility:  Bed Mobility:  Rolling: Modified independent  Supine to Sit: Modified independent  Sit to Supine: Modified independent  Scooting: Modified independent  Transfers:  Sit to Stand: Modified independent;Supervision  Stand to Sit: Modified independent;Supervision   Balance:   Sitting: Intact; Without support  Standing: Intact; Without support  Wheelchair Mobility: Ambulation/Gait Training:  Distance (ft): 25 Feet (ft)     Ambulation - Level of Assistance: Supervision     Base of Support: Narrowed   Stairs: Therapeutic Exercises:      Pain:  Pain level pre-treatment: 10/10   Pain level post-treatment: 10/10  Pain Intervention(s): Medication (see MAR); Rest, Ice, Repositioning    Response to intervention: Nurse notified, See doc flow     Activity Tolerance:    opal well  Please refer to the flowsheet for vital signs taken during this treatment. After treatment:   []         Patient left in no apparent distress sitting up in chair  [x]         Patient left in no apparent distress in bed  [x]         Call bell left within reach  []         Nursing notified  []         Caregiver present  []         Bed alarm activated  []         SCDs applied    COMMUNICATION/EDUCATION:   [x]         Role of Physical Therapy in the acute care setting. [x]         Fall prevention education was provided and the patient/caregiver indicated understanding. [x]         Patient/family have participated as able in goal setting and plan of care. []         Patient/family agree to work toward stated goals and plan of care. []         Patient understands intent and goals of therapy, but is neutral about his/her participation. []         Patient is unable to participate in goal setting/plan of care: ongoing with therapy staff.  []         Other:     Thank you for this referral.  Xiomara Nolasco, PT   Time Calculation: 11 mins      Eval Complexity: History: MEDIUM  Complexity : 1-2 comorbidities / personal factors will impact the outcome/ POC Exam:MEDIUM Complexity : 3 Standardized tests and measures addressing body structure, function, activity limitation and / or participation in recreation  Presentation: LOW Complexity : Stable, uncomplicated  Clinical Decision Making:Low Complexity level of assistance with functional mobility  Overall Complexity:LOW

## 2021-06-12 NOTE — CONSULTS
Per Ortho     Patient seen at bedside s/p nail gun injury left volar index finger base. C/o pain and difficult ROM Left hand. Extensive soft tissue swelling left hand dorsal, volar, involving index/long finger with lacking full extension and Palm to pulp 10cm deficit    Trace warmth, no erythema. Entry wound volar radial side adjacent to MCPJ    CT as below    Result Information    Status: Final result (Exam End: 6/11/2021 23:22) Provider Status: Open   Study Result    Narrative & Impression   EXAMINATION: CT left hand with IV contrast     INDICATION: Hand puncture wound     COMPARISON: Radiographs same date     TECHNIQUE: CT of the left hand performed following 100 cc IV Isovue-300 with  multiplanar reformations. All CT scans at this facility are performed using dose  optimization technique as appropriate to a performed exam, to include automated  exposure control, adjustment of the mA and/or kV according to patient size  (including appropriate matching first site specific examinations), or use of  iterative reconstruction technique.     FINDINGS:     Bones/joints: No acute osseous findings. Fourth metacarpal pin fixation. Spaces  largely maintained.     Muscles/tendons: Extensive ill-defined edema/swelling in the second finger, most  pronounced distally, and along the flexor tendon.     Other: Second finger soft tissue findings as above. No radiopaque foreign body  identified. No rim-enhancing collection identified.     IMPRESSION     Secondary soft tissue findings suspicious for cellulitis and flexor  tenosynovitis. Flexor tendon tear difficult to exclude on CT.  No radiopaque  foreign body and no acute osseous findings identified.        Case discussed Dr. Renny Trivedi, recommend I and D with washout and intra operative cultures, STAT Covid, repeat CBC, NPO

## 2021-06-12 NOTE — INTERVAL H&P NOTE
Update History & Physical 
 
The Patient's History and Physical of June 12, 2021 was reviewed with the patient and I examined the patient. There was no change. The surgical site was confirmed by the patient and me. Plan:  The risk, benefits, expected outcome, and alternative to the recommended procedure have been discussed with the patient. Patient understands and wants to proceed with the procedure.  
 
Electronically signed by Haile Hernández MD on 6/12/2021 at 12:17 PM

## 2021-06-12 NOTE — ED TRIAGE NOTES
Pt. Reports having a nail go through his left hand/index finger while holding a piece of wood. Pt states this incident happened yesterday.

## 2021-06-12 NOTE — PROGRESS NOTES
Problem: Falls - Risk of  Goal: *Absence of Falls  Description: Document Keron Shove Fall Risk and appropriate interventions in the flowsheet.   Outcome: Progressing Towards Goal  Note: Fall Risk Interventions:            Medication Interventions: Utilize gait belt for transfers/ambulation, Teach patient to arise slowly, Patient to call before getting OOB                   Problem: Patient Education: Go to Patient Education Activity  Goal: Patient/Family Education  Outcome: Progressing Towards Goal     Problem: Pain  Goal: *Control of Pain  Outcome: Progressing Towards Goal     Problem: Patient Education: Go to Patient Education Activity  Goal: Patient/Family Education  Outcome: Progressing Towards Goal

## 2021-06-12 NOTE — PROGRESS NOTES
Problem: Falls - Risk of  Goal: *Absence of Falls  Description: Document Haley Ngo Fall Risk and appropriate interventions in the flowsheet.   Outcome: Progressing Towards Goal  Note: Fall Risk Interventions:            Medication Interventions: Patient to call before getting OOB, Teach patient to arise slowly                   Problem: Patient Education: Go to Patient Education Activity  Goal: Patient/Family Education  Outcome: Progressing Towards Goal     Problem: Pain  Goal: *Control of Pain  Outcome: Progressing Towards Goal     Problem: Patient Education: Go to Patient Education Activity  Goal: Patient/Family Education  Outcome: Progressing Towards Goal     Problem: Patient Education: Go to Patient Education Activity  Goal: Patient/Family Education  Outcome: Progressing Towards Goal

## 2021-06-12 NOTE — PROGRESS NOTES
New OT orders received and chart reviewed. Unable to see pt for OT evaluation at this time due to:    Pt is off Unit for scheduled I&D left hand wound, catheter flexor space washout, probable A1 pulley release. We will follow up following procedure. Please update any  AROM and WB restrictions for pt's L hand following procedure to allow for full OT evaluation. Will follow up later as pt's schedule allows.  Thank you for this referral.    Charo Arango MS, OTR/L

## 2021-06-12 NOTE — BRIEF OP NOTE
Brief Postoperative Note    Patient: Janee Maza  YOB: 1965  MRN: [id number]    Date of Procedure: 6/12/2021     Pre-Op Diagnosis: Infected flexor tenosynovitis left index finger, infected puncture wound left hand    Post-Op Diagnosis: Same as preoperative diagnosis.       Procedure(s):  INCISION AND DRAINAGE UPPER EXTREMITY/ WASHOUT LEFT INDEX FINGER    Surgeon(s):  Aparna Paez MD    Surgical Assistant: Surg Asst-1: Dayron Blake    Anesthesia: General     Estimated Blood Loss (mL): Minimal    Complications: None    Specimens:   ID Type Source Tests Collected by Time Destination   1 : puncture wound left index wound Wound Finger CULTURE, WOUND W Ming Guillaume Paez MD 6/12/2021 1310 Microbiology        Implants: * No implants in log *    Drains:   [REMOVED] Corona-Parekh Drain 10/22/19 (Removed)       Findings: above    Electronically Signed by Ermias Dickinson MD on 6/12/2021 at 1:53 PM

## 2021-06-12 NOTE — ANESTHESIA PREPROCEDURE EVALUATION
Relevant Problems   RESPIRATORY SYSTEM   (+) Moderate persistent asthma without complication      GASTROINTESTINAL   (+) Gastroesophageal reflux disease with esophagitis       Anesthetic History   No history of anesthetic complications            Review of Systems / Medical History  Patient summary reviewed and pertinent labs reviewed    Pulmonary            Asthma : well controlled       Neuro/Psych       CVA       Cardiovascular  Within defined limits                     GI/Hepatic/Renal     GERD           Endo/Other        Arthritis     Other Findings              Physical Exam    Airway  Mallampati: II  TM Distance: 4 - 6 cm  Neck ROM: normal range of motion   Mouth opening: Normal     Cardiovascular  Regular rate and rhythm,  S1 and S2 normal,  no murmur, click, rub, or gallop             Dental    Dentition: Poor dentition     Pulmonary  Breath sounds clear to auscultation               Abdominal  GI exam deferred       Other Findings            Anesthetic Plan    ASA: 3, emergent  Anesthesia type: general          Induction: Intravenous  Anesthetic plan and risks discussed with: Patient

## 2021-06-12 NOTE — PROGRESS NOTES
Hospitalist Progress Note    Patient: Ben Maciel Age: 54 y.o. : 1965 MR#: [id number] SSN: [id number]  Date/Time: 2021 10:48 AM    DOA: 2021  PCP: Babita Vital NP    Subjective:     He was helping his friend and he sustained nail gun injury to his left finger. It has swollen and painful in the last 2 day. No fever. In the ER, he was given Vancomycin and levofloxacin. Ortho to take him for ID and washout today      Interval Hospital Course:        ROS: No current fever/chills, no headache, no dizziness, no facial pain, no sinus congestion,   No swallowing pain, No chest pain, no palpitation, no shortness of breath, no abd pain,  No diarrhea, no urinary complaint, no leg pain or swelling      Assessment/Plan:   1. Injury to left finger with nail gun  2. Tenosynovitis of flexor tendon of left hand   3. Cellulitis of left hand   4. Hypokalemia  5. Leukopenia     Cont vancomycin/levofloxacin, spoke with ID for further follow up tomorrow   Intraoperative culture follow up.    OR today for washout  pepcid  ICS  Wound care     Full code   Additional Notes:   Time spent >30 minutes     Case discussed with:  [x]Patient  []Family  [x]Nursing  [x]Case Management  DVT Prophylaxis:  []Lovenox  []Hep SQ  [x]SCDs  []Coumadin   []On Heparin gtt    Signed By: Marilyn Graff MD     2021 10:48 AM              Objective:   VS:   Visit Vitals  /75 (BP 1 Location: Left upper arm, BP Patient Position: At rest)   Pulse 61   Temp 96.9 °F (36.1 °C)   Resp 18   Ht 5' 10\" (1.778 m)   Wt 78 kg (172 lb)   SpO2 96%   BMI 24.68 kg/m²      Tmax/24hrs: Temp (24hrs), Av.3 °F (36.3 °C), Min:96.8 °F (36 °C), Max:98.2 °F (36.8 °C)  No intake or output data in the 24 hours ending 21 1048    Tele:   General:  Cooperative, Not in acute distress, speaks in full sentence while in bed  HEENT: PERRL, EOMI, supple neck, no JVD, dry oral mucosa  Cardiovascular: S1S2 regular, no rub/gallop Pulmonary: Clear air entry bilaterally, no wheezing, no crackle  GI:  Soft, non tender, non distended, +bs, no guarding   Extremities:  No pedal edema, +distal pulses appreciated   Left hand cellulitis, swelling at index finger  Neuro: AOx3, moving all extremities, no gross deficit. Additional:       Current Facility-Administered Medications   Medication Dose Route Frequency    morphine injection 1 mg  1 mg IntraVENous ONCE    aspirin chewable tablet 81 mg  81 mg Oral DAILY    famotidine (PEPCID) tablet 40 mg  40 mg Oral BID PRN    sodium chloride (NS) flush 5-40 mL  5-40 mL IntraVENous Q8H    sodium chloride (NS) flush 5-40 mL  5-40 mL IntraVENous PRN    acetaminophen (TYLENOL) tablet 650 mg  650 mg Oral Q6H PRN    Or    acetaminophen (TYLENOL) suppository 650 mg  650 mg Rectal Q6H PRN    polyethylene glycol (MIRALAX) packet 17 g  17 g Oral DAILY PRN    ondansetron (ZOFRAN ODT) tablet 4 mg  4 mg Oral Q8H PRN    Or    ondansetron (ZOFRAN) injection 4 mg  4 mg IntraVENous Q6H PRN    enoxaparin (LOVENOX) injection 40 mg  40 mg SubCUTAneous DAILY    albuterol-ipratropium (DUO-NEB) 2.5 MG-0.5 MG/3 ML  3 mL Nebulization Q4H RT    0.9% sodium chloride infusion  50 mL/hr IntraVENous CONTINUOUS    vancomycin (VANCOCIN) 1250 mg in  ml infusion  1,250 mg IntraVENous Q8H    morphine injection 2 mg  2 mg IntraVENous Q3H PRN    acetaminophen (TYLENOL) tablet 500 mg  500 mg Oral ON CALL    pregabalin (LYRICA) capsule 75 mg  75 mg Oral ON CALL TO OR    levoFLOXacin (LEVAQUIN) 500 mg in D5W IVPB  500 mg IntraVENous Q24H    Lactobacillus Acidoph & Bulgar (FLORANEX) tablet 2 Tablet  2 Tablet Oral BID            Lab/Data Review:  Labs: Results:       Chemistry Recent Labs     06/11/21  2210   GLU 85      K 3.4*      CO2 26   BUN 15   CREA 0.71   BUCR 21*   AGAP 8   CA 8.2*     No results for input(s): TBIL, ALT, ALKP, TP, ALB, GLOB, AGRAT in the last 72 hours.     No lab exists for component: SGOT   CBC w/Diff Recent Labs     06/11/21  2210   WBC 6.0   RBC 4.67   HGB 13.8   HCT 40.0   MCV 85.7   MCH 29.6   MCHC 34.5   RDW 11.9      GRANS 53   LYMPH 29   EOS 3      Coagulation No results for input(s): PTP, INR, APTT, INREXT in the last 72 hours. Iron/Ferritin No results found for: IRON, FE, TIBC, IBCT, PSAT, FERR    BNP    Cardiac Enzymes Lab Results   Component Value Date/Time    CK 69 02/12/2015 11:45 AM    CK - MB 0.8 02/12/2015 11:45 AM    CK-MB Index 1.2 02/12/2015 11:45 AM    Troponin-I, QT <0.02 02/12/2015 11:45 AM        Lactic Acid    Thyroid Studies          All Micro Results     Procedure Component Value Units Date/Time    CULTURE, Kingsley Valverde STAIN [[id number]]     Order Status: Sent Specimen: Wound from Finger     CULTURE, Reliance Valverde STAIN [[id number]]     Order Status: Canceled Specimen: Wound from Finger     COVID-19 RAPID TEST [[id number]]     Order Status: Sent             Images:    CT (Most Recent). CT Results (most recent):  Results from Hospital Encounter encounter on 06/11/21    CT HAND LT W CONT    Narrative  EXAMINATION: CT left hand with IV contrast    INDICATION: Hand puncture wound    COMPARISON: Radiographs same date    TECHNIQUE: CT of the left hand performed following 100 cc IV Isovue-300 with  multiplanar reformations. All CT scans at this facility are performed using dose  optimization technique as appropriate to a performed exam, to include automated  exposure control, adjustment of the mA and/or kV according to patient size  (including appropriate matching first site specific examinations), or use of  iterative reconstruction technique. FINDINGS:    Bones/joints: No acute osseous findings. Fourth metacarpal pin fixation. Spaces  largely maintained. Muscles/tendons: Extensive ill-defined edema/swelling in the second finger, most  pronounced distally, and along the flexor tendon. Other: Second finger soft tissue findings as above.  No radiopaque foreign body  identified. No rim-enhancing collection identified. Impression  Secondary soft tissue findings suspicious for cellulitis and flexor  tenosynovitis. Flexor tendon tear difficult to exclude on CT. No radiopaque  foreign body and no acute osseous findings identified. XRAY (Most Recent)      EKG No results found for this or any previous visit.      2D ECHO

## 2021-06-12 NOTE — ED PROVIDER NOTES
EMERGENCY DEPARTMENT HISTORY AND PHYSICAL EXAM      Date: 6/11/2021  Patient Name: Quincy Jerez    History of Presenting Illness     Chief Complaint   Patient presents with    Hand Injury    Finger Pain       History (Context): Quincy Jerez is a 54 y.o. gentleman with past medical history as noted below who presents after having a nail gun discharge into his hand. This happened 2 days ago, and it has gotten progressively worse. It is severe, acute onset, persistent, progressive. Swelling and pain with motion is noted. On review of systems, the patient denies fever, chills, rashes, numbness, tingling, weakness    PCP: Shanon Burris NP    Current Facility-Administered Medications   Medication Dose Route Frequency Provider Last Rate Last Admin    diph,Pertuss(AC),Tet Vac-PF (BOOSTRIX) suspension 0.5 mL  0.5 mL IntraMUSCular PRIOR TO DISCHARGE Mercedes Reina MD        vancomycin (VANCOCIN) 1,000 mg in 0.9% sodium chloride 250 mL (VIAL-MATE)  1,000 mg IntraVENous NOW Mercedes Reina MD        clindamycin phosphate (CLEOCIN) 600 mg in 0.9% sodium chloride (MBP/ADV) 100 mL MBP  600 mg IntraVENous NOW Mercedes Reina MD        morphine injection 4 mg  4 mg IntraVENous NOW Mercedes Reina MD         Current Outpatient Medications   Medication Sig Dispense Refill    ondansetron (ZOFRAN ODT) 4 mg disintegrating tablet Take 1 Tablet by mouth every eight (8) hours as needed for Nausea. 20 Tablet 0    cyclobenzaprine (FLEXERIL) 10 mg tablet Take 1 Tablet by mouth three (3) times daily as needed for Muscle Spasm(s). 90 Tablet 6    albuterol (PROVENTIL HFA, VENTOLIN HFA, PROAIR HFA) 90 mcg/actuation inhaler Take 2 Puffs by inhalation every six (6) hours as needed for Wheezing or Shortness of Breath. 1 Inhaler 11    budesonide-formoteroL (Symbicort) 160-4.5 mcg/actuation HFAA Take 1 Puff by inhalation two (2) times a day.  1 Inhaler 6    ibuprofen (MOTRIN) 600 mg tablet Take 1 Tab by mouth every six (6) hours as needed for Pain. 20 Tab 0    loratadine (Claritin) 10 mg tablet Take 1 Tab by mouth daily. 90 Tab 3    famotidine (PEPCID) 40 mg tablet Take 1 Tab by mouth two (2) times daily as needed (indigestion/acid reflux). 90 Tab 0    gabapentin (NEURONTIN) 800 mg tablet Take 1 Tab by mouth three (3) times daily (with meals). Max Daily Amount: 2,400 mg. Indications: Neuropathic Pain 270 Tab 0    aspirin 81 mg chewable tablet Take 81 mg by mouth daily.  albuterol (PROVENTIL VENTOLIN) 2.5 mg /3 mL (0.083 %) nebulizer solution 3 mL by Nebulization route once for 1 dose.  1 Package 0       Past History     Past Medical History:  Past Medical History:   Diagnosis Date    Arthritis of knee, right 12/2017    advanced degen changes noted, CT right LE    Asthma     Cellulitis 12/2017    DDD (degenerative disc disease), lumbar 2013    noted on MRI with annular tears    Diverticulitis 2016    GERD (gastroesophageal reflux disease) 2016    with esophagitis according to endscopy    Kidney stone     Lumbar post-laminectomy syndrome     Sacroiliitis ([address]     Stroke [facility]) may 14 2010    Unspecified rotator cuff tear or rupture of left shoulder, not specified as traumatic 03/2016       Past Surgical History:  Past Surgical History:   Procedure Laterality Date    COLONOSCOPY N/A 8/12/2016    COLONOSCOPY with polypectomy performed by Lauren Garland MD at [address]  2002, 2004, 2006    L5-S1 fusion, laminectomies    HX ENDOSCOPY  10/2016    grade 1 espohagitis    HX ENDOSCOPY  11/2016    mild esophagitis    HX KNEE ARTHROSCOPY Right     knee    HX MOHS PROCEDURES      right    HX ORTHOPAEDIC      right shoulder       Family History:  Family History   Problem Relation Age of Onset    Other Brother     Cancer Maternal Grandmother     No Known Problems Mother     No Known Problems Father        Social History:  Social History     Tobacco Use    Smoking status: Former Smoker Packs/day: 2.00     Types: Cigarettes     Quit date: 2016     Years since quittin.9    Smokeless tobacco: Never Used   Substance Use Topics    Alcohol use: No     Alcohol/week: 0.0 standard drinks    Drug use: No       Allergies: Allergies   Allergen Reactions    Penicillins Angioedema    Penicillins Anaphylaxis    Vicodin [Hydrocodone-Acetaminophen] Nausea and Vomiting    Azithromycin Nausea and Vomiting    Hydrochlorothiazide Nausea Only     Dizzy, lightheaded, blisters on his legs    Protonix [Pantoprazole] Itching and Contact Dermatitis     Patient denies allergy    Vicodin [Hydrocodone-Acetaminophen] Nausea and Vomiting    Erythromycin Other (comments)       PMH, PSH, family history, social history, allergies reviewed with the patient with significant items noted above. Review of Systems   As per HPI, otherwise reviewed and negative. Physical Exam     Vitals:    21   BP: 106/73   Pulse: 64   Resp: 18   Temp: 98.2 °F (36.8 °C)   SpO2: 99%   Weight: 78 kg (172 lb)   Height: 5' 10\" (1.778 m)       Gen: Well-appearing, in no acute distress   HEENT: Normocephalic, sclera anicteric  Cardiovascular: Normal rate, regular rhythm, no murmurs, rubs, gallops. Pulses intact and equal distally. Pulmonary: No respiratory distress. No stridor. Clear lungs. ABD: Soft, nontender, nondistended. Neuro: Alert. Normal speech. Normal mentation. Psych: Normal thought content and thought processes. : No CVA tenderness  EXT: Left hand with puncture wound near the M1 pulley of the left index finger. The wound is on the border of the hand and the finger. No Knievel signs. Patient is tender throughout the swelling of his hand. Moves all extremities well. No cyanosis or clubbing. Skin: Warm and well-perfused. Other:        Diagnostic Study Results     Labs -   No results found for this or any previous visit (from the past 12 hour(s)).     Radiologic Studies -   XR HAND LT MIN 3 V (Results Pending)     CT Results  (Last 48 hours)    None        CXR Results  (Last 48 hours)    None            Medical Decision Making   I am the first provider for this patient. I reviewed the vital signs, available nursing notes, past medical history, past surgical history, family history and social history. Vital Signs-Reviewed the patient's vital signs. Records Reviewed: Personally, on initial evaluation    MDM:   Patient presents with hand pain after nail gun discharge into his hand. Nail was removed. The patient presents in delayed fashion, as this did not happen today. .  Exam significant for what appears to be hand infection. DDX considered: Hand infection, flexor tenosynovitis, local inflammation  DDX thought to be less likely but also considered due to high risk condition:     Patient condition on initial evaluation: Stable    Plan:   Pain control  Orders as below:  Orders Placed This Encounter    XR HAND LT MIN 3 V    CBC WITH AUTOMATED DIFF    BASIC METABOLIC PANEL    C REACTIVE PROTEIN, QT    diph,Pertuss(AC),Tet Vac-PF (BOOSTRIX) suspension 0.5 mL    vancomycin (VANCOCIN) 1,000 mg in 0.9% sodium chloride 250 mL (VIAL-MATE)    clindamycin phosphate (CLEOCIN) 600 mg in 0.9% sodium chloride (MBP/ADV) 100 mL MBP    morphine injection 4 mg        ED Course:      Patient stable throughout ED course. The patient will likely need an incision and drainage with hand washout. I consulted orthopedic surgery and the hospitalist.  The patient was admitted to the hospitalist without issue. Patient condition at time of disposition: Stable  :     Disposition: Admit    Diagnosis     Clinical Impression:   1. Cellulitis of finger of left hand    2. Suppurative tenosynovitis of flexor tendon of left hand    3.  Puncture wound of left hand with infection, initial encounter        Signed,  Geoff Cline MD  Emergency Physician  [facility]    As a voice dictation software was utilized to dictate this note, minor word transpositions can occur. I apologize for confusing wording and typographic errors. Please feel free to contact me for clarification.

## 2021-06-13 PROBLEM — L03.012 CELLULITIS OF FINGER OF LEFT HAND: Status: ACTIVE | Noted: 2021-06-13

## 2021-06-13 LAB
ANION GAP SERPL CALC-SCNC: 6 MMOL/L (ref 3–18)
BUN SERPL-MCNC: 16 MG/DL (ref 7–18)
BUN/CREAT SERPL: 25 (ref 12–20)
CALCIUM SERPL-MCNC: 8.4 MG/DL (ref 8.5–10.1)
CHLORIDE SERPL-SCNC: 112 MMOL/L (ref 100–111)
CO2 SERPL-SCNC: 24 MMOL/L (ref 21–32)
CREAT SERPL-MCNC: 0.63 MG/DL (ref 0.6–1.3)
GLUCOSE SERPL-MCNC: 134 MG/DL (ref 74–99)
POTASSIUM SERPL-SCNC: 3.8 MMOL/L (ref 3.5–5.5)
SODIUM SERPL-SCNC: 142 MMOL/L (ref 136–145)

## 2021-06-13 PROCEDURE — 80202 ASSAY OF VANCOMYCIN: CPT

## 2021-06-13 PROCEDURE — 74011250637 HC RX REV CODE- 250/637: Performed by: SPECIALIST

## 2021-06-13 PROCEDURE — 74011000250 HC RX REV CODE- 250: Performed by: INTERNAL MEDICINE

## 2021-06-13 PROCEDURE — 36415 COLL VENOUS BLD VENIPUNCTURE: CPT

## 2021-06-13 PROCEDURE — 94640 AIRWAY INHALATION TREATMENT: CPT

## 2021-06-13 PROCEDURE — 65270000029 HC RM PRIVATE

## 2021-06-13 PROCEDURE — 77030027138 HC INCENT SPIROMETER -A

## 2021-06-13 PROCEDURE — 74011250636 HC RX REV CODE- 250/636: Performed by: INTERNAL MEDICINE

## 2021-06-13 PROCEDURE — 80048 BASIC METABOLIC PNL TOTAL CA: CPT

## 2021-06-13 PROCEDURE — 74011250636 HC RX REV CODE- 250/636: Performed by: NURSE ANESTHETIST, CERTIFIED REGISTERED

## 2021-06-13 PROCEDURE — 74011250637 HC RX REV CODE- 250/637: Performed by: INTERNAL MEDICINE

## 2021-06-13 PROCEDURE — 99218 HC RM OBSERVATION: CPT

## 2021-06-13 PROCEDURE — 99232 SBSQ HOSP IP/OBS MODERATE 35: CPT | Performed by: INTERNAL MEDICINE

## 2021-06-13 PROCEDURE — 97165 OT EVAL LOW COMPLEX 30 MIN: CPT

## 2021-06-13 PROCEDURE — 2709999900 HC NON-CHARGEABLE SUPPLY

## 2021-06-13 RX ORDER — CYCLOBENZAPRINE HCL 10 MG
10 TABLET ORAL
Status: DISCONTINUED | OUTPATIENT
Start: 2021-06-13 | End: 2021-06-15 | Stop reason: HOSPADM

## 2021-06-13 RX ORDER — IPRATROPIUM BROMIDE AND ALBUTEROL SULFATE 2.5; .5 MG/3ML; MG/3ML
3 SOLUTION RESPIRATORY (INHALATION)
Status: DISCONTINUED | OUTPATIENT
Start: 2021-06-13 | End: 2021-06-15 | Stop reason: HOSPADM

## 2021-06-13 RX ORDER — GABAPENTIN 300 MG/1
600 CAPSULE ORAL 3 TIMES DAILY
Status: DISCONTINUED | OUTPATIENT
Start: 2021-06-13 | End: 2021-06-15 | Stop reason: HOSPADM

## 2021-06-13 RX ADMIN — GABAPENTIN 600 MG: 300 CAPSULE ORAL at 17:54

## 2021-06-13 RX ADMIN — LACTOBACILLUS TAB 2 TABLET: TAB at 17:54

## 2021-06-13 RX ADMIN — DOCUSATE SODIUM 50MG AND SENNOSIDES 8.6MG 1 TABLET: 8.6; 5 TABLET, FILM COATED ORAL at 17:54

## 2021-06-13 RX ADMIN — Medication 10 ML: at 05:26

## 2021-06-13 RX ADMIN — SODIUM CHLORIDE, SODIUM LACTATE, POTASSIUM CHLORIDE, AND CALCIUM CHLORIDE 50 ML/HR: 600; 310; 30; 20 INJECTION, SOLUTION INTRAVENOUS at 12:32

## 2021-06-13 RX ADMIN — Medication 10 ML: at 21:13

## 2021-06-13 RX ADMIN — ACETAMINOPHEN 650 MG: 325 TABLET ORAL at 17:53

## 2021-06-13 RX ADMIN — LEVOFLOXACIN 500 MG: 5 INJECTION, SOLUTION INTRAVENOUS at 21:12

## 2021-06-13 RX ADMIN — GABAPENTIN 600 MG: 300 CAPSULE ORAL at 12:26

## 2021-06-13 RX ADMIN — Medication 10 ML: at 05:25

## 2021-06-13 RX ADMIN — DOCUSATE SODIUM 50MG AND SENNOSIDES 8.6MG 1 TABLET: 8.6; 5 TABLET, FILM COATED ORAL at 09:25

## 2021-06-13 RX ADMIN — GABAPENTIN 600 MG: 300 CAPSULE ORAL at 21:13

## 2021-06-13 RX ADMIN — LACTOBACILLUS TAB 2 TABLET: TAB at 09:25

## 2021-06-13 RX ADMIN — OXYCODONE HYDROCHLORIDE 10 MG: 10 TABLET ORAL at 00:49

## 2021-06-13 RX ADMIN — VANCOMYCIN HYDROCHLORIDE 1250 MG: 10 INJECTION, POWDER, LYOPHILIZED, FOR SOLUTION INTRAVENOUS at 00:49

## 2021-06-13 RX ADMIN — IPRATROPIUM BROMIDE AND ALBUTEROL SULFATE 3 ML: .5; 3 SOLUTION RESPIRATORY (INHALATION) at 00:03

## 2021-06-13 RX ADMIN — VANCOMYCIN HYDROCHLORIDE 1250 MG: 10 INJECTION, POWDER, LYOPHILIZED, FOR SOLUTION INTRAVENOUS at 12:26

## 2021-06-13 RX ADMIN — ACETAMINOPHEN 650 MG: 325 TABLET ORAL at 12:26

## 2021-06-13 RX ADMIN — ACETAMINOPHEN 650 MG: 325 TABLET ORAL at 00:49

## 2021-06-13 RX ADMIN — OXYCODONE HYDROCHLORIDE 10 MG: 10 TABLET ORAL at 09:25

## 2021-06-13 RX ADMIN — CYCLOBENZAPRINE 10 MG: 10 TABLET, FILM COATED ORAL at 21:13

## 2021-06-13 RX ADMIN — OXYCODONE HYDROCHLORIDE 5 MG: 5 TABLET ORAL at 20:09

## 2021-06-13 NOTE — PROGRESS NOTES
End of Shift Note     Bedside and verbal shift change report given to Lara Ann (On coming nurse) by Sangeeta Bergman RN (Off going nurse).   Report included the following information:      --Procedure Summary     --MAR,     --Recent Results     --Med Rec Status

## 2021-06-13 NOTE — ROUTINE PROCESS
End of Shift Note Bedside and verbal shift change report given to Brent Rodriguez RN (On coming nurse) by Alba Mixon RN (Off going nurse). Report included the following information:  
   --Procedure Summary 
   --MAR, 
   --Recent Results --Med Rec Status

## 2021-06-13 NOTE — PROGRESS NOTES
Hospitalist Progress Note    Patient: Deangelo Potts Age: 54 y.o. : 1965 MR#: [id number] SSN: [id number]  Date/Time: 2021 12:45 AM    DOA: 2021  PCP: Tao Hyman NP    Subjective:     Feels well overall. Tolerate surgical washout yesterday. Pain in hand is better. Wants his gabapentin resume   No fever. Pending intraoperative Cx  tolerates Vancomycin and levofloxacin. Ortho to take him for ID and washout today      Interval Hospital Course:        ROS: No current fever/chills, no headache, no dizziness, no facial pain, no sinus congestion,   No swallowing pain, No chest pain, no palpitation, no shortness of breath, no abd pain,  No diarrhea, no urinary complaint, no leg pain or swelling      Assessment/Plan:   1. Injury to left finger with nail gun  2. Tenosynovitis of flexor tendon of left hand   3. Cellulitis of left hand   4. Hypokalemia  5. Leukopenia       Cont vancomycin/levofloxacin, appreciate ID consult  Intraoperative culture follow up.    Appreciate ortho intervention   pepcid  ICS  Wound care   Resume home medications    Full code   Additional Notes:   Time spent >30 minutes     Case discussed with:  [x]Patient  []Family  [x]Nursing  [x]Case Management  DVT Prophylaxis:  []Lovenox  []Hep SQ  [x]SCDs  []Coumadin   []On Heparin gtt    Signed By: Michael Rodriguez MD     2021 12:45 AM              Objective:   VS:   Visit Vitals  /65   Pulse 69   Temp 97.7 °F (36.5 °C)   Resp 18   Ht 5' 10\" (1.778 m)   Wt 78 kg (172 lb)   SpO2 98%   BMI 24.68 kg/m²      Tmax/24hrs: Temp (24hrs), Av.4 °F (36.3 °C), Min:96.8 °F (36 °C), Max:98 °F (36.7 °C)      Intake/Output Summary (Last 24 hours) at 2021 1245  Last data filed at 2021 1233  Gross per 24 hour   Intake 4513.75 ml   Output 1220 ml   Net 3293.75 ml       Tele:   General:  Cooperative, Not in acute distress, speaks in full sentence while in bed  HEENT: PERRL, EOMI, supple neck, no JVD, dry oral mucosa  Cardiovascular: S1S2 regular, no rub/gallop   Pulmonary: Clear air entry bilaterally, no wheezing, no crackle  GI:  Soft, non tender, non distended, +bs, no guarding   Extremities:  No pedal edema, +distal pulses appreciated   Left hand cellulitis, swelling at index finger  Neuro: AOx3, moving all extremities, no gross deficit.      Additional:       Current Facility-Administered Medications   Medication Dose Route Frequency    cyclobenzaprine (FLEXERIL) tablet 10 mg  10 mg Oral TID PRN    gabapentin (NEURONTIN) capsule 600 mg  600 mg Oral TID    Vancomycin Lab Information  1 Each Other Once per day on Sun    famotidine (PEPCID) tablet 40 mg  40 mg Oral BID PRN    sodium chloride (NS) flush 5-40 mL  5-40 mL IntraVENous Q8H    sodium chloride (NS) flush 5-40 mL  5-40 mL IntraVENous PRN    acetaminophen (TYLENOL) tablet 650 mg  650 mg Oral Q6H PRN    Or    acetaminophen (TYLENOL) suppository 650 mg  650 mg Rectal Q6H PRN    polyethylene glycol (MIRALAX) packet 17 g  17 g Oral DAILY PRN    ondansetron (ZOFRAN ODT) tablet 4 mg  4 mg Oral Q8H PRN    Or    ondansetron (ZOFRAN) injection 4 mg  4 mg IntraVENous Q6H PRN    albuterol-ipratropium (DUO-NEB) 2.5 MG-0.5 MG/3 ML  3 mL Nebulization Q4H RT    0.9% sodium chloride infusion  50 mL/hr IntraVENous CONTINUOUS    morphine injection 2 mg  2 mg IntraVENous Q3H PRN    levoFLOXacin (LEVAQUIN) 500 mg in D5W IVPB  500 mg IntraVENous Q24H    Lactobacillus Acidoph & Bulgar (FLORANEX) tablet 2 Tablet  2 Tablet Oral BID    vancomycin (VANCOCIN) 1250 mg in  ml infusion  1,250 mg IntraVENous Q12H    lactated Ringers infusion  50 mL/hr IntraVENous CONTINUOUS    sodium chloride (NS) flush 5-40 mL  5-40 mL IntraVENous Q8H    sodium chloride (NS) flush 5-40 mL  5-40 mL IntraVENous PRN    dextrose 5% lactated ringers infusion  75 mL/hr IntraVENous CONTINUOUS    sodium chloride (NS) flush 5-40 mL  5-40 mL IntraVENous Q8H    sodium chloride (NS) flush 5-40 mL  5-40 mL IntraVENous PRN    acetaminophen (TYLENOL) tablet 650 mg  650 mg Oral Q6H    oxyCODONE IR (ROXICODONE) tablet 5 mg  5 mg Oral Q4H PRN    oxyCODONE IR (ROXICODONE) tablet 10 mg  10 mg Oral Q4H PRN    HYDROmorphone (DILAUDID) injection 1 mg  1 mg IntraVENous Q4H PRN    naloxone (NARCAN) injection 0.4 mg  0.4 mg IntraVENous PRN    ondansetron (ZOFRAN) injection 4 mg  4 mg IntraVENous Q4H PRN    senna-docusate (PERICOLACE) 8.6-50 mg per tablet 1 Tablet  1 Tablet Oral BID    zolpidem (AMBIEN) tablet 5 mg  5 mg Oral QHS PRN            Lab/Data Review:  Labs: Results:       Chemistry Recent Labs     06/13/21  0505 06/12/21  1029 06/11/21  2210   * 104* 85    141 141   K 3.8 3.9 3.4*   * 112* 107   CO2 24 27 26   BUN 16 16 15   CREA 0.63 0.65 0.71   BUCR 25* 25* 21*   AGAP 6 2* 8   CA 8.4* 8.1* 8.2*     Recent Labs     06/12/21  1029   ALT 17   TP 6.2*   ALB 3.5   GLOB 2.7   AGRAT 1.3      CBC w/Diff Recent Labs     06/12/21  1029 06/11/21  2210   WBC 4.3* 6.0   RBC 4.85 4.67   HGB 13.8 13.8   HCT 42.3 40.0   MCV 87.2 85.7   MCH 28.5 29.6   MCHC 32.6 34.5   RDW 12.0 11.9    220   GRANS 58 53   LYMPH 23 29   EOS 5 3      Coagulation No results for input(s): PTP, INR, APTT, INREXT, INREXT in the last 72 hours.     Iron/Ferritin No results found for: IRON, FE, TIBC, IBCT, PSAT, FERR    BNP    Cardiac Enzymes Lab Results   Component Value Date/Time    CK 69 02/12/2015 11:45 AM    CK - MB 0.8 02/12/2015 11:45 AM    CK-MB Index 1.2 02/12/2015 11:45 AM    Troponin-I, QT <0.02 02/12/2015 11:45 AM        Lactic Acid    Thyroid Studies          All Micro Results     Procedure Component Value Units Date/Time    CULTURE, Brian Dany STAIN [[id number]] Collected: 06/12/21 1310    Order Status: Completed Specimen: Wound from Finger Updated: 06/12/21 1725    COVID-19 RAPID TEST [[id number]] Collected: 06/12/21 1050    Order Status: Completed Specimen: Nasopharyngeal Updated: 06/12/21 1153 Specimen source Nasopharyngeal        COVID-19 rapid test Not detected        Comment: Rapid Abbott ID Now       Rapid NAAT:  The specimen is NEGATIVE for SARS-CoV-2, the novel coronavirus associated with COVID-19. Negative results should be treated as presumptive and, if inconsistent with clinical signs and symptoms or necessary for patient management, should be tested with an alternative molecular assay. Negative results do not preclude SARS-CoV-2 infection and should not be used as the sole basis for patient management decisions. This test has been authorized by the FDA under an Emergency Use Authorization (EUA) for use by authorized laboratories. Fact sheet for Healthcare Providers: [employer]  Fact sheet for Patients: [employer]stattoo[employer]com       Methodology: Isothermal Nucleic Acid Amplification         CULTURE, WOUND Tena Aleman [[id number]]     Order Status: Canceled Specimen: Wound from Finger             Images:    CT (Most Recent). CT Results (most recent):  Results from Hospital Encounter encounter on 06/11/21    CT HAND LT W CONT    Narrative  EXAMINATION: CT left hand with IV contrast    INDICATION: Hand puncture wound    COMPARISON: Radiographs same date    TECHNIQUE: CT of the left hand performed following 100 cc IV Isovue-300 with  multiplanar reformations. All CT scans at this facility are performed using dose  optimization technique as appropriate to a performed exam, to include automated  exposure control, adjustment of the mA and/or kV according to patient size  (including appropriate matching first site specific examinations), or use of  iterative reconstruction technique. FINDINGS:    Bones/joints: No acute osseous findings. Fourth metacarpal pin fixation. Spaces  largely maintained.     Muscles/tendons: Extensive ill-defined edema/swelling in the second finger, most  pronounced distally, and along the flexor tendon. Other: Second finger soft tissue findings as above. No radiopaque foreign body  identified. No rim-enhancing collection identified. Impression  Secondary soft tissue findings suspicious for cellulitis and flexor  tenosynovitis. Flexor tendon tear difficult to exclude on CT. No radiopaque  foreign body and no acute osseous findings identified. XRAY (Most Recent) XR Results (most recent):  Results from Hospital Encounter encounter on 06/11/21    XR HAND LT MIN 3 V    Narrative  EXAM: HAND THREE VIEWS LEFT    CLINICAL HISTORY/INDICATION:   nail through the medial portion of the index  finger one day ago, removed, today pain and swelling of the hand,. COMPARISON: 7/1/2010. TECHNIQUE: Three views obtained. FINDINGS:    There is diffuse soft tissue swelling of the second digit. The joint spaces are  maintained. There is no fracture or dislocation. Mineralization is normal.  Metallic pin demonstrated at the fourth metacarpal head unchanged. Impression  Diffuse soft tissue swelling of the phalanges of the second digit. EKG No results found for this or any previous visit.      2D ECHO

## 2021-06-13 NOTE — CONSULTS
[facility]  (A Division of [address] [place] [address])      Consultation Note      Date of Admission: 6/11/2021    Date of Note: 6/13/2021      Reason for Referral: Left had cellulitis, infected nail puncture wound      Current Antimicrobials:    Prior Antimicrobials:  Levofloxacin 6/11 - 2, Vancomycin 6/12 - 1        Assessment: Rec / Plan:   Infectious Tenosynovitis, Left index finger  - following nail puncture wound (from nail gun, nail went through dry wood) 1 day PTA  - left hand swelling and pain on day of admission  - s/p 6/12 I&D, L hand wound; washout, L index finger flexor space; debridement flexor tenosynovitis and A1 pulley release. \"Small amount of slightly cloudy fluid found w/in flexor tendon space. \" Culture pending -> continue vancomycin and Levofloxacin pending surgical cultures  -> would aim to give antibiotic therapy for 2-3 weeks, switching to po if susceptibility testing and PCN allergy allow   Penicillin Allergy   - Anaphylaxis    DJD    Asthma    diverticulitis    GERD    Nephrolithiasis      Microbiology:      Lines / Catheters:  Peripheral IV 06/11/21 Right; Lower Forearm 1 day      HPI:  54year-old  male with DJD, Asthma, diverticulitis, GERD, Nephrolithiasis admitted to [facility] 6/11/2021 with left hand swelling and pain. One day PTA, he was assisting someone who was using a nailgun to nail a wooden handrail and the nail went through the wood and punctured his left second finger. He cleaned the puncture wound which promptly closed and dried up but but he next morning his whole hand was swollen and painful. He presented to [facility] ED and was directed to [facility] for admission 6/11. CT left hand had shown findings suspicious for cellulitis and flexor tenosynovitis.    On 6/12 he was taken to the OR by Dr. Arcadio Kaye of Orthopedics who performed incision and drainage, washout, left finger flexor space and debridement of flexor tenosynovitis and A1 pulley release. Cultures are ending.   He has a history of anaphylaxis with Penicillin and was started on Levofloxacin 6/11 and Vancomycin 6/12     Active Hospital Problems    Diagnosis Date Noted    Cellulitis of finger of left hand 06/13/2021    Suppurative tenosynovitis of flexor tendon of left hand 06/12/2021    Hypokalemia 06/12/2021    Bradycardia 06/12/2021    Infected puncture wound of hand 06/11/2021    Cellulitis 12/06/2017     Past Medical History:   Diagnosis Date    Arthritis of knee, right 12/2017    advanced degen changes noted, CT right LE    Asthma     Cellulitis 12/2017    DDD (degenerative disc disease), lumbar 2013    noted on MRI with annular tears    Diverticulitis 2016    GERD (gastroesophageal reflux disease) 2016    with esophagitis according to endscopy    Kidney stone     Lumbar post-laminectomy syndrome     Sacroiliitis ([facility] [address]     Stroke [facility]) may 14 2010    Unspecified rotator cuff tear or rupture of left shoulder, not specified as traumatic 03/2016     Past Surgical History:   Procedure Laterality Date    COLONOSCOPY N/A 8/12/2016    COLONOSCOPY with polypectomy performed by Diandra Frias MD at [address]  2002, 2004, 2006    L5-S1 fusion, laminectomies    HX ENDOSCOPY  10/2016    grade 1 espohagitis    HX ENDOSCOPY  11/2016    mild esophagitis    HX KNEE ARTHROSCOPY Right     knee    HX MOHS PROCEDURES      right    HX ORTHOPAEDIC      right shoulder     Family History   Problem Relation Age of Onset    Other Brother     Cancer Maternal Grandmother     No Known Problems Mother     No Known Problems Father      Social History     Socioeconomic History    Marital status: SINGLE     Spouse name: Not on file    Number of children: Not on file    Years of education: Not on file    Highest education level: Not on file   Occupational History    Not on file   Tobacco Use    Smoking status: Former Smoker     Packs/day: 2.00     Types: Cigarettes     Quit date: 2016     Years since quittin.9    Smokeless tobacco: Never Used   Substance and Sexual Activity    Alcohol use: No     Alcohol/week: 0.0 standard drinks    Drug use: No    Sexual activity: Yes     Partners: Female   Other Topics Concern    Not on file   Social History Narrative    ** Merged History Encounter **          Social Determinants of Health     Financial Resource Strain:     Difficulty of Paying Living Expenses:    Food Insecurity:     Worried About Running Out of Food in the Last Year:     Ran Out of Food in the Last Year:    Transportation Needs:     Lack of Transportation (Medical):  Lack of Transportation (Non-Medical):    Physical Activity:     Days of Exercise per Week:     Minutes of Exercise per Session:    Stress:     Feeling of Stress :    Social Connections:     Frequency of Communication with Friends and Family:     Frequency of Social Gatherings with Friends and Family:     Attends [employer] Services:     Active Member of Clubs or Organizations:     Attends Club or Organization Meetings:     Marital Status:    Intimate Partner Violence:     Fear of Current or Ex-Partner:     Emotionally Abused:     Physically Abused:     Sexually Abused:         Allergies:  Penicillins, Penicillins, Vicodin [hydrocodone-acetaminophen], Azithromycin, Hydrochlorothiazide, Protonix [pantoprazole], Vicodin [hydrocodone-acetaminophen], and Erythromycin     Medications:  Current Facility-Administered Medications   Medication Dose Route Frequency    cyclobenzaprine (FLEXERIL) tablet 10 mg  10 mg Oral TID PRN    gabapentin (NEURONTIN) capsule 600 mg  600 mg Oral TID    Vancomycin Lab Information  1 Each Other Once per day on Sun    famotidine (PEPCID) tablet 40 mg  40 mg Oral BID PRN    sodium chloride (NS) flush 5-40 mL  5-40 mL IntraVENous Q8H    sodium chloride (NS) flush 5-40 mL  5-40 mL IntraVENous PRN    acetaminophen (TYLENOL) tablet 650 mg  650 mg Oral Q6H PRN    Or    acetaminophen (TYLENOL) suppository 650 mg  650 mg Rectal Q6H PRN    polyethylene glycol (MIRALAX) packet 17 g  17 g Oral DAILY PRN    ondansetron (ZOFRAN ODT) tablet 4 mg  4 mg Oral Q8H PRN    Or    ondansetron (ZOFRAN) injection 4 mg  4 mg IntraVENous Q6H PRN    albuterol-ipratropium (DUO-NEB) 2.5 MG-0.5 MG/3 ML  3 mL Nebulization Q4H RT    0.9% sodium chloride infusion  50 mL/hr IntraVENous CONTINUOUS    morphine injection 2 mg  2 mg IntraVENous Q3H PRN    levoFLOXacin (LEVAQUIN) 500 mg in D5W IVPB  500 mg IntraVENous Q24H    Lactobacillus Acidoph & Bulgar (FLORANEX) tablet 2 Tablet  2 Tablet Oral BID    vancomycin (VANCOCIN) 1250 mg in  ml infusion  1,250 mg IntraVENous Q12H    lactated Ringers infusion  50 mL/hr IntraVENous CONTINUOUS    sodium chloride (NS) flush 5-40 mL  5-40 mL IntraVENous Q8H    sodium chloride (NS) flush 5-40 mL  5-40 mL IntraVENous PRN    sodium chloride (NS) flush 5-40 mL  5-40 mL IntraVENous Q8H    sodium chloride (NS) flush 5-40 mL  5-40 mL IntraVENous PRN    acetaminophen (TYLENOL) tablet 650 mg  650 mg Oral Q6H    oxyCODONE IR (ROXICODONE) tablet 5 mg  5 mg Oral Q4H PRN    oxyCODONE IR (ROXICODONE) tablet 10 mg  10 mg Oral Q4H PRN    HYDROmorphone (DILAUDID) injection 1 mg  1 mg IntraVENous Q4H PRN    naloxone (NARCAN) injection 0.4 mg  0.4 mg IntraVENous PRN    ondansetron (ZOFRAN) injection 4 mg  4 mg IntraVENous Q4H PRN    senna-docusate (PERICOLACE) 8.6-50 mg per tablet 1 Tablet  1 Tablet Oral BID    zolpidem (AMBIEN) tablet 5 mg  5 mg Oral QHS PRN        ROS:  A comprehensive review of systems was negative except for that written in the History of Present Illness.      Physical Exam:    Temp (24hrs), Av.4 °F (36.3 °C), Min:97 °F (36.1 °C), Max:97.8 °F (36.6 °C)    Visit Vitals  /65   Pulse 69   Temp 97.7 °F (36.5 °C)   Resp 18   Ht 5' 10\" (1.778 m)   Wt 78 kg (172 lb)   SpO2 98%   BMI 24.68 kg/m²       General: Well developed, well nourished 54 y.o. WHITE male in no acute distress. ENT: ENT exam normal, no neck nodes or sinus tenderness  Head: normocephalic, without obvious abnormality  Mouth:  mucous membranes moist, pharynx normal without lesions  Neck: supple, symmetrical, trachea midline   Cardio:  regular rate and rhythm, S1, S2 normal, no murmur, click, rub or gallop  Chest: inspection normal - no chest wall deformities or tenderness, respiratory effort normal  Lungs: clear to auscultation, no wheezes or rales and unlabored breathing  Abdomen: soft, non-tender. Bowel sounds normal. No masses, no organomegaly. Extremities:  no redness or tenderness in the calves or thighs, no edema, left hand bulky post-op dressing intact  Neuro: Grossly normal       Lab results:    Chemistry  Recent Labs     06/13/21  0505 06/12/21  1029 06/11/21  2210   * 104* 85    141 141   K 3.8 3.9 3.4*   * 112* 107   CO2 24 27 26   BUN 16 16 15   CREA 0.63 0.65 0.71   CA 8.4* 8.1* 8.2*   AGAP 6 2* 8   BUCR 25* 25* 21*   AP  --  68  --    TP  --  6.2*  --    ALB  --  3.5  --    GLOB  --  2.7  --    AGRAT  --  1.3  --        CBC w/ Diff  Recent Labs     06/12/21  1029 06/11/21  2210   WBC 4.3* 6.0   RBC 4.85 4.67   HGB 13.8 13.8   HCT 42.3 40.0    220   GRANS 58 53   LYMPH 23 29   EOS 5 3       Microbiology  All Micro Results     Procedure Component Value Units Date/Time    CULTURE, Angela Grise STAIN [[id number]] Collected: 06/12/21 1310    Order Status: Completed Specimen: Wound from Finger Updated: 06/12/21 1725    COVID-19 RAPID TEST [[id number]] Collected: 06/12/21 1050    Order Status: Completed Specimen: Nasopharyngeal Updated: 06/12/21 1153     Specimen source Nasopharyngeal        COVID-19 rapid test Not detected        Comment: Rapid Abbott ID Now       Rapid NAAT:  The specimen is NEGATIVE for SARS-CoV-2, the novel coronavirus associated with COVID-19.        Negative results should be treated as presumptive and, if inconsistent with clinical signs and symptoms or necessary for patient management, should be tested with an alternative molecular assay. Negative results do not preclude SARS-CoV-2 infection and should not be used as the sole basis for patient management decisions. This test has been authorized by the FDA under an Emergency Use Authorization (EUA) for use by authorized laboratories.    Fact sheet for Healthcare Providers: ConventionUpdate.co.nz  Fact sheet for Patients: ConventionUpdate.co.nz       Methodology: Isothermal Nucleic Acid Amplification         CULTURE, WOUND Moni Mendieta [[id number]]     Order Status: Canceled Specimen: Wound from Oscar Cunningham MD, [facility]  6/13/2021   10:49

## 2021-06-13 NOTE — PROGRESS NOTES
Bedside and Verbal shift change report given to Sree Andrade RN (oncoming nurse) by Siddhartha Vazquez RN (offgoing nurse). Report included the following information SBAR, OR Summary, Kardex, Intake/Output and MAR.

## 2021-06-13 NOTE — PROGRESS NOTES
Virtual reviewer communicated change to CM, which reflect outpatient observation order written prior to Written discharge order. Code 44 delivered and given to patient. CM met with the patient and provided to the patient the printed information about her outpatient observation status. The patient was given the flyer entitled, \"Medicare Outpatient Observation: Information & notification. \" All questions were answered, no additional discharge needs identified at this time and patient expects to return to their (home, assisted living facility, relatives home, etc.) after discharge today. Copy provided to patient or sent secure email, secure fax , or certified mail to patient's loved one per their request.      Observation notice provided in writing to patient and/or caregiver as well as verbal explanation of the policy. Patients who are in outpatient status also receive the Observation notice      Notified Alverto Luna of Utilization Review.         SURINDER Villegas RN  Care Management  Pager: [phone]

## 2021-06-13 NOTE — PROGRESS NOTES
Problem: Self Care Deficits Care Plan (Adult)  Goal: *Acute Goals and Plan of Care (Insert Text)  Reactivated    OCCUPATIONAL THERAPY EVALUATION/DISCHARGE    Patient: Celina Disla (56 y.o. male)  Date: 6/13/2021  Primary Diagnosis: Infected puncture wound of hand [S61.439A, L08.9]  Cellulitis [L03.90]  Cellulitis of finger of left hand [L03.012]  Procedure(s) (LRB):  INCISION AND DRAINAGE UPPER EXTREMITY/ WASHOUT LEFT INDEX FINGER (Left) 1 Day Post-Op   Precautions: none listed; L hand cast is in place    PLOF: he reports he was independent with his self care and independent in the community    ASSESSMENT AND RECOMMENDATIONS:  Based on the objective data described below, the patient presents without functional deficits and is independent return demonstration of L hand home program (digit flexion, extension, digit ABDuction and shoulder AROM. All within the constraints of his cast. He is to avoid pain at this time and he rports an understanding and that he will follow through; therefore, skilled occupational therapy is not indicated at this time. Discharge Recommendations: None  Further Equipment Recommendations for Discharge: N/A      SUBJECTIVE:   Patient stated I did the same thing several years ago, to the same hand.  he is showing a picture of a nail through his Left index finger he reports is several years post    OBJECTIVE DATA SUMMARY:     Past Medical History:   Diagnosis Date    Arthritis of knee, right 12/2017    advanced degen changes noted, CT right LE    Asthma     Cellulitis 12/2017    DDD (degenerative disc disease), lumbar 2013    noted on MRI with annular tears    Diverticulitis 2016    GERD (gastroesophageal reflux disease) 2016    with esophagitis according to endscopy    Kidney stone     Lumbar post-laminectomy syndrome     Sacroiliitis ([address]     Stroke [facility]) may 14 2010    Unspecified rotator cuff tear or rupture of left shoulder, not specified as traumatic 03/2016     Past Surgical History:   Procedure Laterality Date    COLONOSCOPY N/A 8/12/2016    COLONOSCOPY with polypectomy performed by Grace Tao MD at [facility]  2002, 2004, 2006    L5-S1 fusion, laminectomies    HX ENDOSCOPY  10/2016    grade 1 espohagitis    HX ENDOSCOPY  11/2016    mild esophagitis    HX KNEE ARTHROSCOPY Right     knee    HX MOHS PROCEDURES      right    HX ORTHOPAEDIC      right shoulder     Barriers to Learning/Limitations: none  Home Situation:   Home Situation  Home Environment: Private residence  # Steps to Enter: 3  Rails to Enter: Yes  Hand Rails : Left  One/Two Story Residence: One story  Living Alone: No  Support Systems: Family member(s), Friends \ neighbors  Patient Expects to be Discharged to[de-identified] Other (comment) (private residence)  Current DME Used/Available at Home: None  [x]     Right hand dominant   []     Left hand dominant    Cognitive/Behavioral Status:  Neurologic State: Alert  Orientation Level: Oriented X4     Skin: no skin integrity issues noted during OT evaluation  Edema: no edema noted    Vision/Perceptual:       Tracking is WFL    Coordination: BUE   WFL RUE  WFL LUE proximal and distal to the cast    Balance:   WFL in sitting and standing    Strength: BUE  WFL in available range (distal to the cast not assessed with MMT)   Tone & Sensation: BUE  WFL BUE (LUE proximal and distal to the cast)   Range of Motion: BUE  RUE: AROM is WFL  LUE AROM WFL with the exception of digit IP A/PROM limited by the cast   Functional Mobility and Transfers for ADLs:  Bed Mobility:   Modified independent   Transfers:   independent     ADL Assessment:   Self feeding: independent (simulated)  Grooming: modified independent (simulated)  UB bathing and dressing: modified independent (simulated)  LB bathing and dressing: modified independent (simulated)    Therapeutic Exercise:  AROM home program instructed on L houlder/elbow and digits distal to the cast; he is independent return demonstration and he reports that he will follow through  Pain:  Pain level pre-treatment: 0/10   Pain level post-treatment: 0/10   Activity Tolerance:   No SOB or c/o fatigue noted  Please refer to the flowsheet for vital signs taken during this treatment. After treatment:   []  Patient left in no apparent distress sitting up in chair  [x]  Patient left in no apparent distress in bed  [x]  Call bell left within reach  []  Nursing notified  []  Caregiver present  []  Bed alarm activated    COMMUNICATION/EDUCATION:   [x]      Role of Occupational Therapy in the acute care setting  []      Home safety education was provided and the patient/caregiver indicated understanding. []      Patient/family have participated as able and agree with findings and recommendations. []      Patient is unable to participate in plan of care at this time. Thank you for this referral.  Robert Reyna OTR/L  Time Calculation: 13 mins      Eval Complexity: History: LOW Complexity : Brief history review ; Examination: LOW Complexity : 1-3 performance deficits relating to physical, cognitive , or psychosocial skils that result in activity limitations and / or participation restrictions ;    Decision Making:LOW Complexity : No comorbidities that affect functional and no verbal or physical assistance needed to complete eval tasks

## 2021-06-13 NOTE — PROGRESS NOTES
Problem: Falls - Risk of  Goal: *Absence of Falls  Description: Document Ronnell Soler Fall Risk and appropriate interventions in the flowsheet.   Outcome: Progressing Towards Goal  Note: Fall Risk Interventions:            Medication Interventions: Patient to call before getting OOB, Teach patient to arise slowly                   Problem: Patient Education: Go to Patient Education Activity  Goal: Patient/Family Education  Outcome: Progressing Towards Goal     Problem: Pain  Goal: *Control of Pain  Outcome: Progressing Towards Goal     Problem: Patient Education: Go to Patient Education Activity  Goal: Patient/Family Education  Outcome: Progressing Towards Goal

## 2021-06-13 NOTE — PROGRESS NOTES
Patient refused 0800 breathing treatment. No respiratory distress or issues noted. Patient requesting to change orders to PRN.  Patient uses PRN inhalers when not in hospital.

## 2021-06-13 NOTE — OP NOTES
[facility]  OPERATIVE REPORT    Name:  Francisco Bhatti  MR#:   [id number]  :  1965  ACCOUNT #:  [de-identified]  DATE OF SERVICE:  2021    PREOPERATIVE DIAGNOSES:  Infected flexor tenosynovitis, left index finger; infected puncture wound, left hand. POSTOPERATIVE DIAGNOSES:  Infected flexor tenosynovitis, left index finger; infected puncture wound, left hand. PROCEDURE PERFORMED:  Incision and drainage, left hand wound; washout, left index finger flexor space; debridement flexor tenosynovitis and A1 pulley release. SURGEON:  Aranza Conway MD.    ASSISTANTMitch Pham. ANESTHESIA:  General.    COMPLICATIONS:  None. SPECIMENS REMOVED:  Aerobic culture. IMPLANTS:  None. ESTIMATED BLOOD LOSS:  Minimal.    DRAINS:  None. PROCEDURE NOTE:  After satisfactory general anesthesia, the patient in the supine position with his left arm in an armboard, the patient's left upper extremity was prepped with ChloraPrep solution and draped in the usual fashion for hand surgery. The intended incision site was marked at the base of the left index finger. The patient's left upper extremity was exsanguinated with an Esmarch bandage and the tourniquet was inflated to 250 mmHg. An oblique incision was made overlying the A1 pulley at the base of the patient's left index finger starting from the puncture wound site and extending proximally and in ulnar direction. The incision was made with a #15 scalpel blade. The incision was carried down carefully with dissection through the subcutaneous tissues using a pair of tenotomy scissors. The flexor tendon and A1 pulley at the base of the left index finger were exposed with 4 Ragnell retractors. The flexor tendon sheath was incised with a #15 scalpel blade and small amount of slightly cloudy fluid was found within the flexor tendon space.   The A1 pulley was also re-released with a #15 scalpel blade and some inflamed flexor tenosynovium was removed with tenotomy scissors. A vessel cannula was placed into the flexor tendon sheath and the flexor tendon space was irrigated with dilute Betadine and normal saline lavage. The puncture wound site was also irrigated copiously. The patient's left index finger was placed through range of motion and full motion was achieved. There was no evidence of flexor tendon injury either to the flexor superficialis or flexor profundus tendons. The wound was closed with interrupted 4-0 vertical mattress sutures. A bulky sterile hand dressing was applied. Culture was also taken during the procedure. Sponge and needle count was correct.       Nehemiah Liriano MD      [id number]  D:  06/12/2021 14:00  T:  06/13/2021 0:51  JOB #:  [id number]

## 2021-06-13 NOTE — PROGRESS NOTES
Per Ortho    Patient seen at bedside,    No CP, No SOB, No F/C/NS    VS    21 0455 97.5 °F (36.4 °C) 72 100/56Abnormal  71  At rest 16 98 %     S/p     Name:  Ashleigh Ferrera  MR#:   [id number]  :  1965  ACCOUNT #:  [de-identified]  DATE OF SERVICE:  2021     PREOPERATIVE DIAGNOSES:  Infected flexor tenosynovitis, left index finger; infected puncture wound, left hand.     POSTOPERATIVE DIAGNOSES:  Infected flexor tenosynovitis, left index finger; infected puncture wound, left hand.     PROCEDURE PERFORMED:  Incision and drainage, left hand wound; washout, left index finger flexor space; debridement flexor tenosynovitis and A1 pulley release.     SURGEON:  Alem Ojeda MD.     ASSISTANT:  Rashaad Mccord.     ANESTHESIA:  General.     COMPLICATIONS:  None.     SPECIMENS REMOVED:  Aerobic culture.     IMPLANTS:  None.     ESTIMATED BLOOD LOSS:  Minimal.    Micro: Cultures pending    Bulky dressing Left hand, improved ROM Left index finger and Thumb    Pain well managed    Plan: Continue antibiotics, await cultures, dressing change per protocol

## 2021-06-14 VITALS
TEMPERATURE: 97 F | SYSTOLIC BLOOD PRESSURE: 131 MMHG | HEIGHT: 70 IN | HEART RATE: 71 BPM | RESPIRATION RATE: 16 BRPM | WEIGHT: 172 LBS | OXYGEN SATURATION: 97 % | BODY MASS INDEX: 24.62 KG/M2 | DIASTOLIC BLOOD PRESSURE: 71 MMHG

## 2021-06-14 LAB
ANION GAP SERPL CALC-SCNC: 3 MMOL/L (ref 3–18)
BUN SERPL-MCNC: 14 MG/DL (ref 7–18)
BUN/CREAT SERPL: 23 (ref 12–20)
CALCIUM SERPL-MCNC: 7.8 MG/DL (ref 8.5–10.1)
CHLORIDE SERPL-SCNC: 113 MMOL/L (ref 100–111)
CO2 SERPL-SCNC: 27 MMOL/L (ref 21–32)
CREAT SERPL-MCNC: 0.62 MG/DL (ref 0.6–1.3)
DATE LAST DOSE: NORMAL
ERYTHROCYTE [DISTWIDTH] IN BLOOD BY AUTOMATED COUNT: 12.1 % (ref 11.6–14.5)
GLUCOSE SERPL-MCNC: 84 MG/DL (ref 74–99)
HCT VFR BLD AUTO: 37.9 % (ref 36–48)
HGB BLD-MCNC: 12.7 G/DL (ref 13–16)
MCH RBC QN AUTO: 29.3 PG (ref 24–34)
MCHC RBC AUTO-ENTMCNC: 33.5 G/DL (ref 31–37)
MCV RBC AUTO: 87.5 FL (ref 74–97)
PLATELET # BLD AUTO: 185 K/UL (ref 135–420)
PMV BLD AUTO: 9.7 FL (ref 9.2–11.8)
POTASSIUM SERPL-SCNC: 4.1 MMOL/L (ref 3.5–5.5)
PROCALCITONIN SERPL-MCNC: <0.05 NG/ML
RBC # BLD AUTO: 4.33 M/UL (ref 4.35–5.65)
REPORTED DOSE,DOSE: NORMAL UNITS
REPORTED DOSE/TIME,TMG: 1200
SODIUM SERPL-SCNC: 143 MMOL/L (ref 136–145)
VANCOMYCIN TROUGH SERPL-MCNC: 11.7 UG/ML (ref 10–20)
WBC # BLD AUTO: 6.8 K/UL (ref 4.6–13.2)

## 2021-06-14 PROCEDURE — 74011250636 HC RX REV CODE- 250/636: Performed by: INTERNAL MEDICINE

## 2021-06-14 PROCEDURE — 2709999900 HC NON-CHARGEABLE SUPPLY

## 2021-06-14 PROCEDURE — 36415 COLL VENOUS BLD VENIPUNCTURE: CPT

## 2021-06-14 PROCEDURE — 74011250637 HC RX REV CODE- 250/637: Performed by: SPECIALIST

## 2021-06-14 PROCEDURE — 74011250637 HC RX REV CODE- 250/637: Performed by: INTERNAL MEDICINE

## 2021-06-14 PROCEDURE — 80048 BASIC METABOLIC PNL TOTAL CA: CPT

## 2021-06-14 PROCEDURE — 84145 PROCALCITONIN (PCT): CPT

## 2021-06-14 PROCEDURE — 85027 COMPLETE CBC AUTOMATED: CPT

## 2021-06-14 PROCEDURE — 99239 HOSP IP/OBS DSCHRG MGMT >30: CPT | Performed by: INTERNAL MEDICINE

## 2021-06-14 RX ORDER — OXYCODONE HYDROCHLORIDE 10 MG/1
10 TABLET ORAL
Qty: 10 TABLET | Refills: 0 | Status: SHIPPED | OUTPATIENT
Start: 2021-06-14 | End: 2021-06-17

## 2021-06-14 RX ORDER — LEVOFLOXACIN 750 MG/1
750 TABLET ORAL EVERY 24 HOURS
Status: DISCONTINUED | OUTPATIENT
Start: 2021-06-14 | End: 2021-06-15 | Stop reason: HOSPADM

## 2021-06-14 RX ORDER — DOXYCYCLINE 100 MG/1
100 CAPSULE ORAL EVERY 12 HOURS
Qty: 24 CAPSULE | Refills: 0 | Status: SHIPPED | OUTPATIENT
Start: 2021-06-14 | End: 2021-06-26

## 2021-06-14 RX ORDER — DOXYCYCLINE 100 MG/1
100 CAPSULE ORAL EVERY 12 HOURS
Status: DISCONTINUED | OUTPATIENT
Start: 2021-06-14 | End: 2021-06-15 | Stop reason: HOSPADM

## 2021-06-14 RX ORDER — LEVOFLOXACIN 750 MG/1
750 TABLET ORAL EVERY 24 HOURS
Qty: 12 TABLET | Refills: 0 | Status: SHIPPED | OUTPATIENT
Start: 2021-06-14 | End: 2021-06-26

## 2021-06-14 RX ORDER — VANCOMYCIN/0.9 % SOD CHLORIDE 1.5G/250ML
1500 PLASTIC BAG, INJECTION (ML) INTRAVENOUS EVERY 12 HOURS
Status: DISCONTINUED | OUTPATIENT
Start: 2021-06-14 | End: 2021-06-14

## 2021-06-14 RX ORDER — UREA 10 %
2 LOTION (ML) TOPICAL 2 TIMES DAILY
Qty: 15 TABLET | Refills: 0 | Status: SHIPPED | OUTPATIENT
Start: 2021-06-14 | End: 2021-12-17 | Stop reason: ALTCHOICE

## 2021-06-14 RX ADMIN — LEVOFLOXACIN 750 MG: 750 TABLET, FILM COATED ORAL at 19:47

## 2021-06-14 RX ADMIN — DOCUSATE SODIUM 50MG AND SENNOSIDES 8.6MG 1 TABLET: 8.6; 5 TABLET, FILM COATED ORAL at 17:47

## 2021-06-14 RX ADMIN — Medication 10 ML: at 05:03

## 2021-06-14 RX ADMIN — SODIUM CHLORIDE 50 ML/HR: 900 INJECTION, SOLUTION INTRAVENOUS at 12:29

## 2021-06-14 RX ADMIN — ACETAMINOPHEN 650 MG: 325 TABLET ORAL at 17:47

## 2021-06-14 RX ADMIN — GABAPENTIN 600 MG: 300 CAPSULE ORAL at 09:01

## 2021-06-14 RX ADMIN — GABAPENTIN 600 MG: 300 CAPSULE ORAL at 16:45

## 2021-06-14 RX ADMIN — ACETAMINOPHEN 650 MG: 325 TABLET ORAL at 00:35

## 2021-06-14 RX ADMIN — LACTOBACILLUS TAB 2 TABLET: TAB at 09:01

## 2021-06-14 RX ADMIN — ACETAMINOPHEN 650 MG: 325 TABLET ORAL at 05:42

## 2021-06-14 RX ADMIN — DOCUSATE SODIUM 50MG AND SENNOSIDES 8.6MG 1 TABLET: 8.6; 5 TABLET, FILM COATED ORAL at 09:01

## 2021-06-14 RX ADMIN — VANCOMYCIN HYDROCHLORIDE 1500 MG: 10 INJECTION, POWDER, LYOPHILIZED, FOR SOLUTION INTRAVENOUS at 12:28

## 2021-06-14 RX ADMIN — ACETAMINOPHEN 650 MG: 325 TABLET ORAL at 12:29

## 2021-06-14 RX ADMIN — VANCOMYCIN HYDROCHLORIDE 1250 MG: 10 INJECTION, POWDER, LYOPHILIZED, FOR SOLUTION INTRAVENOUS at 00:35

## 2021-06-14 RX ADMIN — LACTOBACILLUS TAB 2 TABLET: TAB at 17:47

## 2021-06-14 NOTE — DISCHARGE SUMMARY
Discharge Summary    Patient: Rashida Valdes               Sex: male          DOA: 6/11/2021         YOB: 1965      Age:  54 y.o.        LOS:  LOS: 3 days                Admit Date: 6/11/2021    Discharge Date: 6/14/2021    Primary care physician: Viv Benton NP    Discharge Diagnoses:    1. Injury to left finger with nail gun  2. Infected puncture wound of hand   3. Cellulitis of finger of left hand   4. Tenosynovitis of flexor tendon of left hand   5. Hypokalemia, resolved   6. Leukopenia, resolved       Discharge Condition: Good  Disposition: home   Code Status: full code     Follow up for Primary Care Physician:  1) he continues on Levofloxacin and Doxycycline for another 12 days, he needs follow up with ID in 1 week. 2)  He can follow up with ortho in 1-2 weeks as needed       Hospital Course:   54 y.o male with GERD, DDD, mild intermittent asthma, presented to ER with swelling of his left hand after gun nail injury. He injured during his left hand finger when a nail gun misfired into his hand. He has swollen and pain pain for the 2 days. No fever/chill. He came to ER where imaging was concern for tenosynovitis and cellulitis. He was started on Vancomycin and Levofloxacin. He has severe Penicillin allergy. Ortho consulted for ID and washout. Intraoperative culture without growth to date. ID consulted for further care. Last 24 Hours: he has no overnight event. He tolerated iv antibiotics and hand swelling improved. No fever. Leukopenia resolved. He asked to go home today.    ID ok for home with oral antibiotics     ROS:  No current fever/chills, no headache, no dizziness, no facial pain, no sinus congestion,   No swallowing pain, No chest pain, no palpitation, no shortness of breath, no abd pain,  No diarrhea, no urinary complaint, no leg pain or swelling, +left hand pain    VS:   Visit Vitals  /72 (BP 1 Location: Left upper arm, BP Patient Position: At rest) Pulse 69   Temp 97.3 °F (36.3 °C)   Resp 16   Ht 5' 10\" (1.778 m)   Wt 78 kg (172 lb)   SpO2 97%   BMI 24.68 kg/m²      Tmax/24hrs: Temp (24hrs), Av.5 °F (36.4 °C), Min:97.2 °F (36.2 °C), Max:98.1 °F (36.7 °C)      Intake/Output Summary (Last 24 hours) at 2021 1645  Last data filed at 2021 1644  Gross per 24 hour   Intake 2987.08 ml   Output 3450 ml   Net -462.92 ml       Tele:   General:  Cooperative, Not in acute distress, speaks in full sentence while in bed  HEENT: PERRL, EOMI, supple neck, no JVD, dry oral mucosa  Cardiovascular: S1S2 regular, no rub/gallop   Pulmonary: Clear air entry bilaterally, no wheezing, no crackle  GI:  Soft, non tender, non distended, +bs, no guarding   Extremities:  No pedal edema, +distal pulses appreciated   Left hand with decrease swelling  Neuro: AOx3, moving all extremities, no gross deficit. Consults:  ID: Dr. El Chew  Ortho: Dr Shantanu Barraza:   CT Results (most recent):  Results from Hospital Encounter encounter on 21    CT HAND LT W CONT    Narrative  EXAMINATION: CT left hand with IV contrast    INDICATION: Hand puncture wound    COMPARISON: Radiographs same date    TECHNIQUE: CT of the left hand performed following 100 cc IV Isovue-300 with  multiplanar reformations. All CT scans at this facility are performed using dose  optimization technique as appropriate to a performed exam, to include automated  exposure control, adjustment of the mA and/or kV according to patient size  (including appropriate matching first site specific examinations), or use of  iterative reconstruction technique. FINDINGS:    Bones/joints: No acute osseous findings. Fourth metacarpal pin fixation. Spaces  largely maintained. Muscles/tendons: Extensive ill-defined edema/swelling in the second finger, most  pronounced distally, and along the flexor tendon. Other: Second finger soft tissue findings as above.  No radiopaque foreign body  identified. No rim-enhancing collection identified. Impression  Secondary soft tissue findings suspicious for cellulitis and flexor  tenosynovitis. Flexor tendon tear difficult to exclude on CT. No radiopaque  foreign body and no acute osseous findings identified. XR Results (most recent):  Results from Hospital Encounter encounter on 06/11/21    XR HAND LT MIN 3 V    Narrative  EXAM: HAND THREE VIEWS LEFT    CLINICAL HISTORY/INDICATION:   nail through the medial portion of the index  finger one day ago, removed, today pain and swelling of the hand,. COMPARISON: 7/1/2010. TECHNIQUE: Three views obtained. FINDINGS:    There is diffuse soft tissue swelling of the second digit. The joint spaces are  maintained. There is no fracture or dislocation. Mineralization is normal.  Metallic pin demonstrated at the fourth metacarpal head unchanged. Impression  Diffuse soft tissue swelling of the phalanges of the second digit. Lab/Data Review:  Labs: Results:       Chemistry Recent Labs     06/14/21  0255 06/13/21  0505 06/12/21  1029   GLU 84 134* 104*    142 141   K 4.1 3.8 3.9   * 112* 112*   CO2 27 24 27   BUN 14 16 16   CREA 0.62 0.63 0.65   CA 7.8* 8.4* 8.1*   AGAP 3 6 2*   BUCR 23* 25* 25*   AP  --   --  68   TP  --   --  6.2*   ALB  --   --  3.5   GLOB  --   --  2.7   AGRAT  --   --  1.3      CBC w/Diff Recent Labs     06/14/21  0255 06/12/21  1029 06/11/21  2210   WBC 6.8 4.3* 6.0   RBC 4.33* 4.85 4.67   HGB 12.7* 13.8 13.8   HCT 37.9 42.3 40.0    206 220   GRANS  --  58 53   LYMPH  --  23 29   EOS  --  5 3      Coagulation No results for input(s): PTP, INR, APTT, INREXT in the last 72 hours. Iron/Ferritin No results for input(s): IRON in the last 72 hours. No lab exists for component: TIBCCALC   BNP No results for input(s): BNPP in the last 72 hours. Cardiac Enzymes No results for input(s): CPK, CKND1, MARIANNE in the last 72 hours.     No lab exists for component: CKRMB, TROIP   Liver Enzymes Recent Labs     06/12/21  1029   TP 6.2*   ALB 3.5   AP 68      Thyroid Studies No results for input(s): T4, T3U, TSH, TSHEXT in the last 72 hours. No lab exists for component: T3RU       All Micro Results     Procedure Component Value Units Date/Time    CULTURE, Mannie Other STAIN [[id number]] Collected: 06/12/21 1310    Order Status: Completed Specimen: Wound from Finger Updated: 06/14/21 1147     Special Requests: NO SPECIAL REQUESTS        GRAM STAIN RARE WBCS SEEN         NO ORGANISMS SEEN        Culture result: NO GROWTH THUS FAR       COVID-19 RAPID TEST [[id number]] Collected: 06/12/21 1050    Order Status: Completed Specimen: Nasopharyngeal Updated: 06/12/21 1153     Specimen source Nasopharyngeal        COVID-19 rapid test Not detected        Comment: Rapid Abbott ID Now       Rapid NAAT:  The specimen is NEGATIVE for SARS-CoV-2, the novel coronavirus associated with COVID-19. Negative results should be treated as presumptive and, if inconsistent with clinical signs and symptoms or necessary for patient management, should be tested with an alternative molecular assay. Negative results do not preclude SARS-CoV-2 infection and should not be used as the sole basis for patient management decisions. This test has been authorized by the FDA under an Emergency Use Authorization (EUA) for use by authorized laboratories.    Fact sheet for Healthcare Providers: ConventionUpdate.co.nz  Fact sheet for Patients: ConventionUpdate.co.nz       Methodology: Isothermal Nucleic Acid Amplification         CULTURE, WOUND Edward Osborne [[id number]]     Order Status: Canceled Specimen: Wound from Finger                 Medications at discharge  including reasons for change and indications for new ones:   Current Discharge Medication List      START taking these medications    Details   doxycycline (VIBRAMYCIN) 100 mg capsule Take 1 Capsule by mouth every twelve (12) hours for 12 days. Qty: 24 Capsule, Refills: 0  Start date: 6/14/2021, End date: 6/26/2021      Lactobacillus [facility]) 1 million cell tab tablet Take 2 Tablets by mouth two (2) times a day. Qty: 15 Tablet, Refills: 0  Start date: 6/14/2021      levoFLOXacin (LEVAQUIN) 750 mg tablet Take 1 Tablet by mouth every twenty-four (24) hours for 12 days. Qty: 12 Tablet, Refills: 0  Start date: 6/14/2021, End date: 6/26/2021      oxyCODONE IR (ROXICODONE) 10 mg tab immediate release tablet Take 1 Tablet by mouth every eight (8) hours as needed for Pain for up to 3 days. Max Daily Amount: 30 mg.  Qty: 10 Tablet, Refills: 0  Start date: 6/14/2021, End date: 6/17/2021    Associated Diagnoses: Cellulitis of finger of left hand         CONTINUE these medications which have NOT CHANGED    Details   cyclobenzaprine (FLEXERIL) 10 mg tablet Take 1 Tablet by mouth three (3) times daily as needed for Muscle Spasm(s). Qty: 90 Tablet, Refills: 6    Associated Diagnoses: Degenerative disc disease, lumbar; S/P spinal fusion; Lumbar herniated disc; Postlaminectomy syndrome of lumbar region      albuterol (PROVENTIL HFA, VENTOLIN HFA, PROAIR HFA) 90 mcg/actuation inhaler Take 2 Puffs by inhalation every six (6) hours as needed for Wheezing or Shortness of Breath. Qty: 1 Inhaler, Refills: 11    Associated Diagnoses: Moderate persistent asthma without complication      budesonide-formoteroL (Symbicort) 160-4.5 mcg/actuation HFAA Take 1 Puff by inhalation two (2) times a day. Qty: 1 Inhaler, Refills: 6    Associated Diagnoses: Moderate persistent asthma without complication      loratadine (Claritin) 10 mg tablet Take 1 Tab by mouth daily. Qty: 90 Tab, Refills: 3    Associated Diagnoses: Acute non-recurrent sinusitis, unspecified location      famotidine (PEPCID) 40 mg tablet Take 1 Tab by mouth two (2) times daily as needed (indigestion/acid reflux).   Qty: 90 Tab, Refills: 0    Associated Diagnoses: Gastroesophageal reflux disease with esophagitis      gabapentin (NEURONTIN) 800 mg tablet Take 1 Tab by mouth three (3) times daily (with meals). Max Daily Amount: 2,400 mg. Indications: Neuropathic Pain  Qty: 270 Tab, Refills: 0    Associated Diagnoses: Lumbar radiculopathy; Degenerative disc disease, lumbar; Lumbar postlaminectomy syndrome      aspirin 81 mg chewable tablet Take 81 mg by mouth daily. albuterol (PROVENTIL VENTOLIN) 2.5 mg /3 mL (0.083 %) nebulizer solution 3 mL by Nebulization route once for 1 dose.   Qty: 1 Package, Refills: 0    Associated Diagnoses: URI (upper respiratory infection)         STOP taking these medications       ondansetron (ZOFRAN ODT) 4 mg disintegrating tablet Comments:   Reason for Stopping:         ibuprofen (MOTRIN) 600 mg tablet Comments:   Reason for Stopping:                       Pending laboratory work and tests: intraoperative wound culture     Activity: Activity as tolerated    Diet: Cardiac Diet and Low fat, Low cholesterol    Wound Care: Keep wound clean and dry      Time spent >30 minutes  Jay Mac MD  6/14/2021  4:45 PM

## 2021-06-14 NOTE — PROGRESS NOTES
Problem: Falls - Risk of  Goal: *Absence of Falls  Description: Document Javan Somers Fall Risk and appropriate interventions in the flowsheet.   Outcome: Progressing Towards Goal  Note: Fall Risk Interventions:            Medication Interventions: Patient to call before getting OOB, Teach patient to arise slowly                   Problem: Patient Education: Go to Patient Education Activity  Goal: Patient/Family Education  Outcome: Progressing Towards Goal     Problem: Pain  Goal: *Control of Pain  Outcome: Progressing Towards Goal     Problem: Patient Education: Go to Patient Education Activity  Goal: Patient/Family Education  Outcome: Progressing Towards Goal

## 2021-06-14 NOTE — DISCHARGE INSTRUCTIONS
DISCHARGE SUMMARY from Nurse    PATIENT INSTRUCTIONS:    After general anesthesia or intravenous sedation, for 24 hours or while taking prescription Narcotics:  · Limit your activities  · Do not drive and operate hazardous machinery  · Do not make important personal or business decisions  · Do  not drink alcoholic beverages  · If you have not urinated within 8 hours after discharge, please contact your surgeon on call. Report the following to your surgeon:  · Excessive pain, swelling, redness or odor of or around the surgical area  · Temperature over 100.5  · Nausea and vomiting lasting longer than 4 hours or if unable to take medications  · Any signs of decreased circulation or nerve impairment to extremity: change in color, persistent  numbness, tingling, coldness or increase pain  · Any questions    What to do at Home:  Recommended activity: Activity as tolerated. *  Please give a list of your current medications to your Primary Care Provider. *  Please update this list whenever your medications are discontinued, doses are      changed, or new medications (including over-the-counter products) are added. *  Please carry medication information at all times in case of emergency situations. These are general instructions for a healthy lifestyle:    No smoking/ No tobacco products/ Avoid exposure to second hand smoke  Surgeon General's Warning:  Quitting smoking now greatly reduces serious risk to your health. Obesity, smoking, and sedentary lifestyle greatly increases your risk for illness    A healthy diet, regular physical exercise & weight monitoring are important for maintaining a healthy lifestyle    You may be retaining fluid if you have a history of heart failure or if you experience any of the following symptoms:  Weight gain of 3 pounds or more overnight or 5 pounds in a week, increased swelling in our hands or feet or shortness of breath while lying flat in bed.   Please call your doctor as soon as you notice any of these symptoms; do not wait until your next office visit. The discharge information has been reviewed with the patient. The patient verbalized understanding. Discharge medications reviewed with the patient and appropriate educational materials and side effects teaching were provided. ___________________________________________________________________________________________________________________________________  Discharge Instructions    Patient: Nena Tate MRN: [id number]  CSN: [id number]    YOB: 1965  Age: 54 y.o. Sex: male    DOA: 2021 LOS:  LOS: 3 days   Discharge Date:      ACUTE DIAGNOSES:  1. Injury to left finger with nail gun  2. Infected puncture wound of hand   3. Cellulitis of finger of left hand   4. Tenosynovitis of flexor tendon of left hand   5. Hypokalemia, resolved   6. Leukopenia, resolved   Patient armband removed and shredded. MyChart Activation    Thank you for requesting access to mobiManage. Please follow the instructions below to securely access and download your online medical record. mobiManage allows you to send messages to your doctor, view your test results, renew your prescriptions, schedule appointments, and more. How Do I Sign Up? 1. In your internet browser, go to [email or web address]. 2. Click on the First Time User? Click Here link in the Sign In box. You will see the New Member Sign Up page. 3. Enter your mobiManage Access Code exactly as it appears below. You will not need to use this code after youve completed the sign-up process. If you do not sign up before the expiration date, you must request a new code. mobiManage Access Code: Activation code not generated  Current mobiManage Status: Active (This is the date your mobiManage access code will )    4.  Enter the last four digits of your Social Security Number (xxxx) and Date of Birth (mm/dd/yyyy) as indicated and click Submit. You will be taken to the next sign-up page. 5. Create a Alumnizet ID. This will be your Spodly login ID and cannot be changed, so think of one that is secure and easy to remember. 6. Create a Spodly password. You can change your password at any time. 7. Enter your Password Reset Question and Answer. This can be used at a later time if you forget your password. 8. Enter your e-mail address. You will receive e-mail notification when new information is available in [address]. 9. Click Sign Up. You can now view and download portions of your medical record. 10. Click the Download Summary menu link to download a portable copy of your medical information. Additional Information    If you have questions, please visit the Frequently Asked Questions section of the Spodly website at [email or web address] Remember, Spodly is NOT to be used for urgent needs. For medical emergencies, dial 911. DISCHARGE MEDICATIONS:     Continue antibiotics for a total of 14 days. · It is important that you take the medication exactly as they are prescribed. · Keep your medication in the bottles provided by the pharmacist and keep a list of the medication names, dosages, and times to be taken in your wallet. · Do not take other medications without consulting your doctor. DIET:  Cardiac Diet and Low fat, Low cholesterol    ACTIVITY: Activity as tolerated  Wound care: keep your wound clean and dry. ADDITIONAL INFORMATION: If you experience any of the following symptoms then please call your primary care physician or return to the emergency room if you cannot get hold of your doctor: Fever, chills, nausea, vomiting, diarrhea, change in mentation, falling, bleeding, shortness of breath. FOLLOW UP CARE:  Dr. Marylee Edwards, NP  you are to call and set up an appointment to see them in 2 weeks.     Follow up with Dr. Skyla Dyson, orthopaedic in 1-2 week    Follow-up with ** Anjel, infectious disease in 1 week   [facility] after 1 week. He should call [phone] option [phone] for appointment       Information obtained by :  I understand that if any problems occur once I am at home I am to contact my physician. I understand and acknowledge receipt of the instructions indicated above.                                                                                                                                            Physician's or R.N.'s Signature                                                                  Date/Time                                                                                                                                              Patient or Representative Signature                                                          Date/Time    Daiana Lewis MD  6/14/2021  4:40 PM

## 2021-06-14 NOTE — PROGRESS NOTES
RESPIRATORY MEDICATION PROTOCOL ASSESSMENT      Patient  Jessica Apgar     54 y.o.   male     2021  8:05 PM    Breath Sounds Pre Procedure:  Breath Sounds Bilateral: Diminished                                            Breath Sounds Post Procedure: Breath Sounds Bilateral: Diminished                                               Breathing pattern: Pre procedure  Breathing Pattern: Regular          Post procedure  Breathing Pattern: Regular    Cough: Pre procedure                  Post procedure      Heart Rate: Pre procedure Pulse: 65           Post procedure Pulse: 71    Resp Rate: Pre procedure  Respirations: 18           Post procedure          Nebulizer Therapy: Current medications Aerosolized Medications: DuoNeb       Problem List:   Patient Active Problem List   Diagnosis Code    Degenerative disc disease, lumbar M51.36    S/P spinal fusion Z98.1    History of CVA (cerebrovascular accident) Z86.73    Lumbar herniated disc M51.26    Gastroesophageal reflux disease with esophagitis K21.00    Moderate persistent asthma without complication X69.56    Postlaminectomy syndrome of lumbar region M96.1    Other intervertebral disc degeneration, lumbar region M51.36    Cellulitis L03.90    Primary osteoarthritis of right knee M17.11    HNP (herniated nucleus pulposus), lumbar M51.26    Lumbar radiculopathy M54.16    Infected puncture wound of hand S61.439A, L08.9    Suppurative tenosynovitis of flexor tendon of left hand M65.142    Hypokalemia E87.6    Bradycardia R00.1    Cellulitis of finger of left hand L03.012       Patient alert and cooperative to use MDI: Yes    Home Respiratory Therapy Regimen/Frequency:  YES  Medication   Device  Frequency     SEVERITY INDEX:    ITEM 0 1 2 3 4 Score   Respiratory Pattern and or Rate Regular  10-19 Regular  20-24   24-30    30-34 Severe SOB or   Greater than 35 0   Breath Sounds Clear Occasional Wheeze Mild Wheezing Moderate Wheezing  wheezing/Absent breath sounds 0   Shortness of Breath None Dyspnea on Exertion Dyspnea at Rest Moderate Shortness of Breath at Rest Severe Shortness of Breath - Limited Speech 0       Total Score:  0      * Scoring Guidelines  0-4 pts:  PRN-BID   5-7 pts:  BID, TID, QID  8-9 pts:  TID, QID, Q6  10-12 pts:  Q4-Q6  * - Guidelines used with clinical judgement. PRN Treatments can be ordered to supplement scheduled treatments. Regardless of score, frequency should not be less than normal home regimen.     Recommended Order/Frequency:  PRN    Comments:          Respiratory Therapist: Kimberlyn Alarcon, RT

## 2021-06-14 NOTE — PROGRESS NOTES
Reason for Admission:  Infected puncture wound of hand [S61.439A, L08.9]  Cellulitis [L03.90]  Cellulitis of finger of left hand [L03.012]                 RUR Score:    9%            Plan for utilizing home health:    no                      Likelihood of Readmission:   LOW                         Transition of Care Plan:              Initial assessment completed with patient. Cognitive status of patient: oriented to time, place, person and situation. Face sheet information confirmed:  yes. The patient designates roxyuzair Swathi Hoffman to participate in his discharge plan and to receive any needed information. This patient lives in a single family home with girlfriend. Patient is able to navigate steps as needed. Prior to hospitalization, patient was considered to be independent with ADLs/IADLS : yes . Patient has a current ACP document on file: no      Healthcare Decision Maker:     Click here to complete [address] including selection of the Healthcare Decision Maker Relationship (ie \"Primary\")    The girlfriend will be available to transport patient home upon discharge. The patient has no DME available in the home. Patient is not currently active with home health. Patient has not stayed in a skilled nursing facility or rehab. This patient is on dialysis :no    Currently, the discharge plan is Home. The patient states that he can obtain his medications from the pharmacy, and take his medications as directed. Patient's current insurance is Medicare. Care Management Interventions  PCP Verified by CM:  Yes  Mode of Transport at Discharge: Self  Transition of Care Consult (CM Consult): Discharge Planning  Current Support Network: Lives with Spouse  Confirm Follow Up Transport: Family  Discharge Location  Discharge Placement: Home        Naeem Holcomb RN - Outcomes Manager  [phone]
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
## 2021-06-14 NOTE — PROGRESS NOTES
PT order received and chart reviewed. Pt was previously eval and dc on 6/12/21 at [facility] level. Pt had a left hand procedure on 6/12/21. Pt seen at bedside this date, he reports no concerns with mobility or need for DME, demonstrates finger mobility with cast on [facility], endorsing just wanting to go home at this time. Will sign off with no discharge needs noted. Thank you for this referral. Ree Huber, PT, DPT   .

## 2021-06-14 NOTE — PROGRESS NOTES
Per Ortho:    Patient seen at bedside. Patient sleeping awakens easily. Patient reports decreased pain left hand. Surgical dressings intact. All dressings were removed today to reveal over the volar aspect of the left hand a transverse surgical incision measuring 3 cm 1/2 cm proximal from the index finger volar metacarpophalangeal joint. Sutures noted. Wound borders well approximated. Soft tissue swelling improved associated with the dorsum of the left hand as well as the index and long finger volar. Patient's flexion noting a 4 cm palm to pulp deficit for the index finger, no deficit for long ring or little finger. Ranging did not elicit pain in the flexion plane    Extensor and flexor tendons intact against resistance today checked. Cap refills brisk less than 2 seconds of the left upper extremity. Microbiological cultures pending. Plan: Continue IV antibiotics awaiting final culture report. New dressing placed by nursing over the surgical site today. All patient's questions answered to satisfaction.

## 2021-06-14 NOTE — WOUND CARE
Physical Exam  
Room 507: pt being followed by ortho services.  
Karsten MAINN, RN, [employer], [address]

## 2021-06-14 NOTE — PROGRESS NOTES
Bedside and Verbal shift change report given to Yesenia Gamino RN (oncoming nurse) by Juaquin De La Rosa RN (offgoing nurse). Report included the following information SBAR, OR Summary, Kardex, Intake/Output and MAR.

## 2021-06-14 NOTE — ROUTINE PROCESS
Bedside and Verbal shift change report given to JACOB Hyde (oncoming nurse) by Eduardo Bhakta (offgoing nurse). Report included the following information SBAR, Kardex, Procedure Summary, Intake/Output, MAR and Accordion.

## 2021-06-15 ENCOUNTER — PATIENT OUTREACH (OUTPATIENT)
Dept: CASE MANAGEMENT | Age: 56
End: 2021-06-15

## 2021-06-15 DIAGNOSIS — M96.1 LUMBAR POSTLAMINECTOMY SYNDROME: ICD-10-CM

## 2021-06-15 DIAGNOSIS — M51.36 DEGENERATIVE DISC DISEASE, LUMBAR: ICD-10-CM

## 2021-06-15 DIAGNOSIS — M54.16 LUMBAR RADICULOPATHY: ICD-10-CM

## 2021-06-15 NOTE — PROGRESS NOTES
Date/Time:  6/15/2021 4:19 PM   Call within 2 business days of discharge: Yes     Care Transitions Nurse (CTN) made two separate attempts to contact patient on 06/15/21 to complete Transitions of Care. Unable to reach. Left HIPAA compliant [facility] requesting a return call. Plan to try to contact patient again within the next 5 days.

## 2021-06-16 LAB
BACTERIA SPEC CULT: ABNORMAL
BACTERIA SPEC CULT: ABNORMAL
GRAM STN SPEC: ABNORMAL
GRAM STN SPEC: ABNORMAL
SERVICE CMNT-IMP: ABNORMAL

## 2021-06-16 RX ORDER — GABAPENTIN 800 MG/1
TABLET ORAL
Qty: 270 TABLET | Refills: 0 | Status: SHIPPED | OUTPATIENT
Start: 2021-06-16 | End: 2021-12-21 | Stop reason: SDUPTHER

## 2021-06-19 ENCOUNTER — PATIENT OUTREACH (OUTPATIENT)
Dept: CASE MANAGEMENT | Age: 56
End: 2021-06-19

## 2021-06-19 NOTE — PROGRESS NOTES
Date/Time:  6/19/2021 10:04 AM   Call within 2 business days of discharge: No     Care Transitions Nurse (CTN) made a third attempt to contact patient on 06/19/21 to complete transitions of care follow up. Unable to reach. Left HIPAA compliant [facility] requesting a return call. Sent patient a [employer] message. Resolving Transitions of Care episode. Patient has CTN's contact information.

## 2021-12-17 ENCOUNTER — VIRTUAL VISIT (OUTPATIENT)
Dept: FAMILY MEDICINE CLINIC | Age: 56
End: 2021-12-17
Payer: MEDICARE

## 2021-12-17 DIAGNOSIS — Z13.29 SCREENING FOR ENDOCRINE, METABOLIC AND IMMUNITY DISORDER: ICD-10-CM

## 2021-12-17 DIAGNOSIS — Z13.0 SCREENING FOR ENDOCRINE, METABOLIC AND IMMUNITY DISORDER: ICD-10-CM

## 2021-12-17 DIAGNOSIS — J32.0 MAXILLARY SINUSITIS, UNSPECIFIED CHRONICITY: Primary | ICD-10-CM

## 2021-12-17 DIAGNOSIS — M51.26 LUMBAR HERNIATED DISC: ICD-10-CM

## 2021-12-17 DIAGNOSIS — Z13.6 SCREENING FOR CARDIOVASCULAR CONDITION: ICD-10-CM

## 2021-12-17 DIAGNOSIS — M96.1 POSTLAMINECTOMY SYNDROME OF LUMBAR REGION: ICD-10-CM

## 2021-12-17 DIAGNOSIS — Z98.1 S/P SPINAL FUSION: ICD-10-CM

## 2021-12-17 DIAGNOSIS — Z13.228 SCREENING FOR ENDOCRINE, METABOLIC AND IMMUNITY DISORDER: ICD-10-CM

## 2021-12-17 DIAGNOSIS — M51.36 DEGENERATIVE DISC DISEASE, LUMBAR: ICD-10-CM

## 2021-12-17 DIAGNOSIS — E11.9 TYPE 2 DIABETES MELLITUS WITHOUT COMPLICATION, WITHOUT LONG-TERM CURRENT USE OF INSULIN (HCC): ICD-10-CM

## 2021-12-17 PROCEDURE — G8427 DOCREV CUR MEDS BY ELIG CLIN: HCPCS | Performed by: NURSE PRACTITIONER

## 2021-12-17 PROCEDURE — 3046F HEMOGLOBIN A1C LEVEL >9.0%: CPT | Performed by: NURSE PRACTITIONER

## 2021-12-17 PROCEDURE — 3017F COLORECTAL CA SCREEN DOC REV: CPT | Performed by: NURSE PRACTITIONER

## 2021-12-17 PROCEDURE — G8420 CALC BMI NORM PARAMETERS: HCPCS | Performed by: NURSE PRACTITIONER

## 2021-12-17 PROCEDURE — 2022F DILAT RTA XM EVC RTNOPTHY: CPT | Performed by: NURSE PRACTITIONER

## 2021-12-17 PROCEDURE — G8432 DEP SCR NOT DOC, RNG: HCPCS | Performed by: NURSE PRACTITIONER

## 2021-12-17 PROCEDURE — 99214 OFFICE O/P EST MOD 30 MIN: CPT | Performed by: NURSE PRACTITIONER

## 2021-12-17 RX ORDER — DOXYCYCLINE 100 MG/1
100 TABLET ORAL 2 TIMES DAILY
Qty: 14 TABLET | Refills: 0 | Status: SHIPPED | OUTPATIENT
Start: 2021-12-17 | End: 2021-12-21 | Stop reason: ALTCHOICE

## 2021-12-17 RX ORDER — CYCLOBENZAPRINE HCL 10 MG
10 TABLET ORAL
Qty: 90 TABLET | Refills: 2 | Status: SHIPPED | OUTPATIENT
Start: 2021-12-17

## 2021-12-17 NOTE — PROGRESS NOTES
1. \"Have you been to the ER, urgent care clinic since your last visit? Hospitalized since your last visit? \" No    2. \"Have you seen or consulted any other health care providers outside of the [address] [place] [address] since your last visit? \" No     3. For patients aged 39-70: Has the patient had a colonoscopy / FIT/ Cologuard? No     If the patient is female:    4. For patients aged 41-77: Has the patient had a mammogram within the past 2 years? NA based on age or sex    11. For patients aged 21-65: Has the patient had a pap smear?  NA based on age or sex
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
## 2021-12-21 ENCOUNTER — TELEPHONE (OUTPATIENT)
Dept: FAMILY MEDICINE CLINIC | Age: 56
End: 2021-12-21

## 2021-12-21 ENCOUNTER — OFFICE VISIT (OUTPATIENT)
Dept: ORTHOPEDIC SURGERY | Age: 56
End: 2021-12-21
Payer: MEDICARE

## 2021-12-21 VITALS
RESPIRATION RATE: 17 BRPM | BODY MASS INDEX: 24.39 KG/M2 | HEART RATE: 95 BPM | WEIGHT: 170 LBS | TEMPERATURE: 98.7 F | OXYGEN SATURATION: 98 %

## 2021-12-21 DIAGNOSIS — M51.36 DEGENERATIVE DISC DISEASE, LUMBAR: ICD-10-CM

## 2021-12-21 DIAGNOSIS — G89.29 CHRONIC LEFT SHOULDER PAIN: ICD-10-CM

## 2021-12-21 DIAGNOSIS — M25.512 CHRONIC LEFT SHOULDER PAIN: ICD-10-CM

## 2021-12-21 DIAGNOSIS — M54.16 LUMBAR RADICULOPATHY: Primary | ICD-10-CM

## 2021-12-21 DIAGNOSIS — M96.1 LUMBAR POSTLAMINECTOMY SYNDROME: ICD-10-CM

## 2021-12-21 PROCEDURE — G8420 CALC BMI NORM PARAMETERS: HCPCS | Performed by: PHYSICAL MEDICINE & REHABILITATION

## 2021-12-21 PROCEDURE — G8432 DEP SCR NOT DOC, RNG: HCPCS | Performed by: PHYSICAL MEDICINE & REHABILITATION

## 2021-12-21 PROCEDURE — 3017F COLORECTAL CA SCREEN DOC REV: CPT | Performed by: PHYSICAL MEDICINE & REHABILITATION

## 2021-12-21 PROCEDURE — 99214 OFFICE O/P EST MOD 30 MIN: CPT | Performed by: PHYSICAL MEDICINE & REHABILITATION

## 2021-12-21 PROCEDURE — G8427 DOCREV CUR MEDS BY ELIG CLIN: HCPCS | Performed by: PHYSICAL MEDICINE & REHABILITATION

## 2021-12-21 RX ORDER — GABAPENTIN 300 MG/1
300 CAPSULE ORAL 3 TIMES DAILY
Qty: 90 CAPSULE | Refills: 0 | Status: ON HOLD | OUTPATIENT
Start: 2021-12-21 | End: 2022-05-01

## 2021-12-21 RX ORDER — GABAPENTIN 600 MG/1
600 TABLET ORAL 3 TIMES DAILY
Qty: 90 TABLET | Refills: 0 | Status: ON HOLD | OUTPATIENT
Start: 2022-01-20 | End: 2022-05-01

## 2021-12-21 RX ORDER — IBUPROFEN 200 MG
TABLET ORAL
COMMUNITY
End: 2022-05-03

## 2021-12-21 RX ORDER — GABAPENTIN 800 MG/1
TABLET ORAL
Qty: 90 TABLET | Refills: 1 | Status: SHIPPED | OUTPATIENT
Start: 2022-02-18 | End: 2022-05-04

## 2021-12-21 NOTE — LETTER
12/21/2021    Patient: Valeria Casillas   YOB: 1965   Date of Visit: 12/21/2021     Dax Wilson NP  [address]  Via In Basket    Dear Dax Wilson NP,      Thank you for referring Mr. Ioana Santos to [address] for evaluation. My notes for this consultation are attached. If you have questions, please do not hesitate to call me. I look forward to following your patient along with you.       Sincerely,    Phillip Thomas MD

## 2021-12-21 NOTE — TELEPHONE ENCOUNTER
Pt wife stated her  was given doxycycline for his infection but he had an allergic reaction, and he needs another medication. Prescription needs to be sent to [facility]. Pt can be reached at [phone]. Please advise.  Thank you!!!

## 2022-01-01 NOTE — PROGRESS NOTES
Patient attended appointments with Orthopedic MD, SAMIRA Mejia on 3/4/20, Nurse Navigator reviewed EMR and will follow up on next scheduled outreach. Patient has hx of ovarian cysts bilaterally, yesterday leaned over her counter and feels that another cyst burst on right side and pain is now worse. Has OB appt on Thursday- has 1 hydrocodone left and would like a refill

## 2022-03-18 PROBLEM — S61.439A INFECTED PUNCTURE WOUND OF HAND: Status: ACTIVE | Noted: 2021-06-11

## 2022-03-18 PROBLEM — R00.1 BRADYCARDIA: Status: ACTIVE | Noted: 2021-06-12

## 2022-03-18 PROBLEM — J45.40 MODERATE PERSISTENT ASTHMA WITHOUT COMPLICATION: Status: ACTIVE | Noted: 2017-09-05

## 2022-03-18 PROBLEM — K21.00 GASTROESOPHAGEAL REFLUX DISEASE WITH ESOPHAGITIS: Status: ACTIVE | Noted: 2017-09-05

## 2022-03-18 PROBLEM — M17.11 PRIMARY OSTEOARTHRITIS OF RIGHT KNEE: Status: ACTIVE | Noted: 2017-12-13

## 2022-03-18 PROBLEM — L08.9 INFECTED PUNCTURE WOUND OF HAND: Status: ACTIVE | Noted: 2021-06-11

## 2022-03-19 PROBLEM — M51.369 OTHER INTERVERTEBRAL DISC DEGENERATION, LUMBAR REGION: Status: ACTIVE | Noted: 2017-09-25

## 2022-03-19 PROBLEM — M65.142 SUPPURATIVE TENOSYNOVITIS OF FLEXOR TENDON OF LEFT HAND: Status: ACTIVE | Noted: 2021-06-12

## 2022-03-19 PROBLEM — M51.36 OTHER INTERVERTEBRAL DISC DEGENERATION, LUMBAR REGION: Status: ACTIVE | Noted: 2017-09-25

## 2022-03-19 PROBLEM — M51.26 LUMBAR HERNIATED DISC: Status: ACTIVE | Noted: 2017-04-20

## 2022-03-19 PROBLEM — M96.1 POSTLAMINECTOMY SYNDROME OF LUMBAR REGION: Status: ACTIVE | Noted: 2017-09-25

## 2022-03-19 PROBLEM — E87.6 HYPOKALEMIA: Status: ACTIVE | Noted: 2021-06-12

## 2022-03-19 PROBLEM — L03.90 CELLULITIS: Status: ACTIVE | Noted: 2017-12-06

## 2022-03-19 PROBLEM — L03.012 CELLULITIS OF FINGER OF LEFT HAND: Status: ACTIVE | Noted: 2021-06-13

## 2022-03-19 PROBLEM — M51.26 HNP (HERNIATED NUCLEUS PULPOSUS), LUMBAR: Status: ACTIVE | Noted: 2017-12-15

## 2022-03-20 PROBLEM — Z86.73 HISTORY OF CVA (CEREBROVASCULAR ACCIDENT): Status: ACTIVE | Noted: 2017-02-02

## 2022-03-20 PROBLEM — M54.16 LUMBAR RADICULOPATHY: Status: ACTIVE | Noted: 2018-04-10

## 2022-03-21 ENCOUNTER — TELEPHONE (OUTPATIENT)
Dept: ORTHOPEDIC SURGERY | Age: 57
End: 2022-03-21

## 2022-04-26 ENCOUNTER — TELEPHONE (OUTPATIENT)
Dept: FAMILY MEDICINE CLINIC | Age: 57
End: 2022-04-26

## 2022-04-26 NOTE — TELEPHONE ENCOUNTER
Nurse Called Blood Sugar  129 this Morning Pt. Nurse is Requesting an earlyer Appointment Transferred her to [place]. Advised Nurse to Take Pt. To Urgent Care or ER.

## 2022-04-27 ENCOUNTER — OFFICE VISIT (OUTPATIENT)
Dept: FAMILY MEDICINE CLINIC | Age: 57
End: 2022-04-27
Payer: MEDICARE

## 2022-04-27 VITALS
HEART RATE: 82 BPM | HEIGHT: 70 IN | TEMPERATURE: 97.3 F | DIASTOLIC BLOOD PRESSURE: 86 MMHG | WEIGHT: 167 LBS | RESPIRATION RATE: 20 BRPM | SYSTOLIC BLOOD PRESSURE: 134 MMHG | OXYGEN SATURATION: 98 % | BODY MASS INDEX: 23.91 KG/M2

## 2022-04-27 DIAGNOSIS — Z13.29 SCREENING FOR ENDOCRINE, METABOLIC AND IMMUNITY DISORDER: ICD-10-CM

## 2022-04-27 DIAGNOSIS — Z13.6 ENCOUNTER FOR LIPID SCREENING FOR CARDIOVASCULAR DISEASE: ICD-10-CM

## 2022-04-27 DIAGNOSIS — J01.01 ACUTE RECURRENT MAXILLARY SINUSITIS: ICD-10-CM

## 2022-04-27 DIAGNOSIS — J32.0 CHRONIC MAXILLARY SINUSITIS: ICD-10-CM

## 2022-04-27 DIAGNOSIS — Z13.228 SCREENING FOR ENDOCRINE, METABOLIC AND IMMUNITY DISORDER: ICD-10-CM

## 2022-04-27 DIAGNOSIS — Z13.220 ENCOUNTER FOR LIPID SCREENING FOR CARDIOVASCULAR DISEASE: ICD-10-CM

## 2022-04-27 DIAGNOSIS — E11.9 TYPE 2 DIABETES MELLITUS WITHOUT COMPLICATION, WITHOUT LONG-TERM CURRENT USE OF INSULIN (HCC): ICD-10-CM

## 2022-04-27 DIAGNOSIS — E16.2 HYPOGLYCEMIA: ICD-10-CM

## 2022-04-27 DIAGNOSIS — Z13.0 SCREENING FOR ENDOCRINE, METABOLIC AND IMMUNITY DISORDER: ICD-10-CM

## 2022-04-27 DIAGNOSIS — Z12.11 SCREENING FOR COLON CANCER: ICD-10-CM

## 2022-04-27 DIAGNOSIS — Z12.5 SCREENING FOR PROSTATE CANCER: ICD-10-CM

## 2022-04-27 LAB
GLUCOSE POC: 119 MG/DL
HBA1C MFR BLD HPLC: 5.5 %

## 2022-04-27 PROCEDURE — 3017F COLORECTAL CA SCREEN DOC REV: CPT | Performed by: FAMILY MEDICINE

## 2022-04-27 PROCEDURE — G8510 SCR DEP NEG, NO PLAN REQD: HCPCS | Performed by: FAMILY MEDICINE

## 2022-04-27 PROCEDURE — 2022F DILAT RTA XM EVC RTNOPTHY: CPT | Performed by: FAMILY MEDICINE

## 2022-04-27 PROCEDURE — G8420 CALC BMI NORM PARAMETERS: HCPCS | Performed by: FAMILY MEDICINE

## 2022-04-27 PROCEDURE — 82962 GLUCOSE BLOOD TEST: CPT | Performed by: FAMILY MEDICINE

## 2022-04-27 PROCEDURE — 3046F HEMOGLOBIN A1C LEVEL >9.0%: CPT | Performed by: FAMILY MEDICINE

## 2022-04-27 PROCEDURE — 83036 HEMOGLOBIN GLYCOSYLATED A1C: CPT | Performed by: FAMILY MEDICINE

## 2022-04-27 PROCEDURE — G8427 DOCREV CUR MEDS BY ELIG CLIN: HCPCS | Performed by: FAMILY MEDICINE

## 2022-04-27 PROCEDURE — 99214 OFFICE O/P EST MOD 30 MIN: CPT | Performed by: FAMILY MEDICINE

## 2022-04-27 RX ORDER — CALCIUM CITRATE/VITAMIN D3 200MG-6.25
TABLET ORAL
Qty: 200 STRIP | Refills: 4 | Status: SHIPPED | OUTPATIENT
Start: 2022-04-27 | End: 2022-05-03

## 2022-04-27 RX ORDER — LEVOFLOXACIN 500 MG/1
500 TABLET, FILM COATED ORAL DAILY
Qty: 10 TABLET | Refills: 0 | Status: SHIPPED | OUTPATIENT
Start: 2022-04-27 | End: 2022-05-03

## 2022-04-27 RX ORDER — LANCETS
EACH MISCELLANEOUS
Qty: 100 EACH | Refills: 11 | Status: SHIPPED | OUTPATIENT
Start: 2022-04-27 | End: 2022-05-03

## 2022-04-27 RX ORDER — INSULIN PUMP SYRINGE, 3 ML
EACH MISCELLANEOUS
Qty: 1 KIT | Refills: 0 | Status: SHIPPED | OUTPATIENT
Start: 2022-04-27 | End: 2022-05-03

## 2022-04-27 RX ORDER — CETIRIZINE HCL 10 MG
10 TABLET ORAL
Qty: 90 TABLET | Refills: 0 | Status: SHIPPED | OUTPATIENT
Start: 2022-04-27 | End: 2022-07-26

## 2022-04-27 RX ORDER — FLUTICASONE PROPIONATE 50 MCG
SPRAY, SUSPENSION (ML) NASAL
Qty: 1 EACH | Refills: 4 | Status: SHIPPED | OUTPATIENT
Start: 2022-04-27

## 2022-04-27 NOTE — PATIENT INSTRUCTIONS
Hypoglycemia: Care Instructions  Overview     Hypoglycemia means that your blood sugar is low and your body is not getting enough fuel. Some people get low blood sugar from not eating often enough. Some medicines to treat diabetes can cause low blood sugar. People who have had surgery on their stomachs or intestines may get hypoglycemia. Problems with the pancreas, kidneys, or liver also can cause low blood sugar. A snack or drink with sugar in it will raise your blood sugar and should ease your symptoms right away. Your doctor may recommend that you change or stop your medicines until you can get your blood sugar levels under control. In the long run, you may need to change your diet and eating habits so that you get enough fuel for your body throughout the day. Follow-up care is a key part of your treatment and safety. Be sure to make and go to all appointments, and call your doctor if you are having problems. It's also a good idea to know your test results and keep a list of the medicines you take. How can you care for yourself at home? · Learn your signs of low blood sugar. They are different for everyone. Some common early signs include:  ? Nausea. ? Hunger. ? Feeling nervous, irritable, or shaky. ? Cold, clammy skin. ? Sweating (when you're not exercising). · Use the \"rule of 15\" to treat low blood sugar. This includes eating 15 grams of carbohydrate from a quick-sugar food, such as 3 or 4 glucose tablets or ½ cup of juice. Wait 15 minutes and check your blood sugar. If it is still below 70 mg/dL, eat another 15 grams of carbohydrate. Repeat this every 15 minutes until your blood sugar is in a safe target range. · Once your blood sugar is in a safe range, eat a snack or meal to prevent recurrent low blood sugar. · Make sure family, friends, and coworkers know the symptoms of low blood sugar and know how to get your sugar level up.   · If you were prescribed a glucagon kit, always have it with you. Make sure friends and family know how to use it. When should you call for help? Call 911 anytime you think you may need emergency care. For example, call if:    · You passed out (lost consciousness).     · You are confused or cannot think clearly.     · Your blood sugar is very high or very low. Watch closely for changes in your health, and be sure to contact your doctor if:    · Your blood sugar stays outside the level your doctor set for you.     · You have any problems. Where can you learn more? Go to http://www.thomas.com/  Enter R955 in the search box to learn more about \"Hypoglycemia: Care Instructions. \"  Current as of: July 28, 2021               Content Version: 13.2  © 2006-2022 Healthwise[employer]. Care instructions adapted under license by [employer] (which disclaims liability or warranty for this information). If you have questions about a medical condition or this instruction, always ask your healthcare professional. [address] any warranty or liability for your use of this information.

## 2022-04-27 NOTE — PROGRESS NOTES
1. \"Have you been to the ER, urgent care clinic since your last visit? Hospitalized since your last visit? \" No    2. \"Have you seen or consulted any other health care providers outside of the [address] [place] [address] since your last visit? \" No     3. For patients aged 39-70: Has the patient had a colonoscopy / FIT/ Cologuard? No      If the patient is female:    4. For patients aged 41-77: Has the patient had a mammogram within the past 2 years? NA - based on age or sex      11. For patients aged 21-65: Has the patient had a pap smear?  NA - based on age or sex

## 2022-04-27 NOTE — PROGRESS NOTES
HPI  This is a 70-year-old  male with past medical history significant for osteoarthritis of right knee, asthma, degenerative disc disease of the lumbar spine, diverticulitis, GERD, history of kidney stones, lumbar postlaminectomy syndrome, sacroiliitis, stroke who is here to follow-up on multiple complaints. Patient is accompanied by his partner Ms. Denis Singh    Hypoglycemia  Patient states that for couple years she has been having issues with low blood sugars where his blood sugars drops below 50 and he starts getting lightheaded, jittery, slurs his speech, has palpitations and just falls asleep. He states that this occurs at least twice every month. His partner has diabetes and for that reason they have been checking his blood sugars and know that they have been running low when he has those symptoms. Patient is not diabetic is not on any diabetes medications. Of note he states that he does not eat breakfast and the only good meal he does eat is dinner. Counseled that he should try to eat or snack on something every 2 hours just to prevent hypoglycemia episodes. No family history of any malabsorption issues, diabetes, similar complaints. We will check insulin levels and C-peptide levels. Point-of-care glucose today was 119 and point-of-care HbA1c was 5.5. Acute bacterial rhinosinusitis. Patient states that he has green-colored mucus being expelled from the nose. He states that he has pain in the left side of his face and is congested and has a runny nose for the past 1 week. Denies any fevers, chills. Has a history of bacterial sinus infections. Is allergic to penicillin and erythromycin and doxycycline. Tenderness palpation along the maxillary sinus on the left side. I will prescribe levofloxacin 5 mg once a day for the next 10 days. Routine health maintenance  Ordered routine labs  Ordered referral for colonoscopy.     Past Medical History  Past Medical History: Diagnosis Date    Arthritis of knee, right 12/2017    advanced degen changes noted, CT right LE    Asthma     Cellulitis 12/2017    DDD (degenerative disc disease), lumbar 2013    noted on MRI with annular tears    Diverticulitis 2016    GERD (gastroesophageal reflux disease) 2016    with esophagitis according to endscopy    Kidney stone     Lumbar post-laminectomy syndrome     Sacroiliitis ([address]     Stroke [facility]) may 14 2010    Unspecified rotator cuff tear or rupture of left shoulder, not specified as traumatic 03/2016       Surgical History  Past Surgical History:   Procedure Laterality Date    COLONOSCOPY N/A 8/12/2016    COLONOSCOPY with polypectomy performed by Sallie Ramires MD at [address] [place]  2002, 2004, 2006    L5-S1 fusion, laminectomies    HX ENDOSCOPY  10/2016    grade 1 espohagitis    HX ENDOSCOPY  11/2016    mild esophagitis    HX KNEE ARTHROSCOPY Right     knee    HX MOHS PROCEDURES      right    HX ORTHOPAEDIC      right shoulder        Medications  Current Outpatient Medications   Medication Sig Dispense Refill    levoFLOXacin (LEVAQUIN) 500 mg tablet Take 1 Tablet by mouth daily for 10 days. 10 Tablet 0    cetirizine (ZYRTEC) 10 mg tablet Take 1 Tablet by mouth nightly for 90 days. 90 Tablet 0    fluticasone propionate (FLONASE) 50 mcg/actuation nasal spray One spray in each nostril once a day 1 Each 4    Blood-Glucose Meter (True Metrix Glucose Meter) monitoring kit Check blood glucose 2-3 times a day 1 Kit 0    glucose blood VI test strips (True Metrix Glucose Test Strip) strip Check blood glucose 2 times a day due to hypoglycemia and fluctuating blood glucose 200 Strip 4    lancets misc Check blood glucose 2-3 times a day for hypoglycemia 100 Each 11    ibuprofen (MOTRIN) 200 mg tablet Take  by mouth.  gabapentin (NEURONTIN) 800 mg tablet TAKE 1 TABLET BY MOUTH THREE TIMES DAILY.  MAX 3 TABS DAILY 90 Tablet 1    cyclobenzaprine (FLEXERIL) 10 mg tablet Take 1 Tablet by mouth three (3) times daily as needed for Muscle Spasm(s). 90 Tablet 2    albuterol (PROVENTIL HFA, VENTOLIN HFA, PROAIR HFA) 90 mcg/actuation inhaler Take 2 Puffs by inhalation every six (6) hours as needed for Wheezing or Shortness of Breath. 1 Inhaler 11    budesonide-formoteroL (Symbicort) 160-4.5 mcg/actuation HFAA Take 1 Puff by inhalation two (2) times a day. 1 Inhaler 6    famotidine (PEPCID) 40 mg tablet Take 1 Tab by mouth two (2) times daily as needed (indigestion/acid reflux). 90 Tab 0    aspirin 81 mg chewable tablet Take 81 mg by mouth daily.  gabapentin (Neurontin) 300 mg capsule Take 1 Capsule by mouth three (3) times daily. Max Daily Amount: 900 mg. (Patient not taking: Reported on 4/27/2022) 90 Capsule 0    gabapentin (Neurontin) 600 mg tablet Take 1 Tablet by mouth three (3) times daily. Max Daily Amount: 1,800 mg. (Patient not taking: Reported on 4/27/2022) 90 Tablet 0    albuterol (PROVENTIL VENTOLIN) 2.5 mg /3 mL (0.083 %) nebulizer solution 3 mL by Nebulization route once for 1 dose.  1 Package 0       Allergies  Allergies   Allergen Reactions    Penicillins Angioedema    Penicillins Anaphylaxis    Vicodin [Hydrocodone-Acetaminophen] Nausea and Vomiting    Azithromycin Nausea and Vomiting    Doxycycline Itching     Black eyes    Hydrochlorothiazide Nausea Only     Dizzy, lightheaded, blisters on his legs    Protonix [Pantoprazole] Itching and Contact Dermatitis     Patient denies allergy    Vicodin [Hydrocodone-Acetaminophen] Nausea and Vomiting    Erythromycin Other (comments)       Family History  Family History   Problem Relation Age of Onset    Other Brother     Cancer Maternal Grandmother     No Known Problems Mother     No Known Problems Father        Social History  Social History     Socioeconomic History    Marital status: SINGLE     Spouse name: Not on file    Number of children: Not on file    Years of education: Not on file  Highest education level: Not on file   Occupational History    Not on file   Tobacco Use    Smoking status: Former Smoker     Packs/day: 2.00     Types: Cigarettes     Quit date: 2016     Years since quittin.8    Smokeless tobacco: Never Used   Vaping Use    Vaping Use: Not on file   Substance and Sexual Activity    Alcohol use: No     Alcohol/week: 0.0 standard drinks    Drug use: No    Sexual activity: Yes     Partners: Female   Other Topics Concern    Not on file   Social History Narrative    ** Merged History Encounter **          Social Determinants of Health     Financial Resource Strain:     Difficulty of Paying Living Expenses: Not on file   Food Insecurity:     Worried About Running Out of Food in the Last Year: Not on file    Shari of Food in the Last Year: Not on file   Transportation Needs:     Lack of Transportation (Medical): Not on file    Lack of Transportation (Non-Medical): Not on file   Physical Activity:     Days of Exercise per Week: Not on file    Minutes of Exercise per Session: Not on file   Stress:     Feeling of Stress : Not on file   Social Connections:     Frequency of Communication with Friends and Family: Not on file    Frequency of Social Gatherings with Friends and Family: Not on file    Attends [employer]ism Services: Not on file    Active Member of [address] [place] [address] [employer] or Organizations: Not on file    Attends Club or Organization Meetings: Not on file    Marital Status: Not on file   Intimate Partner Violence:     Fear of Current or Ex-Partner: Not on file    Emotionally Abused: Not on file    Physically Abused: Not on file    Sexually Abused: Not on file   Housing Stability:     Unable to Pay for Housing in the Last Year: Not on file    Number of [place] in the Last Year: Not on file    Unstable Housing in the Last Year: Not on file       Review of Systems  Review of Systems   Constitutional: Negative for chills and fever.    Respiratory: Negative for cough and shortness of breath. Cardiovascular: Negative for chest pain and leg swelling. Gastrointestinal: Negative for abdominal pain, nausea and vomiting. Skin: Negative for rash. Neurological: Negative for dizziness and headaches. Vital Signs  Visit Vitals  /86 (BP 1 Location: Left upper arm, BP Patient Position: Sitting, BP Cuff Size: Adult long)   Pulse 82   Temp 97.3 °F (36.3 °C) (Temporal)   Resp 20   Ht 5' 10\" (1.778 m)   Wt 167 lb (75.8 kg)   SpO2 98%   BMI 23.96 kg/m²         Physical Exam  Physical Exam  Constitutional:       Appearance: Normal appearance. HENT:      Head: Normocephalic and atraumatic. Nose: Nose normal.   Eyes:      General: No scleral icterus. Conjunctiva/sclera: Conjunctivae normal.   Cardiovascular:      Rate and Rhythm: Normal rate and regular rhythm. Heart sounds: Normal heart sounds. No murmur heard. No gallop. Pulmonary:      Effort: Pulmonary effort is normal. No respiratory distress. Breath sounds: No wheezing. Abdominal:      General: There is no distension. Palpations: Abdomen is soft. Musculoskeletal:      Cervical back: Normal range of motion and neck supple. Skin:     Findings: No erythema or rash. Neurological:      Mental Status: He is alert and oriented to person, place, and time. Mental status is at baseline. Results  Results for orders placed or performed during the hospital encounter of 06/11/21   COVID-19 RAPID TEST   Result Value Ref Range    Specimen source Nasopharyngeal      COVID-19 rapid test Not detected NOTD     CULTURE, WOUND W GRAM STAIN    Specimen: Finger;  Wound   Result Value Ref Range    Special Requests: NO SPECIAL REQUESTS      GRAM STAIN RARE WBCS SEEN      GRAM STAIN NO ORGANISMS SEEN      Culture result: CLOSTRIDIUM BARATI ISOLATED FROM THIO BROTH ONLY (A)      Culture result: No growth on solid media AT 3 days     CBC WITH AUTOMATED DIFF   Result Value Ref Range    WBC 6.0 4.6 - 13.2 K/uL    RBC 4.67 4.35 - 5.65 M/uL    HGB 13.8 13.0 - 16.0 g/dL    HCT 40.0 36.0 - 48.0 %    MCV 85.7 74.0 - 97.0 FL    MCH 29.6 24.0 - 34.0 PG    MCHC 34.5 31.0 - 37.0 g/dL    RDW 11.9 11.6 - 14.5 %    PLATELET 857 044 - 063 K/uL    MPV 9.5 9.2 - 11.8 FL    NEUTROPHILS 53 40 - 73 %    LYMPHOCYTES 29 21 - 52 %    MONOCYTES 14 (H) 3 - 10 %    EOSINOPHILS 3 0 - 5 %    BASOPHILS 0 0 - 2 %    ABS. NEUTROPHILS 3.2 1.8 - 8.0 K/UL    ABS. LYMPHOCYTES 1.7 0.9 - 3.6 K/UL    ABS. MONOCYTES 0.9 0.05 - 1.2 K/UL    ABS. EOSINOPHILS 0.2 0.0 - 0.4 K/UL    ABS. BASOPHILS 0.0 0.0 - 0.1 K/UL    DF AUTOMATED     METABOLIC PANEL, BASIC   Result Value Ref Range    Sodium 141 136 - 145 mmol/L    Potassium 3.4 (L) 3.5 - 5.5 mmol/L    Chloride 107 100 - 111 mmol/L    CO2 26 21 - 32 mmol/L    Anion gap 8 3.0 - 18 mmol/L    Glucose 85 74 - 99 mg/dL    BUN 15 7.0 - 18 MG/DL    Creatinine 0.71 0.6 - 1.3 MG/DL    BUN/Creatinine ratio 21 (H) 12 - 20      GFR est AA >60 >60 ml/min/1.73m2    GFR est non-AA >60 >60 ml/min/1.73m2    Calcium 8.2 (L) 8.5 - 10.1 MG/DL   C REACTIVE PROTEIN, QT   Result Value Ref Range    C-Reactive protein 0.5 (H) 0 - 0.3 mg/dL   METABOLIC PANEL, COMPREHENSIVE   Result Value Ref Range    Sodium 141 136 - 145 mmol/L    Potassium 3.9 3.5 - 5.5 mmol/L    Chloride 112 (H) 100 - 111 mmol/L    CO2 27 21 - 32 mmol/L    Anion gap 2 (L) 3.0 - 18 mmol/L    Glucose 104 (H) 74 - 99 mg/dL    BUN 16 7.0 - 18 MG/DL    Creatinine 0.65 0.6 - 1.3 MG/DL    BUN/Creatinine ratio 25 (H) 12 - 20      GFR est AA >60 >60 ml/min/1.73m2    GFR est non-AA >60 >60 ml/min/1.73m2    Calcium 8.1 (L) 8.5 - 10.1 MG/DL    Bilirubin, total 0.4 0.2 - 1.0 MG/DL    ALT (SGPT) 17 16 - 61 U/L    AST (SGOT) 8 (L) 10 - 38 U/L    Alk.  phosphatase 68 45 - 117 U/L    Protein, total 6.2 (L) 6.4 - 8.2 g/dL    Albumin 3.5 3.4 - 5.0 g/dL    Globulin 2.7 2.0 - 4.0 g/dL    A-G Ratio 1.3 0.8 - 1.7     MAGNESIUM   Result Value Ref Range    Magnesium 2.2 1.6 - 2.6 mg/dL   CBC WITH AUTOMATED DIFF   Result Value Ref Range    WBC 4.3 (L) 4.6 - 13.2 K/uL    RBC 4.85 4.35 - 5.65 M/uL    HGB 13.8 13.0 - 16.0 g/dL    HCT 42.3 36.0 - 48.0 %    MCV 87.2 74.0 - 97.0 FL    MCH 28.5 24.0 - 34.0 PG    MCHC 32.6 31.0 - 37.0 g/dL    RDW 12.0 11.6 - 14.5 %    PLATELET 718 330 - 319 K/uL    MPV 9.4 9.2 - 11.8 FL    NEUTROPHILS 58 40 - 73 %    LYMPHOCYTES 23 21 - 52 %    MONOCYTES 14 (H) 3 - 10 %    EOSINOPHILS 5 0 - 5 %    BASOPHILS 1 0 - 2 %    ABS. NEUTROPHILS 2.5 1.8 - 8.0 K/UL    ABS. LYMPHOCYTES 1.0 0.9 - 3.6 K/UL    ABS. MONOCYTES 0.6 0.05 - 1.2 K/UL    ABS. EOSINOPHILS 0.2 0.0 - 0.4 K/UL    ABS.  BASOPHILS 0.0 0.0 - 0.1 K/UL    DF AUTOMATED     SED RATE (ESR)   Result Value Ref Range    Sed rate, automated 5 0 - 20 mm/hr   CRP, HIGH SENSITIVITY   Result Value Ref Range    CRP, High sensitivity 4.8 mg/L   METABOLIC PANEL, BASIC   Result Value Ref Range    Sodium 142 136 - 145 mmol/L    Potassium 3.8 3.5 - 5.5 mmol/L    Chloride 112 (H) 100 - 111 mmol/L    CO2 24 21 - 32 mmol/L    Anion gap 6 3.0 - 18 mmol/L    Glucose 134 (H) 74 - 99 mg/dL    BUN 16 7.0 - 18 MG/DL    Creatinine 0.63 0.6 - 1.3 MG/DL    BUN/Creatinine ratio 25 (H) 12 - 20      GFR est AA >60 >60 ml/min/1.73m2    GFR est non-AA >60 >60 ml/min/1.73m2    Calcium 8.4 (L) 8.5 - 10.1 MG/DL   CBC W/O DIFF   Result Value Ref Range    WBC 6.8 4.6 - 13.2 K/uL    RBC 4.33 (L) 4.35 - 5.65 M/uL    HGB 12.7 (L) 13.0 - 16.0 g/dL    HCT 37.9 36.0 - 48.0 %    MCV 87.5 74.0 - 97.0 FL    MCH 29.3 24.0 - 34.0 PG    MCHC 33.5 31.0 - 37.0 g/dL    RDW 12.1 11.6 - 14.5 %    PLATELET 059 362 - 728 K/uL    MPV 9.7 9.2 - 68.2 FL   METABOLIC PANEL, BASIC   Result Value Ref Range    Sodium 143 136 - 145 mmol/L    Potassium 4.1 3.5 - 5.5 mmol/L    Chloride 113 (H) 100 - 111 mmol/L    CO2 27 21 - 32 mmol/L    Anion gap 3 3.0 - 18 mmol/L    Glucose 84 74 - 99 mg/dL    BUN 14 7.0 - 18 MG/DL    Creatinine 0.62 0.6 - 1.3 MG/DL    BUN/Creatinine ratio 23 (H) 12 - 20      GFR est AA >60 >60 ml/min/1.73m2    GFR est non-AA >60 >60 ml/min/1.73m2    Calcium 7.8 (L) 8.5 - 10.1 MG/DL   PROCALCITONIN   Result Value Ref Range    Procalcitonin <0.05 ng/mL   VANCOMYCIN, TROUGH   Result Value Ref Range    Vancomycin,trough 11.7 10.0 - 20.0 ug/mL    Reported dose date [id number]      Reported dose time: 1200      Reported dose: 1250MG UNITS       ASSESSMENT and PLAN  1. Hypoglycemia  - INSULIN, FREE & TOTAL; Future  - C-PEPTIDE; Future  - Blood-Glucose Meter (True Metrix Glucose Meter) monitoring kit; Check blood glucose 2-3 times a day  Dispense: 1 Kit; Refill: 0  - glucose blood VI test strips (True Metrix Glucose Test Strip) strip; Check blood glucose 2 times a day due to hypoglycemia and fluctuating blood glucose  Dispense: 200 Strip; Refill: 4  - lancets misc; Check blood glucose 2-3 times a day for hypoglycemia  Dispense: 100 Each; Refill: 11    2. Type 2 diabetes mellitus without complication, without long-term current use of insulin ([facility])  - MICROALBUMIN, UR, RAND W/ MICROALB/CREAT RATIO; Future  - AMB POC HEMOGLOBIN A1C  - Blood-Glucose Meter (True Metrix Glucose Meter) monitoring kit; Check blood glucose 2-3 times a day  Dispense: 1 Kit; Refill: 0  - glucose blood VI test strips (True Metrix Glucose Test Strip) strip; Check blood glucose 2 times a day due to hypoglycemia and fluctuating blood glucose  Dispense: 200 Strip; Refill: 4  - lancets misc; Check blood glucose 2-3 times a day for hypoglycemia  Dispense: 100 Each; Refill: 11    3. Acute recurrent maxillary sinusitis  - levoFLOXacin (LEVAQUIN) 500 mg tablet; Take 1 Tablet by mouth daily for 10 days. Dispense: 10 Tablet; Refill: 0    4. Chronic maxillary sinusitis  - cetirizine (ZYRTEC) 10 mg tablet; Take 1 Tablet by mouth nightly for 90 days. Dispense: 90 Tablet; Refill: 0  - fluticasone propionate (FLONASE) 50 mcg/actuation nasal spray; One spray in each nostril once a day  Dispense: 1 Each; Refill: 4    5. Screening for prostate cancer  - PSA SCREENING (SCREENING); Future    6. Screening for colon cancer  - REFERRAL TO GASTROENTEROLOGY    7. Screening for endocrine, metabolic and immunity disorder  - CBC WITH AUTOMATED DIFF; Future  - METABOLIC PANEL, COMPREHENSIVE; Future    8. Encounter for lipid screening for cardiovascular disease  - LIPID PANEL; Future           Follow-up and Dispositions    · Return in about 5 weeks (around 6/1/2022) for follow up hypoglycemia. I have discussed the diagnosis with the patient and the intended plan of care as seen in the above orders. The patient has received an after-visit summary and questions were answered concerning future plans. I have discussed medication, side effects, and warnings with the patient in detail. The patient verbalized understanding and is in agreement with the plan of care. The patient will contact the office with any additional concerns. I spent at least 30 minutes on this visit with this established patient. Abbi Ngo MD    PLEASE NOTE:   This document has been produced using voice recognition software.  Unrecognized errors in transcription may be present

## 2022-04-29 ENCOUNTER — HOSPITAL ENCOUNTER (OUTPATIENT)
Dept: LAB | Age: 57
Discharge: HOME | DRG: 661 | End: 2022-04-29
Payer: MEDICARE

## 2022-04-29 ENCOUNTER — TRANSCRIBE ORDER (OUTPATIENT)
Dept: REGISTRATION | Age: 57
End: 2022-04-29

## 2022-04-29 DIAGNOSIS — Z13.29 SCREENING FOR ENDOCRINE, METABOLIC AND IMMUNITY DISORDER: ICD-10-CM

## 2022-04-29 DIAGNOSIS — Z12.5 SCREENING FOR PROSTATE CANCER: ICD-10-CM

## 2022-04-29 DIAGNOSIS — Z13.6 ENCOUNTER FOR LIPID SCREENING FOR CARDIOVASCULAR DISEASE: ICD-10-CM

## 2022-04-29 DIAGNOSIS — E16.2 HYPOGLYCEMIA: ICD-10-CM

## 2022-04-29 DIAGNOSIS — Z13.228 SCREENING FOR ENDOCRINE, METABOLIC AND IMMUNITY DISORDER: ICD-10-CM

## 2022-04-29 DIAGNOSIS — Z13.0 SCREENING FOR ENDOCRINE, METABOLIC AND IMMUNITY DISORDER: ICD-10-CM

## 2022-04-29 DIAGNOSIS — E11.9 TYPE 2 DIABETES MELLITUS WITHOUT COMPLICATION, WITHOUT LONG-TERM CURRENT USE OF INSULIN (HCC): ICD-10-CM

## 2022-04-29 DIAGNOSIS — Z13.220 ENCOUNTER FOR LIPID SCREENING FOR CARDIOVASCULAR DISEASE: ICD-10-CM

## 2022-04-29 LAB
ALBUMIN SERPL-MCNC: 3.7 G/DL (ref 3.4–5)
ALBUMIN/GLOB SERPL: 1.2 {RATIO} (ref 0.8–1.7)
ALP SERPL-CCNC: 84 U/L (ref 45–117)
ALT SERPL-CCNC: 20 U/L (ref 16–61)
ANION GAP SERPL CALC-SCNC: 4 MMOL/L (ref 3–18)
AST SERPL-CCNC: 10 U/L (ref 10–38)
BASOPHILS # BLD: 0 K/UL (ref 0–0.1)
BASOPHILS NFR BLD: 0 % (ref 0–2)
BILIRUB SERPL-MCNC: 0.6 MG/DL (ref 0.2–1)
BUN SERPL-MCNC: 17 MG/DL (ref 7–18)
BUN/CREAT SERPL: 19 (ref 12–20)
CALCIUM SERPL-MCNC: 8.9 MG/DL (ref 8.5–10.1)
CHLORIDE SERPL-SCNC: 105 MMOL/L (ref 100–111)
CHOLEST SERPL-MCNC: 133 MG/DL
CO2 SERPL-SCNC: 30 MMOL/L (ref 21–32)
CREAT SERPL-MCNC: 0.88 MG/DL (ref 0.6–1.3)
CREAT UR-MCNC: 237 MG/DL (ref 30–125)
DIFFERENTIAL METHOD BLD: ABNORMAL
EOSINOPHIL # BLD: 0.2 K/UL (ref 0–0.4)
EOSINOPHIL NFR BLD: 3 % (ref 0–5)
ERYTHROCYTE [DISTWIDTH] IN BLOOD BY AUTOMATED COUNT: 12.2 % (ref 11.6–14.5)
GLOBULIN SER CALC-MCNC: 3 G/DL (ref 2–4)
GLUCOSE SERPL-MCNC: 96 MG/DL (ref 74–99)
HCT VFR BLD AUTO: 47.3 % (ref 36–48)
HDLC SERPL-MCNC: 39 MG/DL (ref 40–60)
HDLC SERPL: 3.4 {RATIO} (ref 0–5)
HGB BLD-MCNC: 15.8 G/DL (ref 13–16)
IMM GRANULOCYTES # BLD AUTO: 0.1 K/UL (ref 0–0.04)
IMM GRANULOCYTES NFR BLD AUTO: 2 % (ref 0–0.5)
LDLC SERPL CALC-MCNC: 76.8 MG/DL (ref 0–100)
LIPID PROFILE,FLP: ABNORMAL
LYMPHOCYTES # BLD: 1.8 K/UL (ref 0.9–3.6)
LYMPHOCYTES NFR BLD: 23 % (ref 21–52)
MCH RBC QN AUTO: 28.9 PG (ref 24–34)
MCHC RBC AUTO-ENTMCNC: 33.4 G/DL (ref 31–37)
MCV RBC AUTO: 86.6 FL (ref 78–100)
MICROALBUMIN UR-MCNC: 45.2 MG/DL (ref 0–3)
MICROALBUMIN/CREAT UR-RTO: 191 MG/G (ref 0–30)
MONOCYTES # BLD: 1.3 K/UL (ref 0.05–1.2)
MONOCYTES NFR BLD: 17 % (ref 3–10)
NEUTS SEG # BLD: 4.5 K/UL (ref 1.8–8)
NEUTS SEG NFR BLD: 56 % (ref 40–73)
NRBC # BLD: 0 K/UL (ref 0–0.01)
NRBC BLD-RTO: 0 PER 100 WBC
PLATELET # BLD AUTO: 267 K/UL (ref 135–420)
PMV BLD AUTO: 9.3 FL (ref 9.2–11.8)
POTASSIUM SERPL-SCNC: 4 MMOL/L (ref 3.5–5.5)
PROT SERPL-MCNC: 6.7 G/DL (ref 6.4–8.2)
PSA SERPL-MCNC: 7.5 NG/ML (ref 0–4)
RBC # BLD AUTO: 5.46 M/UL (ref 4.35–5.65)
SODIUM SERPL-SCNC: 139 MMOL/L (ref 136–145)
TRIGL SERPL-MCNC: 86 MG/DL (ref ?–150)
VLDLC SERPL CALC-MCNC: 17.2 MG/DL
WBC # BLD AUTO: 7.9 K/UL (ref 4.6–13.2)

## 2022-04-29 PROCEDURE — 83525 ASSAY OF INSULIN: CPT

## 2022-04-29 PROCEDURE — 84153 ASSAY OF PSA TOTAL: CPT

## 2022-04-29 PROCEDURE — 80061 LIPID PANEL: CPT

## 2022-04-29 PROCEDURE — 80053 COMPREHEN METABOLIC PANEL: CPT

## 2022-04-29 PROCEDURE — 36415 COLL VENOUS BLD VENIPUNCTURE: CPT

## 2022-04-29 PROCEDURE — 82043 UR ALBUMIN QUANTITATIVE: CPT

## 2022-04-29 PROCEDURE — 85025 COMPLETE CBC W/AUTO DIFF WBC: CPT

## 2022-04-29 PROCEDURE — 84681 ASSAY OF C-PEPTIDE: CPT

## 2022-04-30 LAB — C PEPTIDE SERPL-MCNC: 3.6 NG/ML (ref 1.1–4.4)

## 2022-05-01 ENCOUNTER — APPOINTMENT (OUTPATIENT)
Dept: CT IMAGING | Age: 57
DRG: 661 | End: 2022-05-01
Attending: STUDENT IN AN ORGANIZED HEALTH CARE EDUCATION/TRAINING PROGRAM
Payer: MEDICARE

## 2022-05-01 ENCOUNTER — ANESTHESIA EVENT (OUTPATIENT)
Dept: SURGERY | Age: 57
DRG: 661 | End: 2022-05-01
Payer: MEDICARE

## 2022-05-01 ENCOUNTER — APPOINTMENT (OUTPATIENT)
Dept: GENERAL RADIOLOGY | Age: 57
DRG: 661 | End: 2022-05-01
Attending: UROLOGY
Payer: MEDICARE

## 2022-05-01 ENCOUNTER — HOSPITAL ENCOUNTER (INPATIENT)
Age: 57
LOS: 2 days | Discharge: HOME | DRG: 661 | End: 2022-05-03
Attending: STUDENT IN AN ORGANIZED HEALTH CARE EDUCATION/TRAINING PROGRAM | Admitting: INTERNAL MEDICINE
Payer: MEDICARE

## 2022-05-01 ENCOUNTER — ANESTHESIA (OUTPATIENT)
Dept: SURGERY | Age: 57
DRG: 661 | End: 2022-05-01
Payer: MEDICARE

## 2022-05-01 DIAGNOSIS — Z86.73 HISTORY OF CVA (CEREBROVASCULAR ACCIDENT): Chronic | ICD-10-CM

## 2022-05-01 DIAGNOSIS — J45.40 MODERATE PERSISTENT ASTHMA WITHOUT COMPLICATION: Chronic | ICD-10-CM

## 2022-05-01 DIAGNOSIS — N20.1 RIGHT URETERAL STONE: ICD-10-CM

## 2022-05-01 DIAGNOSIS — N39.0 COMPLICATED UTI (URINARY TRACT INFECTION): ICD-10-CM

## 2022-05-01 PROBLEM — N20.0 KIDNEY STONE: Status: ACTIVE | Noted: 2022-05-01

## 2022-05-01 PROBLEM — N20.0 KIDNEY STONE: Status: RESOLVED | Noted: 2022-05-01 | Resolved: 2022-05-01

## 2022-05-01 PROBLEM — Z87.891 FORMER SMOKER: Chronic | Status: ACTIVE | Noted: 2022-05-01

## 2022-05-01 PROBLEM — Z87.891 FORMER SMOKER: Status: ACTIVE | Noted: 2022-05-01

## 2022-05-01 PROBLEM — K21.00 GASTROESOPHAGEAL REFLUX DISEASE WITH ESOPHAGITIS: Chronic | Status: ACTIVE | Noted: 2017-09-05

## 2022-05-01 LAB
ANION GAP SERPL CALC-SCNC: 8 MMOL/L (ref 3–18)
APPEARANCE UR: ABNORMAL
APPEARANCE UR: CLEAR
BACTERIA URNS QL MICRO: ABNORMAL /HPF
BASOPHILS # BLD: 0 K/UL (ref 0–0.1)
BASOPHILS NFR BLD: 0 % (ref 0–2)
BILIRUB UR QL: ABNORMAL
BILIRUB UR QL: ABNORMAL
BUN SERPL-MCNC: 23 MG/DL (ref 7–18)
BUN/CREAT SERPL: 29 (ref 12–20)
CALCIUM SERPL-MCNC: 9.3 MG/DL (ref 8.5–10.1)
CHLORIDE SERPL-SCNC: 109 MMOL/L (ref 100–111)
CO2 SERPL-SCNC: 24 MMOL/L (ref 21–32)
COLOR UR: ABNORMAL
COLOR UR: ABNORMAL
CREAT SERPL-MCNC: 0.79 MG/DL (ref 0.6–1.3)
DIFFERENTIAL METHOD BLD: ABNORMAL
EOSINOPHIL # BLD: 0.1 K/UL (ref 0–0.4)
EOSINOPHIL NFR BLD: 1 % (ref 0–5)
ERYTHROCYTE [DISTWIDTH] IN BLOOD BY AUTOMATED COUNT: 11.9 % (ref 11.6–14.5)
GLUCOSE SERPL-MCNC: 123 MG/DL (ref 74–99)
GLUCOSE UR STRIP.AUTO-MCNC: NEGATIVE MG/DL
GLUCOSE UR STRIP.AUTO-MCNC: NEGATIVE MG/DL
HCT VFR BLD AUTO: 44.6 % (ref 36–48)
HGB BLD-MCNC: 15.2 G/DL (ref 13–16)
HGB UR QL STRIP: ABNORMAL
HGB UR QL STRIP: ABNORMAL
IMM GRANULOCYTES # BLD AUTO: 0.1 K/UL (ref 0–0.04)
IMM GRANULOCYTES NFR BLD AUTO: 1 % (ref 0–0.5)
KETONES UR QL STRIP.AUTO: NEGATIVE MG/DL
KETONES UR QL STRIP.AUTO: NEGATIVE MG/DL
LEUKOCYTE ESTERASE UR QL STRIP.AUTO: ABNORMAL
LEUKOCYTE ESTERASE UR QL STRIP.AUTO: ABNORMAL
LYMPHOCYTES # BLD: 0.7 K/UL (ref 0.9–3.6)
LYMPHOCYTES NFR BLD: 10 % (ref 21–52)
MCH RBC QN AUTO: 29.3 PG (ref 24–34)
MCHC RBC AUTO-ENTMCNC: 34.1 G/DL (ref 31–37)
MCV RBC AUTO: 86.1 FL (ref 78–100)
MONOCYTES # BLD: 0.7 K/UL (ref 0.05–1.2)
MONOCYTES NFR BLD: 9 % (ref 3–10)
NEUTS SEG # BLD: 6.1 K/UL (ref 1.8–8)
NEUTS SEG NFR BLD: 80 % (ref 40–73)
NITRITE UR QL STRIP.AUTO: POSITIVE
NITRITE UR QL STRIP.AUTO: POSITIVE
NRBC # BLD: 0 K/UL (ref 0–0.01)
NRBC BLD-RTO: 0 PER 100 WBC
PH UR STRIP: 5.5 [PH] (ref 5–8)
PH UR STRIP: 6.5 [PH] (ref 5–8)
PLATELET # BLD AUTO: 256 K/UL (ref 135–420)
PMV BLD AUTO: 9.7 FL (ref 9.2–11.8)
POTASSIUM SERPL-SCNC: 4.5 MMOL/L (ref 3.5–5.5)
PROT UR STRIP-MCNC: 100 MG/DL
PROT UR STRIP-MCNC: 100 MG/DL
RBC # BLD AUTO: 5.18 M/UL (ref 4.35–5.65)
RBC #/AREA URNS HPF: ABNORMAL /HPF (ref 0–5)
RBC #/AREA URNS HPF: NORMAL /HPF (ref 0–5)
SODIUM SERPL-SCNC: 141 MMOL/L (ref 136–145)
SP GR UR REFRACTOMETRY: 1.01 (ref 1–1.03)
SP GR UR REFRACTOMETRY: 1.01 (ref 1–1.03)
UROBILINOGEN UR QL STRIP.AUTO: 1 EU/DL (ref 0.2–1)
UROBILINOGEN UR QL STRIP.AUTO: 1 EU/DL (ref 0.2–1)
WBC # BLD AUTO: 7.6 K/UL (ref 4.6–13.2)
WBC URNS QL MICRO: ABNORMAL /HPF (ref 0–4)
WBC URNS QL MICRO: NORMAL /HPF (ref 0–4)

## 2022-05-01 PROCEDURE — 74011000250 HC RX REV CODE- 250: Performed by: NURSE ANESTHETIST, CERTIFIED REGISTERED

## 2022-05-01 PROCEDURE — 74176 CT ABD & PELVIS W/O CONTRAST: CPT

## 2022-05-01 PROCEDURE — 2709999900 HC NON-CHARGEABLE SUPPLY: Performed by: UROLOGY

## 2022-05-01 PROCEDURE — 74011250636 HC RX REV CODE- 250/636: Performed by: INTERNAL MEDICINE

## 2022-05-01 PROCEDURE — 0T918ZZ DRAINAGE OF LEFT KIDNEY, VIA NATURAL OR ARTIFICIAL OPENING ENDOSCOPIC: ICD-10-PCS | Performed by: UROLOGY

## 2022-05-01 PROCEDURE — 74011000636 HC RX REV CODE- 636: Performed by: UROLOGY

## 2022-05-01 PROCEDURE — 87116 MYCOBACTERIA CULTURE: CPT

## 2022-05-01 PROCEDURE — 76010000138 HC OR TIME 0.5 TO 1 HR: Performed by: UROLOGY

## 2022-05-01 PROCEDURE — 77030018842 HC SOL IRR SOD CL 9% BAXT -A

## 2022-05-01 PROCEDURE — 74011250637 HC RX REV CODE- 250/637: Performed by: INTERNAL MEDICINE

## 2022-05-01 PROCEDURE — 2709999900 HC NON-CHARGEABLE SUPPLY

## 2022-05-01 PROCEDURE — 77030019927 HC TBNG IRR CYSTO BAXT -A: Performed by: UROLOGY

## 2022-05-01 PROCEDURE — 74011000250 HC RX REV CODE- 250: Performed by: INTERNAL MEDICINE

## 2022-05-01 PROCEDURE — 76060000032 HC ANESTHESIA 0.5 TO 1 HR: Performed by: UROLOGY

## 2022-05-01 PROCEDURE — 96374 THER/PROPH/DIAG INJ IV PUSH: CPT

## 2022-05-01 PROCEDURE — 65270000029 HC RM PRIVATE

## 2022-05-01 PROCEDURE — C2617 STENT, NON-COR, TEM W/O DEL: HCPCS | Performed by: UROLOGY

## 2022-05-01 PROCEDURE — 77030027138 HC INCENT SPIROMETER -A

## 2022-05-01 PROCEDURE — 87086 URINE CULTURE/COLONY COUNT: CPT

## 2022-05-01 PROCEDURE — 87075 CULTR BACTERIA EXCEPT BLOOD: CPT

## 2022-05-01 PROCEDURE — 74011000250 HC RX REV CODE- 250: Performed by: STUDENT IN AN ORGANIZED HEALTH CARE EDUCATION/TRAINING PROGRAM

## 2022-05-01 PROCEDURE — 99140 ANES COMP EMERGENCY COND: CPT | Performed by: ANESTHESIOLOGY

## 2022-05-01 PROCEDURE — 81001 URINALYSIS AUTO W/SCOPE: CPT

## 2022-05-01 PROCEDURE — 87102 FUNGUS ISOLATION CULTURE: CPT

## 2022-05-01 PROCEDURE — 74011250636 HC RX REV CODE- 250/636: Performed by: STUDENT IN AN ORGANIZED HEALTH CARE EDUCATION/TRAINING PROGRAM

## 2022-05-01 PROCEDURE — 00910 ANES TRANSURETHRAL PX NOS: CPT | Performed by: NURSE ANESTHETIST, CERTIFIED REGISTERED

## 2022-05-01 PROCEDURE — BT141ZZ FLUOROSCOPY OF KIDNEYS, URETERS AND BLADDER USING LOW OSMOLAR CONTRAST: ICD-10-PCS | Performed by: UROLOGY

## 2022-05-01 PROCEDURE — 96376 TX/PRO/DX INJ SAME DRUG ADON: CPT

## 2022-05-01 PROCEDURE — 99285 EMERGENCY DEPT VISIT HI MDM: CPT

## 2022-05-01 PROCEDURE — 00910 ANES TRANSURETHRAL PX NOS: CPT | Performed by: ANESTHESIOLOGY

## 2022-05-01 PROCEDURE — 85025 COMPLETE CBC W/AUTO DIFF WBC: CPT

## 2022-05-01 PROCEDURE — 80048 BASIC METABOLIC PNL TOTAL CA: CPT

## 2022-05-01 PROCEDURE — 99140 ANES COMP EMERGENCY COND: CPT | Performed by: NURSE ANESTHETIST, CERTIFIED REGISTERED

## 2022-05-01 PROCEDURE — 0T778DZ DILATION OF LEFT URETER WITH INTRALUMINAL DEVICE, VIA NATURAL OR ARTIFICIAL OPENING ENDOSCOPIC: ICD-10-PCS | Performed by: UROLOGY

## 2022-05-01 PROCEDURE — 74420 UROGRAPHY RTRGR +-KUB: CPT

## 2022-05-01 PROCEDURE — 76210000006 HC OR PH I REC 0.5 TO 1 HR: Performed by: UROLOGY

## 2022-05-01 PROCEDURE — 74011250636 HC RX REV CODE- 250/636: Performed by: NURSE ANESTHETIST, CERTIFIED REGISTERED

## 2022-05-01 DEVICE — URETERAL STENT
Type: IMPLANTABLE DEVICE | Site: URETER | Status: FUNCTIONAL
Brand: PERCUFLEX™ PLUS

## 2022-05-01 RX ORDER — KETOROLAC TROMETHAMINE 15 MG/ML
15 INJECTION, SOLUTION INTRAMUSCULAR; INTRAVENOUS ONCE
Status: COMPLETED | OUTPATIENT
Start: 2022-05-01 | End: 2022-05-01

## 2022-05-01 RX ORDER — CETIRIZINE HCL 10 MG
10 TABLET ORAL
Status: DISCONTINUED | OUTPATIENT
Start: 2022-05-01 | End: 2022-05-03 | Stop reason: HOSPADM

## 2022-05-01 RX ORDER — HYDROMORPHONE HYDROCHLORIDE 2 MG/ML
0.5 INJECTION, SOLUTION INTRAMUSCULAR; INTRAVENOUS; SUBCUTANEOUS
Status: DISCONTINUED | OUTPATIENT
Start: 2022-05-01 | End: 2022-05-01 | Stop reason: HOSPADM

## 2022-05-01 RX ORDER — SODIUM CHLORIDE 0.9 % (FLUSH) 0.9 %
5-40 SYRINGE (ML) INJECTION AS NEEDED
Status: DISCONTINUED | OUTPATIENT
Start: 2022-05-01 | End: 2022-05-01 | Stop reason: HOSPADM

## 2022-05-01 RX ORDER — PHENAZOPYRIDINE HYDROCHLORIDE 100 MG/1
100 TABLET, FILM COATED ORAL
COMMUNITY
End: 2022-05-12

## 2022-05-01 RX ORDER — SODIUM CHLORIDE 9 MG/ML
100 INJECTION, SOLUTION INTRAVENOUS CONTINUOUS
Status: DISCONTINUED | OUTPATIENT
Start: 2022-05-01 | End: 2022-05-03 | Stop reason: HOSPADM

## 2022-05-01 RX ORDER — SODIUM CHLORIDE 9 MG/ML
100 INJECTION, SOLUTION INTRAVENOUS ONCE
Status: COMPLETED | OUTPATIENT
Start: 2022-05-01 | End: 2022-05-01

## 2022-05-01 RX ORDER — ONDANSETRON 4 MG/1
4 TABLET, ORALLY DISINTEGRATING ORAL
Status: DISCONTINUED | OUTPATIENT
Start: 2022-05-01 | End: 2022-05-03 | Stop reason: HOSPADM

## 2022-05-01 RX ORDER — IPRATROPIUM BROMIDE AND ALBUTEROL SULFATE 2.5; .5 MG/3ML; MG/3ML
3 SOLUTION RESPIRATORY (INHALATION)
Status: DISCONTINUED | OUTPATIENT
Start: 2022-05-01 | End: 2022-05-03 | Stop reason: HOSPADM

## 2022-05-01 RX ORDER — SODIUM CHLORIDE 0.9 % (FLUSH) 0.9 %
5-40 SYRINGE (ML) INJECTION EVERY 8 HOURS
Status: DISCONTINUED | OUTPATIENT
Start: 2022-05-01 | End: 2022-05-01 | Stop reason: HOSPADM

## 2022-05-01 RX ORDER — NALOXONE HYDROCHLORIDE 0.4 MG/ML
0.4 INJECTION, SOLUTION INTRAMUSCULAR; INTRAVENOUS; SUBCUTANEOUS
Status: DISCONTINUED | OUTPATIENT
Start: 2022-05-01 | End: 2022-05-03 | Stop reason: HOSPADM

## 2022-05-01 RX ORDER — SODIUM CHLORIDE 9 MG/ML
INJECTION, SOLUTION INTRAVENOUS
Status: DISCONTINUED | OUTPATIENT
Start: 2022-05-01 | End: 2022-05-01 | Stop reason: HOSPADM

## 2022-05-01 RX ORDER — HYDROMORPHONE HYDROCHLORIDE 2 MG/ML
0.2 INJECTION, SOLUTION INTRAMUSCULAR; INTRAVENOUS; SUBCUTANEOUS AS NEEDED
Status: DISCONTINUED | OUTPATIENT
Start: 2022-05-01 | End: 2022-05-01 | Stop reason: HOSPADM

## 2022-05-01 RX ORDER — ACETAMINOPHEN 650 MG/1
650 SUPPOSITORY RECTAL
Status: DISCONTINUED | OUTPATIENT
Start: 2022-05-01 | End: 2022-05-03 | Stop reason: HOSPADM

## 2022-05-01 RX ORDER — LIDOCAINE HYDROCHLORIDE 20 MG/ML
INJECTION, SOLUTION EPIDURAL; INFILTRATION; INTRACAUDAL; PERINEURAL AS NEEDED
Status: DISCONTINUED | OUTPATIENT
Start: 2022-05-01 | End: 2022-05-01 | Stop reason: HOSPADM

## 2022-05-01 RX ORDER — ONDANSETRON 2 MG/ML
INJECTION INTRAMUSCULAR; INTRAVENOUS AS NEEDED
Status: DISCONTINUED | OUTPATIENT
Start: 2022-05-01 | End: 2022-05-01 | Stop reason: HOSPADM

## 2022-05-01 RX ORDER — FENTANYL CITRATE 50 UG/ML
INJECTION, SOLUTION INTRAMUSCULAR; INTRAVENOUS AS NEEDED
Status: DISCONTINUED | OUTPATIENT
Start: 2022-05-01 | End: 2022-05-01 | Stop reason: HOSPADM

## 2022-05-01 RX ORDER — ONDANSETRON 2 MG/ML
4 INJECTION INTRAMUSCULAR; INTRAVENOUS
Status: DISCONTINUED | OUTPATIENT
Start: 2022-05-01 | End: 2022-05-03 | Stop reason: HOSPADM

## 2022-05-01 RX ORDER — CIPROFLOXACIN 2 MG/ML
400 INJECTION, SOLUTION INTRAVENOUS EVERY 12 HOURS
Status: DISCONTINUED | OUTPATIENT
Start: 2022-05-01 | End: 2022-05-01

## 2022-05-01 RX ORDER — CHOLECALCIFEROL (VITAMIN D3) 125 MCG
5 CAPSULE ORAL
Status: DISCONTINUED | OUTPATIENT
Start: 2022-05-01 | End: 2022-05-03 | Stop reason: HOSPADM

## 2022-05-01 RX ORDER — GABAPENTIN 400 MG/1
800 CAPSULE ORAL 3 TIMES DAILY
Status: DISCONTINUED | OUTPATIENT
Start: 2022-05-01 | End: 2022-05-03 | Stop reason: HOSPADM

## 2022-05-01 RX ORDER — KETOROLAC TROMETHAMINE 15 MG/ML
INJECTION, SOLUTION INTRAMUSCULAR; INTRAVENOUS AS NEEDED
Status: DISCONTINUED | OUTPATIENT
Start: 2022-05-01 | End: 2022-05-01 | Stop reason: HOSPADM

## 2022-05-01 RX ORDER — ACETAMINOPHEN 325 MG/1
650 TABLET ORAL
Status: DISCONTINUED | OUTPATIENT
Start: 2022-05-01 | End: 2022-05-03 | Stop reason: HOSPADM

## 2022-05-01 RX ORDER — POLYETHYLENE GLYCOL 3350 17 G/17G
17 POWDER, FOR SOLUTION ORAL DAILY PRN
Status: DISCONTINUED | OUTPATIENT
Start: 2022-05-01 | End: 2022-05-03 | Stop reason: HOSPADM

## 2022-05-01 RX ORDER — FAMOTIDINE 20 MG/1
40 TABLET, FILM COATED ORAL
Status: DISCONTINUED | OUTPATIENT
Start: 2022-05-01 | End: 2022-05-03 | Stop reason: HOSPADM

## 2022-05-01 RX ORDER — INSULIN LISPRO 100 [IU]/ML
INJECTION, SOLUTION INTRAVENOUS; SUBCUTANEOUS ONCE
Status: DISCONTINUED | OUTPATIENT
Start: 2022-05-01 | End: 2022-05-01 | Stop reason: HOSPADM

## 2022-05-01 RX ORDER — SODIUM CHLORIDE 0.9 % (FLUSH) 0.9 %
5-40 SYRINGE (ML) INJECTION AS NEEDED
Status: DISCONTINUED | OUTPATIENT
Start: 2022-05-01 | End: 2022-05-03 | Stop reason: HOSPADM

## 2022-05-01 RX ORDER — PROPOFOL 10 MG/ML
INJECTION, EMULSION INTRAVENOUS AS NEEDED
Status: DISCONTINUED | OUTPATIENT
Start: 2022-05-01 | End: 2022-05-01 | Stop reason: HOSPADM

## 2022-05-01 RX ORDER — GUAIFENESIN 100 MG/5ML
81 LIQUID (ML) ORAL DAILY
Status: DISCONTINUED | OUTPATIENT
Start: 2022-05-02 | End: 2022-05-03 | Stop reason: HOSPADM

## 2022-05-01 RX ORDER — CYCLOBENZAPRINE HCL 10 MG
10 TABLET ORAL
Status: DISCONTINUED | OUTPATIENT
Start: 2022-05-01 | End: 2022-05-03 | Stop reason: HOSPADM

## 2022-05-01 RX ORDER — NALOXONE HYDROCHLORIDE 0.4 MG/ML
0.1 INJECTION, SOLUTION INTRAMUSCULAR; INTRAVENOUS; SUBCUTANEOUS AS NEEDED
Status: DISCONTINUED | OUTPATIENT
Start: 2022-05-01 | End: 2022-05-01 | Stop reason: HOSPADM

## 2022-05-01 RX ORDER — DIPHENHYDRAMINE HYDROCHLORIDE 50 MG/ML
12.5 INJECTION, SOLUTION INTRAMUSCULAR; INTRAVENOUS
Status: DISCONTINUED | OUTPATIENT
Start: 2022-05-01 | End: 2022-05-01 | Stop reason: HOSPADM

## 2022-05-01 RX ORDER — SODIUM CHLORIDE 0.9 % (FLUSH) 0.9 %
5-40 SYRINGE (ML) INJECTION EVERY 8 HOURS
Status: DISCONTINUED | OUTPATIENT
Start: 2022-05-01 | End: 2022-05-03 | Stop reason: HOSPADM

## 2022-05-01 RX ORDER — MORPHINE SULFATE 2 MG/ML
2-4 INJECTION, SOLUTION INTRAMUSCULAR; INTRAVENOUS
Status: DISCONTINUED | OUTPATIENT
Start: 2022-05-01 | End: 2022-05-03 | Stop reason: HOSPADM

## 2022-05-01 RX ORDER — SODIUM CHLORIDE, SODIUM LACTATE, POTASSIUM CHLORIDE, CALCIUM CHLORIDE 600; 310; 30; 20 MG/100ML; MG/100ML; MG/100ML; MG/100ML
50 INJECTION, SOLUTION INTRAVENOUS CONTINUOUS
Status: DISCONTINUED | OUTPATIENT
Start: 2022-05-01 | End: 2022-05-01 | Stop reason: HOSPADM

## 2022-05-01 RX ORDER — ONDANSETRON 2 MG/ML
4 INJECTION INTRAMUSCULAR; INTRAVENOUS ONCE
Status: DISCONTINUED | OUTPATIENT
Start: 2022-05-01 | End: 2022-05-01 | Stop reason: HOSPADM

## 2022-05-01 RX ORDER — DEXAMETHASONE SODIUM PHOSPHATE 4 MG/ML
INJECTION, SOLUTION INTRA-ARTICULAR; INTRALESIONAL; INTRAMUSCULAR; INTRAVENOUS; SOFT TISSUE AS NEEDED
Status: DISCONTINUED | OUTPATIENT
Start: 2022-05-01 | End: 2022-05-01 | Stop reason: HOSPADM

## 2022-05-01 RX ADMIN — DEXAMETHASONE SODIUM PHOSPHATE 8 MG: 4 INJECTION, SOLUTION INTRAMUSCULAR; INTRAVENOUS at 18:36

## 2022-05-01 RX ADMIN — SODIUM CHLORIDE 100 ML/HR: 900 INJECTION, SOLUTION INTRAVENOUS at 23:27

## 2022-05-01 RX ADMIN — SODIUM CHLORIDE: 9 INJECTION, SOLUTION INTRAVENOUS at 18:23

## 2022-05-01 RX ADMIN — GABAPENTIN 800 MG: 400 CAPSULE ORAL at 21:58

## 2022-05-01 RX ADMIN — FENTANYL CITRATE 50 MCG: 50 INJECTION, SOLUTION INTRAMUSCULAR; INTRAVENOUS at 18:42

## 2022-05-01 RX ADMIN — CEFTRIAXONE 1 G: 1 INJECTION, POWDER, FOR SOLUTION INTRAMUSCULAR; INTRAVENOUS at 15:24

## 2022-05-01 RX ADMIN — PROPOFOL 200 MG: 10 INJECTION, EMULSION INTRAVENOUS at 18:33

## 2022-05-01 RX ADMIN — SODIUM CHLORIDE, PRESERVATIVE FREE 10 ML: 5 INJECTION INTRAVENOUS at 21:59

## 2022-05-01 RX ADMIN — FENTANYL CITRATE 50 MCG: 50 INJECTION, SOLUTION INTRAMUSCULAR; INTRAVENOUS at 18:39

## 2022-05-01 RX ADMIN — KETOROLAC TROMETHAMINE 15 MG: 15 INJECTION, SOLUTION INTRAMUSCULAR; INTRAVENOUS at 18:44

## 2022-05-01 RX ADMIN — LIDOCAINE HYDROCHLORIDE 100 MG: 20 INJECTION, SOLUTION EPIDURAL; INFILTRATION; INTRACAUDAL; PERINEURAL at 18:33

## 2022-05-01 RX ADMIN — SODIUM CHLORIDE 1000 ML: 900 INJECTION, SOLUTION INTRAVENOUS at 12:43

## 2022-05-01 RX ADMIN — KETOROLAC TROMETHAMINE 15 MG: 15 INJECTION, SOLUTION INTRAMUSCULAR; INTRAVENOUS at 14:34

## 2022-05-01 RX ADMIN — KETOROLAC TROMETHAMINE 15 MG: 15 INJECTION, SOLUTION INTRAMUSCULAR; INTRAVENOUS at 12:43

## 2022-05-01 RX ADMIN — Medication 5 MG: at 21:59

## 2022-05-01 RX ADMIN — ONDANSETRON 4 MG: 2 INJECTION INTRAMUSCULAR; INTRAVENOUS at 18:36

## 2022-05-01 RX ADMIN — CETIRIZINE HYDROCHLORIDE 10 MG: 10 TABLET, FILM COATED ORAL at 21:59

## 2022-05-01 RX ADMIN — SODIUM CHLORIDE 100 ML/HR: 900 INJECTION, SOLUTION INTRAVENOUS at 15:25

## 2022-05-01 NOTE — ANESTHESIA POSTPROCEDURE EVALUATION
Procedure(s):  CYSTOSCOPY URETERAL STENT INSERTION. general    Anesthesia Post Evaluation      Multimodal analgesia: multimodal analgesia used between 6 hours prior to anesthesia start to PACU discharge  Patient location during evaluation: PACU  Patient participation: complete - patient participated  Level of consciousness: awake and alert  Pain management: adequate  Airway patency: patent  Anesthetic complications: no  Cardiovascular status: acceptable  Respiratory status: acceptable  Hydration status: acceptable  Post anesthesia nausea and vomiting:  none  Final Post Anesthesia Temperature Assessment:  Normothermia (36.0-37.5 degrees C)      INITIAL Post-op Vital signs:   Vitals Value Taken Time   /83 05/01/22 1925   Temp 36.6 °C (97.9 °F) 05/01/22 1910   Pulse 74 05/01/22 1927   Resp 9 05/01/22 1927   SpO2 100 % 05/01/22 1927   Vitals shown include unvalidated device data.

## 2022-05-01 NOTE — ED TRIAGE NOTES
Pt reports left lower quadrant pain with hematuria, onset the past few days. States hx of kidney stones. Stats having vomiting, not active at present.   Denies having a urologist.

## 2022-05-01 NOTE — H&P
History and Physical    Patient: Maya Wiggins MRN: [id number]  SSN: [id number]    YOB: 1965  Age: 64 y.o. Sex: male      Subjective:      Maya Wiggins is a 64 y.o. male who presents to [facility] ER with complaint of Abdominal Pain. Patient reports a suprapubic pain of 10/10 intensity that was \"excruciating,\" non-radiating, and started at approximately 0730 EST this AM (5/01/2022). Patient reports that he had been having hematuria for a few days prior to the pain. Patient reports that the pain was associated with nausea and vomiting and that he has had these calcium kidney stones before. Patient reports taking Pyridium to try to treat the pain and noted hesitation while trying to urinate and frequent attempts without micturation. Patient states pain resolved in [facility] ER. Patient denies fevers, chills, diarrhea, dysuria, cough, and flank pain. In [facility] ER, Patient is noted to have WBC 7.6, Neut% 80%, Neut# 6.1, UA positive for UTI, Glucose 123 mg/dL, BUN 23, Creatinine 0.79, and eGFR >60/>60.  CT Abdomen showed two right ureteral stones of 4 mm and 9 mm. Patient is admitted to [facility] Medical Unit for management of Right Unilateral Ureteral Stones x2 and Complicated UTI.     Past Medical History:   Diagnosis Date    Arthritis of knee, right 12/2017    advanced degen changes noted, CT right LE    Asthma     Cellulitis 12/2017    DDD (degenerative disc disease), lumbar 2013    noted on MRI with annular tears    Diverticulitis 2016    GERD (gastroesophageal reflux disease) 2016    with esophagitis according to endscopy    Kidney stone     Lumbar post-laminectomy syndrome     [address]roiliitis ([address]     Stroke [facility]) may 14 2010    Unspecified rotator cuff tear or rupture of left shoulder, not specified as traumatic 03/2016     Past Surgical History:   Procedure Laterality Date    COLONOSCOPY N/A 8/12/2016    COLONOSCOPY with polypectomy performed by Anais Mitchell MD at [place] [address]  HX BACK SURGERY  , , 2006    L5-S1 fusion, laminectomies    HX ENDOSCOPY  10/2016    grade 1 espohagitis    HX ENDOSCOPY  2016    mild esophagitis    HX KNEE ARTHROSCOPY Right     knee    HX MOHS PROCEDURES      right    HX ORTHOPAEDIC      right shoulder      Family History   Problem Relation Age of Onset    Other Brother     Cancer Maternal Grandmother     No Known Problems Mother     No Known Problems Father      Social History     Tobacco Use    Smoking status: Former Smoker     Packs/day: 2.00     Types: Cigarettes     Quit date: 2016     Years since quittin.8    Smokeless tobacco: Never Used   Substance Use Topics    Alcohol use: No     Alcohol/week: 0.0 standard drinks      Prior to Admission medications    Medication Sig Start Date End Date Taking? Authorizing Provider   phenazopyridine (Pyridium) 100 mg tablet Take 100 mg by mouth daily as needed (Urinary Pain). Yes Provider, Historical   levoFLOXacin (LEVAQUIN) 500 mg tablet Take 1 Tablet by mouth daily for 10 days. 22 Yes Kishore Buenrostro MD   cetirizine (ZYRTEC) 10 mg tablet Take 1 Tablet by mouth nightly for 90 days. 22 Yes Kishore Buenrostro MD   fluticasone propionate [facility]) 50 mcg/actuation nasal spray One spray in each nostril once a day 22  Yes Kishore Buenrostro MD   ibuprofen (MOTRIN) 200 mg tablet Take  by mouth. Yes Provider, Historical   gabapentin (NEURONTIN) 800 mg tablet TAKE 1 TABLET BY MOUTH THREE TIMES DAILY. MAX 3 TABS DAILY 22  Yes Garcia Rodrigues MD   cyclobenzaprine (FLEXERIL) 10 mg tablet Take 1 Tablet by mouth three (3) times daily as needed for Muscle Spasm(s).  21  Yes Shanon Burris NP   albuterol (PROVENTIL HFA, VENTOLIN HFA, PROAIR HFA) 90 mcg/actuation inhaler Take 2 Puffs by inhalation every six (6) hours as needed for Wheezing or Shortness of Breath. 21  Yes Shanon Burris NP   budesonide-formoteroL (Symbicort) 160-4.5 mcg/actuation HFAA Take 1 Puff by inhalation two (2) times a day. 5/25/21  Yes Shanon Burris NP   famotidine (PEPCID) 40 mg tablet Take 1 Tab by mouth two (2) times daily as needed (indigestion/acid reflux). 2/13/20  Yes Elgin Frey NP   aspirin 81 mg chewable tablet Take 81 mg by mouth daily. Yes Provider, Historical   Blood-Glucose Meter (True Metrix Glucose Meter) monitoring kit Check blood glucose 2-3 times a day 4/27/22   Mandy Vasquez MD   glucose blood VI test strips (True Metrix Glucose Test Strip) strip Check blood glucose 2 times a day due to hypoglycemia and fluctuating blood glucose 4/27/22   Mandy Vasquez MD   lancets misc Check blood glucose 2-3 times a day for hypoglycemia 4/27/22   Mandy Vasquez MD   albuterol (PROVENTIL VENTOLIN) 2.5 mg /3 mL (0.083 %) nebulizer solution 3 mL by Nebulization route once for 1 dose.  1/13/15 2/8/20  Daljit Kaye NP        Allergies   Allergen Reactions    Penicillins Angioedema    Penicillins Anaphylaxis    Vicodin [Hydrocodone-Acetaminophen] Nausea and Vomiting    Azithromycin Nausea and Vomiting    Doxycycline Itching     Black eyes    Hydrochlorothiazide Nausea Only     Dizzy, lightheaded, blisters on his legs    Protonix [Pantoprazole] Itching and Contact Dermatitis     Patient denies allergy    Vicodin [Hydrocodone-Acetaminophen] Nausea and Vomiting    Erythromycin Other (comments)       Review of Systems:  (-) Fevers  (-) Chills  (+) Nasal Congestion  (+) Nasal Discharge  (-) Cough  (-) Increased Sputum Production  (+) Abdominal Pain  (+) Nausea  (+) Vomiting  (-) Diarrhea  (+) Increased Urinary Frequency  (+) Hesitation  (-) Urinary Urgency  (-) Polyuria  (-) Dysuria  (+) Hematuria    All other systems have been reviewed and are negative      Objective:     Vitals:    05/01/22 1943 05/01/22 1944 05/01/22 1949 05/01/22 2025   BP: 134/73   137/87   Pulse:  79 78 88   Resp:  14 13 16   Temp:    98 °F (36.7 °C)   SpO2:  100%  99% Weight:       Height:            Physical Exam:  General:  Adult male lying in bed in no acute distress  HEENT:  Atraumatic, normocephalic; Pupils equally round and reactive to light with accommodation; Extraocular muscles intact; Moist Oropharynx without erythema, edema, or exudates; (+) Poor, Sparse Dentition  Chest:  No pectus carinatum; No pectus excavatum  Cardiovascular:  Regular rate and rhythm without rubs, gallops, or murmurs  Respiratory:  (+) Mild Crackles over Right Lung base; no wheezes or rhonchi; normal effort of breathing  Abdominal:  Soft, non-tense, (+) Moderate to Severely Tender LLQ; BS present without guarding, rebound, or masses  :  (+) Márquez Catheter in place and draining light red urine into a Collect Bag containing approximately 7 mL of urine  Extremities:  Pulses 2+ x4 without edema, clubbing, or cyanosis  Musculoskeletal:  Strength 5/5 and symmetrical in BUE and BLE  Integument:  No rash on face, forearms, or legs  Neurological:  A&O x4/4; No gross deficits of Visual Acuity, Eye Movement, Jaw Opening, Facial Expression, Hearing, Phonation, or Head Movement;  No gross deficits of Tongue Movement or Slurring of Speech  Psychiatric:  Affect is appropriate; Language is present and fluent; Behavior is appropriate      Laboratory Studies:  CMP:   Lab Results   Component Value Date/Time     05/01/2022 12:36 PM    K 4.5 05/01/2022 12:36 PM     05/01/2022 12:36 PM    CO2 24 05/01/2022 12:36 PM    AGAP 8 05/01/2022 12:36 PM     (H) 05/01/2022 12:36 PM    BUN 23 (H) 05/01/2022 12:36 PM    CREA 0.79 05/01/2022 12:36 PM    GFRAA >60 05/01/2022 12:36 PM    GFRNA >60 05/01/2022 12:36 PM    CA 9.3 05/01/2022 12:36 PM     CBC:   Lab Results   Component Value Date/Time    WBC 7.6 05/01/2022 12:36 PM    HGB 15.2 05/01/2022 12:36 PM    HCT 44.6 05/01/2022 12:36 PM     05/01/2022 12:36 PM     All Cardiac Markers in the last 24 hours: No results found for: CPK, CK, CKMMB, CKMB, RCK3, CKMBT, CKNDX, CKND1, MARIANNE, TROPT, TROIQ, JENARO, TROPT, TNIPOC, BNP, BNPP  Recent Glucose Results:   Lab Results   Component Value Date/Time     (H) 05/01/2022 12:36 PM     COAGS: No results found for: APTT, PTP, INR, INREXT, INREXT     Images Reviewed:  CT ABD PELV WO CONT    Result Date: 5/1/2022  EXAM: CT ABDOMEN AND PELVIS WITHOUT CONTRAST CLINICAL HISTORY/INDICATION: Patient presents with left lower quadrant abdominal pain, hematuria and urgency, with vomiting, described as similar to previous kidney stones, also history of diverticulitis, L sided kidney stone? signs of pylonephritis? COMPARISON: CT abdomen and pelvis 7/2/2020. TECHNIQUE: No intravenous contrast was utilized for this helical thin section CT of the abdomen and pelvis. Coronal and sagittal reformations obtained. Evaluation of the solid organs, bowel wall, and vascular structures are therefore limited. All CT scans at this facility are performed using dose optimization technique as appropriate to a performed exam, to include automated exposure control, adjustment of the mA and/or kV according to patient's size (including appropriate matching for site-specific examinations), or use of iterative reconstruction technique. FINDINGS: Abdomen- Mild bibasal dependent atelectasis. The included portions of the chest wall and the abdominal wall  are unremarkable. The liver and spleen are normal in size and density. Solitary small calcification. The biliary tree is not dilated. The gallbladder is unremarkable. Mild to moderate left hydronephrosis with hydroureter of the proximal ureter. At the lower pole of the left kidney is an obstructing 9 x 7 mm calculus. The more distal ureter demonstrates less dilatation but is still mildly dilated. At the ureterovesical junction is a second intraluminal calculus measuring 5 mm. Axial image 80. There are one or 2 tiny punctate left renal midpole nonobstructing calculi.  There are multiple right intrarenal calculi the largest measuring 0.9 x 0.6 cm at the midpole. Additional several tiny 1 to 2 mm calculi. . Adrenals and Pancreas  are of normal CT appearance. There is no free fluid or free air. The large and small bowel seem unremarkable. Normal appendix. Small umbilical hernia contains fat. Pelvis - There is no free pelvic fluid. The pelvic viscera are unremarkable. The bladder is incompletely distended but unremarkable. Review of osseous structures throughout on bone window settings shows no significant osseous pathology. Moderately obstructing left proximal ureteral 9 mm calculus. Previously located at the midpole. The more distal left ureteral calculus measures 5 mm. Additional bilateral nonobstructing intrarenal calculi. Report provided to the emergency department at 1436 hrs. Assessment:     Hospital Problems  Date Reviewed: 5/1/2022          Codes Class Noted POA    * (Principal) Right ureteral stone ICD-10-CM: N20.1  ICD-9-CM: 592.1  5/9/5032 Yes        Complicated UTI (urinary tract infection) ICD-10-CM: N39.0  ICD-9-CM: 599.0  5/1/2022 Yes        Former smoker (Chronic) ICD-10-CM: B97.986  ICD-9-CM: V15.82  5/1/2022 Yes        Gastroesophageal reflux disease with esophagitis (Chronic) ICD-10-CM: K21.00  ICD-9-CM: 530.11  9/5/2017 Yes        Moderate persistent asthma without complication (Chronic) PGP-40-ZC: J45.40  ICD-9-CM: 493.90  9/5/2017 Yes        History of CVA (cerebrovascular accident) (Chronic) ICD-10-CM: O74.18  ICD-9-CM: V12.54  2/2/2017 Yes              Plan:     IV Ceftriaxone, IV fluids ( mL/hr), PRN IV Morphine, PRN Naloxone, and Urine Culture. Urological services consulted in [facility] by [facility] Physician. Continue home medications for History of CVA, COPD/Asthma, and Seasonal Allergies. DVT mechanoprophylaxis is achieved with SCDs.     Signed By: Yvette Bustos DO     May 1, 2022

## 2022-05-01 NOTE — PROGRESS NOTES
INTERIM UPDATE - 3120 EST on 5/01/2022    [facility] ER Physician calls requesting admission for a Patient with unilateral ureteral stones x2 (5 mm and 9 mm) with UTI. Urological services have been contacted by [facility] ER Physician and are aware of Patient requiring Ureteral Stent. Plan:  Admit Patient to [facility] Medical Unit for management of Ureteral Stones x2 and Complicated UTI.

## 2022-05-01 NOTE — ROUTINE PROCESS
TRANSFER - OUT REPORT:    Verbal report given to Vandana Rodriguez RN(name) on Epi Sosa  being transferred to [facility] room 512(unit) for routine progression of care       Report consisted of patients Situation, Background, Assessment and   Recommendations(SBAR). Information from the following report(s) ED Summary was reviewed with the receiving nurse. Lines:   Peripheral IV 05/01/22 Right Hand (Active)   Site Assessment Clean, dry, & intact 05/01/22 1237   Phlebitis Assessment 0 05/01/22 1237   Infiltration Assessment 0 05/01/22 1237   Dressing Status Clean, dry, & intact 05/01/22 1237   Hub Color/Line Status Pink 05/01/22 1237   Action Taken Blood drawn 05/01/22 1237        Opportunity for questions and clarification was provided.       Patient transported with:   INT

## 2022-05-01 NOTE — CONSULTS
5/1/2022    ASSESSMENT:   1. Left obstructing ureterolithiasis, distal and proximal  2. Associated left hydronephrosis  3. UTI  4. Right nonobstructive nephrolithiasis      PLAN:    Admitted by internal medicine, appreciate assistance   Antibiotic management per primary team  To the OR for cystoscopy, left retrograde pyelogram, placement of left JJ ureteral stent    I discussed the risks of cystoscopy, left retrograde pyelogram, and left double J ureteral stent placement, including bleeding, infection, injury to bladder, ureter, kidney, need for additional procedures, inability to place the stent, voiding c/o due to stent irritation including hematuria, frequency, urgency, and dysuria. I discussed possible PCN (Percutaneous Nephrostomy Tube(s) placement if unable to pass stent past stone. All questions were answered. Pt. verbalized understanding and desires to proceed with cystoscopy, left retrograde pyelogram, and left double J stent placement today. Pt. understands He will need an additional procedure with ESWL vs ureteroscopy with holmium laser lithotripsy pending KUB. Urine culture and KUB ordered. Follow up arranged? Luke Shah MD    [phone]      Chief Complaint   Patient presents with    Blood in Urine    Urinary Pain       HISTORY OF PRESENT ILLNESS:  Scooby Tejada is a 64 y.o. male who is seen in consultation as referred by Dr. Odilia Allred for obstructive ureterolithiasis with associated hydronephrosis and UTI. Patient has known nephrolithiasis and has passed stones in the past.  He states that earlier this morning he is started having some left flank pain that escalated to the point that was severe and prompted him to come to the emergency department at [facility] where he was evaluated with a CT scan showing 2 stones at the left ureter, one 5 mm at the distal ureter and 1 9 mm at the proximal ureter causing mild hydronephrosis.   The urine showed signs of infection and the patient has moderate to severe pain. Urology was consulted and patient was given options and decided to have a stent placement. The patient understands that this is not the definitive treatment for the stone and he will require further treatment once infection is cleared. HPI:  Location left kidney and left ureter  Duration Hours  Associated signs/symptoms none  Modifying factors none  Severity moderate        AUA Symptom Score 2/11/2019   Over the past month how often have you had the sensation that your bladder was not completely empty after you finished urinating? 0   Over the past month, how often have had to urinate again less than 2 hours after you last finished urinating? 1   Over the past month, how often have you found you stopped and started again several times when you urinated? 0   Over the past month, how often have you found it difficult to postpone urination? 1   Over the past month, how often have you had a weak urinary stream? 1   Over the past month, how often have you had to push or strain to begin urinating? 1   Over the past month, how many times did you most typically get up to urinate from the time you went to bed at night until the time you got up in the morning? 2   AUA Score 6   If you were to spend the rest of your life with your urinary condition the way it is now, how would you feel about that?  Mostly satisfied         Past Medical History:   Diagnosis Date    Arthritis of knee, right 12/2017    advanced degen changes noted, CT right LE    Asthma     Cellulitis 12/2017    DDD (degenerative disc disease), lumbar 2013    noted on MRI with annular tears    Diverticulitis 2016    GERD (gastroesophageal reflux disease) 2016    with esophagitis according to endscopy    Kidney stone     Lumbar post-laminectomy syndrome     [address] [address]     Stroke [facility]) may 14 2010    Unspecified rotator cuff tear or rupture of left shoulder, not specified as traumatic 03/2016 Past Surgical History:   Procedure Laterality Date    COLONOSCOPY N/A 2016    COLONOSCOPY with polypectomy performed by Marley Vaz MD at [address] [place] [address]  2002, , 2006    L5-S1 fusion, laminectomies    HX ENDOSCOPY  10/2016    grade 1 espohagitis    HX ENDOSCOPY  2016    mild esophagitis    HX KNEE ARTHROSCOPY Right     knee    HX MOHS PROCEDURES      right    HX ORTHOPAEDIC      right shoulder       Social History     Tobacco Use    Smoking status: Former Smoker     Packs/day: 2.00     Types: Cigarettes     Quit date: 2016     Years since quittin.8    Smokeless tobacco: Never Used   Vaping Use    Vaping Use: Not on file   Substance Use Topics    Alcohol use: No     Alcohol/week: 0.0 standard drinks    Drug use: No       Allergies   Allergen Reactions    Penicillins Angioedema    Penicillins Anaphylaxis    Vicodin [Hydrocodone-Acetaminophen] Nausea and Vomiting    Azithromycin Nausea and Vomiting    Doxycycline Itching     Black eyes    Hydrochlorothiazide Nausea Only     Dizzy, lightheaded, blisters on his legs    Protonix [Pantoprazole] Itching and Contact Dermatitis     Patient denies allergy    Vicodin [Hydrocodone-Acetaminophen] Nausea and Vomiting    Erythromycin Other (comments)       Family History   Problem Relation Age of Onset    Other Brother     Cancer Maternal Grandmother     No Known Problems Mother     No Known Problems Father            Review of Systems      Negative for: Ophthalmologic issues, ENT issues, Cardiovascular issues, respiratory issues, GI issues, neurologic issues, hematoogic issues, skin lesions, musculoskeletal issues, psychiatric issues  Exceptions: no    Positive for:    Left flank pain          PHYSICAL EXAMINATION:   Visit Vitals  BP (!) 145/92   Pulse 90   Temp 97.9 °F (36.6 °C)   Resp 19   Ht 5' 10\" (1.778 m)   Wt 165 lb (74.8 kg)   SpO2 99%   BMI 23.68 kg/m²     Constitutional: Well developed, well nourished male. No acute distress. HEENT: Normocephalic, Atraumatic, EOM's intact   CV:  no edema  Respiratory: No respiratory distress or difficulties breathing   Abdomen:  soft and non tender    Male:  No CVA tenderness    SCROTUM:  No scrotal rash or lesions noticed. Normal bilateral testes and epididymis. PENIS: Urethral meatus normal in location and size. No urethral discharge. Circumsized   Skin: No evidence of jaundice. Normal color  Neuro/Psych:  Alert and oriented. Affect appropriate. REVIEW OF LABS AND IMAGING:      Labs: Results:   Chemistry    Recent Labs     05/01/22  1236 04/29/22  0947   * 96    139   K 4.5 4.0    105   CO2 24 30   BUN 23* 17   CREA 0.79 0.88   CA 9.3 8.9   AGAP 8 4   BUCR 29* 19   AP  --  84   TP  --  6.7   ALB  --  3.7   GLOB  --  3.0   AGRAT  --  1.2      CBC w/Diff Recent Labs     05/01/22  1236 04/29/22  0947   WBC 7.6 7.9   RBC 5.18 5.46   HGB 15.2 15.8   HCT 44.6 47.3    267   GRANS 80* 56   LYMPH 10* 23   EOS 1 3      Cultures No results for input(s): CULT in the last 72 hours.   All Micro Results     None            Urinalysis Color   Date Value Ref Range Status   05/01/2022 ORANGE   Final     Appearance   Date Value Ref Range Status   05/01/2022 CLEAR   Final     Specific gravity   Date Value Ref Range Status   05/01/2022 1.011 1.003 - 1.030   Final     pH (UA)   Date Value Ref Range Status   05/01/2022 6.5 5.0 - 8.0   Final     Protein   Date Value Ref Range Status   05/01/2022 100 (A) NEG mg/dL Final     Ketone   Date Value Ref Range Status   05/01/2022 Negative NEG mg/dL Final     Bilirubin   Date Value Ref Range Status   05/01/2022 SMALL (A) NEG   Final     Blood   Date Value Ref Range Status   05/01/2022 LARGE (A) NEG   Final     Urobilinogen   Date Value Ref Range Status   05/01/2022 1.0 0.2 - 1.0 EU/dL Final     Nitrites   Date Value Ref Range Status   05/01/2022 Positive (A) NEG   Final     Leukocyte Esterase   Date Value Ref Range Status   05/01/2022 SMALL (A) NEG   Final     Potassium   Date Value Ref Range Status   05/01/2022 4.5 3.5 - 5.5 mmol/L Final     Comment:     SPECIMEN IS SLIGHTLY HEMOLYZED     Creatinine   Date Value Ref Range Status   05/01/2022 0.79 0.6 - 1.3 MG/DL Final     BUN   Date Value Ref Range Status   05/01/2022 23 (H) 7.0 - 18 MG/DL Final     Prostate Specific Ag   Date Value Ref Range Status   04/29/2022 7.5 (H) 0.0 - 4.0 ng/mL Final      PSA Recent Labs     04/29/22  0947   PSA 7.5*      Coagulation Lab Results   Component Value Date/Time    Prothrombin time 12.3 03/01/2015 11:03 AM    Prothrombin time 13.1 11/27/2011 04:15 PM    INR 0.9 03/01/2015 11:03 AM    INR 1.0 11/27/2011 04:15 PM    aPTT 28.1 03/01/2015 11:03 AM    aPTT 26.4 11/27/2011 04:15 PM           CT scan today     IMPRESSION     Moderately obstructing left proximal ureteral 9 mm calculus. Previously located  at the midpole. The more distal left ureteral calculus measures 5 mm. Additional bilateral nonobstructing intrarenal calculi.        Report provided to the emergency department at 1436 hrs.

## 2022-05-01 NOTE — ED PROVIDER NOTES
HPI   Patient is a 58-year-old male who presents with left lower quadrant abdominal pain. Patient states that over the last day he has noticed hematuria and trouble with urination. And starting this morning he started to develop left lower quadrant abdominal pain. Pain is a constant sharp pain that feels worse than previous kidney stones but does feel similar to previous kidney stones. He does have a history of diverticulitis but states this feels very different. Does have urinary urgency associated with this but denies any burning with urination. Denies any fever, sweats or chills. Has always passed kidney stones in the past.  Does not follow-up with urology. Has had multiple bouts of vomiting today usually associated with pain. Denies any change in his bowel habits. Denies any bleeding from elsewhere.     Past Medical History:   Diagnosis Date    Arthritis of knee, right 12/2017    advanced degen changes noted, CT right LE    Asthma     Cellulitis 12/2017    DDD (degenerative disc disease), lumbar 2013    noted on MRI with annular tears    Diverticulitis 2016    GERD (gastroesophageal reflux disease) 2016    with esophagitis according to endscopy    Kidney stone     Lumbar post-laminectomy syndrome     Sacroiliitis ([address]     Stroke [facility]) may 14 2010    Unspecified rotator cuff tear or rupture of left shoulder, not specified as traumatic 03/2016       Past Surgical History:   Procedure Laterality Date    COLONOSCOPY N/A 8/12/2016    COLONOSCOPY with polypectomy performed by Trish Singh MD at [facility]  2002, 2004, 2006    L5-S1 fusion, laminectomies    HX ENDOSCOPY  10/2016    grade 1 espohagitis    HX ENDOSCOPY  11/2016    mild esophagitis    HX KNEE ARTHROSCOPY Right     knee    HX MOHS PROCEDURES      right    HX ORTHOPAEDIC      right shoulder         Family History:   Problem Relation Age of Onset    Other Brother     Cancer Maternal Grandmother     No Known Problems Mother     No Known Problems Father        Social History     Socioeconomic History    Marital status: SINGLE     Spouse name: Not on file    Number of children: Not on file    Years of education: Not on file    Highest education level: Not on file   Occupational History    Not on file   Tobacco Use    Smoking status: Former Smoker     Packs/day: 2.00     Types: Cigarettes     Quit date: 2016     Years since quittin.8    Smokeless tobacco: Never Used   Vaping Use    Vaping Use: Not on file   Substance and Sexual Activity    Alcohol use: No     Alcohol/week: 0.0 standard drinks    Drug use: No    Sexual activity: Yes     Partners: Female   Other Topics Concern    Not on file   Social History Narrative    ** Merged History Encounter **          Social Determinants of Health     Financial Resource Strain:     Difficulty of Paying Living Expenses: Not on file   Food Insecurity:     Worried About [address] in the Last Year: Not on file    Shari of Food in the Last Year: Not on file   Transportation Needs:     Lack of Transportation (Medical): Not on file    Lack of Transportation (Non-Medical):  Not on file   Physical Activity:     Days of Exercise per Week: Not on file    Minutes of Exercise per Session: Not on file   Stress:     Feeling of Stress : Not on file   Social Connections:     Frequency of Communication with Friends and Family: Not on file    Frequency of Social Gatherings with Friends and Family: Not on file    Attends Tenriism Services: Not on file    Active Member of Clubs or Organizations: Not on file    Attends Club or Organization Meetings: Not on file    Marital Status: Not on file   Intimate Partner Violence:     Fear of Current or Ex-Partner: Not on file    Emotionally Abused: Not on file    Physically Abused: Not on file    Sexually Abused: Not on file   Housing Stability:     Unable to Pay for Housing in the Last Year: Not on file  Number of Places Lived in the Last Year: Not on file    Unstable Housing in the Last Year: Not on file         ALLERGIES: Penicillins, Penicillins, Vicodin [hydrocodone-acetaminophen], Azithromycin, Doxycycline, Hydrochlorothiazide, Protonix [pantoprazole], Vicodin [hydrocodone-acetaminophen], and Erythromycin    Review of Systems  Constitutional: No fever  HENT: No ear pain  Eyes: No change in vision  Respiratory: No SOB  Cardio: No chest pain  GI: No blood in stool  : Positive for hematuria  MSK: No back pain  Skin: No rashes  Neuro: No headache    Vitals:    05/01/22 1226   BP: (!) 145/92   Pulse: 90   Resp: 19   Temp: 97.9 °F (36.6 °C)   SpO2: 99%   Weight: 75.8 kg (167 lb)   Height: 5' 10\" (1.778 m)            Physical Exam   General: No acute distress  Head: Normocephalic, atraumatic  Psych: Cooperative and alert  Eyes: No scleral icterus, normal conjunctiva  ENT: Moist oral mucosa  Neck: Supple  CV: Regular rate and rhythm, no pitting edema, palpable radial pulses  Pulm: Clear breath sounds bilaterally without any wheezing or rhonchi, normal respiratory rate  GI: Normal bowel sounds, soft, tenderness to the mid left lower quadrant suprapubic region without any rebound or guarding, no CVA tenderness  MSK: Moves all four extremities  Skin: No rashes  Neuro: Alert and conversive    MDM   Patient is a 63-year-old male who presents with left lower quadrant abdominal pain. Symptoms are most consistent with a kidney stone. Which fits with his hematuria. Although it is little atypical that the pain is not coming and going. Also that he had symptoms prior to the pain starting. Like to rule out infection and other etiologies. Blood work be sent. Patient be given Toradol. CBC, BMP are within normal limits. UA shows a large amount of blood, moderate bilirubin, positive for nitrates, small leukocyte esterase and RBCs however unable to quantify WBCs.   Overall fact there is nitrates and small amount of leukocyte esterase could be concerning for infection. Because of this and the possible need for stent we will obtain a CT of the abdomen and pelvis. CT scan of the abdomen and pelvis shows: Moderately obstructing left proximal ureteral 9 mm calculus. Previously located  at the midpole. The more distal left ureteral calculus measures 5 mm. Additional bilateral nonobstructing intrarenal calculi. With the patient having a 9 mm calcaneus, possible infection and additional 5 mm stone I do feel that he may benefit from a stent placement. Case was discussed with urology and they agree with admission over at [facility] for stent placement. This is patient's preferred course of action. Patient is admitted to the hospitalist service. Patient is in agreement with the plan to be admitted at this time.   All orders moving forward replacement the admitting team.        Procedures

## 2022-05-01 NOTE — PROCEDURES
Operative Note    5/1/2022    Patient: Mariposa George               Sex: male             MRN: [id number]      YOB: 1965      Age:  64 y.o. Preoperative Diagnosis: kidney stone    Postoperative Diagnosis:  * No post-op diagnosis entered *    Surgeon: Surgeon(s) and Role:     * Kelly Pepper MD - Primary    Assistant:   Circ-1: Harvey Chow RN  Radiology Technician: Nelly Easely, ASHLY, RT, NM, ARRT  Scrub Tech-1: Lynette Cleveland    Anesthesia:  General  Anesthesiologist: Nayely Christensen MD  CRNA: Shannon Patel CRNA    Indications for surgery: Mr. Nestor Escobar has known bilateral nephrolithiasis. He has passed stones spontaneously in the past.  Now admitted for severe left flank pain associated to multiple left proximal and distal ureteral stones with associated hydronephrosis and UTI. He was consented for cystoscopy with ureteral stent insertion    Procedure:  Procedure(s):  CYSTOSCOPY URETERAL STENT INSERTION (left) left retrograde pyelogram fluoroscopy, less than 1 hour, with interpretation, urine culture obtained from the left kidney     Procedure in Detail:   The patient was seen in the pre-operative area. The risks, benefits, complications, alternative treatment options, and expected outcomes were again discussed with the patient. The possibilities of reaction to medication, pain, infection, bleeding, major cardiovascular event, death, damage to surrounding structures were specifically addressed. Informed consent was then obtained. The site of surgery properly noted/marked. The patient was brought to the cystoscopy suite. Under adequate LMA anesthesia, the patient was positioned in dorsal lithotomy and prepped and draped as usual  A preoperative time out was performed addressing the anticipated surgical site, procedure, and safety precautions. Sequential compression stockings were applied.   Prophylactic antibiotic (Rocephin 1 g) was administered empirically. The 21Fr cystoscope was inserted through a normal urethra. A brief inspection of the bladder surface revealed no mucosal abnormalities. Yellow jacket was advanced in the left ureter and retrograde pyelogram was performed. Obstructing stone was noted at the UPJ. Mild hydro was seen. No extrav.  A 0.035\" glide wire was guided into the left ureter to the renal pelvis. Yellow jacket was advanced proximal to the stone. Pus was aspirated from the kidney and sent for culture. Wire was passed to the upper pole. Yellow jacket removed. [id number] JJ stent was advanced over wire. Wire removed and stent deployed in good position. XRay saved confirming good stent position. Yellow jacket was placed in right ureter. Right RPG performed. No hydro, no filling defects, no extrav. Yellow jacket removed. Bladder was drained. Scope removed. Pt awakened and transferred to recovery. No complications. Pt tolerated the procedure well. Estimated Blood Loss:  1 mL                  Implants:   Implant Name Type Inv. Item Serial No.  Lot No. LRB No. Used Action   STENT URET 6F 26CM -- [address] [place] [address] [id number] - [id number]  STENT URET 6F 26CM -- [address] [place] [address] [id number]  [employer] [facility]_ [id number] Left 1 Implanted       Specimens: Urine from left kidney was obtained and sent for culture and sensitivity    Drains: A 16 Indonesian Márquez catheter for maximal decompression until urine cultures are resulted           Complications:  None           Counts: Not necessary    Plan: Mr. Billy Munguia will continue admitted per primary team.  Urology will monitor his progress. Pending urine cultures he will be discharged on proper antibiotics. He will need definitive stone management once infection is cleared.   Keep the Márquez catheter until cultures are resulted    Lucy Boss MD  5/1/2022

## 2022-05-01 NOTE — ANESTHESIA PREPROCEDURE EVALUATION
Relevant Problems   RESPIRATORY SYSTEM   (+) Moderate persistent asthma without complication      GASTROINTESTINAL   (+) Gastroesophageal reflux disease with esophagitis      RENAL FAILURE   (+) Kidney stone       Anesthetic History   No history of anesthetic complications            Review of Systems / Medical History  Patient summary reviewed and pertinent labs reviewed    Pulmonary          Smoker  Asthma : well controlled    Comments: 1/2 PPD. 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 Cardiovascular  Within defined limits                  Comments: ETT in past (2015), negative for perfusion abnormalities, EF 51% or more, low risk study. GI/Hepatic/Renal     GERD: well controlled           Endo/Other        Arthritis     Other Findings   Comments: Low back pain, post lumbar syndrome.           Physical Exam    Airway  Mallampati: II  TM Distance: 4 - 6 cm  Neck ROM: normal range of motion   Mouth opening: Normal     Cardiovascular  Regular rate and rhythm,  S1 and S2 normal,  no murmur, click, rub, or gallop  Rhythm: regular  Rate: normal         Dental    Dentition: Poor dentition     Pulmonary  Breath sounds clear to auscultation               Abdominal  GI exam deferred       Other Findings            Anesthetic Plan    ASA: 3, emergent  Anesthesia type: general          Induction: Intravenous  Anesthetic plan and risks discussed with: Patient

## 2022-05-01 NOTE — ROUTINE PROCESS
TRANSFER - IN REPORT:    Verbal report received from JACOB Calderon(name) on Danette Section  being received from [facility]) for routine post - op      Report consisted of patients Situation, Background, Assessment and   Recommendations(SBAR). Information from the following report(s) SBAR, Kardex, Procedure Summary, Intake/Output, MAR, Recent Results and Med Rec Status was reviewed with the receiving nurse. Opportunity for questions and clarification was provided. Assessment completed upon patients arrival to unit and care assumed. Primary Nurse Mandy Medina RN and Fabiola Oconnor RN performed a dual skin assessment on this patient No impairment noted  Joseluis score is 21    Bedside and Verbal shift change report given to [facility] (oncoming nurse) by me (offgoing nurse). Report included the following information SBAR, Kardex, Procedure Summary, Intake/Output, MAR and Recent Results.

## 2022-05-02 ENCOUNTER — APPOINTMENT (OUTPATIENT)
Dept: GENERAL RADIOLOGY | Age: 57
DRG: 661 | End: 2022-05-02
Attending: PHYSICIAN ASSISTANT
Payer: MEDICARE

## 2022-05-02 DIAGNOSIS — R97.20 ELEVATED PSA: ICD-10-CM

## 2022-05-02 DIAGNOSIS — R80.9 MICROALBUMINURIA: Primary | ICD-10-CM

## 2022-05-02 DIAGNOSIS — E11.9 TYPE 2 DIABETES MELLITUS WITHOUT COMPLICATION, WITHOUT LONG-TERM CURRENT USE OF INSULIN (HCC): ICD-10-CM

## 2022-05-02 DIAGNOSIS — E16.2 HYPOGLYCEMIA: ICD-10-CM

## 2022-05-02 LAB
ALBUMIN SERPL-MCNC: 3.3 G/DL (ref 3.4–5)
ALBUMIN/GLOB SERPL: 1.3 {RATIO} (ref 0.8–1.7)
ALP SERPL-CCNC: 78 U/L (ref 45–117)
ALT SERPL-CCNC: 24 U/L (ref 16–61)
ANION GAP SERPL CALC-SCNC: 5 MMOL/L (ref 3–18)
AST SERPL-CCNC: 17 U/L (ref 10–38)
BASOPHILS # BLD: 0 K/UL (ref 0–0.1)
BASOPHILS NFR BLD: 0 % (ref 0–2)
BILIRUB SERPL-MCNC: 0.4 MG/DL (ref 0.2–1)
BUN SERPL-MCNC: 26 MG/DL (ref 7–18)
BUN/CREAT SERPL: 26 (ref 12–20)
CALCIUM SERPL-MCNC: 9.1 MG/DL (ref 8.5–10.1)
CHLORIDE SERPL-SCNC: 111 MMOL/L (ref 100–111)
CO2 SERPL-SCNC: 27 MMOL/L (ref 21–32)
CREAT SERPL-MCNC: 1 MG/DL (ref 0.6–1.3)
DIFFERENTIAL METHOD BLD: ABNORMAL
EOSINOPHIL # BLD: 0 K/UL (ref 0–0.4)
EOSINOPHIL NFR BLD: 0 % (ref 0–5)
ERYTHROCYTE [DISTWIDTH] IN BLOOD BY AUTOMATED COUNT: 12.1 % (ref 11.6–14.5)
GLOBULIN SER CALC-MCNC: 2.6 G/DL (ref 2–4)
GLUCOSE BLD STRIP.AUTO-MCNC: 137 MG/DL (ref 70–110)
GLUCOSE BLD STRIP.AUTO-MCNC: 86 MG/DL (ref 70–110)
GLUCOSE SERPL-MCNC: 144 MG/DL (ref 74–99)
HCT VFR BLD AUTO: 40.9 % (ref 36–48)
HGB BLD-MCNC: 13.8 G/DL (ref 13–16)
IMM GRANULOCYTES # BLD AUTO: 0.1 K/UL (ref 0–0.04)
IMM GRANULOCYTES NFR BLD AUTO: 1 % (ref 0–0.5)
LYMPHOCYTES # BLD: 0.7 K/UL (ref 0.9–3.6)
LYMPHOCYTES NFR BLD: 10 % (ref 21–52)
MCH RBC QN AUTO: 29.2 PG (ref 24–34)
MCHC RBC AUTO-ENTMCNC: 33.7 G/DL (ref 31–37)
MCV RBC AUTO: 86.5 FL (ref 78–100)
MONOCYTES # BLD: 0.6 K/UL (ref 0.05–1.2)
MONOCYTES NFR BLD: 8 % (ref 3–10)
NEUTS SEG # BLD: 5.3 K/UL (ref 1.8–8)
NEUTS SEG NFR BLD: 80 % (ref 40–73)
NRBC # BLD: 0 K/UL (ref 0–0.01)
NRBC BLD-RTO: 0 PER 100 WBC
PLATELET # BLD AUTO: 281 K/UL (ref 135–420)
PMV BLD AUTO: 9.4 FL (ref 9.2–11.8)
POTASSIUM SERPL-SCNC: 4.5 MMOL/L (ref 3.5–5.5)
PROT SERPL-MCNC: 5.9 G/DL (ref 6.4–8.2)
RBC # BLD AUTO: 4.73 M/UL (ref 4.35–5.65)
SODIUM SERPL-SCNC: 143 MMOL/L (ref 136–145)
WBC # BLD AUTO: 6.6 K/UL (ref 4.6–13.2)

## 2022-05-02 PROCEDURE — 94640 AIRWAY INHALATION TREATMENT: CPT

## 2022-05-02 PROCEDURE — 74011000250 HC RX REV CODE- 250: Performed by: INTERNAL MEDICINE

## 2022-05-02 PROCEDURE — 65270000029 HC RM PRIVATE

## 2022-05-02 PROCEDURE — 82962 GLUCOSE BLOOD TEST: CPT

## 2022-05-02 PROCEDURE — 74011250637 HC RX REV CODE- 250/637: Performed by: INTERNAL MEDICINE

## 2022-05-02 PROCEDURE — 94761 N-INVAS EAR/PLS OXIMETRY MLT: CPT

## 2022-05-02 PROCEDURE — 74018 RADEX ABDOMEN 1 VIEW: CPT

## 2022-05-02 PROCEDURE — 99232 SBSQ HOSP IP/OBS MODERATE 35: CPT | Performed by: EMERGENCY MEDICINE

## 2022-05-02 PROCEDURE — 74011250636 HC RX REV CODE- 250/636: Performed by: INTERNAL MEDICINE

## 2022-05-02 PROCEDURE — 80053 COMPREHEN METABOLIC PANEL: CPT

## 2022-05-02 PROCEDURE — 99222 1ST HOSP IP/OBS MODERATE 55: CPT | Performed by: INTERNAL MEDICINE

## 2022-05-02 PROCEDURE — 85025 COMPLETE CBC W/AUTO DIFF WBC: CPT

## 2022-05-02 PROCEDURE — 2709999900 HC NON-CHARGEABLE SUPPLY

## 2022-05-02 PROCEDURE — 36415 COLL VENOUS BLD VENIPUNCTURE: CPT

## 2022-05-02 RX ORDER — FLASH GLUCOSE SCANNING READER
EACH MISCELLANEOUS
Qty: 1 EACH | Refills: 0 | Status: SHIPPED | OUTPATIENT
Start: 2022-05-02 | End: 2022-05-03

## 2022-05-02 RX ORDER — FLASH GLUCOSE SENSOR
KIT MISCELLANEOUS
Qty: 1 KIT | Refills: 0 | Status: SHIPPED | OUTPATIENT
Start: 2022-05-02 | End: 2022-05-03

## 2022-05-02 RX ADMIN — SODIUM CHLORIDE, PRESERVATIVE FREE 10 ML: 5 INJECTION INTRAVENOUS at 13:37

## 2022-05-02 RX ADMIN — ARFORMOTEROL TARTRATE: 15 SOLUTION RESPIRATORY (INHALATION) at 19:05

## 2022-05-02 RX ADMIN — CETIRIZINE HYDROCHLORIDE 10 MG: 10 TABLET, FILM COATED ORAL at 22:23

## 2022-05-02 RX ADMIN — SODIUM CHLORIDE 100 ML/HR: 900 INJECTION, SOLUTION INTRAVENOUS at 17:24

## 2022-05-02 RX ADMIN — GABAPENTIN 800 MG: 400 CAPSULE ORAL at 13:36

## 2022-05-02 RX ADMIN — GABAPENTIN 800 MG: 400 CAPSULE ORAL at 08:20

## 2022-05-02 RX ADMIN — ASPIRIN 81 MG 81 MG: 81 TABLET ORAL at 08:20

## 2022-05-02 RX ADMIN — SODIUM CHLORIDE 100 ML/HR: 900 INJECTION, SOLUTION INTRAVENOUS at 08:22

## 2022-05-02 RX ADMIN — GABAPENTIN 800 MG: 400 CAPSULE ORAL at 22:23

## 2022-05-02 RX ADMIN — Medication 5 MG: at 22:23

## 2022-05-02 RX ADMIN — SODIUM CHLORIDE, PRESERVATIVE FREE 10 ML: 5 INJECTION INTRAVENOUS at 05:34

## 2022-05-02 RX ADMIN — CYCLOBENZAPRINE 10 MG: 10 TABLET, FILM COATED ORAL at 22:44

## 2022-05-02 RX ADMIN — WATER 1 G: 1 INJECTION INTRAMUSCULAR; INTRAVENOUS; SUBCUTANEOUS at 15:14

## 2022-05-02 RX ADMIN — SODIUM CHLORIDE, PRESERVATIVE FREE 10 ML: 5 INJECTION INTRAVENOUS at 21:13

## 2022-05-02 RX ADMIN — ARFORMOTEROL TARTRATE: 15 SOLUTION RESPIRATORY (INHALATION) at 07:09

## 2022-05-02 NOTE — PROGRESS NOTES
conducted an initial consultation and Spiritual Assessment for Aayush Song, who is a 64 y.o.,male. Patients Primary Language is: [place]. According to the patients EMR Islam Affiliation is: [place]. The reason the Patient came to the hospital is:   Patient Active Problem List    Diagnosis Date Noted    Right ureteral stone 95/54/5028    Complicated UTI (urinary tract infection) 05/01/2022    Former smoker 05/01/2022    Cellulitis of finger of left hand 06/13/2021    Suppurative tenosynovitis of flexor tendon of left hand 06/12/2021    Hypokalemia 06/12/2021    Bradycardia 06/12/2021    Infected puncture wound of hand 06/11/2021    Lumbar radiculopathy 04/10/2018    HNP (herniated nucleus pulposus), lumbar 12/15/2017    Primary osteoarthritis of right knee 12/13/2017    Cellulitis 12/06/2017    Postlaminectomy syndrome of lumbar region 09/25/2017    Other intervertebral disc degeneration, lumbar region 09/25/2017    Gastroesophageal reflux disease with esophagitis 09/05/2017    Moderate persistent asthma without complication 49/85/2919    Lumbar herniated disc 04/20/2017    History of CVA (cerebrovascular accident) 02/02/2017    Degenerative disc disease, lumbar 05/15/2013    S/P spinal fusion 05/15/2013        The  provided the following Interventions:  Initiated a relationship of care and support. Explored issues of may, belief, spirituality and Advent/ritual needs while hospitalized. Listened empathically. Provided information about Spiritual Care Services. Offered prayer and assurance of continued prayers on patient's behalf. Chart reviewed. The following outcomes where achieved:  Patient not interested in completing an Advance medical Directive. Patient expressed gratitude for 's visit. Assessment:  Patient does not have any Advent/cultural needs that will affect patients preferences in health care.   There are no spiritual or Tenriism issues which require intervention at this time. Plan:  Chaplains will continue to follow and will provide pastoral care on an as needed/requested basis.  recommends bedside caregivers page  on duty if patient shows signs of acute spiritual or emotional distress.     [address]  [phone])

## 2022-05-02 NOTE — ROUTINE PROCESS
Mobility Intervention:       [] Pt dangled at edge of bed    [] Pt assisted OOB to bedside commode    [] Pt assisted OOB to chair    [x] Pt ambulated to bathroom    [] Patient was ambulated in room/hallway    Assistive Device Utilized:       [] Rolling walker   [] Crutches   [] Straight Cane   [] Knee immobilizer   [x] IV pole    After Mobilization:     [] Patient left in no apparent distress sitting up in chair  [x] Patient left in no apparent distress in bed  [x] Call bell left within reach  [x] SCDs on & machine turned on  [] Ice applied  [] RN notified  [] Caregiver present  [] Bed alarm activated    Reason patient not mobilized:      [] Patient refused   [] Nausea/vomiting   [] Low blood pressure   [] Drowsy/lethargic    Pain Rating:     [x] 0  [] 1  Assistive Device:        [] 2  [] 3  [] 4  [] 5  [] 6  Assistive Device:        [] 7  [] 8  [] 9  [] 10    Comments:

## 2022-05-02 NOTE — PROGRESS NOTES
Urology Progress Note        Assessment/Plan:     Patient Active Problem List   Diagnosis Code    Degenerative disc disease, lumbar M51.36    S/P spinal fusion Z98.1    History of CVA (cerebrovascular accident) Z86.73    Lumbar herniated disc M51.26    Gastroesophageal reflux disease with esophagitis K21.00    Moderate persistent asthma without complication V61.78    Postlaminectomy syndrome of lumbar region M96.1    Other intervertebral disc degeneration, lumbar region M51.36    Cellulitis L03.90    Primary osteoarthritis of right knee M17.11    HNP (herniated nucleus pulposus), lumbar M51.26    Lumbar radiculopathy M54.16    Infected puncture wound of hand S61.439A, L08.9    Suppurative tenosynovitis of flexor tendon of left hand M65.142    Hypokalemia E87.6    Bradycardia R00.1    Cellulitis of finger of left hand L03.012    Right ureteral stone V02.5    Complicated UTI (urinary tract infection) N39.0    Former smoker Z87.891        5/1/2022     ASSESSMENT:   Left obstructing ureterolithiasis, distal and proximal   S/p Cysto with L RGP, L JJ stent placement on 5/1/22 by Dr. Harrison Beckett left hydronephrosis   Creat: 1>0.79  UTI   UCX pending  Right nonobstructive nephrolithiasis  Elevated PSA   PSA 4/29/22: 7.5        PLAN:    Appreciate overall management per medicine. Maintain palomo until creat improves, ucx resulted  Follow cx, continue Rocephin. KUB order to evaluate for possible ESWL outpatient. Patient will need definitive stone surgery outpatient:  ESWL vs ureteroscopy with holmium laser lithotripsy pending KUB. Will follow. Follow up arranged? NO, will need intermediate pathway    Daryn Greer PA-C  [facility]  Available [address]  Pager: [phone]     I have reviewed pertinent vitals, I/O's, notes, laboratory values and imaging.  I have discussed the case and I agree with the provider's assessment and plan as outlined above, with ammendments as follows: Distal + Proximal ureteral stone = ureteroscopy  Will be intermediate        Vasile Pelaez MD  [facility] [place] [place]  Pager [phone]      Subjective:     Daily Progress Note: 2022 10:34 AM    Simi Feliz is sleeping. VSS. Márquez in place with clear, yellow urine. Objective:     Visit Vitals  BP (!) 107/56   Pulse 75   Temp 97.9 °F (36.6 °C)   Resp 15   Ht 5' 10\" (1.778 m)   Wt 74.8 kg (165 lb)   SpO2 96%   BMI 23.68 kg/m²        Temp (24hrs), Av.8 °F (36.6 °C), Min:97 °F (36.1 °C), Max:98.2 °F (36.8 °C)      Intake and Output:  1901 -  0700  In: 2553.3 [P.O.:800; I.V.:1753.3]  Out: 1550 [[id number]]  701 -  190  In: 5 [P.O.:420]  Out: -     PHYSICAL EXAMINATION:   Visit Vitals  BP (!) 107/56   Pulse 75   Temp 97.9 °F (36.6 °C)   Resp 15   Ht 5' 10\" (1.778 m)   Wt 74.8 kg (165 lb)   SpO2 96%   BMI 23.68 kg/m²     Constitutional: Well developed, well nourished male. No acute distress. HEENT: Normocephalic, Atraumatic, EOM's intact   CV:  no edema  Respiratory: No respiratory distress or difficulties breathing   Abdomen:  soft and non tender    Male:  No CVA tenderness   Márquez- clear, yellow urine. SCROTUM:  No scrotal rash or lesions noticed. Normal bilateral testes and epididymis. PENIS: Urethral meatus normal in location and size. No urethral discharge. Circumsized   Skin: No evidence of jaundice. Normal color  Neuro/Psych:  Alert and oriented. Affect appropriate. Lab/Data Review: All lab results for the last 24 hours reviewed.     Labs:     Labs: Results:   Chemistry    Recent Labs     22  0316 22  1236   * 123*    141   K 4.5 4.5    109   CO2 27 24   BUN 26* 23*   CREA 1.00 0.79   CA 9.1 9.3   AGAP 5 8   BUCR 26* 29*   AP 78  --    TP 5.9*  --    ALB 3.3*  --    GLOB 2.6  --    AGRAT 1.3  --       CBC w/Diff Recent Labs     22  0316 22  1236   WBC 6.6 7.6   RBC 4.73 5.18   HGB 13.8 15.2   HCT 40.9 44.6   PLT 281 256   GRANS 80* 80*   LYMPH 10* 10*   EOS 0 1      Cultures No results for input(s): CULT in the last 72 hours.   All Micro Results       Procedure Component Value Units Date/Time    CULTURE, URINE [[id number]] Collected: 05/01/22 1914    Order Status: Completed Specimen: Urine Updated: 05/02/22 0819    CULTURE, ANAEROBIC [[id number]] Collected: 05/01/22 1914    Order Status: Completed Updated: 05/02/22 0818    CULTURE, FUNGUS [[id number]] Collected: 05/01/22 1914    Order Status: Completed Updated: 05/02/22 0818    AFB CULTURE + SMEAR W/RFLX ID FROM CULTURE [[id number]] Collected: 05/01/22 1914    Order Status: Completed Updated: 05/01/22 1937              Urinalysis Color   Date Value Ref Range Status   05/01/2022 ORANGE   Final     Appearance   Date Value Ref Range Status   05/01/2022 CLEAR   Final     Specific gravity   Date Value Ref Range Status   05/01/2022 1.011 1.003 - 1.030   Final     pH (UA)   Date Value Ref Range Status   05/01/2022 6.5 5.0 - 8.0   Final     Protein   Date Value Ref Range Status   05/01/2022 100 (A) NEG mg/dL Final     Ketone   Date Value Ref Range Status   05/01/2022 Negative NEG mg/dL Final     Bilirubin   Date Value Ref Range Status   05/01/2022 SMALL (A) NEG   Final     Blood   Date Value Ref Range Status   05/01/2022 LARGE (A) NEG   Final     Urobilinogen   Date Value Ref Range Status   05/01/2022 1.0 0.2 - 1.0 EU/dL Final     Nitrites   Date Value Ref Range Status   05/01/2022 Positive (A) NEG   Final     Leukocyte Esterase   Date Value Ref Range Status   05/01/2022 SMALL (A) NEG   Final     Potassium   Date Value Ref Range Status   05/02/2022 4.5 3.5 - 5.5 mmol/L Final     Creatinine   Date Value Ref Range Status   05/02/2022 1.00 0.6 - 1.3 MG/DL Final     BUN   Date Value Ref Range Status   05/02/2022 26 (H) 7.0 - 18 MG/DL Final     Prostate Specific Ag   Date Value Ref Range Status   04/29/2022 7.5 (H) 0.0 - 4.0 ng/mL Final      PSA No results for input(s): PSA in the last 72 hours.   Coagulation Lab Results   Component Value Date/Time    Prothrombin time 12.3 03/01/2015 11:03 AM    Prothrombin time 13.1 11/27/2011 04:15 PM    INR 0.9 03/01/2015 11:03 AM    INR 1.0 11/27/2011 04:15 PM    aPTT 28.1 03/01/2015 11:03 AM    aPTT 26.4 11/27/2011 04:15 PM

## 2022-05-02 NOTE — PROGRESS NOTES
Would you mind telling this patient that his lab work shows that he is excreting an abnormal amount of protein in the urine for which I will refer patient to the nephrologist (kidney doctor). His prostate antigen testing was higher than normal which could indicate multiple things including benign prostate hyperplasia or enlargement or something more serious and in order to find out what is causing the elevation we will have his see the urologist who I am placing a referral for. If he does not hear back from either of those referrals in 2 weeks he should give us as call so that we can given them the number to call to schedule an appointment. C peptide testing was normal indicating his pancreas is functioning normally. We are still waiting on insulin level testing.  Thank you

## 2022-05-02 NOTE — PROGRESS NOTES
[facility]  [facility]    Patient: Maya Wiggins Age: 64 y.o. : 1965 MR#: [id number] SSN: [id number]  Date/Time: 2022    Subjective:     Patient is laying in bed in no apparent distress, awake and alert. Denies any pain at this time. Márquez is in place with some hematuria. Assessment/Plan:     Nephrolithiasis  Moderately obstructing left ureteral calculus-9 mm  UTI  Status post cystoscopy and stent  Neuropathy    Plan  Status post cystoscopy and stent  Márquez is still in place and has some hematuria. Monitor  Continue IV fluids. Monitor renal function  On ceftriaxone  Discussed with patient. Patient is full code. Patient requests double portions and diet.   Home when cleared by urology  Disposition Home  Anticipated date of discharge is May 3, 2022    Case discussed with:  [x]Patient  []Family  []Nursing  []Case Management  DVT Prophylaxis:  []Lovenox  []Hep SQ  [x]SCDs  []Coumadin   []On Heparin gtt    Objective:   VS:   Visit Vitals  /72   Pulse 84   Temp 98.2 °F (36.8 °C)   Resp 18   Ht 5' 10\" (1.778 m)   Wt 74.8 kg (165 lb)   SpO2 97%   BMI 23.68 kg/m²      Tmax/24hrs: Temp (24hrs), Av.9 °F (36.6 °C), Min:97 °F (36.1 °C), Max:98.2 °F (36.8 °C)    Input/Output:     Intake/Output Summary (Last 24 hours) at 2022 1809  Last data filed at 2022 1339  Gross per 24 hour   Intake 2173.33 ml   Output 2550 ml   Net -376.67 ml       General:  Awake, alert  Cardiovascular:  S1S2+, RRR  Pulmonary:  CTA b/l  GI:  Soft, BS+, NT, ND  Extremities:  No edema  Has Márquez with pink urine    Labs:    Recent Results (from the past 24 hour(s))   METABOLIC PANEL, COMPREHENSIVE    Collection Time: 22  3:16 AM   Result Value Ref Range    Sodium 143 136 - 145 mmol/L    Potassium 4.5 3.5 - 5.5 mmol/L    Chloride 111 100 - 111 mmol/L    CO2 27 21 - 32 mmol/L    Anion gap 5 3.0 - 18 mmol/L    Glucose 144 (H) 74 - 99 mg/dL    BUN 26 (H) 7.0 - 18 MG/DL Creatinine 1.00 0.6 - 1.3 MG/DL    BUN/Creatinine ratio 26 (H) 12 - 20      GFR est AA >60 >60 ml/min/1.73m2    GFR est non-AA >60 >60 ml/min/1.73m2    Calcium 9.1 8.5 - 10.1 MG/DL    Bilirubin, total 0.4 0.2 - 1.0 MG/DL    ALT (SGPT) 24 16 - 61 U/L    AST (SGOT) 17 10 - 38 U/L    Alk. phosphatase 78 45 - 117 U/L    Protein, total 5.9 (L) 6.4 - 8.2 g/dL    Albumin 3.3 (L) 3.4 - 5.0 g/dL    Globulin 2.6 2.0 - 4.0 g/dL    A-G Ratio 1.3 0.8 - 1.7     CBC WITH AUTOMATED DIFF    Collection Time: 05/02/22  3:16 AM   Result Value Ref Range    WBC 6.6 4.6 - 13.2 K/uL    RBC 4.73 4.35 - 5.65 M/uL    HGB 13.8 13.0 - 16.0 g/dL    HCT 40.9 36.0 - 48.0 %    MCV 86.5 78.0 - 100.0 FL    MCH 29.2 24.0 - 34.0 PG    MCHC 33.7 31.0 - 37.0 g/dL    RDW 12.1 11.6 - 14.5 %    PLATELET 753 277 - 269 K/uL    MPV 9.4 9.2 - 11.8 FL    NRBC 0.0 0  WBC    ABSOLUTE NRBC 0.00 0.00 - 0.01 K/uL    NEUTROPHILS 80 (H) 40 - 73 %    LYMPHOCYTES 10 (L) 21 - 52 %    MONOCYTES 8 3 - 10 %    EOSINOPHILS 0 0 - 5 %    BASOPHILS 0 0 - 2 %    IMMATURE GRANULOCYTES 1 (H) 0.0 - 0.5 %    ABS. NEUTROPHILS 5.3 1.8 - 8.0 K/UL    ABS. LYMPHOCYTES 0.7 (L) 0.9 - 3.6 K/UL    ABS. MONOCYTES 0.6 0.05 - 1.2 K/UL    ABS. EOSINOPHILS 0.0 0.0 - 0.4 K/UL    ABS. BASOPHILS 0.0 0.0 - 0.1 K/UL    ABS. IMM.  GRANS. 0.1 (H) 0.00 - 0.04 K/UL    DF AUTOMATED       Additional Data Reviewed:      Signed By: Patt Ryan MD     May 2, 2022

## 2022-05-02 NOTE — PROGRESS NOTES
Reason for Admission:  Kidney stone [N20.0]                 RUR Score:  11%           Plan for utilizing home health:    no                      Likelihood of Readmission:   LOW                         Transition of Care Plan:              Initial assessment completed with patient. Cognitive status of patient: oriented to time, place, person and situation. Face sheet information confirmed:  yes. The patient designates ellenfrienRicardo bell to participate in his discharge plan and to receive any needed information. This patient lives in a single family home with girlfriend. Patient is able to navigate steps as needed. Prior to hospitalization, patient was considered to be independent with ADLs/IADLS : yes . Patient has a current ACP document on file: no      Healthcare Decision Maker: The girlfriend will be available to transport patient home upon discharge. The patient already has nebulizer machine,  medical equipment available in the home. Patient is not currently active with home health. Patient has not stayed in a skilled nursing facility or rehab. Was  stay within last 60 days : no. This patient is on dialysis :no    Currently, the discharge plan is Home. The patient states that he can obtain his medications from the pharmacy, and take his medications as directed. Patient's current insurance is [employer] Medicare Part A and B       Care Management Interventions  PCP Verified by CM: Yes (Saw PCP last month, April)  Mode of Transport at Discharge: Other (see comment) brian Gruber)  Transition of Care Consult (CM Consult): Discharge Planning  Occupational Therapy Consult: No  Support Systems: Spouse/Significant Other  Confirm Follow Up Transport: Family  Discharge Location  Patient Expects to be Discharged to[de-identified] Home with family assistance         will continue to monitor and assist with transition of care needs.     ETELVINA LewisN, RN  Care Management  Pager # [phone]

## 2022-05-02 NOTE — PROGRESS NOTES
Problem: Falls - Risk of  Goal: *Absence of Falls  Description: Document Jamar Lewis Fall Risk and appropriate interventions in the flowsheet.   Outcome: Progressing Towards Goal  Note: Fall Risk Interventions:            Medication Interventions: Assess postural VS orthostatic hypotension,Evaluate medications/consider consulting pharmacy,Patient to call before getting OOB,Teach patient to arise slowly                   Problem: Patient Education: Go to Patient Education Activity  Goal: Patient/Family Education  Outcome: Progressing Towards Goal     Problem: Kidney [address] (Adult)  Goal: *Elimination of kidney stone(s)  Outcome: Progressing Towards Goal  Goal: *Control of acute pain  Outcome: Progressing Towards Goal     Problem: Patient Education: Go to Patient Education Activity  Goal: Patient/Family Education  Outcome: Progressing Towards Goal

## 2022-05-03 VITALS
DIASTOLIC BLOOD PRESSURE: 69 MMHG | RESPIRATION RATE: 18 BRPM | HEIGHT: 70 IN | BODY MASS INDEX: 23.62 KG/M2 | OXYGEN SATURATION: 95 % | SYSTOLIC BLOOD PRESSURE: 119 MMHG | WEIGHT: 165 LBS | HEART RATE: 73 BPM | TEMPERATURE: 98.2 F

## 2022-05-03 LAB
ANION GAP SERPL CALC-SCNC: 4 MMOL/L (ref 3–18)
BASOPHILS # BLD: 0 K/UL (ref 0–0.1)
BASOPHILS NFR BLD: 0 % (ref 0–2)
BUN SERPL-MCNC: 19 MG/DL (ref 7–18)
BUN/CREAT SERPL: 20 (ref 12–20)
CALCIUM SERPL-MCNC: 8.4 MG/DL (ref 8.5–10.1)
CHLORIDE SERPL-SCNC: 109 MMOL/L (ref 100–111)
CO2 SERPL-SCNC: 29 MMOL/L (ref 21–32)
CREAT SERPL-MCNC: 0.94 MG/DL (ref 0.6–1.3)
DIFFERENTIAL METHOD BLD: ABNORMAL
EOSINOPHIL # BLD: 0.1 K/UL (ref 0–0.4)
EOSINOPHIL NFR BLD: 2 % (ref 0–5)
ERYTHROCYTE [DISTWIDTH] IN BLOOD BY AUTOMATED COUNT: 12.3 % (ref 11.6–14.5)
EST. AVERAGE GLUCOSE BLD GHB EST-MCNC: 111 MG/DL
GLUCOSE BLD STRIP.AUTO-MCNC: 97 MG/DL (ref 70–110)
GLUCOSE SERPL-MCNC: 87 MG/DL (ref 74–99)
HBA1C MFR BLD: 5.5 % (ref 4.2–5.6)
HCT VFR BLD AUTO: 39.1 % (ref 36–48)
HGB BLD-MCNC: 13.1 G/DL (ref 13–16)
IMM GRANULOCYTES # BLD AUTO: 0.1 K/UL (ref 0–0.04)
IMM GRANULOCYTES NFR BLD AUTO: 1 % (ref 0–0.5)
LYMPHOCYTES # BLD: 1.8 K/UL (ref 0.9–3.6)
LYMPHOCYTES NFR BLD: 24 % (ref 21–52)
MAGNESIUM SERPL-MCNC: 1.8 MG/DL (ref 1.6–2.6)
MCH RBC QN AUTO: 28.8 PG (ref 24–34)
MCHC RBC AUTO-ENTMCNC: 33.5 G/DL (ref 31–37)
MCV RBC AUTO: 85.9 FL (ref 78–100)
MONOCYTES # BLD: 1 K/UL (ref 0.05–1.2)
MONOCYTES NFR BLD: 13 % (ref 3–10)
NEUTS SEG # BLD: 4.6 K/UL (ref 1.8–8)
NEUTS SEG NFR BLD: 60 % (ref 40–73)
NRBC # BLD: 0 K/UL (ref 0–0.01)
NRBC BLD-RTO: 0 PER 100 WBC
PLATELET # BLD AUTO: 262 K/UL (ref 135–420)
PMV BLD AUTO: 9.6 FL (ref 9.2–11.8)
POTASSIUM SERPL-SCNC: 3.9 MMOL/L (ref 3.5–5.5)
RBC # BLD AUTO: 4.55 M/UL (ref 4.35–5.65)
SODIUM SERPL-SCNC: 142 MMOL/L (ref 136–145)
WBC # BLD AUTO: 7.7 K/UL (ref 4.6–13.2)

## 2022-05-03 PROCEDURE — 82962 GLUCOSE BLOOD TEST: CPT

## 2022-05-03 PROCEDURE — 85025 COMPLETE CBC W/AUTO DIFF WBC: CPT

## 2022-05-03 PROCEDURE — 74011250636 HC RX REV CODE- 250/636: Performed by: INTERNAL MEDICINE

## 2022-05-03 PROCEDURE — 80048 BASIC METABOLIC PNL TOTAL CA: CPT

## 2022-05-03 PROCEDURE — 77030018842 HC SOL IRR SOD CL 9% BAXT -A

## 2022-05-03 PROCEDURE — 74011250637 HC RX REV CODE- 250/637: Performed by: INTERNAL MEDICINE

## 2022-05-03 PROCEDURE — 83735 ASSAY OF MAGNESIUM: CPT

## 2022-05-03 PROCEDURE — 74011000250 HC RX REV CODE- 250: Performed by: INTERNAL MEDICINE

## 2022-05-03 PROCEDURE — 36415 COLL VENOUS BLD VENIPUNCTURE: CPT

## 2022-05-03 PROCEDURE — 99239 HOSP IP/OBS DSCHRG MGMT >30: CPT | Performed by: EMERGENCY MEDICINE

## 2022-05-03 PROCEDURE — 83036 HEMOGLOBIN GLYCOSYLATED A1C: CPT

## 2022-05-03 RX ORDER — LEVOFLOXACIN 500 MG/1
500 TABLET, FILM COATED ORAL DAILY
Qty: 7 TABLET | Refills: 0 | Status: SHIPPED | OUTPATIENT
Start: 2022-05-03 | End: 2022-05-10

## 2022-05-03 RX ORDER — DEXTROSE MONOHYDRATE 100 MG/ML
0-250 INJECTION, SOLUTION INTRAVENOUS AS NEEDED
Status: DISCONTINUED | OUTPATIENT
Start: 2022-05-03 | End: 2022-05-03 | Stop reason: HOSPADM

## 2022-05-03 RX ORDER — POLYETHYLENE GLYCOL 3350 17 G/17G
17 POWDER, FOR SOLUTION ORAL
Qty: 15 PACKET | Refills: 0 | Status: SHIPPED | OUTPATIENT
Start: 2022-05-03

## 2022-05-03 RX ORDER — MAGNESIUM SULFATE 100 %
4 CRYSTALS MISCELLANEOUS AS NEEDED
Status: DISCONTINUED | OUTPATIENT
Start: 2022-05-03 | End: 2022-05-03 | Stop reason: HOSPADM

## 2022-05-03 RX ORDER — INSULIN LISPRO 100 [IU]/ML
INJECTION, SOLUTION INTRAVENOUS; SUBCUTANEOUS
Status: DISCONTINUED | OUTPATIENT
Start: 2022-05-03 | End: 2022-05-03

## 2022-05-03 RX ADMIN — ASPIRIN 81 MG 81 MG: 81 TABLET ORAL at 08:23

## 2022-05-03 RX ADMIN — SODIUM CHLORIDE 100 ML/HR: 900 INJECTION, SOLUTION INTRAVENOUS at 05:22

## 2022-05-03 RX ADMIN — GABAPENTIN 800 MG: 400 CAPSULE ORAL at 14:15

## 2022-05-03 RX ADMIN — SODIUM CHLORIDE, PRESERVATIVE FREE 10 ML: 5 INJECTION INTRAVENOUS at 05:03

## 2022-05-03 RX ADMIN — GABAPENTIN 800 MG: 400 CAPSULE ORAL at 08:23

## 2022-05-03 NOTE — PROGRESS NOTES
Bedside and Verbal shift change report given to Edenilson Carvajal (oncoming nurse) by Elmira Peterson RN (offgoing nurse). Report included the following information SBAR, Kardex, Intake/Output, MAR and Recent Results.

## 2022-05-03 NOTE — PROGRESS NOTES
Patient is sitting in bed in no apparent distress, awake and alert. Tolerating diet. Wishes to go home. Discussed with urology APC and will discontinue Márquez. Urology APC cleared patient for discharge once he is able to void. Discussed with RN. I discussed discharge plans and medications with the patient. Home today.

## 2022-05-03 NOTE — PROGRESS NOTES
Results for Othel Simmonds (MRN [id number]) as of 5/2/2022 21:14   Ref. Range 5/2/2022 21:12   GLUCOSE,FAST - POC Latest Ref Range: 70 - 110 mg/dL 86       Patient noted to have the Free[employer] Gabe continuous glucose meter on his L upper arm - reported that he has history of DM that prompts him to monitor his blood sugar from time to time. Asked patient if RN could spot check his sugar just to make sure that we have his BG on record at the time of assessment. Patient agreed, BG = 86. Patient denies any symptoms of hypoglycemia at this time. Will continue to monitor.

## 2022-05-03 NOTE — PROGRESS NOTES
Patient discharge instructions reviewed with patient at bedside, patient verbalizes understanding and denies any questions or concerns at this time. Patient PIV removed, belongings packed and patient discharged.

## 2022-05-03 NOTE — PROGRESS NOTES
Patient reported getting sweaty, hot - did not know if it was from hypoglycemia or the temperature in the room. Immediately gave pt 120mL of orange juice - rechecked BG:    Results for Steven Guido (MRN [id number]) as of 5/2/2022 22:38   Ref. Range 5/2/2022 22:38   GLUCOSE,FAST - POC Latest Ref Range: 70 - 110 mg/dL 137 (H)     Will continue to monitor. Patient refused snacks offered.

## 2022-05-03 NOTE — PROGRESS NOTES
Bedside and Verbal shift change report given to Bhavesh Mills RN (oncoming nurse) by Segundo Camarillo RN (offgoing nurse). Report included the following information SBAR, Kardex, Intake/Output, MAR and Recent Results.
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
## 2022-05-03 NOTE — PROGRESS NOTES
D/C order noted for today. Orders reviewed. No needs identified at this time. The patient's girlfriend will transport home. CM remains available if needed.     ETELVINA RoseN, RN  Care Management  Pager # [phone]

## 2022-05-03 NOTE — PROGRESS NOTES
MD paged about pt's need for glucose checks and SSI, reported incident from earlier. Dr. Caroline Chowdhury put in orders for hypoglycemic protocol + SSI.

## 2022-05-03 NOTE — PROGRESS NOTES
Patient ambulated around unit with staff, pulse oximetry on while ambulating, patient oxygen saturations remained at 97% on RA.

## 2022-05-04 ENCOUNTER — PATIENT OUTREACH (OUTPATIENT)
Dept: CASE MANAGEMENT | Age: 57
End: 2022-05-04

## 2022-05-04 ENCOUNTER — OFFICE VISIT (OUTPATIENT)
Dept: ORTHOPEDIC SURGERY | Age: 57
End: 2022-05-04
Payer: MEDICARE

## 2022-05-04 VITALS
HEART RATE: 107 BPM | SYSTOLIC BLOOD PRESSURE: 121 MMHG | RESPIRATION RATE: 18 BRPM | OXYGEN SATURATION: 99 % | DIASTOLIC BLOOD PRESSURE: 80 MMHG | WEIGHT: 168 LBS | TEMPERATURE: 97.8 F | BODY MASS INDEX: 24.11 KG/M2

## 2022-05-04 DIAGNOSIS — M96.1 LUMBAR POSTLAMINECTOMY SYNDROME: ICD-10-CM

## 2022-05-04 DIAGNOSIS — M54.16 LUMBAR RADICULOPATHY: Primary | ICD-10-CM

## 2022-05-04 DIAGNOSIS — M51.36 DEGENERATIVE DISC DISEASE, LUMBAR: ICD-10-CM

## 2022-05-04 LAB
BACTERIA SPEC CULT: NORMAL
CC UR VC: NORMAL
SERVICE CMNT-IMP: NORMAL

## 2022-05-04 PROCEDURE — G8432 DEP SCR NOT DOC, RNG: HCPCS | Performed by: PHYSICAL MEDICINE & REHABILITATION

## 2022-05-04 PROCEDURE — 99213 OFFICE O/P EST LOW 20 MIN: CPT | Performed by: PHYSICAL MEDICINE & REHABILITATION

## 2022-05-04 PROCEDURE — G8420 CALC BMI NORM PARAMETERS: HCPCS | Performed by: PHYSICAL MEDICINE & REHABILITATION

## 2022-05-04 PROCEDURE — 3017F COLORECTAL CA SCREEN DOC REV: CPT | Performed by: PHYSICAL MEDICINE & REHABILITATION

## 2022-05-04 PROCEDURE — 1111F DSCHRG MED/CURRENT MED MERGE: CPT | Performed by: PHYSICAL MEDICINE & REHABILITATION

## 2022-05-04 PROCEDURE — G8427 DOCREV CUR MEDS BY ELIG CLIN: HCPCS | Performed by: PHYSICAL MEDICINE & REHABILITATION

## 2022-05-04 RX ORDER — GABAPENTIN 600 MG/1
600 TABLET ORAL 3 TIMES DAILY
Qty: 270 TABLET | Refills: 0 | Status: SHIPPED | OUTPATIENT
Start: 2022-05-04 | End: 2022-08-10 | Stop reason: SDUPTHER

## 2022-05-04 NOTE — PROGRESS NOTES
Care Transitions Initial Call    Call within 2 business days of discharge: Yes     Patient: Lannis Nyhan Patient : 1965 MRN: [id number]    Last Discharge [address]       Complaint Diagnosis Description Type Department Provider    22 Blood in Urine; Urinary Pain Right ureteral stone . .. ED to Hosp-Admission (Discharged) (ADMIT) Laqueta Gosselin, MD; [place], [place] . .. Was this an external facility discharge? No Discharge Facility: N/A    Challenges to be reviewed by the provider   Additional needs identified to be addressed with provider: no  none         Method of communication with provider : chart routing    Discussed COVID-19 related testing which was not done at this time. Test results were not done. Patient informed of results, if available? N/A     Advance Care Planning:   Does patient have an Advance Directive:  not on file    Inpatient Readmission Risk score: Unplanned Readmit Risk Score: 9.6 ( )    Was this a readmission? no   Patient stated reason for the admission: N/A    Patients top risk factors for readmission: medical condition-kidney stones/ureteral stent   Interventions to address risk factors: Obtained and reviewed discharge summary and/or continuity of care documents    Care Transition Nurse (CTN) contacted the patient by telephone to perform post hospital discharge assessment. Call answered by [facility], significant other. Verified name and  with significant other as identifiers. Provided introduction to self, and explanation of the CTN role. CTN reviewed discharge instructions, medical action plan and red flags with girlfriend  who verbalized understanding. Were discharge instructions available to patient? yes. Reviewed appropriate site of care based on symptoms and resources available to patient including: PCP, Specialist and CTN. Girlfriend  given an opportunity to ask questions and does not have any further questions or concerns at this time. The girlfriend agrees to contact the PCP office for questions related to their healthcare. Medication reconciliation was performed with girlfriend, who verbalizes understanding of administration of home medications. Advised obtaining a 90-day supply of all daily and as-needed medications. Referral to Pharm D needed: no     Home Health/Outpatient orders at discharge: none   Durable Medical Equipment ordered at discharge: None        Discussed follow-up appointments. If no appointment was previously scheduled, appointment scheduling offered: yes; girlfriend declined and voiced she will schedule PCP and urology follow ups. . Is follow up appointment scheduled within 7 days of discharge? TBD.   [facility] follow up appointment(s):   Future Appointments   Date Time Provider Mikhail Goodrich   8/5/2022  8:45 AM Suzette Schilder, MD [facility]     Non-[facility] follow up appointment(s): none    Plan for follow-up call in 7-10 days based on severity of symptoms and risk factors. Plan for next call: symptom management-hematuria improving and ACP-AMD     CTN provided contact information for future needs. Goals Addressed                 This Visit's Progress     Attends follow-up appointments as directed. Goal: Patient will attend all appointments scheduled within the next 30 days. Ensure provider appt is scheduled within 7 business days post-discharge; 5/4: AMANDA appt offered; significant other voiced she will schedule appts with PCP and urology. CTN sent urology contact info via text per request.   Confirm patient attended post-discharge provider appt   Complete post-visit call to confirm attendance and update care needs           Prevent complications post hospitalization. Goal: No admissions post 30 days from discharge of 5/3/22.       Review/educate common or potential \"red flags\" of condition worsening; 5/4: Reviewed/educated on s/s to monitor for and report: hematuria (or if urine goes from pink to red), inability/difficulty urinating, N/V, fever > 100.5, chills, pelvic/suprapubic pain or flank pain. Evaluate adherence to medications and priority barriers to resolve; None   Instruct on adherence to medications as ordered and assess for therapeutic response and side-effects       Monitor labs and diagnostic testing; 5/4: Results of retrograde pyelogram pending. Observe for trends in symptoms and measures, provide direction to patient, and notify provider as needed       Discuss and provide resources for ACP; 5/4: Not on file. Will address at a a later time.

## 2022-05-04 NOTE — LETTER
5/4/2022    Patient: Eulalia Ngo   YOB: 1965   Date of Visit: 5/4/2022     Dustin Rowan NP  [address] [facility] [address]  Via In Basket    Dear Dustin Rowan NP,      Thank you for referring Mr. Ale Becker to [facility] for evaluation. My notes for this consultation are attached. If you have questions, please do not hesitate to call me. I look forward to following your patient along with you.       Sincerely,    Tish Hills MD

## 2022-05-06 LAB
BACTERIA SPEC CULT: NORMAL
SERVICE CMNT-IMP: NORMAL

## 2022-05-08 LAB
INSULIN FREE SERPL-ACNC: 9.1 UU/ML
INSULIN SERPL-ACNC: 9.1 UU/ML

## 2022-05-09 ENCOUNTER — TELEPHONE (OUTPATIENT)
Dept: FAMILY MEDICINE CLINIC | Age: 57
End: 2022-05-09

## 2022-05-09 DIAGNOSIS — E11.9 TYPE 2 DIABETES MELLITUS WITHOUT COMPLICATION, WITHOUT LONG-TERM CURRENT USE OF INSULIN (HCC): ICD-10-CM

## 2022-05-09 DIAGNOSIS — E16.2 HYPOGLYCEMIA: ICD-10-CM

## 2022-05-09 RX ORDER — FLASH GLUCOSE SCANNING READER
EACH MISCELLANEOUS
Qty: 2 EACH | Refills: 5 | Status: SHIPPED | OUTPATIENT
Start: 2022-05-09

## 2022-05-09 RX ORDER — FLASH GLUCOSE SENSOR
KIT MISCELLANEOUS
Qty: 2 KIT | Refills: 5 | Status: SHIPPED | OUTPATIENT
Start: 2022-05-09

## 2022-05-09 NOTE — PROGRESS NOTES
Spoke with Pt. Was in [facility] 5/1/2022. Pt. Verbalized understanding of lab results. He is currently seeing Doctors.

## 2022-05-09 NOTE — TELEPHONE ENCOUNTER
Pt was told that the pcp was sending in [place] 14 day number 1 to the pharmacy but pt went to the pharmacy and it was not there. Please follow up and inform pt once the prescription is done.

## 2022-05-11 ENCOUNTER — PATIENT OUTREACH (OUTPATIENT)
Dept: CASE MANAGEMENT | Age: 57
End: 2022-05-11

## 2022-05-11 NOTE — PROGRESS NOTES
Care Transitions Follow Up Call    Challenges to be reviewed by the provider   Additional needs identified to be addressed with provider: yes  Signicant other reported left testicle pain. She reported patient assisted with helping her mother from the floor after a fall and since that time has had left testicular pain. Method of communication with provider : chart routing    Care Transition Nurse (CTN) contacted the patient by telephone to follow up after admission on 22. Spoke with Moncho Araya, significant other (listed on PHI). Verified name and  with significant other as identifiers. Addressed changes since last contact: none  Follow up appointment completed? no.   Was follow up appointment scheduled within 7 days of discharge? no.     Advance Care Planning:   Does patient have an Advance Directive:  not on file    CTN reviewed discharge instructions, medical action plan and red flags with significant other and discussed any barriers to care and/or understanding of plan of care after discharge. Discussed appropriate site of care based on symptoms and resources available to patient including: PCP, Specialist, When to call 911 and CTN. The significant other agrees to contact the PCP office for questions related to their healthcare. Patients top risk factors for readmission: medical condition-ureteral stent placement   Interventions to address risk factors: Significant other reported patient c/o of sever testical pain. Per significant other patient assisted with picken her mother off the floor after a fall and pain worsened after that time. Patient did take one dose of Pyridium which did not touch the pain. Patient has urology follow up on . Significant other verbalized urine is yellow and occassionally pink but much better than before. Significant other also voiced patient experienced migraines while taking Levaquin.      [facility] follow up appointment(s):   Future Appointments   Date Time Provider Mikhail Goodrich   5/12/2022  9:00 AM MARC Saul   6/1/2022  9:45 AM Prudencio Albert MD [facility] BS AMB   8/5/2022  8:45 AM Ignacio Rodrigues MD [facility] BS AMB     Non-BS follow up appointment(s): none    CTN provided contact information for future needs. Plan for follow-up call in 1-2 days based on severity of symptoms and risk factors.   Plan for next call: symptom management-assess testicle pain     Goals Addressed    None

## 2022-05-12 PROBLEM — F11.99 OPIOID USE, UNSPECIFIED WITH UNSPECIFIED OPIOID-INDUCED DISORDER (HCC): Status: ACTIVE | Noted: 2022-05-12

## 2022-05-13 ENCOUNTER — DOCUMENTATION ONLY (OUTPATIENT)
Dept: CASE MANAGEMENT | Age: 57
End: 2022-05-13

## 2022-05-13 NOTE — PROGRESS NOTES
CTN reviewed 5/12/22 urology note. KUB performed. Stent in proper position. Percocet prescribed for severe pain. Also prescribed Ditropan. Patient instructed to push fluids. Discussed long term stent with exchange. CTN will follow up.

## 2022-05-16 NOTE — DISCHARGE SUMMARY
[address] [place] [address] Dr [facility], [address]     DISCHARGE SUMMARY    Name: Shaneka Sanchez MRN: [id number]   Age / Sex: 64 y.o. / male CSN: [id number]   YOB: 1965 Length of Stay: 2 days   Admit Date: 5/1/2022 Discharge Date:        PRIMARY CARE PHYSICIAN: Rina Burris NP      DISCHARGE DIAGNOSES:    Nephrolithiasis  Moderately obstructing left ureteral calculus-9 mm  UTI  Status post cystoscopy and stent  Neuropathy    CONSULTS CALLED: Urology      PROCEDURES DONE: Cystoscopy and stent placement      Mita Winslow 65: This is a 49-year-old male who presented to the ED with complaints of suprapubic pain. Patient was evaluated. Patient was noted to have nephrolithiasis and moderately obstructing left ureteral calculus. Patient was admitted. Urology was consulted. Patient was started on antibiotics for UTI. Patient subsequently underwent cystoscopy and stent placement. Márquez catheter was left in place. Patient had some hematuria. Patient was monitored overnight. Patient remained stable. Antibiotics were continued. The next day the Márquez catheter was discontinued. Discharge plans and medications were discussed with the patient. Urology cleared the patient for discharge. Patient was discharged home. MEDICATIONS ON DISCHARGE:    Discharge Medication List as of 5/3/2022  1:43 PM      START taking these medications    Details   polyethylene glycol (MIRALAX) 17 gram packet Take 1 Packet by mouth daily as needed for Constipation. , Print, Disp-15 Packet, R-0      L. acidophilus,casei,rhamnosus (Bio-K plus) 50 billion cell cpDR capsule Take 1 Capsule by mouth daily. , Print, Disp-7 Capsule, R-0         CONTINUE these medications which have CHANGED    Details   levoFLOXacin (LEVAQUIN) 500 mg tablet Take 1 Tablet by mouth daily for 7 days. , Print, Disp-7 Tablet, R-0         CONTINUE these medications which have NOT CHANGED    Details   phenazopyridine (Pyridium) 100 mg tablet Take 100 mg by mouth daily as needed (Urinary Pain). , Historical Med      cetirizine (ZYRTEC) 10 mg tablet Take 1 Tablet by mouth nightly for 90 days. , Normal, Disp-90 Tablet, R-0      fluticasone propionate (FLONASE) 50 mcg/actuation nasal spray One spray in each nostril once a day, Normal, Disp-1 Each, R-4      gabapentin (NEURONTIN) 800 mg tablet TAKE 1 TABLET BY MOUTH THREE TIMES DAILY. MAX 3 TABS DAILY, Normal, Disp-90 Tablet, R-1      cyclobenzaprine (FLEXERIL) 10 mg tablet Take 1 Tablet by mouth three (3) times daily as needed for Muscle Spasm(s). , Normal, Disp-90 Tablet, R-2      albuterol (PROVENTIL HFA, VENTOLIN HFA, PROAIR HFA) 90 mcg/actuation inhaler Take 2 Puffs by inhalation every six (6) hours as needed for Wheezing or Shortness of Breath., Normal, Disp-1 Inhaler, R-11      budesonide-formoteroL (Symbicort) 160-4.5 mcg/actuation HFAA Take 1 Puff by inhalation two (2) times a day., Normal, Disp-1 Inhaler, R-6      famotidine (PEPCID) 40 mg tablet Take 1 Tab by mouth two (2) times daily as needed (indigestion/acid reflux). , Normal, Disp-90 Tab, R-0      aspirin 81 mg chewable tablet Take 81 mg by mouth daily. , Historical Med         STOP taking these medications       flash glucose sensor (FreeStyle Gabe 14 Day Sensor) kit Comments:   Reason for Stopping:         flash glucose scanning reader (FreeStyle Gabe 2 [place]) misc Comments:   Reason for Stopping:         Blood-Glucose Meter (True Metrix Glucose Meter) monitoring kit Comments:   Reason for Stopping:         glucose blood VI test strips (True Metrix Glucose Test Strip) strip Comments:   Reason for Stopping:         lancets misc Comments:   Reason for Stopping:         ibuprofen (MOTRIN) 200 mg tablet Comments:   Reason for Stopping:         albuterol (PROVENTIL VENTOLIN) 2.5 mg /3 mL (0.083 %) nebulizer solution Comments:   Reason for Stopping:                 DISCHARGE VITAL SIGNS:  Visit Vitals  /69   Pulse 73   Temp 98.2 °F (36.8 °C)   Resp 18   Ht 5' 10\" (1.778 m)   Wt 74.8 kg (165 lb)   SpO2 95%   BMI 23.68 kg/m²           CONDITION ON DISCHARGE: Stable. DISPOSITION: Home      FOLLOW-UP RECOMMENDATIONS:   Follow-up Information     Follow up With Specialties Details Why Contact Info    Brittni Benitez NP Nurse Practitioner   [place] [address].  [address]      Екатерина Rust MD Family Medicine   [place] [address].  [place] [address]  [phone]            OTHER INSTRUCTIONS:        TIME SPENT ON DISCHARGE ACTIVITIES: More than 35 minutes. Dragon medical dictation software was used for portions of this report. Unintended errors may occur.       Signed:  Supriya Delaney MD      5/16/2022

## 2022-05-17 ENCOUNTER — PATIENT OUTREACH (OUTPATIENT)
Dept: CASE MANAGEMENT | Age: 57
End: 2022-05-17

## 2022-05-17 NOTE — PROGRESS NOTES
Care Transitions Follow Up Call    Challenges to be reviewed by the provider   Additional needs identified to be addressed with provider: yes  Assessed testiclar pain. Caregiver reported it is better since stent cocktail but is still present. Patient experiencing N/V and hematuria. Caregiver reported pain is making patient nauseous and loss of blood is making him weak. Requested Rx for Zofran to be sent to [address] in [facility]. Method of communication with provider : chart routing    Care Transition Nurse (CTN) contacted the caregiver, Mariam Daly (listed on PHI)by telephone to follow up after admission on 22. Verified name and  with caregiver as identifiers. Addressed changes since last contact: surgery date moved up to 22  Follow up appointment completed? yes. Was follow up appointment scheduled within 7 days of discharge? no.     Advance Care Planning:   Does patient have an Advance Directive:  not on file    CTN reviewed discharge instructions, medical action plan and red flags with caregiver and discussed any barriers to care and/or understanding of plan of care after discharge. Discussed appropriate site of care based on symptoms and resources available to patient including: PCP, Specialist, When to call 911 and CTN. The caregiver agrees to contact the PCP office for questions related to their healthcare. Patients top risk factors for readmission: medical condition-kidney stone   Interventions to address risk factors: Assessed testiclar pain. Caregiver reported it is better since stent cocktail but is still present. Patient experiencing N/V and hematuria. Caregiver reported pain is making patient nauseous and loss of blood is making him weak.       [facility] follow up appointment(s):   Future Appointments   Date Time Provider Mikhail Goodrich   2022  9:45 AM MD ANNA Diane BS AMB   2022 11:40 AM LUCIA Bustamante [facility] 6/28/2022  2:40 PM [facility] NURSE CHEO BURNETTE   8/5/2022  8:45 AM MD GABRIEL Hernandes BS [facility]     Non-BS follow up appointment(s): None    CTN provided contact information for future needs. Plan for follow-up call in 7-10 days based on severity of symptoms and risk factors.   Plan for next call: symptom management-stent pain, medication management-medication for nausea and ACP     Goals Addressed    None

## 2022-05-25 ENCOUNTER — PATIENT OUTREACH (OUTPATIENT)
Dept: CASE MANAGEMENT | Age: 57
End: 2022-05-25

## 2022-05-25 NOTE — PROGRESS NOTES
Care Transitions Follow Up Call    Challenges to be reviewed by the provider   Additional needs identified to be addressed with provider: no  none           Method of communication with provider : none    Care Transition Nurse (CTN) contacted the caregiver Denis Singh, girlfriend by telephone to follow up after admission on 5/1/22. Caregiver voiced it is storming where she is and requested a call tomorrow. Addressed changes since last contact: Patient had lithotripsy, stone extraction and left ureter stent exchange on 5/19  Follow up appointment completed? yes. Was follow up appointment scheduled within 7 days of discharge? yes.      Advance Care Planning:   Does patient have an Advance Directive:  not on file         [facility] follow up appointment(s):   Future Appointments   Date Time Provider Mikhail Goodrich   6/1/2022  9:45 AM Rocio Power MD [facility] BS AMB   6/16/2022 11:40 AM LUCIA Owens   6/28/2022  2:40 PM [facility] NURSE Jo Ann Cam   8/5/2022  8:45 AM Shahida Rodrigues MD [facility] BS AMB     Non-[facility] follow up appointment(s): Dr. Alberto Betancourt (urology) X 4-6 weeks

## 2022-05-26 ENCOUNTER — PATIENT OUTREACH (OUTPATIENT)
Dept: CASE MANAGEMENT | Age: 57
End: 2022-05-26

## 2022-05-26 NOTE — PROGRESS NOTES
Care Transitions Follow Up Call    Challenges to be reviewed by the provider   Additional needs identified to be addressed with provider: no  none           Method of communication with provider : none    Care Transition Nurse (CTN) contacted the caregiver by telephone to follow up after admission on 5/1/22. No answer. Left HIPPA compliant message. Name, role and contact information provided. Requested return call. Will attempt to contact at a later time.

## 2022-05-27 ENCOUNTER — TELEPHONE (OUTPATIENT)
Dept: FAMILY MEDICINE CLINIC | Age: 57
End: 2022-05-27

## 2022-05-27 NOTE — TELEPHONE ENCOUNTER
Spoke with patient's girlfriend and she advised that [atceliaent's sample kit of Gabe malfunctioned and would not stick to the arm-second sample offreestyle given to be picked up. Alison's bs has been flucuating all the time, reminded her to keep scheduled follow up appt and keep an eye on blood sugar.   She verbalized understanding

## 2022-05-30 LAB
BACTERIA SPEC CULT: NORMAL
SERVICE CMNT-IMP: NORMAL

## 2022-06-01 ENCOUNTER — OFFICE VISIT (OUTPATIENT)
Dept: FAMILY MEDICINE CLINIC | Age: 57
End: 2022-06-01
Payer: MEDICARE

## 2022-06-01 VITALS
RESPIRATION RATE: 20 BRPM | SYSTOLIC BLOOD PRESSURE: 98 MMHG | BODY MASS INDEX: 24.48 KG/M2 | HEART RATE: 93 BPM | HEIGHT: 70 IN | DIASTOLIC BLOOD PRESSURE: 62 MMHG | WEIGHT: 171 LBS | TEMPERATURE: 98 F | OXYGEN SATURATION: 97 %

## 2022-06-01 DIAGNOSIS — N20.0 KIDNEY STONES: ICD-10-CM

## 2022-06-01 DIAGNOSIS — E16.2 HYPOGLYCEMIA: ICD-10-CM

## 2022-06-01 DIAGNOSIS — Z00.00 MEDICARE ANNUAL WELLNESS VISIT, SUBSEQUENT: ICD-10-CM

## 2022-06-01 DIAGNOSIS — Z71.89 ACP (ADVANCE CARE PLANNING): ICD-10-CM

## 2022-06-01 DIAGNOSIS — Z13.31 SCREENING FOR DEPRESSION: ICD-10-CM

## 2022-06-01 PROCEDURE — 1111F DSCHRG MED/CURRENT MED MERGE: CPT | Performed by: FAMILY MEDICINE

## 2022-06-01 PROCEDURE — G8420 CALC BMI NORM PARAMETERS: HCPCS | Performed by: FAMILY MEDICINE

## 2022-06-01 PROCEDURE — G8427 DOCREV CUR MEDS BY ELIG CLIN: HCPCS | Performed by: FAMILY MEDICINE

## 2022-06-01 PROCEDURE — 3017F COLORECTAL CA SCREEN DOC REV: CPT | Performed by: FAMILY MEDICINE

## 2022-06-01 PROCEDURE — 99213 OFFICE O/P EST LOW 20 MIN: CPT | Performed by: FAMILY MEDICINE

## 2022-06-01 PROCEDURE — G0439 PPPS, SUBSEQ VISIT: HCPCS | Performed by: FAMILY MEDICINE

## 2022-06-01 PROCEDURE — G8510 SCR DEP NEG, NO PLAN REQD: HCPCS | Performed by: FAMILY MEDICINE

## 2022-06-01 NOTE — PROGRESS NOTES
This is the Subsequent Medicare Annual Wellness Exam, performed 12 months or more after the Initial AWV or the last Subsequent AWV    I have reviewed the patient's medical history in detail and updated the computerized patient record. Assessment/Plan   Education and counseling provided:  Are appropriate based on today's review and evaluation    1. Medicare annual wellness visit, subsequent  -Immunizations: declined at this time  -colon cancer screening: states that he had it done within the past 5 years  -prostate cancer screening: PSA done at last visit was elevated and patient was referred to urology. Of note patient also recently was diagnosed with kidney stone requiring stent placement and this may have elevated the numbers as well.  -Routine labs: completed   -counseled about diet rich in green leafy vegetables and protein, less carbs /exercise at least 150 minutes a week/weight loss  -Advanced directive: full code and completed today   2. ACP (advance care planning); went over with patient today   3.  Screening for depression: Negative       Depression Risk Factor Screening     3 most recent PHQ Screens 6/1/2022   PHQ Not Done -   Little interest or pleasure in doing things Not at all   Feeling down, depressed, irritable, or hopeless Not at all   Total Score PHQ 2 0   Trouble falling or staying asleep, or sleeping too much Not at all   Feeling tired or having little energy Not at all   Poor appetite, weight loss, or overeating Not at all   Feeling bad about yourself - or that you are a failure or have let yourself or your family down Not at all   Trouble concentrating on things such as school, work, reading, or watching TV Not at all   Moving or speaking so slowly that other people could have noticed; or the opposite being so fidgety that others notice Not at all   Thoughts of being better off dead, or hurting yourself in some way Not at all   PHQ 9 Score 0   How difficult have these problems made it for you to do your work, take care of your home and get along with others Not difficult at all       Alcohol & Drug Abuse Risk Screen    Do you average more than 2 drinks per night or 14 drinks a week: No    On any one occasion in the past three months have you have had more than 4 drinks containing alcohol:  No          Functional Ability and Level of Safety    Hearing: Hearing is good. Activities of Daily Living: The home contains: no safety equipment. Patient does total self care      Ambulation: with no difficulty     Fall Risk:  Fall Risk Assessment, last 12 mths 5/25/2021   Able to walk? Yes   Fall in past 12 months? 0   Do you feel unsteady? 1   Are you worried about falling 0   Is TUG test greater than 12 seconds?  0   Is the gait abnormal? 0      Abuse Screen:  Patient is not abused       Cognitive Screening    Has your family/caregiver stated any concerns about your memory: no     Cognitive Screening: Normal - Clock Drawing Test    Health Maintenance Due     Health Maintenance Due   Topic Date Due    COVID-19 Vaccine (1) Never done    Foot Exam Q1  Never done    Eye Exam Retinal or Dilated  Never done    Shingrix Vaccine Age 50> (1 of 2) Never done    Pneumococcal 0-64 years (2 - PCV) 02/02/2018    Colorectal Cancer Screening Combo  08/12/2021    Medicare Yearly Exam  05/26/2022       Patient Care Team   Patient Care Team:  Park West NP as PCP - General (Nurse Practitioner)  Chris Child MD as [facility]  Jay Vizcarra MD (Orthopedic Surgery)  Suzette Schilder, MD (Physiatry)  Onel Longo MD (Gastroenterology)  Echo Oakley MD (Urology)  Nakul Cornejo MD (Surgery Physician)  Linda Staples RN as Care Transitions Nurse    History     Patient Active Problem List   Diagnosis Code    Degenerative disc disease, lumbar M51.36    S/P spinal fusion Z98.1    History of CVA (cerebrovascular accident) Z86.73    Lumbar herniated disc M51.26  Gastroesophageal reflux disease with esophagitis K21.00    Moderate persistent asthma without complication G33.08    Postlaminectomy syndrome of lumbar region M96.1    Other intervertebral disc degeneration, lumbar region M51.36    Cellulitis L03.90    Primary osteoarthritis of right knee M17.11    HNP (herniated nucleus pulposus), lumbar M51.26    Lumbar radiculopathy M54.16    Infected puncture wound of hand S61.439A, L08.9    Suppurative tenosynovitis of flexor tendon of left hand M65.142    Hypokalemia E87.6    Bradycardia R00.1    Cellulitis of finger of left hand L03.012    Right ureteral stone D15.2    Complicated UTI (urinary tract infection) N39.0    Former smoker Z87.891    Opioid use, unspecified with unspecified opioid-induced disorder F11.99     Past Medical History:   Diagnosis Date    Arthritis of knee, right 12/2017    advanced degen changes noted, CT right LE    Asthma     Cellulitis 12/2017    DDD (degenerative disc disease), lumbar 2013    noted on MRI with annular tears    Diverticulitis 2016    GERD (gastroesophageal reflux disease) 2016    with esophagitis according to endscopy    Kidney stone     Lumbar post-laminectomy syndrome     Sacroiliitis ([facility] [address]     Stroke [facility]) may 14 2010    Unspecified rotator cuff tear or rupture of left shoulder, not specified as traumatic 03/2016      Past Surgical History:   Procedure Laterality Date    COLONOSCOPY N/A 8/12/2016    [address] [address] [facility] [address]  [facility], 2004, 2006    L5-S1 fusion, laminectomies    HX ENDOSCOPY  10/2016    grade 1 espohagitis    HX ENDOSCOPY  11/2016    mild esophagitis    HX KNEE ARTHROSCOPY Right     knee    HX MOHS PROCEDURES      right    HX ORTHOPAEDIC      right shoulder    HX UROLOGICAL  05/19/2022    CYSTOSCOPY, LEFT URETEROSCOPY, LASER LITHOTRIPSY, LEFT STENT EXCHANGE AND LEFT RETROGRADE PYELOGRAM; Dr. Renee Oswald Current Outpatient Medications   Medication Sig Dispense Refill    ondansetron (ZOFRAN ODT) 4 mg disintegrating tablet Take 1 Tablet by mouth every eight (8) hours as needed for Nausea or Vomiting. 20 Tablet 0    acetaminophen (Tylenol Extra Strength) 500 mg tablet Take  by mouth every six (6) hours as needed for Pain.  flash glucose sensor (FreeStyle Gabe 14 Day Sensor) kit Patient has fluctuating blood sugars and frequent hypoglycemia 2 Kit 5    flash glucose scanning reader (FreeStyle Gabe 14 Day [place]) [facility] Patient should check blood glucose regularly as he has hypoglycemia frequently 2 Each 5    gabapentin (Neurontin) 600 mg tablet Take 1 Tablet by mouth three (3) times daily. Max Daily Amount: 1,800 mg. 270 Tablet 0    polyethylene glycol (MIRALAX) 17 gram packet Take 1 Packet by mouth daily as needed for Constipation. 15 Packet 0    L. acidophilus,casei,rhamnosus (Bio-K plus) 50 billion cell cpDR capsule Take 1 Capsule by mouth daily. 7 Capsule 0    cetirizine (ZYRTEC) 10 mg tablet Take 1 Tablet by mouth nightly for 90 days. 90 Tablet 0    cyclobenzaprine (FLEXERIL) 10 mg tablet Take 1 Tablet by mouth three (3) times daily as needed for Muscle Spasm(s). 90 Tablet 2    albuterol (PROVENTIL HFA, VENTOLIN HFA, PROAIR HFA) 90 mcg/actuation inhaler Take 2 Puffs by inhalation every six (6) hours as needed for Wheezing or Shortness of Breath. 1 Inhaler 11    budesonide-formoteroL (Symbicort) 160-4.5 mcg/actuation HFAA Take 1 Puff by inhalation two (2) times a day. 1 Inhaler 6    famotidine (PEPCID) 40 mg tablet Take 1 Tab by mouth two (2) times daily as needed (indigestion/acid reflux). (Patient taking differently: Take 40 mg by mouth two (2) times a day.) 90 Tab 0    aspirin 81 mg chewable tablet Take 81 mg by mouth daily.  oxybutynin (DITROPAN) 5 mg tablet Take 1 Tablet by mouth three (3) times daily.  (Patient not taking: Reported on 6/1/2022) 42 Tablet 0    fluticasone propionate (FLONASE) 50 mcg/actuation nasal spray One spray in each nostril once a day (Patient not taking: Reported on 2022) 1 Each 4     Allergies   Allergen Reactions    Penicillins Angioedema    Penicillins Anaphylaxis    Vicodin [Hydrocodone-Acetaminophen] Nausea and Vomiting    Azithromycin Nausea and Vomiting    Doxycycline Itching     Black eyes    Hydrochlorothiazide Nausea Only     Dizzy, lightheaded, blisters on his legs    Protonix [Pantoprazole] Itching and Contact Dermatitis     Patient denies allergy    Vicodin [Hydrocodone-Acetaminophen] Nausea and Vomiting    Erythromycin Other (comments)       Family History   Problem Relation Age of Onset    Other Brother     Cancer Maternal Grandmother     No Known Problems Mother     No Known Problems Father      Social History     Tobacco Use    Smoking status: Former Smoker     Packs/day: 2.00     Types: Cigarettes     Quit date: 2016     Years since quittin.9    Smokeless tobacco: Never Used   Substance Use Topics    Alcohol use: No     Alcohol/week: 0.0 standard drinks         Buck Liriano

## 2022-06-01 NOTE — PATIENT INSTRUCTIONS
Medicare Wellness Visit, Male    The best way to live healthy is to have a lifestyle where you eat a well-balanced diet, exercise regularly, limit alcohol use, and quit all forms of tobacco/nicotine, if applicable. Regular preventive services are another way to keep healthy. Preventive services (vaccines, screening tests, monitoring & exams) can help personalize your care plan, which helps you manage your own care. Screening tests can find health problems at the earliest stages, when they are easiest to treat. [facility] follows the current, evidence-based guidelines published by the [facility] ([facility]) when recommending preventive services for our patients. Because we follow these guidelines, sometimes recommendations change over time as research supports it. (For example, a prostate screening blood test is no longer routinely recommended for men with no symptoms). Of course, you and your doctor may decide to screen more often for some diseases, based on your risk and co-morbidities (chronic disease you are already diagnosed with). Preventive services for you include:  - Medicare offers their members a free annual wellness visit, which is time for you and your primary care provider to discuss and plan for your preventive service needs. Take advantage of this benefit every year!  -All adults over age 72 should receive the recommended pneumonia vaccines. Current USPSTF guidelines recommend a series of two vaccines for the best pneumonia protection.   -All adults should have a flu vaccine yearly and tetanus vaccine every 10 years.  -All adults age 48 and older should receive the shingles vaccines (series of two vaccines).        -All adults age 38-68 who are overweight should have a diabetes screening test once every three years.   -Other screening tests & preventive services for persons with diabetes include: an eye exam to screen for diabetic retinopathy, a kidney function test, a foot exam, and stricter control over your cholesterol.   -Cardiovascular screening for adults with routine risk involves an electrocardiogram (ECG) at intervals determined by the provider.   -Colorectal cancer screening should be done for adults age 54-65 with no increased risk factors for colorectal cancer. There are a number of acceptable methods of screening for this type of cancer. Each test has its own benefits and drawbacks. Discuss with your provider what is most appropriate for you during your annual wellness visit. The different tests include: colonoscopy (considered the best screening method), a fecal occult blood test, a fecal DNA test, and sigmoidoscopy.  -All adults born between [facility] should be screened once for Hepatitis C.  -An Abdominal Aortic Aneurysm (AAA) Screening is recommended for men age 73-68 who has ever smoked in their lifetime. Here is a list of your current Health Maintenance items (your personalized list of preventive services) with a due date:  Health Maintenance Due   Topic Date Due    COVID-19 Vaccine (1) Never done    Diabetic Foot Care  Never done    Eye Exam  Never done    Shingles Vaccine (1 of 2) Never done    Pneumococcal Vaccine (2 - PCV) 02/02/2018    Colorectal Screening  08/12/2021    Annual Well Visit  05/26/2022        Hypoglycemia: Care Instructions  Overview     Hypoglycemia means that your blood sugar is low and your body is not getting enough fuel. Some people get low blood sugar from not eating often enough. Some medicines to treat diabetes can cause low blood sugar. People who have had surgery on their stomachs or intestines may get hypoglycemia. Problems with the pancreas, kidneys, or liver also can cause low blood sugar. A snack or drink with sugar in it will raise your blood sugar and should ease your symptoms right away.   Your doctor may recommend that you change or stop your medicines until you can get your blood sugar levels under control. In the long run, you may need to change your diet and eating habits so that you get enough fuel for your body throughout the day. Follow-up care is a key part of your treatment and safety. Be sure to make and go to all appointments, and call your doctor if you are having problems. It's also a good idea to know your test results and keep a list of the medicines you take. How can you care for yourself at home? · Learn your signs of low blood sugar. They are different for everyone. Some common early signs include:  ? Nausea. ? Hunger. ? Feeling nervous, irritable, or shaky. ? Cold, clammy skin. ? Sweating (when you're not exercising). · Use the \"rule of 15\" to treat low blood sugar. This includes eating 15 grams of carbohydrate from a quick-sugar food, such as 3 or 4 glucose tablets or ½ cup of juice. Wait 15 minutes and check your blood sugar. If it is still below 70 mg/dL, eat another 15 grams of carbohydrate. Repeat this every 15 minutes until your blood sugar is in a safe target range. · Once your blood sugar is in a safe range, eat a snack or meal to prevent recurrent low blood sugar. · Make sure family, friends, and coworkers know the symptoms of low blood sugar and know how to get your sugar level up. · If you were prescribed a glucagon kit, always have it with you. Make sure friends and family know how to use it. When should you call for help? Call 911 anytime you think you may need emergency care. For example, call if:    · You passed out (lost consciousness).     · You are confused or cannot think clearly.     · Your blood sugar is very high or very low. Watch closely for changes in your health, and be sure to contact your doctor if:    · Your blood sugar stays outside the level your doctor set for you.     · You have any problems. Where can you learn more?   Go to http://www.gray.com/  Enter R955 in the search box to learn more about \"Hypoglycemia: Care Instructions. \"  Current as of: July 28, 2021               Content Version: 13.2  © 6814-0505 [employer]. Care instructions adapted under license by [employer] (which disclaims liability or warranty for this information). If you have questions about a medical condition or this instruction, always ask your healthcare professional. [address] any warranty or liability for your use of this information.

## 2022-06-01 NOTE — PROGRESS NOTES
HPI  This is a 68-year-old  male with past medical history significant for osteoarthritis of right knee, asthma, degenerative disc disease of the lumbar spine, diverticulitis, GERD, history of kidney stones, lumbar postlaminectomy syndrome, sacroiliitis, stroke who is here to follow-up recent hospital admission and hypoglycemia. Nephrolithiasis  Patient who was admitted on 5/1/2022 and discharged on 5/3/2022 due to nephrolithiasis/moderately obstructing left ureteral calculus of 9 mm/UTI status post cystoscopy and ureteral stent placement. Patient is much improved at this time denies any fevers, chills, abdominal pain, flank pain, nausea, vomiting, blood in it. He has a follow-up appointment with urology on July 16. Glycemia  Patient states that for couple years she has been having issues with low blood sugars where his blood sugars drops below 50 and he starts getting lightheaded, jittery, slurs his speech, has palpitations and just falls asleep. He states that this occurs at least twice every month. His partner has diabetes and for that reason they have been checking his blood sugars and know that they have been running low when he has those symptoms. Patient is not diabetic is not on any diabetes medications. Of note he states that he does not eat breakfast and the only good meal he does eat is dinner. Counseled that he should try to eat or snack on something every 2 hours just to prevent hypoglycemia episodes. No family history of any malabsorption issues, diabetes, similar complaints. Checked C-peptide and insulin levels and they were within normal limits. I will refer patient to endocrinology for further work-up. Patient has a continuous glucose monitor in place and states that he continues to drop below 60s regularly.     Past Medical History  Past Medical History:   Diagnosis Date    Arthritis of knee, right 12/2017    advanced degen changes noted, CT right LE    Asthma     Cellulitis 12/2017    DDD (degenerative disc disease), lumbar 2013    noted on MRI with annular tears    Diverticulitis 2016    GERD (gastroesophageal reflux disease) 2016    with esophagitis according to endscopy    Kidney stone     Lumbar post-laminectomy syndrome     Sacroiliitis ([address]     Stroke [facility]) may 14 2010    Unspecified rotator cuff tear or rupture of left shoulder, not specified as traumatic 03/2016       Surgical History  Past Surgical History:   Procedure Laterality Date    COLONOSCOPY N/A 8/12/2016    COLONOSCOPY with polypectomy performed by Sallie Ramires MD at [address] [place] [address]  2002, 2004, 2006    L5-S1 fusion, laminectomies    HX ENDOSCOPY  10/2016    grade 1 espohagitis    HX ENDOSCOPY  11/2016    mild esophagitis    HX KNEE ARTHROSCOPY Right     knee    HX MOHS PROCEDURES      right    HX ORTHOPAEDIC      right shoulder    HX UROLOGICAL  05/19/2022    CYSTOSCOPY, LEFT URETEROSCOPY, LASER LITHOTRIPSY, LEFT STENT EXCHANGE AND LEFT RETROGRADE PYELOGRAM; Dr. Rosario Hutchison        Medications  Current Outpatient Medications   Medication Sig Dispense Refill    ondansetron (ZOFRAN ODT) 4 mg disintegrating tablet Take 1 Tablet by mouth every eight (8) hours as needed for Nausea or Vomiting. 20 Tablet 0    acetaminophen (Tylenol Extra Strength) 500 mg tablet Take  by mouth every six (6) hours as needed for Pain.  flash glucose sensor (FreeStyle Gabe 14 Day Sensor) kit Patient has fluctuating blood sugars and frequent hypoglycemia 2 Kit 5    flash glucose scanning reader (FreeStyle Gabe 14 Day [place]) [facility] Patient should check blood glucose regularly as he has hypoglycemia frequently 2 Each 5    gabapentin (Neurontin) 600 mg tablet Take 1 Tablet by mouth three (3) times daily. Max Daily Amount: 1,800 mg. 270 Tablet 0    polyethylene glycol (MIRALAX) 17 gram packet Take 1 Packet by mouth daily as needed for Constipation.  15 Packet 0    L. acidophilus,casei,rhamnosus (Bio-K plus) 50 billion cell cpDR capsule Take 1 Capsule by mouth daily. 7 Capsule 0    cetirizine (ZYRTEC) 10 mg tablet Take 1 Tablet by mouth nightly for 90 days. 90 Tablet 0    cyclobenzaprine (FLEXERIL) 10 mg tablet Take 1 Tablet by mouth three (3) times daily as needed for Muscle Spasm(s). 90 Tablet 2    albuterol (PROVENTIL HFA, VENTOLIN HFA, PROAIR HFA) 90 mcg/actuation inhaler Take 2 Puffs by inhalation every six (6) hours as needed for Wheezing or Shortness of Breath. 1 Inhaler 11    budesonide-formoteroL (Symbicort) 160-4.5 mcg/actuation HFAA Take 1 Puff by inhalation two (2) times a day. 1 Inhaler 6    famotidine (PEPCID) 40 mg tablet Take 1 Tab by mouth two (2) times daily as needed (indigestion/acid reflux). (Patient taking differently: Take 40 mg by mouth two (2) times a day.) 90 Tab 0    aspirin 81 mg chewable tablet Take 81 mg by mouth daily.  oxybutynin (DITROPAN) 5 mg tablet Take 1 Tablet by mouth three (3) times daily.  (Patient not taking: Reported on 6/1/2022) 42 Tablet 0    fluticasone propionate (FLONASE) 50 mcg/actuation nasal spray One spray in each nostril once a day (Patient not taking: Reported on 6/1/2022) 1 Each 4       Allergies  Allergies   Allergen Reactions    Penicillins Angioedema    Penicillins Anaphylaxis    Vicodin [Hydrocodone-Acetaminophen] Nausea and Vomiting    Azithromycin Nausea and Vomiting    Doxycycline Itching     Black eyes    Hydrochlorothiazide Nausea Only     Dizzy, lightheaded, blisters on his legs    Protonix [Pantoprazole] Itching and Contact Dermatitis     Patient denies allergy    Vicodin [Hydrocodone-Acetaminophen] Nausea and Vomiting    Erythromycin Other (comments)       Family History  Family History   Problem Relation Age of Onset    Other Brother     Cancer Maternal Grandmother     No Known Problems Mother     No Known Problems Father        Social History  Social History     Socioeconomic History    Marital status: SINGLE Spouse name: Not on file    Number of children: Not on file    Years of education: Not on file    Highest education level: Not on file   Occupational History    Not on file   Tobacco Use    Smoking status: Former Smoker     Packs/day: 2.00     Types: Cigarettes     Quit date: 2016     Years since quittin.9    Smokeless tobacco: Never Used   Vaping Use    Vaping Use: Not on file   Substance and Sexual Activity    Alcohol use: No     Alcohol/week: 0.0 standard drinks    Drug use: No    Sexual activity: Yes     Partners: Female   Other Topics Concern    Not on file   Social History Narrative    ** Merged History Encounter **          Social Determinants of Health     Financial Resource Strain:     Difficulty of Paying Living Expenses: Not on file   Food Insecurity:     Worried About Running Out of Food in the Last Year: Not on file    Shari of Food in the Last Year: Not on file   Transportation Needs:     Lack of Transportation (Medical): Not on file    Lack of Transportation (Non-Medical):  Not on file   Physical Activity:     Days of Exercise per Week: Not on file    Minutes of Exercise per Session: Not on file   Stress:     Feeling of Stress : Not on file   Social Connections:     Frequency of Communication with Friends and Family: Not on file    Frequency of Social Gatherings with Friends and Family: Not on file    Attends [employer] Services: Not on file    Active Member of [address] [place] [address] or Organizations: Not on file    Attends Club or Organization Meetings: Not on file    Marital Status: Not on file   Intimate Partner Violence:     Fear of Current or Ex-Partner: Not on file    Emotionally Abused: Not on file    Physically Abused: Not on file    Sexually Abused: Not on file   Housing Stability:     Unable to Pay for Housing in the Last Year: Not on file    Number of [place] in the Last Year: Not on file    Unstable Housing in the Last Year: Not on file       Review of Systems  Review of Systems   Constitutional: Negative for chills and fever. Respiratory: Negative for cough and shortness of breath. Cardiovascular: Negative for chest pain and leg swelling. Gastrointestinal: Negative for abdominal pain, nausea and vomiting. Skin: Negative for rash. Neurological: Negative for dizziness and headaches. Vital Signs  Visit Vitals  BP 98/62 (BP 1 Location: Right upper arm, BP Patient Position: Sitting, BP Cuff Size: Adult long)   Pulse 93   Temp 98 °F (36.7 °C) (Temporal)   Resp 20   Ht 5' 10\" (1.778 m)   Wt 171 lb (77.6 kg)   SpO2 97%   BMI 24.54 kg/m²         Physical Exam  Physical Exam  Constitutional:       Appearance: Normal appearance. HENT:      Head: Normocephalic and atraumatic. Nose: Nose normal.   Eyes:      General: No scleral icterus. Conjunctiva/sclera: Conjunctivae normal.   Cardiovascular:      Rate and Rhythm: Normal rate and regular rhythm. Heart sounds: Normal heart sounds. No murmur heard. No gallop. Pulmonary:      Effort: Pulmonary effort is normal. No respiratory distress. Breath sounds: No wheezing. Abdominal:      General: There is no distension. Palpations: Abdomen is soft. Musculoskeletal:      Cervical back: Normal range of motion and neck supple. Skin:     Findings: No erythema or rash. Neurological:      Mental Status: He is alert and oriented to person, place, and time. Mental status is at baseline. Results  Results for orders placed or performed during the hospital encounter of 05/19/22   CULTURE, URINE    Specimen: Urine   Result Value Ref Range    Culture Result No Growth         ASSESSMENT and PLAN    4. Hypoglycemia  - REFERRAL TO ENDOCRINOLOGY    5. Kidney stones  Patient is to follow-up with urology July 16. Denies any current symptoms                  I have discussed the diagnosis with the patient and the intended plan of care as seen in the above orders.  The patient has received an after-visit summary and questions were answered concerning future plans. I have discussed medication, side effects, and warnings with the patient in detail. The patient verbalized understanding and is in agreement with the plan of care. The patient will contact the office with any additional concerns. I spent at least 30 minutes on this visit with this established patient. Cathryn Deluca MD    PLEASE NOTE:   This document has been produced using voice recognition software.  Unrecognized errors in transcription may be present

## 2022-06-01 NOTE — ACP (ADVANCE CARE PLANNING)
Advance Care Planning     Advance Care Planning (ACP) Physician/NP/PA Conversation      Date of Conversation: 6/1/2022  Conducted with: Patient with Decision Making Capacity    Healthcare Decision Maker:   No healthcare decision makers have been documented. Click here to complete [address] including selection of the Healthcare Decision Maker Relationship (ie \"Primary\")      Today we documented Decision Maker(s). The patient will provide ACP documents. Care Preferences:    Hospitalization: \"If your health worsens and it becomes clear that your chance of recovery is unlikely, what would be your preference regarding hospitalization? \"  The patient would prefer comfort-focused treatment without hospitalization. Ventilation: \"If you were unable to breathe on your own and your chance of recovery was unlikely, what would be your preference about the use of a ventilator (breathing machine) if it was available to you? \"   The patient would desire the use of a ventilator. Resuscitation: \"In the event your heart stopped as a result of an underlying serious health condition, would you want attempts to be made to restart your heart, or would you prefer a natural death? \"   Yes, attempt to resuscitate.     Additional topics discussed: treatment goals    Conversation Outcomes / Follow-Up Plan:   ACP complete - no further action today  Reviewed DNR/DNI and patient elects Full Code (Attempt Resuscitation)     Length of Voluntary ACP Conversation in minutes:  <16 minutes (Non-Billable)    Suzie Amador MD

## 2022-06-08 ENCOUNTER — PATIENT OUTREACH (OUTPATIENT)
Dept: CASE MANAGEMENT | Age: 57
End: 2022-06-08

## 2022-06-08 NOTE — PROGRESS NOTES
Care Transitions Follow Up Call    Challenges to be reviewed by the provider   Additional needs identified to be addressed with provider: no  none           Method of communication with provider : none    Care Transition Nurse (CTN) contacted the patient/significant other by telephone to follow up after admission on 5/1/22. No answer. Left HIPPA compliant message. Name, role and contact information provided. Requested return call. Patient has graduated from the Transitions of Care Coordination  program on 6/8/22. Patient/family has the ability to self-manage at this time Care management goals have been completed. Patient was not referred to the [facility]note 2[facility] team for further management. Goals Addressed                 This Visit's Progress     COMPLETED: Attends follow-up appointments as directed. Goal: Patient will attend all appointments scheduled within the next 30 days. Ensure provider appt is scheduled within 7 business days post-discharge; 5/4: AMANDA appt offered; significant other voiced she will schedule appts with PCP and urology. CTN sent urology contact info via text per request. 5/11: Urology appt scheduled for 5/12 @ 9AM  Confirm patient attended post-discharge provider appt; 5/17: Attended urology appt on 5/12. 6/8: Attended PCP follow up on 6/1. Complete post-visit call to confirm attendance and update care needs; 5/11: Done. 5/17: Done. 5/25: Attempted. 5/26: Attempted. 6/8: Attempted.  COMPLETED: Prevent complications post hospitalization. Goal: No admissions post 30 days from discharge of 5/3/22. Review/educate common or potential \"red flags\" of condition worsening; 5/4: Reviewed/educated on s/s to monitor for and report: hematuria (or if urine goes from pink to red), inability/difficulty urinating, N/V, fever > 100.5, chills, pelvic/suprapubic pain or flank pain.   Evaluate adherence to medications and priority barriers to resolve; None   Instruct on adherence to medications as ordered and assess for therapeutic response and side-effects; 5/11: Completed Levaquin. GF reported patient experienced migraines while taking. Monitor labs and diagnostic testing; 5/4: Results of retrograde pyelogram pending. 6/8: Most recent lab results reviewed. Discuss and provide resources for ACP; 5/4: Not on file. Will address at a a later time. Patient has Care Transition Nurse's contact information for any further questions, concerns, or needs.   Patients upcoming visits:    Future Appointments   Date Time Provider Mikhail Goodrich   6/16/2022 11:40 AM LUCIA Campbell [facility] CINDI SCHED   6/28/2022  2:40 PM [facility] NURSE [facility] CINDI SCHED   8/5/2022  8:45 AM Lisa Rodrigues MD [facility]

## 2022-06-15 LAB
ACID FAST STN SPEC: NEGATIVE
MYCOBACTERIUM SPEC QL CULT: NEGATIVE
SPECIMEN PREPARATION: NORMAL
SPECIMEN SOURCE: NORMAL

## 2022-08-06 DIAGNOSIS — M54.16 LUMBAR RADICULOPATHY: ICD-10-CM

## 2022-08-06 DIAGNOSIS — M51.36 DEGENERATIVE DISC DISEASE, LUMBAR: ICD-10-CM

## 2022-08-06 DIAGNOSIS — M96.1 LUMBAR POSTLAMINECTOMY SYNDROME: ICD-10-CM

## 2022-08-07 RX ORDER — GABAPENTIN 600 MG/1
TABLET ORAL
Qty: 270 TABLET | Refills: 0 | OUTPATIENT
Start: 2022-08-07

## 2022-08-10 DIAGNOSIS — M96.1 LUMBAR POSTLAMINECTOMY SYNDROME: ICD-10-CM

## 2022-08-10 DIAGNOSIS — M54.16 LUMBAR RADICULOPATHY: ICD-10-CM

## 2022-08-10 DIAGNOSIS — M51.36 DEGENERATIVE DISC DISEASE, LUMBAR: ICD-10-CM

## 2022-08-10 RX ORDER — GABAPENTIN 600 MG/1
600 TABLET ORAL 3 TIMES DAILY
Qty: 270 TABLET | Refills: 0 | Status: SHIPPED | OUTPATIENT
Start: 2022-08-10

## 2022-08-10 NOTE — TELEPHONE ENCOUNTER
Ms. Bell Elvin called and is requesting a refill on the Gabapenin 600 mg medication for the patient , from [facility]. [address] [facility] [phone]. Ms. Toro Wooten tel. [phone]. Note: Pt missed 8/5/22 appt due to pt was sick. Next appt with [facility] on 9/12/22.

## 2022-08-24 ENCOUNTER — HOSPITAL ENCOUNTER (EMERGENCY)
Age: 57
Discharge: HOME | End: 2022-08-24
Attending: EMERGENCY MEDICINE
Payer: MEDICARE

## 2022-08-24 VITALS
HEART RATE: 76 BPM | SYSTOLIC BLOOD PRESSURE: 119 MMHG | TEMPERATURE: 97.9 F | WEIGHT: 175 LBS | HEIGHT: 70 IN | OXYGEN SATURATION: 99 % | BODY MASS INDEX: 25.05 KG/M2 | DIASTOLIC BLOOD PRESSURE: 63 MMHG | RESPIRATION RATE: 18 BRPM

## 2022-08-24 DIAGNOSIS — L03.115 CELLULITIS OF RIGHT KNEE: Primary | ICD-10-CM

## 2022-08-24 PROCEDURE — 96365 THER/PROPH/DIAG IV INF INIT: CPT

## 2022-08-24 PROCEDURE — 90715 TDAP VACCINE 7 YRS/> IM: CPT | Performed by: EMERGENCY MEDICINE

## 2022-08-24 PROCEDURE — 74011250636 HC RX REV CODE- 250/636: Performed by: EMERGENCY MEDICINE

## 2022-08-24 PROCEDURE — 74011000250 HC RX REV CODE- 250: Performed by: EMERGENCY MEDICINE

## 2022-08-24 PROCEDURE — 99284 EMERGENCY DEPT VISIT MOD MDM: CPT

## 2022-08-24 PROCEDURE — 74011000258 HC RX REV CODE- 258: Performed by: EMERGENCY MEDICINE

## 2022-08-24 PROCEDURE — 90471 IMMUNIZATION ADMIN: CPT

## 2022-08-24 RX ORDER — CLINDAMYCIN HYDROCHLORIDE 300 MG/1
300 CAPSULE ORAL 4 TIMES DAILY
Qty: 40 CAPSULE | Refills: 0 | Status: SHIPPED | OUTPATIENT
Start: 2022-08-24 | End: 2022-09-03

## 2022-08-24 RX ADMIN — CLINDAMYCIN PHOSPHATE 600 MG: 150 INJECTION, SOLUTION INTRAVENOUS at 23:12

## 2022-08-24 RX ADMIN — TETANUS TOXOID, REDUCED DIPHTHERIA TOXOID AND ACELLULAR PERTUSSIS VACCINE, ADSORBED 0.5 ML: 5; 2.5; 8; 8; 2.5 SUSPENSION INTRAMUSCULAR at 23:11

## 2022-08-25 NOTE — ED PROVIDER NOTES
HPI patient is a 63-year-old male who slipped and fell 4 days ago and landed on his right knee. His wife states that he had a hard object in his knee area that she popped out. She states that over the past 3 days his right knee area has become swollen. No fever chills shortness of breath.     Past Medical History:   Diagnosis Date    Arthritis of knee, right 12/2017    advanced degen changes noted, CT right LE    Asthma     Cellulitis 12/2017    DDD (degenerative disc disease), lumbar 2013    noted on MRI with annular tears    Diverticulitis 2016    GERD (gastroesophageal reflux disease) 2016    with esophagitis according to endscopy    Kidney stone     Lumbar post-laminectomy syndrome     [address]roili[address] ([facility] [address]     Stroke [facility]) may 14 2010    Unspecified rotator cuff tear or rupture of left shoulder, not specified as traumatic 03/2016       Past Surgical History:   Procedure Laterality Date    COLONOSCOPY N/A 8/12/2016    COLONOSCOPY with polypectomy performed by Zenobia Koyanagi, MD at [facility]  2002, 2004, 2006    L5-S1 fusion, laminectomies    HX ENDOSCOPY  10/2016    grade 1 espohagitis    HX ENDOSCOPY  11/2016    mild esophagitis    HX KNEE ARTHROSCOPY Right     knee    HX MOHS PROCEDURES      right    HX ORTHOPAEDIC      right shoulder    HX UROLOGICAL  05/19/2022    CYSTOSCOPY, LEFT URETEROSCOPY, LASER LITHOTRIPSY, LEFT STENT EXCHANGE AND LEFT RETROGRADE PYELOGRAM; Dr. Sameer Hightower         Family History:   Problem Relation Age of Onset    Other Brother     Cancer Maternal Grandmother     No Known Problems Mother     No Known Problems Father        Social History     Socioeconomic History    Marital status: SINGLE     Spouse name: Not on file    Number of children: Not on file    Years of education: Not on file    Highest education level: Not on file   Occupational History    Not on file   Tobacco Use    Smoking status: Former     Packs/day: 2.00     Types: Cigarettes     Quit date: 07/2016 Years since quittin.1    Smokeless tobacco: Never   Vaping Use    Vaping Use: Not on file   Substance and Sexual Activity    Alcohol use: No     Alcohol/week: 0.0 standard drinks    Drug use: No    Sexual activity: Yes     Partners: Female   Other Topics Concern    Not on file   Social History Narrative    ** Merged History Encounter **          Social Determinants of Health     Financial Resource Strain: Not on file   Food Insecurity: Not on file   Transportation Needs: Not on file   Physical Activity: Not on file   Stress: Not on file   Social Connections: Not on file   Intimate Partner Violence: Not on file   Housing Stability: Not on file         ALLERGIES: Penicillins, Penicillins, Vicodin [hydrocodone-acetaminophen], Azithromycin, Doxycycline, Hydrochlorothiazide, Protonix [pantoprazole], Vicodin [hydrocodone-acetaminophen], and Erythromycin    Review of Systems   Constitutional: Negative. HENT: Negative. Eyes: Negative. Respiratory: Negative. Cardiovascular: Negative. Gastrointestinal: Negative. Endocrine: Negative. Genitourinary: Negative. Musculoskeletal: Negative. Skin:  Positive for rash ((+) Erythema and edema over the infra patella area). Allergic/Immunologic: Negative. Neurological: Negative. Hematological: Negative. Psychiatric/Behavioral: Negative. All other systems reviewed and are negative. Vitals:    22 2157   BP: 119/63   Pulse: 76   Resp: 18   Temp: 97.9 °F (36.6 °C)   SpO2: 99%   Weight: 79.4 kg (175 lb)   Height: 5' 10\" (1.778 m)            Physical Exam  Vitals and nursing note reviewed. Constitutional:       General: He is not in acute distress. Appearance: He is well-developed. HENT:      Head: Normocephalic. Eyes:      Conjunctiva/sclera: Conjunctivae normal.      Pupils: Pupils are equal, round, and reactive to light. Cardiovascular:      Rate and Rhythm: Normal rate and regular rhythm.       Heart sounds: Normal heart sounds. No murmur heard. Pulmonary:      Effort: Pulmonary effort is normal. No respiratory distress. Breath sounds: Normal breath sounds. No wheezing or rales. Chest:      Chest wall: No tenderness. Abdominal:      General: Bowel sounds are normal. There is no distension. Palpations: Abdomen is soft. Tenderness: There is no abdominal tenderness. There is no rebound. Musculoskeletal:         General: No tenderness. Normal range of motion. Cervical back: Normal range of motion and neck supple. Skin:     General: Skin is warm and dry. Findings: No rash. Comments: Right knee:  Positive for erythema and edema with tenderness over the infrapatellar area. Normal range of motion pulse and sensory   Neurological:      Mental Status: He is alert and oriented to person, place, and time. Cranial Nerves: No cranial nerve deficit. Motor: No abnormal muscle tone. Coordination: Coordination normal.   Psychiatric:         Behavior: Behavior normal.         Thought Content: Thought content normal.         Judgment: Judgment normal.        [facility]    Hospital course: Patient was infected was treated with IV clindamycin. Procedures    Dx: Right knee cellulitis    Disposition: NM home. Follow-up PCP in 24 for recheck. Clindamycin 500 mg p.o. 4 times daily x 10 days. Return ER needed    Dictation disclaimer:  Please note that this dictation was completed with OOTU, the [employer] voice recognition software. Quite often unanticipated grammatical, syntax, homophones, and other interpretive errors are inadvertently transcribed by the computer software. Please disregard these errors. Please excuse any errors that have escaped final proofreading.

## 2022-08-25 NOTE — ED TRIAGE NOTES
Pt c/o ? Fb to right knee after falling onto concrete while trying to kneel down on Sunday. Wife states she squeezed something out on Monday; yesterday noted redness and swelling. States ? Diabetes; is being evaluated for abnormal glucose levels.

## 2022-08-25 NOTE — ED NOTES
IV removed with catheter intact. Discharge instructions reviewed with patient and understanding verbalized. Patient exits through waiting area.

## 2022-09-12 ENCOUNTER — OFFICE VISIT (OUTPATIENT)
Dept: ORTHOPEDIC SURGERY | Age: 57
End: 2022-09-12
Payer: MEDICARE

## 2022-09-12 VITALS
WEIGHT: 172.4 LBS | SYSTOLIC BLOOD PRESSURE: 95 MMHG | HEART RATE: 100 BPM | DIASTOLIC BLOOD PRESSURE: 63 MMHG | BODY MASS INDEX: 24.68 KG/M2 | OXYGEN SATURATION: 95 % | HEIGHT: 70 IN | TEMPERATURE: 97.2 F

## 2022-09-12 DIAGNOSIS — M96.1 LUMBAR POSTLAMINECTOMY SYNDROME: ICD-10-CM

## 2022-09-12 DIAGNOSIS — M54.16 LUMBAR RADICULOPATHY: Primary | ICD-10-CM

## 2022-09-12 DIAGNOSIS — M51.36 DEGENERATIVE DISC DISEASE, LUMBAR: ICD-10-CM

## 2022-09-12 PROCEDURE — 3017F COLORECTAL CA SCREEN DOC REV: CPT | Performed by: PHYSICAL MEDICINE & REHABILITATION

## 2022-09-12 PROCEDURE — 99213 OFFICE O/P EST LOW 20 MIN: CPT | Performed by: PHYSICAL MEDICINE & REHABILITATION

## 2022-09-12 PROCEDURE — G8420 CALC BMI NORM PARAMETERS: HCPCS | Performed by: PHYSICAL MEDICINE & REHABILITATION

## 2022-09-12 PROCEDURE — G8427 DOCREV CUR MEDS BY ELIG CLIN: HCPCS | Performed by: PHYSICAL MEDICINE & REHABILITATION

## 2022-09-12 PROCEDURE — G8510 SCR DEP NEG, NO PLAN REQD: HCPCS | Performed by: PHYSICAL MEDICINE & REHABILITATION
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
## 2022-10-10 ENCOUNTER — HOSPITAL ENCOUNTER (EMERGENCY)
Age: 57
Discharge: HOME | End: 2022-10-10
Attending: STUDENT IN AN ORGANIZED HEALTH CARE EDUCATION/TRAINING PROGRAM
Payer: MEDICARE

## 2022-10-10 VITALS
BODY MASS INDEX: 24.39 KG/M2 | WEIGHT: 170 LBS | OXYGEN SATURATION: 100 % | DIASTOLIC BLOOD PRESSURE: 93 MMHG | HEART RATE: 64 BPM | SYSTOLIC BLOOD PRESSURE: 129 MMHG | TEMPERATURE: 97.8 F | RESPIRATION RATE: 16 BRPM

## 2022-10-10 DIAGNOSIS — T63.301A SPIDER BITE WOUND, ACCIDENTAL OR UNINTENTIONAL, INITIAL ENCOUNTER: Primary | ICD-10-CM

## 2022-10-10 PROCEDURE — 99283 EMERGENCY DEPT VISIT LOW MDM: CPT

## 2022-10-10 RX ORDER — SULFAMETHOXAZOLE AND TRIMETHOPRIM 800; 160 MG/1; MG/1
1 TABLET ORAL 2 TIMES DAILY
Qty: 14 TABLET | Refills: 0 | Status: SHIPPED | OUTPATIENT
Start: 2022-10-10 | End: 2022-10-24

## 2022-10-10 NOTE — ED PROVIDER NOTES
[facility]  Emergency Department Treatment Report        12:54 PM Presley Villeda is a 62 y.o. male  who presents to ED presents to the ER with complaints of a spider bite to his left forearm. Patient states he saw the spider and knocked it off. It was a black spider. Unsure of what kind of spider. No fevers reported patient states that a day after the bite the area started getting red and it is now red and stings. No fever reported. No other complaints, associated symptoms or modifying factors at this time. PCP: Torres Diaz NP        The history is provided by the patient. No  was used. Insect Bite  This is a new problem. The current episode started more than 2 days ago. The problem has been gradually worsening. Pertinent negatives include no chest pain, no abdominal pain, no headaches and no shortness of breath. Nothing aggravates the symptoms. Nothing relieves the symptoms. He has tried nothing for the symptoms.       Past Medical History:   Diagnosis Date    Arthritis of knee, right 12/2017    advanced degen changes noted, CT right LE    Asthma     Cellulitis 12/2017    DDD (degenerative disc disease), lumbar 2013    noted on MRI with annular tears    Diverticulitis 2016    GERD (gastroesophageal reflux disease) 2016    with esophagitis according to endscopy    Kidney stone     Lumbar post-laminectomy syndrome     Sacroiliitis ([address]     Stroke [facility]) may 14 2010    Unspecified rotator cuff tear or rupture of left shoulder, not specified as traumatic 03/2016       Past Surgical History:   Procedure Laterality Date    COLONOSCOPY N/A 8/12/2016    COLONOSCOPY with polypectomy performed by Fay Baugh MD at [facility]  2002, 2004, 2006    L5-S1 fusion, laminectomies    HX ENDOSCOPY  10/2016    grade 1 espohagitis    HX ENDOSCOPY  11/2016    mild esophagitis    HX KNEE ARTHROSCOPY Right     knee    HX MOHS PROCEDURES      right    HX ORTHOPAEDIC      right shoulder    HX UROLOGICAL  2022    CYSTOSCOPY, LEFT URETEROSCOPY, LASER LITHOTRIPSY, LEFT STENT EXCHANGE AND LEFT RETROGRADE PYELOGRAM; Dr. Abelardo Asif         Family History:   Problem Relation Age of Onset    Other Brother     Cancer Maternal Grandmother     No Known Problems Mother     No Known Problems Father        Social History     Socioeconomic History    Marital status: SINGLE     Spouse name: Not on file    Number of children: Not on file    Years of education: Not on file    Highest education level: Not on file   Occupational History    Not on file   Tobacco Use    Smoking status: Former     Packs/day: 2.00     Years: 30.00     Pack years: 60.00     Types: Cigarettes     Quit date: 2016     Years since quittin.2    Smokeless tobacco: Never   Vaping Use    Vaping Use: Not on file   Substance and Sexual Activity    Alcohol use: No     Alcohol/week: 0.0 standard drinks    Drug use: No    Sexual activity: Yes     Partners: Female   Other Topics Concern    Not on file   Social History Narrative    ** Merged History Encounter **          Social Determinants of Health     Financial Resource Strain: Not on file   Food Insecurity: Not on file   Transportation Needs: Not on file   Physical Activity: Not on file   Stress: Not on file   Social Connections: Not on file   Intimate Partner Violence: Not on file   Housing Stability: Not on file         ALLERGIES: Penicillins, Penicillins, Vicodin [hydrocodone-acetaminophen], Azithromycin, Doxycycline, Hydrochlorothiazide, Protonix [pantoprazole], Vicodin [hydrocodone-acetaminophen], and Erythromycin    Review of Systems   Constitutional:  Negative for fever. Respiratory:  Negative for shortness of breath. Cardiovascular:  Negative for chest pain. Gastrointestinal:  Negative for abdominal pain. Neurological:  Negative for headaches. All other systems reviewed and are negative.     Vitals:    10/10/22 1133   BP: (!) 129/93   Pulse: 64   Resp: 16   Temp: 97.8 °F (36.6 °C)   SpO2: 100%   Weight: 77.1 kg (170 lb)            Physical Exam  Vitals and nursing note reviewed. Constitutional:       General: He is not in acute distress. Appearance: He is well-developed. He is not ill-appearing. HENT:      Head: Normocephalic and atraumatic. Eyes:      Conjunctiva/sclera: Conjunctivae normal.      Pupils: Pupils are equal, round, and reactive to light. Cardiovascular:      Rate and Rhythm: Normal rate and regular rhythm. Pulmonary:      Effort: Pulmonary effort is normal.      Breath sounds: Normal breath sounds. Abdominal:      General: Bowel sounds are normal.      Palpations: Abdomen is soft. Musculoskeletal:         General: Normal range of motion. Cervical back: Normal range of motion and neck supple. Skin:     General: Skin is warm and dry. Findings: Erythema present. Comments: + Area about 4 cm of erythema and a central area of open serous drainage from area. No fluctuant area consistent with an insect bite with minor local infection   Neurological:      Mental Status: He is alert and oriented to person, place, and time. Psychiatric:         Behavior: Behavior normal.         Thought Content: Thought content normal.         Judgment: Judgment normal.        MDM  Number of Diagnoses or Management Options  Spider bite wound, accidental or unintentional, initial encounter: minor  Diagnosis management comments: Suspected spider bite/local infection cellulitis to the area. I will place patient on Bactrim for the infection. No I&D is indicated at this time. I informed patient if it gets worse to return to the emergency room or to his primary care. No further emergency medical problem is suspected patient discharged home.        Amount and/or Complexity of Data Reviewed  Review and summarize past medical records: yes  Independent visualization of images, tracings, or specimens: yes    Risk of Complications, Morbidity, and/or Mortality  Presenting problems: low  Diagnostic procedures: low  Management options: low    Patient Progress  Patient progress: stable         Procedures              Vitals:  Patient Vitals for the past 12 hrs:   Temp Pulse Resp BP SpO2   10/10/22 1133 97.8 °F (36.6 °C) 64 16 (!) 129/93 100 %           Disposition:    Diagnosis:   1. Spider bite wound, accidental or unintentional, initial encounter        Disposition: to Home      Follow-up Information       Follow up With Specialties Details Why Contact Info    Aidee Spence NP Nurse Practitioner   [address] [phone]               Discharge Medication List as of 10/10/2022  1:17 PM        START taking these medications    Details   trimethoprim-sulfamethoxazole (Bactrim DS) 160-800 mg per tablet Take 1 Tablet by mouth two (2) times a day for 14 days. , Normal, Disp-14 Tablet, R-0           CONTINUE these medications which have NOT CHANGED    Details   gabapentin (Neurontin) 600 mg tablet Take 1 Tablet by mouth three (3) times daily. Max Daily Amount: 1,800 mg., Normal, Disp-270 Tablet, R-0      ondansetron (ZOFRAN ODT) 4 mg disintegrating tablet Take 1 Tablet by mouth every eight (8) hours as needed for Nausea or Vomiting., Normal, Disp-20 Tablet, R-0      acetaminophen (TYLENOL) 500 mg tablet Take  by mouth every six (6) hours as needed for Pain., Historical Med      oxybutynin (DITROPAN) 5 mg tablet Take 1 Tablet by mouth three (3) times daily. , Normal, Disp-42 Tablet, R-0Dr. Ainsley La stent pill - Oxybutynin 5 mg / Tamsulosin 0.14 mg / Pyridium 325 mg / Phenyl Salicylate 36 mg.      flash glucose sensor (FreeStyle Gabe 14 Day Sensor) kit Patient has fluctuating blood sugars and frequent hypoglycemia, Normal, Disp-2 Kit, R-5      flash glucose scanning reader (FreeStyle Gabe 14 Day [place]) [facility] Patient should check blood glucose regularly as he has hypoglycemia frequently, Normal, Disp-2 Each, R-5 polyethylene glycol (MIRALAX) 17 gram packet Take 1 Packet by mouth daily as needed for Constipation. , Print, Disp-15 Packet, R-0      L. acidophilus,casei,rhamnosus (Bio-K plus) 50 billion cell cpDR capsule Take 1 Capsule by mouth daily. , Print, Disp-7 Capsule, R-0      fluticasone propionate (FLONASE) 50 mcg/actuation nasal spray One spray in each nostril once a day, Normal, Disp-1 Each, R-4      cyclobenzaprine (FLEXERIL) 10 mg tablet Take 1 Tablet by mouth three (3) times daily as needed for Muscle Spasm(s). , Normal, Disp-90 Tablet, R-2      albuterol (PROVENTIL HFA, VENTOLIN HFA, PROAIR HFA) 90 mcg/actuation inhaler Take 2 Puffs by inhalation every six (6) hours as needed for Wheezing or Shortness of Breath., Normal, Disp-1 Inhaler, R-11      budesonide-formoteroL (Symbicort) 160-4.5 mcg/actuation HFAA Take 1 Puff by inhalation two (2) times a day., Normal, Disp-1 Inhaler, R-6      famotidine (PEPCID) 40 mg tablet Take 1 Tab by mouth two (2) times daily as needed (indigestion/acid reflux). , Normal, Disp-90 Tab, R-0      aspirin 81 mg chewable tablet Take 81 mg by mouth daily. , Historical Med             Return to the ER if you are unable to obtain referral as directed. Mary Devoid  results have been reviewed with him. He has been counseled regarding his diagnosis, treatment, and plan. He verbally conveys understanding and agreement of the signs, symptoms, diagnosis, treatment and prognosis and additionally agrees to follow up as discussed. He also agrees with the care-plan and conveys that all of his questions have been answered. I have also provided discharge instructions for him that include: educational information regarding their diagnosis and treatment, and list of reasons why they would want to return to the ED prior to their follow-up appointment, should his condition change.         Luis Jimenez ENP-C,FNP-C      Dragon voice recognition was used to generate this report, which may have resulted in some phonetic based errors in grammar and contents.  Even though attempts were made to correct all the mistakes, some may have been missed, and remained in the body of the document

## 2022-10-10 NOTE — ED TRIAGE NOTES
Patient say he was bitten by a spider on the 1st of this month and his arm is swollen with puss in it. \"

## 2022-12-12 ENCOUNTER — OFFICE VISIT (OUTPATIENT)
Dept: ORTHOPEDIC SURGERY | Age: 57
End: 2022-12-12
Payer: MEDICARE

## 2022-12-12 VITALS
TEMPERATURE: 97.9 F | HEIGHT: 70 IN | RESPIRATION RATE: 18 BRPM | HEART RATE: 87 BPM | WEIGHT: 177 LBS | BODY MASS INDEX: 25.34 KG/M2 | OXYGEN SATURATION: 96 %

## 2022-12-12 DIAGNOSIS — M54.16 LUMBAR RADICULOPATHY: Primary | ICD-10-CM

## 2022-12-12 DIAGNOSIS — M96.1 LUMBAR POSTLAMINECTOMY SYNDROME: ICD-10-CM

## 2022-12-12 DIAGNOSIS — M51.36 DEGENERATIVE DISC DISEASE, LUMBAR: ICD-10-CM

## 2022-12-12 PROCEDURE — 99213 OFFICE O/P EST LOW 20 MIN: CPT | Performed by: PHYSICAL MEDICINE & REHABILITATION

## 2022-12-12 PROCEDURE — G8427 DOCREV CUR MEDS BY ELIG CLIN: HCPCS | Performed by: PHYSICAL MEDICINE & REHABILITATION

## 2022-12-12 PROCEDURE — G8419 CALC BMI OUT NRM PARAM NOF/U: HCPCS | Performed by: PHYSICAL MEDICINE & REHABILITATION

## 2022-12-12 PROCEDURE — G8432 DEP SCR NOT DOC, RNG: HCPCS | Performed by: PHYSICAL MEDICINE & REHABILITATION

## 2022-12-12 PROCEDURE — 3017F COLORECTAL CA SCREEN DOC REV: CPT | Performed by: PHYSICAL MEDICINE & REHABILITATION

## 2022-12-12 RX ORDER — GABAPENTIN 600 MG/1
600 TABLET ORAL 3 TIMES DAILY
Qty: 90 TABLET | Refills: 2 | Status: SHIPPED | OUTPATIENT
Start: 2022-12-12
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
## 2022-12-12 NOTE — LETTER
12/12/2022    Patient: Juana Clayton   YOB: 1965   Date of Visit: 12/12/2022     Debora Ortega NP  [address] [facility] [address]  Via In Basket    Dear Debora Ortega NP,      Thank you for referring Mr. Julian Garcia to [address] for evaluation. My notes for this consultation are attached. If you have questions, please do not hesitate to call me. I look forward to following your patient along with you.       Sincerely,    Parag Nassar MD

## 2023-01-29 ENCOUNTER — HOSPITAL ENCOUNTER (EMERGENCY)
Age: 58
Discharge: HOME | End: 2023-01-29
Attending: EMERGENCY MEDICINE
Payer: MEDICARE

## 2023-01-29 ENCOUNTER — APPOINTMENT (OUTPATIENT)
Dept: GENERAL RADIOLOGY | Age: 58
End: 2023-01-29
Attending: EMERGENCY MEDICINE
Payer: MEDICARE

## 2023-01-29 VITALS
SYSTOLIC BLOOD PRESSURE: 110 MMHG | RESPIRATION RATE: 19 BRPM | BODY MASS INDEX: 24.77 KG/M2 | OXYGEN SATURATION: 99 % | TEMPERATURE: 97.8 F | HEIGHT: 70 IN | WEIGHT: 173 LBS | HEART RATE: 108 BPM | DIASTOLIC BLOOD PRESSURE: 71 MMHG

## 2023-01-29 DIAGNOSIS — J42 CHRONIC BRONCHITIS, UNSPECIFIED CHRONIC BRONCHITIS TYPE (HCC): Primary | ICD-10-CM

## 2023-01-29 DIAGNOSIS — R51.9 NONINTRACTABLE HEADACHE, UNSPECIFIED CHRONICITY PATTERN, UNSPECIFIED HEADACHE TYPE: ICD-10-CM

## 2023-01-29 DIAGNOSIS — J45.40 MODERATE PERSISTENT ASTHMA WITHOUT COMPLICATION: ICD-10-CM

## 2023-01-29 DIAGNOSIS — F17.200 SMOKER: ICD-10-CM

## 2023-01-29 LAB
COVID-19 RAPID TEST, COVR: NOT DETECTED
FLUAV AG NPH QL IA: NEGATIVE
FLUBV AG NOSE QL IA: NEGATIVE
GLUCOSE BLD STRIP.AUTO-MCNC: 80 MG/DL (ref 70–110)
SOURCE, COVRS: NORMAL

## 2023-01-29 PROCEDURE — 71046 X-RAY EXAM CHEST 2 VIEWS: CPT

## 2023-01-29 PROCEDURE — 74011000250 HC RX REV CODE- 250: Performed by: EMERGENCY MEDICINE

## 2023-01-29 PROCEDURE — 74011250637 HC RX REV CODE- 250/637: Performed by: EMERGENCY MEDICINE

## 2023-01-29 PROCEDURE — 87635 SARS-COV-2 COVID-19 AMP PRB: CPT

## 2023-01-29 PROCEDURE — 74011636637 HC RX REV CODE- 636/637: Performed by: EMERGENCY MEDICINE

## 2023-01-29 PROCEDURE — 82962 GLUCOSE BLOOD TEST: CPT

## 2023-01-29 PROCEDURE — A9270 NON-COVERED ITEM OR SERVICE: HCPCS | Performed by: EMERGENCY MEDICINE

## 2023-01-29 PROCEDURE — 94640 AIRWAY INHALATION TREATMENT: CPT

## 2023-01-29 PROCEDURE — 99283 EMERGENCY DEPT VISIT LOW MDM: CPT

## 2023-01-29 PROCEDURE — 87804 INFLUENZA ASSAY W/OPTIC: CPT

## 2023-01-29 RX ORDER — PREDNISONE 20 MG/1
60 TABLET ORAL
Status: COMPLETED | OUTPATIENT
Start: 2023-01-29 | End: 2023-01-29

## 2023-01-29 RX ORDER — IPRATROPIUM BROMIDE AND ALBUTEROL SULFATE 2.5; .5 MG/3ML; MG/3ML
3 SOLUTION RESPIRATORY (INHALATION)
Status: COMPLETED | OUTPATIENT
Start: 2023-01-29 | End: 2023-01-29

## 2023-01-29 RX ORDER — ALBUTEROL SULFATE 90 UG/1
2 AEROSOL, METERED RESPIRATORY (INHALATION)
Qty: 1 EACH | Refills: 11 | Status: SHIPPED | OUTPATIENT
Start: 2023-01-29

## 2023-01-29 RX ORDER — PREDNISONE 20 MG/1
20 TABLET ORAL DAILY
Qty: 5 TABLET | Refills: 0 | Status: SHIPPED | OUTPATIENT
Start: 2023-01-29 | End: 2023-02-03

## 2023-01-29 RX ORDER — ACETAMINOPHEN 325 MG/1
650 TABLET ORAL ONCE
Status: COMPLETED | OUTPATIENT
Start: 2023-01-29 | End: 2023-01-29

## 2023-01-29 RX ADMIN — PREDNISONE 60 MG: 20 TABLET ORAL at 19:08

## 2023-01-29 RX ADMIN — IPRATROPIUM BROMIDE AND ALBUTEROL SULFATE 3 ML: 2.5; .5 SOLUTION RESPIRATORY (INHALATION) at 19:57

## 2023-01-29 RX ADMIN — ACETAMINOPHEN 650 MG: 325 TABLET, FILM COATED ORAL at 19:13

## 2023-01-29 RX ADMIN — IPRATROPIUM BROMIDE AND ALBUTEROL SULFATE 3 ML: .5; 3 SOLUTION RESPIRATORY (INHALATION) at 19:08

## 2023-01-30 NOTE — ED NOTES
I have reviewed discharge instructions with the patient. The patient verbalized understanding. Current Discharge Medication List        START taking these medications    Details   predniSONE (DELTASONE) 20 mg tablet Take 1 Tablet by mouth daily for 5 days. With Breakfast  Qty: 5 Tablet, Refills: 0  Start date: 1/29/2023, End date: 2/3/2023           CONTINUE these medications which have CHANGED    Details   albuterol (PROVENTIL HFA, VENTOLIN HFA, PROAIR HFA) 90 mcg/actuation inhaler Take 2 Puffs by inhalation every six (6) hours as needed for Wheezing or Shortness of Breath. Qty: 1 Each, Refills: 11  Start date: 1/29/2023    Associated Diagnoses:  Moderate persistent asthma without complication

## 2023-01-30 NOTE — ED PROVIDER NOTES
EMERGENCY DEPARTMENT HISTORY AND PHYSICAL EXAM      Date: 1/29/2023  Patient Name: Caity Zheng      History of Presenting Illness     Chief Complaint   Patient presents with    Cough    Headache       Location/Duration/Severity/Modifying factors   Chief Complaint   Patient presents with    Cough    Headache       HPI:  Caity Zheng is a 62 y.o. male with history as listed presents with a concern of vision, headache, cough, nasal congestion since before Christmas. The patient states symptoms have been waxing and waning. He reports malaise. He has been afebrile. The patient admits to smoking cigarettes daily. He denies any other symptoms. He has not experienced chest pain or shortness of breath. Associated Symptoms:see ROS      There are no other complaints, changes, or physical findings at this time. PCP: None    Current Outpatient Medications   Medication Sig Dispense Refill    predniSONE (DELTASONE) 20 mg tablet Take 1 Tablet by mouth daily for 5 days. With Breakfast 5 Tablet 0    albuterol (PROVENTIL HFA, VENTOLIN HFA, PROAIR HFA) 90 mcg/actuation inhaler Take 2 Puffs by inhalation every six (6) hours as needed for Wheezing or Shortness of Breath. 1 Each 11    gabapentin (Neurontin) 600 mg tablet Take 1 Tablet by mouth three (3) times daily. Max Daily Amount: 1,800 mg. 90 Tablet 2    ondansetron (ZOFRAN ODT) 4 mg disintegrating tablet Take 1 Tablet by mouth every eight (8) hours as needed for Nausea or Vomiting. 20 Tablet 0    acetaminophen (TYLENOL) 500 mg tablet Take  by mouth every six (6) hours as needed for Pain. (Patient not taking: Reported on 12/12/2022)      oxybutynin (DITROPAN) 5 mg tablet Take 1 Tablet by mouth three (3) times daily.  (Patient not taking: No sig reported) 42 Tablet 0    flash glucose sensor (FreeStyle Gabe 14 Day Sensor) kit Patient has fluctuating blood sugars and frequent hypoglycemia (Patient not taking: Reported on 12/12/2022) 2 Kit 5    flash glucose scanning reader [facility] Gabe 14 Day [place]) [facility] Patient should check blood glucose regularly as he has hypoglycemia frequently (Patient not taking: Reported on 12/12/2022) 2 Each 5    polyethylene glycol (MIRALAX) 17 gram packet Take 1 Packet by mouth daily as needed for Constipation. (Patient not taking: Reported on 12/12/2022) 15 Packet 0    L. acidophilus,casei,rhamnosus (Bio-K plus) 50 billion cell cpDR capsule Take 1 Capsule by mouth daily. (Patient not taking: No sig reported) 7 Capsule 0    fluticasone propionate (FLONASE) 50 mcg/actuation nasal spray One spray in each nostril once a day (Patient not taking: No sig reported) 1 Each 4    cyclobenzaprine (FLEXERIL) 10 mg tablet Take 1 Tablet by mouth three (3) times daily as needed for Muscle Spasm(s). 90 Tablet 2    budesonide-formoteroL (Symbicort) 160-4.5 mcg/actuation HFAA Take 1 Puff by inhalation two (2) times a day. 1 Inhaler 6    famotidine (PEPCID) 40 mg tablet Take 1 Tab by mouth two (2) times daily as needed (indigestion/acid reflux). (Patient not taking: No sig reported) 90 Tab 0    aspirin 81 mg chewable tablet Take 81 mg by mouth daily.  (Patient not taking: Reported on 12/12/2022)         Past History     Past Medical History:  Past Medical History:   Diagnosis Date    Arthritis of knee, right 12/2017    advanced degen changes noted, CT right LE    Asthma     Cellulitis 12/2017    DDD (degenerative disc disease), lumbar 2013    noted on MRI with annular tears    Diverticulitis 2016    GERD (gastroesophageal reflux disease) 2016    with esophagitis according to endscopy    Kidney stone     Lumbar post-laminectomy syndrome     Sacroiliitis ([address]     Stroke [facility]) may 14 2010    Unspecified rotator cuff tear or rupture of left shoulder, not specified as traumatic 03/2016       Past Surgical History:  Past Surgical History:   Procedure Laterality Date    COLONOSCOPY N/A 8/12/2016    COLONOSCOPY with polypectomy performed by Sheri López MD at [facility] ENDOSCOPY    HX BACK SURGERY  , ,     L5-S1 fusion, laminectomies    HX ENDOSCOPY  10/2016    grade 1 espohagitis    HX ENDOSCOPY  2016    mild esophagitis    HX KNEE ARTHROSCOPY Right     knee    HX MOHS PROCEDURES      right    HX ORTHOPAEDIC      right shoulder    HX UROLOGICAL  2022    CYSTOSCOPY, LEFT URETEROSCOPY, LASER LITHOTRIPSY, LEFT STENT EXCHANGE AND LEFT RETROGRADE PYELOGRAM; Dr. Jumana Singh       Family History:  Family History   Problem Relation Age of Onset    Other Brother     Cancer Maternal Grandmother     No Known Problems Mother     No Known Problems Father        Social History:  Social History     Tobacco Use    Smoking status: Former     Packs/day: 2.00     Years: 30.00     Pack years: 60.00     Types: Cigarettes     Quit date: 2016     Years since quittin.5    Smokeless tobacco: Never   Substance Use Topics    Alcohol use: No     Alcohol/week: 0.0 standard drinks    Drug use: No       Allergies: Allergies   Allergen Reactions    Penicillins Angioedema    Penicillins Anaphylaxis    Vicodin [Hydrocodone-Acetaminophen] Nausea and Vomiting    Azithromycin Nausea and Vomiting    Doxycycline Itching     Black eyes    Hydrochlorothiazide Nausea Only     Dizzy, lightheaded, blisters on his legs    Protonix [Pantoprazole] Itching and Contact Dermatitis     Patient denies allergy    Vicodin [Hydrocodone-Acetaminophen] Nausea and Vomiting    Erythromycin Other (comments)         Review of Systems     Review of Systems   All other systems reviewed and are negative. Physical Exam     Physical Exam  Vitals and nursing note reviewed. Constitutional:       General: He is awake. He is not in acute distress. Appearance: Normal appearance. He is well-developed and well-groomed. He is not ill-appearing, toxic-appearing or diaphoretic. HENT:      Head: Normocephalic and atraumatic.       Right Ear: External ear normal.      Left Ear: External ear normal.      Nose: Nose normal. Mouth/Throat:      Mouth: Mucous membranes are moist.      Pharynx: Oropharynx is clear. No oropharyngeal exudate or posterior oropharyngeal erythema. Eyes:      General: Lids are normal. Vision grossly intact. Gaze aligned appropriately. No scleral icterus. Right eye: No discharge. Left eye: No discharge. Extraocular Movements: Extraocular movements intact. Conjunctiva/sclera: Conjunctivae normal.      Pupils: Pupils are equal, round, and reactive to light. Neck:      Vascular: No carotid bruit. Trachea: Trachea and phonation normal.   Cardiovascular:      Rate and Rhythm: Normal rate and regular rhythm. Pulses: Normal pulses. Heart sounds: Normal heart sounds, S1 normal and S2 normal. No murmur heard. Pulmonary:      Effort: Pulmonary effort is normal. No respiratory distress. Breath sounds: Normal air entry. Examination of the right-middle field reveals wheezing. Examination of the left-middle field reveals wheezing. Examination of the right-lower field reveals decreased breath sounds and wheezing. Examination of the left-lower field reveals decreased breath sounds and wheezing. Decreased breath sounds and wheezing present. No rales. Abdominal:      General: Bowel sounds are normal. There is no distension. Palpations: Abdomen is soft. Tenderness: There is no abdominal tenderness. There is no guarding. Musculoskeletal:         General: No swelling, deformity or signs of injury. Normal range of motion. Right shoulder: Normal. No swelling, deformity, tenderness, bony tenderness or crepitus. Left shoulder: Normal. No swelling, deformity, tenderness, bony tenderness or crepitus. Right upper arm: Normal. No swelling, edema, deformity, tenderness or bony tenderness. Left upper arm: Normal. No swelling, edema, deformity, tenderness or bony tenderness. Right elbow: No swelling or deformity. No tenderness.       Left elbow: No swelling or deformity. No tenderness. Right forearm: No swelling, edema, deformity, tenderness or bony tenderness. Left forearm: No swelling, edema, deformity, tenderness or bony tenderness. Right wrist: No swelling, deformity, tenderness or crepitus. Normal pulse. Left wrist: No swelling, deformity, tenderness or crepitus. Normal pulse. Right hand: Normal. No swelling, deformity, tenderness or bony tenderness. Normal capillary refill. Left hand: Normal. No swelling, deformity, tenderness or bony tenderness. Normal capillary refill. Cervical back: Normal, full passive range of motion without pain, normal range of motion and neck supple. No swelling, deformity, rigidity, tenderness, bony tenderness or crepitus. No pain with movement. Thoracic back: Normal.      Lumbar back: Normal.      Right hip: No deformity, tenderness, bony tenderness or crepitus. Left hip: No deformity, tenderness, bony tenderness or crepitus. Right upper leg: Normal.      Left upper leg: Normal.      Right knee: Normal. No swelling. Left knee: No swelling. Right lower leg: No swelling or tenderness. No edema. Left lower leg: No swelling or tenderness. No edema. Right ankle: Normal.      Left ankle: Normal.      Right foot: Normal. No swelling or deformity. Left foot: Normal. No swelling. Skin:     General: Skin is warm and dry. Neurological:      General: No focal deficit present. Mental Status: He is alert and oriented to person, place, and time. Mental status is at baseline. GCS: GCS eye subscore is 4. GCS verbal subscore is 5. GCS motor subscore is 6. Cranial Nerves: Cranial nerves 2-12 are intact. No cranial nerve deficit. Sensory: Sensation is intact. No sensory deficit. Motor: Motor function is intact. No weakness. Coordination: Coordination is intact.  Coordination normal.   Psychiatric:         Mood and Affect: Mood normal. Behavior: Behavior normal. Behavior is cooperative. Thought Content: Thought content normal.         Judgment: Judgment normal.       Lab and Diagnostic Study Results     Labs -  Recent Results (from the past 24 hour(s))   COVID-19 RAPID TEST    Collection Time: 01/29/23  1:45 PM   Result Value Ref Range    Specimen source Nasopharyngeal      COVID-19 rapid test Not detected     INFLUENZA A & B AG (RAPID TEST)    Collection Time: 01/29/23  1:45 PM   Result Value Ref Range    Influenza A Antigen Negative NEG      Influenza B Antigen Negative NEG     GLUCOSE, POC    Collection Time: 01/29/23  7:11 PM   Result Value Ref Range    Glucose (POC) 80 70 - 110 mg/dL         Radiologic Studies -   XR CHEST PA LAT   Final Result      No acute findings. Procedures and Critical Care       Performed by: Roosevelt Khoury MD    Procedures         Roosevelt Khoury MD    Medical Decision Making and ED Course   - I am the first and primary provider for this patient AND AM THE PRIMARY PROVIDER OF RECORD. - I reviewed the vital signs, available nursing notes, past medical history, past surgical history, family history and social history. - Initial assessment performed. The patients presenting problems have been discussed, and the staff are in agreement with the care plan formulated and outlined with them. I have encouraged them to ask questions as they arise throughout their visit. Vital Signs-Reviewed the patient's vital signs.     Patient Vitals for the past 12 hrs:   BP SpO2   01/29/23 2005 110/71 --   01/29/23 1935 119/75 99 %   01/29/23 1905 107/63 100 %   01/29/23 1835 (!) 111/98 100 %   01/29/23 1825 117/81 100 %   01/29/23 1821 -- 100 %   01/29/23 1747 106/76 100 %         Provider Notes (Medical Decision Making):     MDM  Number of Diagnoses or Management Options  Chronic bronchitis, unspecified chronic bronchitis type (HCC)  Moderate persistent asthma without complication  Nonintractable headache, unspecified chronicity pattern, unspecified headache type  Smoker  Diagnosis management comments: Patient presented to the emergency department with URI symptoms that have been waxing and waning since prior to Christmas 2022. Patient also reports episodes of blurry vision. He was found to have bilateral wheezing that improved and resolved with DuoNeb treatments and prednisone. The chest x-ray demonstrated no acute cardiopulmonary process. The influenza A/B and COVID-19 swabs were negative. It is likely that the patient's symptoms are secondary to chronic bronchitis. He will have a prescription of prednisone as well as albuterol inhalers at home. He is to follow-up with his primary care provider and call for available appointment. The patient is encouraged to discontinue smoking. He is to take Tylenol and/or ibuprofen as needed as directed on container. The patient is encouraged to follow-up with ophthalmologist.  He and his significant other at bedside stated been years since he has had eye exam.  He is to return to emergency department for any worsening or concerning symptoms. At time of disposition, the patient stable and in no acute distress or obvious discomfort. ED Course:          ------------------------------------------------------------------------------------------------------------        Consultations:       Consultations:       Disposition         Discharged      Diagnosis     Clinical Impression:   1. Chronic bronchitis, unspecified chronic bronchitis type ([facility] [address]    2. Nonintractable headache, unspecified chronicity pattern, unspecified headache type    3. Smoker    4.  Moderate persistent asthma without complication        Attestations:    Siri Huff MD

## 2023-01-30 NOTE — DISCHARGE INSTRUCTIONS
1.  Quit smoking. 2.  Continue home medication as prescribed. 3.  Make appointment for follow-up to PCP. 4.  Return to emergency department any worsening or concerning symptoms. 5.  Drink plenty water and stay well-hydrated.

## 2023-01-30 NOTE — ED NOTES
Bedside shift change report given to Bre Ventura by Krista Coburn. Report included the following information SBAR, ED Summary and MAR.

## 2023-01-31 ENCOUNTER — APPOINTMENT (OUTPATIENT)
Dept: GENERAL RADIOLOGY | Age: 58
End: 2023-01-31
Attending: EMERGENCY MEDICINE
Payer: MEDICARE

## 2023-01-31 ENCOUNTER — HOSPITAL ENCOUNTER (EMERGENCY)
Age: 58
Discharge: HOME | End: 2023-01-31
Attending: EMERGENCY MEDICINE
Payer: MEDICARE

## 2023-01-31 ENCOUNTER — APPOINTMENT (OUTPATIENT)
Dept: CT IMAGING | Age: 58
End: 2023-01-31
Attending: EMERGENCY MEDICINE
Payer: MEDICARE

## 2023-01-31 VITALS
DIASTOLIC BLOOD PRESSURE: 78 MMHG | OXYGEN SATURATION: 99 % | TEMPERATURE: 97.8 F | RESPIRATION RATE: 18 BRPM | WEIGHT: 173 LBS | SYSTOLIC BLOOD PRESSURE: 113 MMHG | BODY MASS INDEX: 24.77 KG/M2 | HEIGHT: 70 IN | HEART RATE: 88 BPM

## 2023-01-31 DIAGNOSIS — R51.9 ACUTE INTRACTABLE HEADACHE, UNSPECIFIED HEADACHE TYPE: Primary | ICD-10-CM

## 2023-01-31 DIAGNOSIS — J98.01 ACUTE BRONCHOSPASM: ICD-10-CM

## 2023-01-31 DIAGNOSIS — J18.9 COMMUNITY ACQUIRED PNEUMONIA OF LEFT LOWER LOBE OF LUNG: ICD-10-CM

## 2023-01-31 LAB
ALBUMIN SERPL-MCNC: 3.9 G/DL (ref 3.4–5)
ALBUMIN/GLOB SERPL: 1.2 (ref 0.8–1.7)
ALP SERPL-CCNC: 80 U/L (ref 45–117)
ALT SERPL-CCNC: 23 U/L (ref 16–61)
ANION GAP SERPL CALC-SCNC: 5 MMOL/L (ref 3–18)
AST SERPL-CCNC: 12 U/L (ref 10–38)
ATRIAL RATE: 58 BPM
BASOPHILS # BLD: 0 K/UL (ref 0–0.1)
BASOPHILS NFR BLD: 0 % (ref 0–2)
BILIRUB SERPL-MCNC: 0.4 MG/DL (ref 0.2–1)
BUN SERPL-MCNC: 22 MG/DL (ref 7–18)
BUN/CREAT SERPL: 28 (ref 12–20)
CALCIUM SERPL-MCNC: 8.4 MG/DL (ref 8.5–10.1)
CALCULATED R AXIS, ECG10: 63 DEGREES
CALCULATED T AXIS, ECG11: 55 DEGREES
CHLORIDE SERPL-SCNC: 108 MMOL/L (ref 100–111)
CO2 SERPL-SCNC: 28 MMOL/L (ref 21–32)
COVID-19 RAPID TEST, COVR: NOT DETECTED
CREAT SERPL-MCNC: 0.79 MG/DL (ref 0.6–1.3)
DIAGNOSIS, 93000: NORMAL
DIFFERENTIAL METHOD BLD: ABNORMAL
EOSINOPHIL # BLD: 0 K/UL (ref 0–0.4)
EOSINOPHIL NFR BLD: 0 % (ref 0–5)
ERYTHROCYTE [DISTWIDTH] IN BLOOD BY AUTOMATED COUNT: 12.1 % (ref 11.6–14.5)
FLUAV AG NPH QL IA: NEGATIVE
FLUBV AG NOSE QL IA: NEGATIVE
GLOBULIN SER CALC-MCNC: 3.2 G/DL (ref 2–4)
GLUCOSE SERPL-MCNC: 89 MG/DL (ref 74–99)
HCT VFR BLD AUTO: 46.2 % (ref 36–48)
HGB BLD-MCNC: 15.9 G/DL (ref 13–16)
IMM GRANULOCYTES # BLD AUTO: 0.1 K/UL (ref 0–0.04)
IMM GRANULOCYTES NFR BLD AUTO: 1 % (ref 0–0.5)
LYMPHOCYTES # BLD: 1.7 K/UL (ref 0.9–3.6)
LYMPHOCYTES NFR BLD: 19 % (ref 21–52)
MCH RBC QN AUTO: 29.8 PG (ref 24–34)
MCHC RBC AUTO-ENTMCNC: 34.4 G/DL (ref 31–37)
MCV RBC AUTO: 86.5 FL (ref 78–100)
MONOCYTES # BLD: 0.8 K/UL (ref 0.05–1.2)
MONOCYTES NFR BLD: 9 % (ref 3–10)
NEUTS SEG # BLD: 6.5 K/UL (ref 1.8–8)
NEUTS SEG NFR BLD: 71 % (ref 40–73)
NRBC # BLD: 0 K/UL (ref 0–0.01)
NRBC BLD-RTO: 0 PER 100 WBC
P-R INTERVAL, ECG05: 122 MS
PLATELET # BLD AUTO: 223 K/UL (ref 135–420)
PMV BLD AUTO: 9.5 FL (ref 9.2–11.8)
POTASSIUM SERPL-SCNC: 3.9 MMOL/L (ref 3.5–5.5)
PROT SERPL-MCNC: 7.1 G/DL (ref 6.4–8.2)
Q-T INTERVAL, ECG07: 386 MS
QRS DURATION, ECG06: 100 MS
QTC CALCULATION (BEZET), ECG08: 378 MS
RBC # BLD AUTO: 5.34 M/UL (ref 4.35–5.65)
SODIUM SERPL-SCNC: 141 MMOL/L (ref 136–145)
SOURCE, COVRS: NORMAL
TROPONIN-HIGH SENSITIVITY: 5 NG/L (ref 0–78)
VENTRICULAR RATE, ECG03: 58 BPM
WBC # BLD AUTO: 9.2 K/UL (ref 4.6–13.2)

## 2023-01-31 PROCEDURE — 99285 EMERGENCY DEPT VISIT HI MDM: CPT

## 2023-01-31 PROCEDURE — 85025 COMPLETE CBC W/AUTO DIFF WBC: CPT

## 2023-01-31 PROCEDURE — 96375 TX/PRO/DX INJ NEW DRUG ADDON: CPT

## 2023-01-31 PROCEDURE — 96361 HYDRATE IV INFUSION ADD-ON: CPT

## 2023-01-31 PROCEDURE — 84484 ASSAY OF TROPONIN QUANT: CPT

## 2023-01-31 PROCEDURE — 93005 ELECTROCARDIOGRAM TRACING: CPT

## 2023-01-31 PROCEDURE — 87804 INFLUENZA ASSAY W/OPTIC: CPT

## 2023-01-31 PROCEDURE — 74011250636 HC RX REV CODE- 250/636: Performed by: EMERGENCY MEDICINE

## 2023-01-31 PROCEDURE — 80053 COMPREHEN METABOLIC PANEL: CPT

## 2023-01-31 PROCEDURE — 96374 THER/PROPH/DIAG INJ IV PUSH: CPT

## 2023-01-31 PROCEDURE — 87635 SARS-COV-2 COVID-19 AMP PRB: CPT

## 2023-01-31 PROCEDURE — 71045 X-RAY EXAM CHEST 1 VIEW: CPT

## 2023-01-31 PROCEDURE — 70450 CT HEAD/BRAIN W/O DYE: CPT

## 2023-01-31 RX ORDER — DIPHENHYDRAMINE HYDROCHLORIDE 50 MG/ML
25 INJECTION, SOLUTION INTRAMUSCULAR; INTRAVENOUS ONCE
Status: COMPLETED | OUTPATIENT
Start: 2023-01-31 | End: 2023-01-31

## 2023-01-31 RX ORDER — PREDNISONE 10 MG/1
TABLET ORAL
Qty: 75 TABLET | Refills: 0 | Status: SHIPPED | OUTPATIENT
Start: 2023-01-31 | End: 2023-02-20

## 2023-01-31 RX ORDER — DEXAMETHASONE SODIUM PHOSPHATE 4 MG/ML
10 INJECTION, SOLUTION INTRA-ARTICULAR; INTRALESIONAL; INTRAMUSCULAR; INTRAVENOUS; SOFT TISSUE ONCE
Status: COMPLETED | OUTPATIENT
Start: 2023-01-31 | End: 2023-01-31

## 2023-01-31 RX ORDER — CEFDINIR 300 MG/1
300 CAPSULE ORAL 2 TIMES DAILY
Qty: 14 CAPSULE | Refills: 0 | Status: SHIPPED | OUTPATIENT
Start: 2023-01-31 | End: 2023-02-07

## 2023-01-31 RX ORDER — PROMETHAZINE HYDROCHLORIDE 25 MG/1
25 TABLET ORAL
Qty: 12 TABLET | Refills: 0 | Status: SHIPPED | OUTPATIENT
Start: 2023-01-31

## 2023-01-31 RX ORDER — KETOROLAC TROMETHAMINE 15 MG/ML
15 INJECTION, SOLUTION INTRAMUSCULAR; INTRAVENOUS ONCE
Status: COMPLETED | OUTPATIENT
Start: 2023-01-31 | End: 2023-01-31

## 2023-01-31 RX ORDER — MORPHINE SULFATE 4 MG/ML
4 INJECTION INTRAVENOUS
Status: COMPLETED | OUTPATIENT
Start: 2023-01-31 | End: 2023-01-31

## 2023-01-31 RX ORDER — PROCHLORPERAZINE EDISYLATE 5 MG/ML
10 INJECTION INTRAMUSCULAR; INTRAVENOUS ONCE
Status: COMPLETED | OUTPATIENT
Start: 2023-01-31 | End: 2023-01-31

## 2023-01-31 RX ADMIN — PROCHLORPERAZINE EDISYLATE 10 MG: 5 INJECTION INTRAMUSCULAR; INTRAVENOUS at 18:31

## 2023-01-31 RX ADMIN — SODIUM CHLORIDE 1000 ML: 9 INJECTION, SOLUTION INTRAVENOUS at 18:31

## 2023-01-31 RX ADMIN — DEXAMETHASONE SODIUM PHOSPHATE 10 MG: 4 INJECTION, SOLUTION INTRAMUSCULAR; INTRAVENOUS at 18:31

## 2023-01-31 RX ADMIN — KETOROLAC TROMETHAMINE 15 MG: 15 INJECTION, SOLUTION INTRAMUSCULAR; INTRAVENOUS at 18:31

## 2023-01-31 RX ADMIN — DIPHENHYDRAMINE HYDROCHLORIDE 25 MG: 50 INJECTION INTRAMUSCULAR; INTRAVENOUS at 18:31

## 2023-01-31 RX ADMIN — MORPHINE SULFATE 4 MG: 4 INJECTION, SOLUTION INTRAMUSCULAR; INTRAVENOUS at 19:10

## 2023-01-31 NOTE — ED PROVIDER NOTES
HPI     Past Medical History:   Diagnosis Date    Arthritis of knee, right 2017    advanced degen changes noted, CT right LE    Asthma     Cellulitis 2017    DDD (degenerative disc disease), lumbar     noted on MRI with annular tears    Diverticulitis     GERD (gastroesophageal reflux disease)     with esophagitis according to endscopy    Kidney stone     Lumbar post-laminectomy syndrome     Sacroiliitis ([address]     Stroke [facility]) may 14 2010    Unspecified rotator cuff tear or rupture of left shoulder, not specified as traumatic 2016       Past Surgical History:   Procedure Laterality Date    COLONOSCOPY N/A 2016    COLONOSCOPY with polypectomy performed by Nabeel Pantoja MD at [facility]  , ,     L5-S1 fusion, laminectomies    HX ENDOSCOPY  10/2016    grade 1 espohagitis    HX ENDOSCOPY  2016    mild esophagitis    HX KNEE ARTHROSCOPY Right     knee    HX MOHS PROCEDURES      right    HX ORTHOPAEDIC      right shoulder    HX UROLOGICAL  2022    CYSTOSCOPY, LEFT URETEROSCOPY, LASER LITHOTRIPSY, LEFT STENT EXCHANGE AND LEFT RETROGRADE PYELOGRAM; Dr. Michelle Alanis         Family History:   Problem Relation Age of Onset    Other Brother     Cancer Maternal Grandmother     No Known Problems Mother     No Known Problems Father        Social History     Socioeconomic History    Marital status: SINGLE     Spouse name: Not on file    Number of children: Not on file    Years of education: Not on file    Highest education level: Not on file   Occupational History    Not on file   Tobacco Use    Smoking status: Former     Packs/day: 2.00     Years: 30.00     Pack years: 60.00     Types: Cigarettes     Quit date: 2016     Years since quittin.5    Smokeless tobacco: Never   Vaping Use    Vaping Use: Not on file   Substance and Sexual Activity    Alcohol use: No     Alcohol/week: 0.0 standard drinks    Drug use: No    Sexual activity: Yes     Partners: Female Other Topics Concern    Not on file   Social History Narrative    ** Merged History Encounter **          Social Determinants of Health     Financial Resource Strain: Not on file   Food Insecurity: Not on file   Transportation Needs: Not on file   Physical Activity: Not on file   Stress: Not on file   Social Connections: Not on file   Intimate Partner Violence: Not on file   Housing Stability: Not on file         ALLERGIES: Penicillins, Penicillins, Vicodin [hydrocodone-acetaminophen], Azithromycin, Doxycycline, Hydrochlorothiazide, Protonix [pantoprazole], Vicodin [hydrocodone-acetaminophen], and Erythromycin    Review of Systems    Vitals:    01/31/23 1126   BP: 113/78   Pulse: 88   Resp: 18   Temp: 97.8 °F (36.6 °C)   SpO2: 99%   Weight: 78.5 kg (173 lb)   Height: 5' 10\" (1.778 m)            Physical Exam       Recent Results (from the past 12 hour(s))   EKG, 12 LEAD, INITIAL    Collection Time: 01/31/23  3:25 PM   Result Value Ref Range    Ventricular Rate 58 BPM    Atrial Rate 58 BPM    P-R Interval 122 ms    QRS Duration 100 ms    Q-T Interval 386 ms    QTC Calculation (Bezet) 378 ms    Calculated R Axis 63 degrees    Calculated T Axis 55 degrees    Diagnosis       Consider ectopic atrial rhythm   Otherwise normal ECG  When compared with ECG of 21-OCT-2019 11:54,  No significant change was found  Confirmed by Rena Terrell ([id number]) on 1/31/2023 3:57:35 PM     COVID-19 RAPID TEST    Collection Time: 01/31/23  3:32 PM   Result Value Ref Range    Specimen source Nasopharyngeal      COVID-19 rapid test Not detected     INFLUENZA A & B AG (RAPID TEST)    Collection Time: 01/31/23  3:32 PM   Result Value Ref Range    Influenza A Antigen Negative NEG      Influenza B Antigen Negative NEG     CBC WITH AUTOMATED DIFF    Collection Time: 01/31/23  3:33 PM   Result Value Ref Range    WBC 9.2 4.6 - 13.2 K/uL    RBC 5.34 4.35 - 5.65 M/uL    HGB 15.9 13.0 - 16.0 g/dL    HCT 46.2 36.0 - 48.0 %    MCV 86.5 78.0 - 100.0 FL MCH 29.8 24.0 - 34.0 PG    MCHC 34.4 31.0 - 37.0 g/dL    RDW 12.1 11.6 - 14.5 %    PLATELET 841 042 - 359 K/uL    MPV 9.5 9.2 - 11.8 FL    NRBC 0.0 0  WBC    ABSOLUTE NRBC 0.00 0.00 - 0.01 K/uL    NEUTROPHILS 71 40 - 73 %    LYMPHOCYTES 19 (L) 21 - 52 %    MONOCYTES 9 3 - 10 %    EOSINOPHILS 0 0 - 5 %    BASOPHILS 0 0 - 2 %    IMMATURE GRANULOCYTES 1 (H) 0.0 - 0.5 %    ABS. NEUTROPHILS 6.5 1.8 - 8.0 K/UL    ABS. LYMPHOCYTES 1.7 0.9 - 3.6 K/UL    ABS. MONOCYTES 0.8 0.05 - 1.2 K/UL    ABS. EOSINOPHILS 0.0 0.0 - 0.4 K/UL    ABS. BASOPHILS 0.0 0.0 - 0.1 K/UL    ABS. IMM. GRANS. 0.1 (H) 0.00 - 0.04 K/UL    DF AUTOMATED     METABOLIC PANEL, COMPREHENSIVE    Collection Time: 01/31/23  3:33 PM   Result Value Ref Range    Sodium 141 136 - 145 mmol/L    Potassium 3.9 3.5 - 5.5 mmol/L    Chloride 108 100 - 111 mmol/L    CO2 28 21 - 32 mmol/L    Anion gap 5 3.0 - 18 mmol/L    Glucose 89 74 - 99 mg/dL    BUN 22 (H) 7.0 - 18 MG/DL    Creatinine 0.79 0.6 - 1.3 MG/DL    BUN/Creatinine ratio 28 (H) 12 - 20      eGFR >60 >60 ml/min/1.73m2    Calcium 8.4 (L) 8.5 - 10.1 MG/DL    Bilirubin, total 0.4 0.2 - 1.0 MG/DL    ALT (SGPT) 23 16 - 61 U/L    AST (SGOT) 12 10 - 38 U/L    Alk. phosphatase 80 45 - 117 U/L    Protein, total 7.1 6.4 - 8.2 g/dL    Albumin 3.9 3.4 - 5.0 g/dL    Globulin 3.2 2.0 - 4.0 g/dL    A-G Ratio 1.2 0.8 - 1.7     TROPONIN-HIGH SENSITIVITY    Collection Time: 01/31/23  3:33 PM   Result Value Ref Range    Troponin-High Sensitivity 5 0 - 78 ng/L     CT HEAD WO CONT   Final Result      No acute intracranial findings. Paranasal sinus disease as above. XR CHEST PORT    (Results Pending)     CXR:  Increased lung markings in the bilateral lower lung fields  Medical Decision Making  Amount and/or Complexity of Data Reviewed  Labs: ordered. Radiology: ordered. ECG/medicine tests: ordered. Risk  Prescription drug management.            Procedures A & B AG (RAPID TEST)    Collection Time: 01/31/23  3:32 PM   Result Value Ref Range    Influenza A Antigen Negative NEG      Influenza B Antigen Negative NEG     CBC WITH AUTOMATED DIFF    Collection Time: 01/31/23  3:33 PM   Result Value Ref Range    WBC 9.2 4.6 - 13.2 K/uL    RBC 5.34 4.35 - 5.65 M/uL    HGB 15.9 13.0 - 16.0 g/dL    HCT 46.2 36.0 - 48.0 %    MCV 86.5 78.0 - 100.0 FL    MCH 29.8 24.0 - 34.0 PG    MCHC 34.4 31.0 - 37.0 g/dL    RDW 12.1 11.6 - 14.5 %    PLATELET 815 425 - 009 K/uL    MPV 9.5 9.2 - 11.8 FL    NRBC 0.0 0  WBC    ABSOLUTE NRBC 0.00 0.00 - 0.01 K/uL    NEUTROPHILS 71 40 - 73 %    LYMPHOCYTES 19 (L) 21 - 52 %    MONOCYTES 9 3 - 10 %    EOSINOPHILS 0 0 - 5 %    BASOPHILS 0 0 - 2 %    IMMATURE GRANULOCYTES 1 (H) 0.0 - 0.5 %    ABS. NEUTROPHILS 6.5 1.8 - 8.0 K/UL    ABS. LYMPHOCYTES 1.7 0.9 - 3.6 K/UL    ABS. MONOCYTES 0.8 0.05 - 1.2 K/UL    ABS. EOSINOPHILS 0.0 0.0 - 0.4 K/UL    ABS. BASOPHILS 0.0 0.0 - 0.1 K/UL    ABS. IMM. GRANS. 0.1 (H) 0.00 - 0.04 K/UL    DF AUTOMATED     METABOLIC PANEL, COMPREHENSIVE    Collection Time: 01/31/23  3:33 PM   Result Value Ref Range    Sodium 141 136 - 145 mmol/L    Potassium 3.9 3.5 - 5.5 mmol/L    Chloride 108 100 - 111 mmol/L    CO2 28 21 - 32 mmol/L    Anion gap 5 3.0 - 18 mmol/L    Glucose 89 74 - 99 mg/dL    BUN 22 (H) 7.0 - 18 MG/DL    Creatinine 0.79 0.6 - 1.3 MG/DL    BUN/Creatinine ratio 28 (H) 12 - 20      eGFR >60 >60 ml/min/1.73m2    Calcium 8.4 (L) 8.5 - 10.1 MG/DL    Bilirubin, total 0.4 0.2 - 1.0 MG/DL    ALT (SGPT) 23 16 - 61 U/L    AST (SGOT) 12 10 - 38 U/L    Alk.  phosphatase 80 45 - 117 U/L    Protein, total 7.1 6.4 - 8.2 g/dL    Albumin 3.9 3.4 - 5.0 g/dL    Globulin 3.2 2.0 - 4.0 g/dL    A-G Ratio 1.2 0.8 - 1.7     TROPONIN-HIGH SENSITIVITY    Collection Time: 01/31/23  3:33 PM   Result Value Ref Range    Troponin-High Sensitivity 5 0 - 78 ng/L     XR CHEST PORT   Final Result      Lower lung hazy streaky infiltrate or edema, slightly increased since prior. CT HEAD WO CONT   Final Result      No acute intracranial findings. Paranasal sinus disease as above. CXR:  Increased lung markings in the bilateral lower lung fields, by my read. Medical Decision Making  The patient is a 59-year-old male with medical history significant for episodes of hypoglycemia, who presents to the ED today with a headache that has been present for the past several weeks. He initially saw an eye doctor because he thought it was related to his vision. They ruled out any ocular causes. He has been taking Tylenol without much relief. His head CT is negative. I do not believe that his headache is secondary to any other acute processes going on. He does not have any signs of COVID, flu, pneumonia, or acute coronary syndrome. He is a smoker and he is wheezing throughout all lung fields on exam.  I suspect that he may have previously undiagnosed COPD. He also appears to have infiltrates in his bilateral lower lung fields. The patient has received IV fluids, IV Compazine, IV Benadryl, IV Toradol and IV Decadron. Upon reevaluation, he is feeling much better. He may either have a rebound headache or potentially a headache secondary to having pneumonia. He will be discharged home with a long slow prednisone taper for his acute bronchospasm along with potential for rebound headaches. He will also be discharged home with some Phenergan to help with the headaches as well as Omnicef for potential pneumonia. He will be advised to follow-up with his primary care physician in 2 to 3 days. Return precautions have been given. Amount and/or Complexity of Data Reviewed  Labs: ordered. Radiology: ordered. ECG/medicine tests: ordered. Risk  Prescription drug management.            Procedures

## 2023-01-31 NOTE — ED TRIAGE NOTES
Patient arrived to ER with complaints of headache. States he was seen at eye doctor and everything was normal. Continues with headache, last tylenol yesterday.

## 2023-02-07 PROBLEM — R51.9 ACUTE INTRACTABLE HEADACHE: Status: ACTIVE | Noted: 2023-02-07

## 2023-03-13 ENCOUNTER — APPOINTMENT (OUTPATIENT)
Facility: HOSPITAL | Age: 58
End: 2023-03-13

## 2023-03-13 ENCOUNTER — HOSPITAL ENCOUNTER (EMERGENCY)
Facility: HOSPITAL | Age: 58
Discharge: HOME | End: 2023-03-14
Attending: EMERGENCY MEDICINE

## 2023-03-13 VITALS
BODY MASS INDEX: 25.05 KG/M2 | WEIGHT: 175 LBS | HEIGHT: 70 IN | TEMPERATURE: 97.3 F | RESPIRATION RATE: 20 BRPM | OXYGEN SATURATION: 98 % | HEART RATE: 72 BPM | SYSTOLIC BLOOD PRESSURE: 103 MMHG | DIASTOLIC BLOOD PRESSURE: 73 MMHG

## 2023-03-13 DIAGNOSIS — L03.011 PARONYCHIA OF FINGER OF RIGHT HAND: ICD-10-CM

## 2023-03-13 DIAGNOSIS — S60.00XA CONTUSION OF FINGER WITHOUT DAMAGE TO NAIL, UNSPECIFIED FINGER, UNSPECIFIED LATERALITY, INITIAL ENCOUNTER: Primary | ICD-10-CM

## 2023-03-13 PROCEDURE — 73140 X-RAY EXAM OF FINGER(S): CPT

## 2023-03-13 PROCEDURE — 99283 EMERGENCY DEPT VISIT LOW MDM: CPT

## 2023-03-13 PROCEDURE — 6370000000 HC RX 637 (ALT 250 FOR IP): Performed by: EMERGENCY MEDICINE

## 2023-03-13 PROCEDURE — 10060 I&D ABSCESS SIMPLE/SINGLE: CPT

## 2023-03-13 RX ORDER — SULFAMETHOXAZOLE AND TRIMETHOPRIM 800; 160 MG/1; MG/1
1 TABLET ORAL ONCE
Status: COMPLETED | OUTPATIENT
Start: 2023-03-13 | End: 2023-03-13

## 2023-03-13 RX ORDER — SULFAMETHOXAZOLE AND TRIMETHOPRIM 800; 160 MG/1; MG/1
1 TABLET ORAL 2 TIMES DAILY
Qty: 14 TABLET | Refills: 0 | Status: SHIPPED | OUTPATIENT
Start: 2023-03-13 | End: 2023-03-14 | Stop reason: SDUPTHER

## 2023-03-13 RX ADMIN — SULFAMETHOXAZOLE AND TRIMETHOPRIM 1 TABLET: 800; 160 TABLET ORAL at 23:25

## 2023-03-13 ASSESSMENT — PAIN SCALES - GENERAL: PAINLEVEL_OUTOF10: 10

## 2023-03-13 ASSESSMENT — PAIN - FUNCTIONAL ASSESSMENT: PAIN_FUNCTIONAL_ASSESSMENT: 0-10

## 2023-03-14 PROCEDURE — 6370000000 HC RX 637 (ALT 250 FOR IP): Performed by: EMERGENCY MEDICINE

## 2023-03-14 RX ORDER — SULFAMETHOXAZOLE AND TRIMETHOPRIM 800; 160 MG/1; MG/1
1 TABLET ORAL 2 TIMES DAILY
Qty: 14 TABLET | Refills: 0 | Status: SHIPPED | OUTPATIENT
Start: 2023-03-14 | End: 2023-03-21

## 2023-03-14 RX ORDER — SULFAMETHOXAZOLE AND TRIMETHOPRIM 800; 160 MG/1; MG/1
1 TABLET ORAL 2 TIMES DAILY
Qty: 14 TABLET | Refills: 0 | Status: SHIPPED | OUTPATIENT
Start: 2023-03-14 | End: 2023-03-14 | Stop reason: SDUPTHER

## 2023-03-14 RX ADMIN — BENZOCAINE: 200 SPRAY DENTAL; ORAL; PERIODONTAL at 00:15

## 2023-03-14 NOTE — ED TRIAGE NOTES
Patient states that he slammed his finger in the front door on approx 6 days. Patient with finger swelling and pain.

## 2023-03-14 NOTE — ED PROVIDER NOTES
[facility] EMERGENCY DEPT  EMERGENCY DEPARTMENT ENCOUNTER      Pt Name: Cash Powell  MRN: [id number]  Joss 1965  Date of evaluation: 3/13/2023  Provider: Gisela Chacko MD    CHIEF COMPLAINT       Chief Complaint   Patient presents with    Finger Pain       HPI:  Cash Powell is a 62 y.o. male who presents to the emergency department pt c/o rt 2nd finger swelling  after slammed in door pain x 6 days, worsening. No drainage. No weakness or numbness. No wound. HPI    Nursing Notes were reviewed. REVIEW OF SYSTEMS    (2-9 systems for level 4, 10 or more for level 5)     Review of Systems   Constitutional:  Negative for fever. Musculoskeletal:  Positive for myalgias. Neurological:  Negative for weakness. Except as noted above the remainder of the review of systems was reviewed and negative.        PAST MEDICAL HISTORY     Past Medical History:   Diagnosis Date    Arthritis of knee, right 12/2017    advanced degen changes noted, CT right LE    Asthma     Cellulitis 12/2017    DDD (degenerative disc disease), lumbar 2013    noted on MRI with annular tears    Diverticulitis 2016    GERD (gastroesophageal reflux disease) 2016    with esophagitis according to endscopy    Kidney stone     Lumbar post-laminectomy syndrome     Sacroiliitis ([facility] [address]     Stroke [facility]) may 14 2010    Unspecified rotator cuff tear or rupture of left shoulder, not specified as traumatic 03/2016         SURGICAL HISTORY       Past Surgical History:   Procedure Laterality Date    BACK SURGERY  2002, 2004, 2006    L5-S1 fusion, laminectomies    COLONOSCOPY N/A 8/12/2016    COLONOSCOPY with polypectomy performed by Xiomy Anderson MD at [facility] ARTHROSCOPY Right     knee    MOHS SURGERY      right    ORTHOPEDIC SURGERY      right shoulder    UPPER GASTROINTESTINAL ENDOSCOPY  11/2016    mild esophagitis    UPPER GASTROINTESTINAL ENDOSCOPY  10/2016    grade 1 espohagitis    UROLOGICAL SURGERY  05/19/2022 CYSTOSCOPY, LEFT URETEROSCOPY, LASER LITHOTRIPSY, LEFT STENT EXCHANGE AND LEFT RETROGRADE PYELOGRAM; Dr. Jose De La Fuente       Previous Medications    ACETAMINOPHEN (TYLENOL) 500 MG TABLET    Take by mouth every 6 hours as needed    ALBUTEROL SULFATE HFA (PROVENTIL;VENTOLIN;PROAIR) 108 (90 BASE) MCG/ACT INHALER    Inhale 2 puffs into the lungs every 6 hours as needed    ASPIRIN 81 MG CHEWABLE TABLET    Take 81 mg by mouth daily    BUDESONIDE-FORMOTEROL (SYMBICORT) 160-4.5 MCG/ACT AERO    Inhale 1 puff into the lungs 2 times daily    CYCLOBENZAPRINE (FLEXERIL) 10 MG TABLET    Take 10 mg by mouth 3 times daily as needed    FAMOTIDINE (PEPCID) 40 MG TABLET    Take 40 mg by mouth 2 times daily as needed    FLUTICASONE (FLONASE) 50 MCG/ACT NASAL SPRAY    One spray in each nostril once a day    GABAPENTIN (NEURONTIN) 600 MG TABLET    Take 600 mg by mouth 3 times daily.     ONDANSETRON (ZOFRAN-ODT) 4 MG DISINTEGRATING TABLET    Take 4 mg by mouth every 8 hours as needed    OXYBUTYNIN (DITROPAN) 5 MG TABLET    Take 5 mg by mouth 3 times daily    POLYETHYLENE GLYCOL (GLYCOLAX) 17 GM/SCOOP POWDER    Take 17 g by mouth daily as needed       ALLERGIES     Hydrocodone-acetaminophen, Penicillins, Doxycycline, Hydrochlorothiazide, Pantoprazole, Azithromycin, and Erythromycin    FAMILY HISTORY       Family History   Problem Relation Age of Onset    Other Brother     No Known Problems Mother     Cancer Maternal Grandmother     No Known Problems Father           SOCIAL HISTORY       Social History     Socioeconomic History    Marital status: Single   Tobacco Use    Smoking status: Former     Packs/day: 2.00     Types: Cigarettes     Quit date: 2016     Years since quittin.7    Smokeless tobacco: Never   Substance and Sexual Activity    Alcohol use: No     Alcohol/week: 0.0 standard drinks    Drug use: No   Social History Narrative    ** Merged History Encounter **            SCREENINGS         Irving Coma Scale  Eye Opening: Spontaneous  Best Verbal Response: Oriented  Best Motor Response: Obeys commands  Irving Coma Scale Score: 15                     CIWA Assessment  BP: 103/73  Heart Rate: 72                 PHYSICAL EXAM    (up to 7 for level 4, 8 or more for level 5)     ED Triage Vitals [03/13/23 2236]   BP Temp Temp Source Heart Rate Resp SpO2 Height Weight   103/73 97.3 °F (36.3 °C) Oral 72 20 98 % 5' 10\" (1.778 m) 175 lb (79.4 kg)       Physical Exam  Vitals and nursing note reviewed. Constitutional:       Appearance: Normal appearance. HENT:      Head: Normocephalic and atraumatic. Eyes:      Conjunctiva/sclera: Conjunctivae normal.   Cardiovascular:      Rate and Rhythm: Normal rate. Pulses: Normal pulses. Pulmonary:      Effort: Pulmonary effort is normal.   Musculoskeletal:         General: Tenderness present. Normal range of motion. Cervical back: Normal range of motion. Comments: + ttp.swellling rt distal/medial distal rt 2nd phalanx, no drainage. Mild fluctuance. Mlid erythema. No other finger/swelling or erythema. Nvi     Skin:     General: Skin is warm and dry. Neurological:      General: No focal deficit present. Mental Status: He is alert. Sensory: No sensory deficit. Motor: No weakness.    Psychiatric:         Mood and Affect: Mood normal.       DIAGNOSTIC RESULTS     EKG: All EKG's are interpreted by the Emergency Department Physician who either signs or Co-signs this chart in the absence of a cardiologist.      RADIOLOGY:   Non-plain film images such as CT, Ultrasound and MRI are read by the radiologist. Plain radiographic images are visualized and preliminarily interpreted by the emergency physician with the below findings:      Interpretation per the Radiologist below, if available at the time of this note:    XR FINGER RIGHT (MIN 2 VIEWS)    (Results Pending)         LABS:  Labs Reviewed - No data to display    All other labs were within normal range or not returned as of this dictation. EMERGENCY DEPARTMENT COURSE and DIFFERENTIAL DIAGNOSIS/MDM:   Vitals:    Vitals:    03/13/23 2236   BP: 103/73   Pulse: 72   Resp: 20   Temp: 97.3 °F (36.3 °C)   TempSrc: Oral   SpO2: 98%   Weight: 175 lb (79.4 kg)   Height: 5' 10\" (1.778 m)         Discussion:       2340, under sterile conditions, after betadine  applied then dried, benzocaine applied topically, then rt 2nd finger medial nail fold elevated w 11 blade scalpel and small incision was made, and approx 3 ml  of purulent fluid expressed, pt improved after, no swelling/flucutance. Nvi. No emc. Splint applied. Det ret inst given . FINAL IMPRESSION      1. Contusion of finger without damage to nail, unspecified finger, unspecified laterality, initial encounter    2. Paronychia of finger of right hand          DISPOSITION/PLAN   DISPOSITION        PATIENT REFERRED TO:  [facility] EMERGENCY DEPT  [address] [facility]  [phone]  Go to   As needed, If symptoms worsen    Al Grant, APRN - CNP  [address] [address]  [phone]          DISCHARGE MEDICATIONS:  New Prescriptions    SULFAMETHOXAZOLE-TRIMETHOPRIM (BACTRIM DS) 800-160 MG PER TABLET    Take 1 tablet by mouth 2 times daily for 7 days     Controlled Substances Monitoring:     No flowsheet data found.     (Please note that portions of this note were completed with a voice recognition program.  Efforts were made to edit the dictations but occasionally words are mis-transcribed.)    Maryjane Cotter MD (electronically signed)  Attending Emergency Physician           Chana Vergara MD  03/14/23 1021 Carlito Shultz MD  03/17/23 9849

## 2023-04-04 ENCOUNTER — OFFICE VISIT (OUTPATIENT)
Facility: CLINIC | Age: 58
End: 2023-04-04
Payer: MEDICARE

## 2023-04-04 VITALS
HEIGHT: 70 IN | BODY MASS INDEX: 24.34 KG/M2 | RESPIRATION RATE: 18 BRPM | TEMPERATURE: 98.1 F | WEIGHT: 170 LBS | DIASTOLIC BLOOD PRESSURE: 66 MMHG | HEART RATE: 90 BPM | SYSTOLIC BLOOD PRESSURE: 100 MMHG

## 2023-04-04 DIAGNOSIS — E11.9 TYPE 2 DIABETES MELLITUS WITHOUT COMPLICATION, UNSPECIFIED WHETHER LONG TERM INSULIN USE (HCC): ICD-10-CM

## 2023-04-04 DIAGNOSIS — K57.92 DIVERTICULITIS: Primary | ICD-10-CM

## 2023-04-04 DIAGNOSIS — J45.909 REACTIVE AIRWAY DISEASE WITHOUT COMPLICATION, UNSPECIFIED ASTHMA SEVERITY, UNSPECIFIED WHETHER PERSISTENT: ICD-10-CM

## 2023-04-04 DIAGNOSIS — F11.99 OPIOID USE, UNSPECIFIED WITH UNSPECIFIED OPIOID-INDUCED DISORDER (HCC): ICD-10-CM

## 2023-04-04 DIAGNOSIS — J30.1 ALLERGIC RHINITIS DUE TO POLLEN, UNSPECIFIED SEASONALITY: ICD-10-CM

## 2023-04-04 PROCEDURE — 3017F COLORECTAL CA SCREEN DOC REV: CPT | Performed by: FAMILY MEDICINE

## 2023-04-04 PROCEDURE — G8420 CALC BMI NORM PARAMETERS: HCPCS | Performed by: FAMILY MEDICINE

## 2023-04-04 PROCEDURE — 99205 OFFICE O/P NEW HI 60 MIN: CPT | Performed by: FAMILY MEDICINE

## 2023-04-04 PROCEDURE — 2022F DILAT RTA XM EVC RTNOPTHY: CPT | Performed by: FAMILY MEDICINE

## 2023-04-04 PROCEDURE — 4004F PT TOBACCO SCREEN RCVD TLK: CPT | Performed by: FAMILY MEDICINE

## 2023-04-04 PROCEDURE — G8428 CUR MEDS NOT DOCUMENT: HCPCS | Performed by: FAMILY MEDICINE

## 2023-04-04 PROCEDURE — 3046F HEMOGLOBIN A1C LEVEL >9.0%: CPT | Performed by: FAMILY MEDICINE

## 2023-04-04 RX ORDER — CIPROFLOXACIN 500 MG/1
500 TABLET, FILM COATED ORAL 2 TIMES DAILY
Qty: 14 TABLET | Refills: 0 | Status: SHIPPED | OUTPATIENT
Start: 2023-04-04 | End: 2023-04-07 | Stop reason: SDUPTHER

## 2023-04-04 RX ORDER — ALBUTEROL SULFATE 90 UG/1
2 AEROSOL, METERED RESPIRATORY (INHALATION) EVERY 6 HOURS PRN
Qty: 18 G | Refills: 0 | Status: SHIPPED | OUTPATIENT
Start: 2023-04-04 | End: 2023-05-04

## 2023-04-04 RX ORDER — FLUTICASONE PROPIONATE 50 MCG
SPRAY, SUSPENSION (ML) NASAL
Qty: 16 G | Refills: 2 | Status: SHIPPED | OUTPATIENT
Start: 2023-04-04

## 2023-04-04 RX ORDER — FLASH GLUCOSE SCANNING READER
EACH MISCELLANEOUS
COMMUNITY
Start: 2022-05-09 | End: 2023-04-04 | Stop reason: SDUPTHER

## 2023-04-04 RX ORDER — FLASH GLUCOSE SENSOR
KIT MISCELLANEOUS
COMMUNITY
Start: 2022-05-09 | End: 2023-04-04 | Stop reason: SDUPTHER

## 2023-04-04 RX ORDER — ONDANSETRON 4 MG/1
4 TABLET, ORALLY DISINTEGRATING ORAL EVERY 8 HOURS PRN
Qty: 20 TABLET | Refills: 0 | Status: SHIPPED | OUTPATIENT
Start: 2023-04-04 | End: 2023-04-07 | Stop reason: SDUPTHER

## 2023-04-04 RX ORDER — METRONIDAZOLE 500 MG/1
500 TABLET ORAL EVERY 6 HOURS
Qty: 28 TABLET | Refills: 0 | Status: SHIPPED | OUTPATIENT
Start: 2023-04-04 | End: 2023-04-07 | Stop reason: SDUPTHER

## 2023-04-04 RX ORDER — FLASH GLUCOSE SENSOR
KIT MISCELLANEOUS
Qty: 1 EACH | Refills: 0 | Status: SHIPPED | OUTPATIENT
Start: 2023-04-04

## 2023-04-04 RX ORDER — BUDESONIDE AND FORMOTEROL FUMARATE DIHYDRATE 160; 4.5 UG/1; UG/1
1 AEROSOL RESPIRATORY (INHALATION) 2 TIMES DAILY
Qty: 10.2 G | Refills: 0 | Status: SHIPPED | OUTPATIENT
Start: 2023-04-04

## 2023-04-04 RX ORDER — CIPROFLOXACIN 250 MG/1
250 TABLET, FILM COATED ORAL 2 TIMES DAILY
Qty: 14 TABLET | Refills: 0 | Status: SHIPPED | OUTPATIENT
Start: 2023-04-04 | End: 2023-04-07 | Stop reason: SDUPTHER

## 2023-04-04 RX ORDER — FLASH GLUCOSE SCANNING READER
EACH MISCELLANEOUS
Qty: 1 EACH | Refills: 0 | Status: SHIPPED | OUTPATIENT
Start: 2023-04-04

## 2023-04-04 SDOH — ECONOMIC STABILITY: HOUSING INSECURITY
IN THE LAST 12 MONTHS, WAS THERE A TIME WHEN YOU DID NOT HAVE A STEADY PLACE TO SLEEP OR SLEPT IN A SHELTER (INCLUDING NOW)?: NO

## 2023-04-04 SDOH — ECONOMIC STABILITY: FOOD INSECURITY: WITHIN THE PAST 12 MONTHS, YOU WORRIED THAT YOUR FOOD WOULD RUN OUT BEFORE YOU GOT MONEY TO BUY MORE.: NEVER TRUE

## 2023-04-04 SDOH — ECONOMIC STABILITY: INCOME INSECURITY: HOW HARD IS IT FOR YOU TO PAY FOR THE VERY BASICS LIKE FOOD, HOUSING, MEDICAL CARE, AND HEATING?: NOT HARD AT ALL

## 2023-04-04 SDOH — ECONOMIC STABILITY: FOOD INSECURITY: WITHIN THE PAST 12 MONTHS, THE FOOD YOU BOUGHT JUST DIDN'T LAST AND YOU DIDN'T HAVE MONEY TO GET MORE.: NEVER TRUE

## 2023-04-04 ASSESSMENT — PATIENT HEALTH QUESTIONNAIRE - PHQ9
SUM OF ALL RESPONSES TO PHQ QUESTIONS 1-9: 0
SUM OF ALL RESPONSES TO PHQ QUESTIONS 1-9: 0
2. FEELING DOWN, DEPRESSED OR HOPELESS: 0
SUM OF ALL RESPONSES TO PHQ9 QUESTIONS 1 & 2: 0
SUM OF ALL RESPONSES TO PHQ QUESTIONS 1-9: 0
1. LITTLE INTEREST OR PLEASURE IN DOING THINGS: 0
SUM OF ALL RESPONSES TO PHQ QUESTIONS 1-9: 0

## 2023-04-04 ASSESSMENT — ENCOUNTER SYMPTOMS
WHEEZING: 1
ABDOMINAL PAIN: 1
VOMITING: 1
SHORTNESS OF BREATH: 1
NAUSEA: 1

## 2023-04-06 ENCOUNTER — TELEPHONE (OUTPATIENT)
Facility: CLINIC | Age: 58
End: 2023-04-06

## 2023-04-07 DIAGNOSIS — K57.92 DIVERTICULITIS: ICD-10-CM

## 2023-04-07 RX ORDER — CIPROFLOXACIN 250 MG/1
250 TABLET, FILM COATED ORAL 2 TIMES DAILY
Qty: 14 TABLET | Refills: 0 | Status: SHIPPED | OUTPATIENT
Start: 2023-04-07 | End: 2023-04-14

## 2023-04-07 RX ORDER — METRONIDAZOLE 500 MG/1
500 TABLET ORAL EVERY 6 HOURS
Qty: 28 TABLET | Refills: 0 | Status: SHIPPED | OUTPATIENT
Start: 2023-04-07 | End: 2023-04-14

## 2023-04-07 RX ORDER — CIPROFLOXACIN 500 MG/1
500 TABLET, FILM COATED ORAL 2 TIMES DAILY
Qty: 14 TABLET | Refills: 0 | Status: SHIPPED | OUTPATIENT
Start: 2023-04-07 | End: 2023-04-14

## 2023-04-07 RX ORDER — ONDANSETRON 4 MG/1
4 TABLET, ORALLY DISINTEGRATING ORAL EVERY 8 HOURS PRN
Qty: 20 TABLET | Refills: 0 | Status: SHIPPED | OUTPATIENT
Start: 2023-04-07

## 2023-04-11 DIAGNOSIS — R11.0 NAUSEA: Primary | ICD-10-CM

## 2023-04-11 RX ORDER — PROMETHAZINE HYDROCHLORIDE 25 MG/1
25 TABLET ORAL 4 TIMES DAILY PRN
Qty: 20 TABLET | Refills: 0 | Status: SHIPPED | OUTPATIENT
Start: 2023-04-11 | End: 2023-04-18

## 2023-05-06 ENCOUNTER — HOSPITAL ENCOUNTER (EMERGENCY)
Facility: HOSPITAL | Age: 58
Discharge: HOME | End: 2023-05-06
Attending: STUDENT IN AN ORGANIZED HEALTH CARE EDUCATION/TRAINING PROGRAM
Payer: MEDICARE

## 2023-05-06 VITALS
WEIGHT: 176 LBS | RESPIRATION RATE: 17 BRPM | BODY MASS INDEX: 25.2 KG/M2 | HEART RATE: 91 BPM | OXYGEN SATURATION: 97 % | HEIGHT: 70 IN | TEMPERATURE: 98.1 F | SYSTOLIC BLOOD PRESSURE: 112 MMHG | DIASTOLIC BLOOD PRESSURE: 83 MMHG

## 2023-05-06 DIAGNOSIS — B02.9 HERPES ZOSTER WITHOUT COMPLICATION: Primary | ICD-10-CM

## 2023-05-06 PROCEDURE — 99283 EMERGENCY DEPT VISIT LOW MDM: CPT

## 2023-05-06 RX ORDER — VALACYCLOVIR HYDROCHLORIDE 1 G/1
1000 TABLET, FILM COATED ORAL 2 TIMES DAILY
Qty: 20 TABLET | Refills: 0 | Status: SHIPPED | OUTPATIENT
Start: 2023-05-06 | End: 2023-05-16

## 2023-05-06 ASSESSMENT — PAIN DESCRIPTION - LOCATION: LOCATION: LEG

## 2023-05-06 ASSESSMENT — PAIN - FUNCTIONAL ASSESSMENT: PAIN_FUNCTIONAL_ASSESSMENT: 0-10

## 2023-05-06 ASSESSMENT — PAIN DESCRIPTION - ORIENTATION: ORIENTATION: LEFT

## 2023-05-06 ASSESSMENT — PAIN SCALES - GENERAL: PAINLEVEL_OUTOF10: 6

## 2023-05-06 NOTE — ED PROVIDER NOTES
[facility] EMERGENCY DEPT  EMERGENCY DEPARTMENT ENCOUNTER      Pt Name: Susan Haro  MRN: [id number]  [place] 1965  Date of evaluation: 5/6/2023  Provider: Timothy Connelly MD    CHIEF COMPLAINT       Chief Complaint   Patient presents with    Rash         HISTORY OF PRESENT ILLNESS   (Location/Symptom, Timing/Onset, Context/Setting, Quality, Duration, Modifying Factors, Severity)  Note limiting factors. Susan Haro is a 62 y.o. male who presents to the emergency department rashes on left leg. HPI  59-year-old male with history of asthma, CVA, arthritis, kidney stone presented to the ED at [facility] with wife with complaints of rashes on his left leg started 2 days ago. Patient denies any direct contact with any plants as he always wears long pants and outside. He states the rashes started small with blisters and slowly grow causing intense burning pain on the medial aspect of his left thigh, knee, and lower calf. Patient denies ever having shingles and have not got the Shingrix vaccine but admits he had had chickenpox in the past.    At this time patient denies headaches, shortness of breath, chest pain, abdominal pain, change in bowel or urinary habits. Nursing Notes were reviewed. REVIEW OF SYSTEMS    (2-9 systems for level 4, 10 or more for level 5)     Review of Systems    Except as noted above the remainder of the review of systems was reviewed and negative.        PAST MEDICAL HISTORY     Past Medical History:   Diagnosis Date    Arthritis of knee, right 12/2017    advanced degen changes noted, CT right LE    Asthma     Cellulitis 12/2017    DDD (degenerative disc disease), lumbar 2013    noted on MRI with annular tears    Diverticulitis 2016    GERD (gastroesophageal reflux disease) 2016    with esophagitis according to endscopy    Kidney stone     Lumbar post-laminectomy syndrome     Sacroiliitis ([address]     Stroke ([facility] [address] may 14 2010    Unspecified rotator cuff tear or rupture of left

## 2023-05-08 ENCOUNTER — OFFICE VISIT (OUTPATIENT)
Age: 58
End: 2023-05-08
Payer: MEDICARE

## 2023-05-08 VITALS
TEMPERATURE: 97.1 F | DIASTOLIC BLOOD PRESSURE: 80 MMHG | HEIGHT: 70 IN | OXYGEN SATURATION: 97 % | WEIGHT: 175 LBS | SYSTOLIC BLOOD PRESSURE: 103 MMHG | HEART RATE: 96 BPM | BODY MASS INDEX: 25.05 KG/M2

## 2023-05-08 DIAGNOSIS — M54.16 RADICULOPATHY, LUMBAR REGION: ICD-10-CM

## 2023-05-08 DIAGNOSIS — M47.817 LUMBOSACRAL SPONDYLOSIS WITHOUT MYELOPATHY: ICD-10-CM

## 2023-05-08 DIAGNOSIS — M51.36 DDD (DEGENERATIVE DISC DISEASE), LUMBAR: ICD-10-CM

## 2023-05-08 DIAGNOSIS — M96.1 POSTLAMINECTOMY SYNDROME, NOT ELSEWHERE CLASSIFIED: ICD-10-CM

## 2023-05-08 DIAGNOSIS — B02.29 OTHER POSTHERPETIC NERVOUS SYSTEM INVOLVEMENT: ICD-10-CM

## 2023-05-08 DIAGNOSIS — M54.50 LUMBAR PAIN: Primary | ICD-10-CM

## 2023-05-08 PROCEDURE — 72100 X-RAY EXAM L-S SPINE 2/3 VWS: CPT | Performed by: PHYSICAL MEDICINE & REHABILITATION

## 2023-05-08 PROCEDURE — G8419 CALC BMI OUT NRM PARAM NOF/U: HCPCS | Performed by: PHYSICAL MEDICINE & REHABILITATION

## 2023-05-08 PROCEDURE — G8427 DOCREV CUR MEDS BY ELIG CLIN: HCPCS | Performed by: PHYSICAL MEDICINE & REHABILITATION

## 2023-05-08 PROCEDURE — 99214 OFFICE O/P EST MOD 30 MIN: CPT | Performed by: PHYSICAL MEDICINE & REHABILITATION

## 2023-05-08 PROCEDURE — 3017F COLORECTAL CA SCREEN DOC REV: CPT | Performed by: PHYSICAL MEDICINE & REHABILITATION

## 2023-05-08 PROCEDURE — 1036F TOBACCO NON-USER: CPT | Performed by: PHYSICAL MEDICINE & REHABILITATION

## 2023-05-08 RX ORDER — GABAPENTIN 800 MG/1
800 TABLET ORAL 3 TIMES DAILY
Qty: 90 TABLET | Refills: 1 | Status: SHIPPED | OUTPATIENT
Start: 2023-05-08 | End: 2023-07-07
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
## 2023-07-13 DIAGNOSIS — M96.1 POSTLAMINECTOMY SYNDROME, NOT ELSEWHERE CLASSIFIED: ICD-10-CM

## 2023-07-13 DIAGNOSIS — M47.817 LUMBOSACRAL SPONDYLOSIS WITHOUT MYELOPATHY: ICD-10-CM

## 2023-07-13 DIAGNOSIS — M54.16 RADICULOPATHY, LUMBAR REGION: ICD-10-CM

## 2023-07-13 DIAGNOSIS — M54.50 LUMBAR PAIN: ICD-10-CM

## 2023-07-13 DIAGNOSIS — M51.36 DDD (DEGENERATIVE DISC DISEASE), LUMBAR: ICD-10-CM

## 2023-07-13 RX ORDER — GABAPENTIN 800 MG/1
800 TABLET ORAL 3 TIMES DAILY
Qty: 90 TABLET | Refills: 0 | Status: SHIPPED | OUTPATIENT
Start: 2023-07-13 | End: 2023-09-11

## 2023-07-13 NOTE — TELEPHONE ENCOUNTER
Requested Prescriptions     Pending Prescriptions Disp Refills    gabapentin (NEURONTIN) 800 MG tablet 90 tablet 1     Sig: Take 1 tablet by mouth 3 times daily for 60 days. Max Daily Amount: 2,400 mg      Patient was last seen on 5/8/23. Patient's next appointment 8/4/23. Patient's choice of pharmacy [place], [place]. The refill request's last refill info on 12/12/22 Qty 90 w/2 refills. Nothing needed by patient.

## 2023-07-13 NOTE — TELEPHONE ENCOUNTER
Patient's friend, Guille Anne, called requesting a refill of Gabapentin 800 mg for the pt be sent in to [address] [place] [address]. Please advise Guille Anne at [phone].

## 2023-07-14 NOTE — TELEPHONE ENCOUNTER
Emani Mena (on [employer]IP[employer]), contacted and told that RX was sent in and to make sure he keeps his 8-4-23 appt because we cannot refill it again until he is seen.

## 2023-07-17 ENCOUNTER — CARE COORDINATION (OUTPATIENT)
Facility: CLINIC | Age: 58
End: 2023-07-17

## 2023-07-17 ASSESSMENT — PATIENT HEALTH QUESTIONNAIRE - PHQ9
SUM OF ALL RESPONSES TO PHQ QUESTIONS 1-9: 0

## 2023-07-17 NOTE — CARE COORDINATION
Ambulatory Care Coordination Note  2023    Patient Current Location:  Home: 1;'  [address] [facility] [address]    ACM contacted the patient by telephone. Verified name and  with patient as identifiers. Provided introduction to self, and explanation of the ACM role. ACM: Pranav Packer RN    Challenges to be reviewed by the provider   Additional needs identified to be addressed with provider: No  none               Method of communication with provider: phone. Patient enrolled in Complex Case Management effective 2023 and will be followed per ACM protocol. Initial questions answered with subsequent encounters planned. Further / follow up appointments listed below - reviewed upcoming appointments  Further questions answered as needed and patient has ACM contact information  Depression screen done - PHQ2 score = 0  Respiratory and cardiac status shows no issues at present  Preliminary review of medications done during this encounter - will do full med rec on next encounter    Offered patient enrollment in the Remote Patient Monitoring (RPM) program for in-home monitoring: NA. Ambulatory Care Coordination Assessment    Care Coordination Protocol  Referral from Primary Care Provider: No  Week 1 - Initial Assessment     Do you have all of your prescriptions and are they filled?: Yes (Comment: Has Freestyle Juan)  Barriers to medication adherence: None  Are you able to afford your medications?: Yes  How often do you have trouble taking your medications the way you have been told to take them?: I always take them as prescribed.            Current Housing: Private Residence                 Suggested Interventions and [employer]                  Future Appointments   Date Time Provider [address]   2023  8:00 AM MD SLICK Rangel AMB

## 2023-07-25 ENCOUNTER — CARE COORDINATION (OUTPATIENT)
Facility: CLINIC | Age: 58
End: 2023-07-25

## 2023-07-31 ENCOUNTER — CARE COORDINATION (OUTPATIENT)
Facility: CLINIC | Age: 58
End: 2023-07-31

## 2023-07-31 ASSESSMENT — PATIENT HEALTH QUESTIONNAIRE - PHQ9
SUM OF ALL RESPONSES TO PHQ QUESTIONS 1-9: 0

## 2023-08-04 ENCOUNTER — OFFICE VISIT (OUTPATIENT)
Age: 58
End: 2023-08-04

## 2023-08-04 VITALS
WEIGHT: 184 LBS | TEMPERATURE: 97.3 F | OXYGEN SATURATION: 97 % | HEIGHT: 70 IN | BODY MASS INDEX: 26.34 KG/M2 | HEART RATE: 69 BPM

## 2023-08-04 DIAGNOSIS — M96.1 POSTLAMINECTOMY SYNDROME, NOT ELSEWHERE CLASSIFIED: Primary | ICD-10-CM

## 2023-08-04 DIAGNOSIS — M54.16 RADICULOPATHY, LUMBAR REGION: ICD-10-CM

## 2023-08-04 DIAGNOSIS — M51.36 DDD (DEGENERATIVE DISC DISEASE), LUMBAR: ICD-10-CM

## 2023-08-04 DIAGNOSIS — M47.817 LUMBOSACRAL SPONDYLOSIS WITHOUT MYELOPATHY: ICD-10-CM

## 2023-08-04 RX ORDER — CYCLOBENZAPRINE HCL 10 MG
10 TABLET ORAL DAILY PRN
Qty: 90 TABLET | Refills: 0 | Status: SHIPPED | OUTPATIENT
Start: 2023-08-04 | End: 2023-11-02

## 2023-08-04 RX ORDER — GABAPENTIN 800 MG/1
800 TABLET ORAL 3 TIMES DAILY
Qty: 270 TABLET | Refills: 0 | Status: SHIPPED | OUTPATIENT
Start: 2023-08-04 | End: 2023-11-02
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
## 2023-08-07 DIAGNOSIS — K57.92 DIVERTICULITIS: ICD-10-CM

## 2023-08-08 RX ORDER — ONDANSETRON 4 MG/1
4 TABLET, ORALLY DISINTEGRATING ORAL EVERY 8 HOURS PRN
Qty: 20 TABLET | Refills: 0 | Status: SHIPPED | OUTPATIENT
Start: 2023-08-08

## 2023-08-16 ENCOUNTER — CARE COORDINATION (OUTPATIENT)
Facility: CLINIC | Age: 58
End: 2023-08-16

## 2023-08-16 NOTE — CARE COORDINATION
Spoke with Michelle York - she stated Patient was doing well, but not at home at the time of call. She also stated that Patient is doing well on meds with no issues reported. ACM will follow up at a later time to speak with Patient.

## 2023-08-24 NOTE — PROGRESS NOTES
[facility] [address], [address] [phone] fax    Discharge Summary  Patient name: Serg Streeter Start of Care: 3/8/2018   Referral source: Sharonda Haddad MD : 1965                          Medical Diagnosis: Low back pain [M54.5]  Pain in left knee [M25.562]  Postlaminectomy syndrome, not elsewhere classified [M96.1]  Lesion of lateral popliteal nerve, left lower limb [G57.32] Onset Date:3 months                          Treatment Diagnosis: back pain   Prior Hospitalization: see medical history Provider#: [id number]   Medications: Verified on Patient summary List    Comorbidities: chronic back pain, per EMR hx of CVA   Prior Level of Function: retired log , 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 Independent with ADLs, has been limited with walking tolerance due to back pain for 10 + years      Visits from Start of Care: 13    Missed Visits: 2  Reporting Period : 18 to 5/3/18      Progress towards goals / Updated goals:  1. Pt will report decrease in average pain rating on VAS to <3/10 to improve ease with daily activities.                       recert status: 8/45                        MET; 0/10  2. Pt will increase LE  FOTO score to 44 points to demonstrate increased ease with ADLs.                          recert RVSVAW::13                        NOT MET; unable to assess as computer system was down  3. Pt will increase walking tolerance to >30 min to improve ease with daily mobility.                          recert ZNWGS-49 min                        MET     Functional Gains: feeling in foot, mobility, walking/standing tolerance  Functional Deficits: big toe tingling  % improvement: 90-95%  Pain   Average: 3/10                            Best: 0/10                          Worst: 10/10  Patient Goal: \"be able to get around on my own. \"    Assessment/Summary of care: Mr. Meche Vazquez has seen SPEECH LANGUAGE PATHOLOGY  Speech Language Pathology  [facility] MED SURG ICU    Date: 8/24/2023  Patient Name: Kristina Lawton  YOB: 1940   AGE: 80 y.o.   MRN: [id number]        Patient Not Available for Speech Therapy     Due to:  [] Testing  [] Hemodialysis  [] Cancelled by RN  [] Surgery   [] Intubation/Sedation/Pain Medication  [x] Medical instability: not appropriate for evaluation at this time  [] Refusal  [] Other    Next scheduled treatment: TBD  Completed by: Kit Lacey M.S., CCC-SLP great response to PT for improved pain, sensations and ankle strength. At this time he is to continue with independent progression of massage and strengthening activities to carryover progress seen in therapy. G-Codes (GP)  Mobility   [id number] Goal  CK= 40-59%  D/C  CK= 40-59%      The severity rating is based on clinical judgment and theFOTO score.   RECOMMENDATIONS:  [x]Discontinue therapy: [x]Patient has reached or is progressing toward set goals      []Patient is non-compliant or has abdicated      []Due to lack of appreciable progress towards set [address], PT 5/4/2018 2:15 PM

## 2023-08-29 ENCOUNTER — CARE COORDINATION (OUTPATIENT)
Facility: CLINIC | Age: 58
End: 2023-08-29

## 2023-09-13 ENCOUNTER — CARE COORDINATION (OUTPATIENT)
Facility: CLINIC | Age: 58
End: 2023-09-13

## 2023-09-19 ENCOUNTER — HOSPITAL ENCOUNTER (EMERGENCY)
Facility: HOSPITAL | Age: 58
Discharge: HOME | End: 2023-09-19
Attending: EMERGENCY MEDICINE
Payer: MEDICARE

## 2023-09-19 ENCOUNTER — APPOINTMENT (OUTPATIENT)
Facility: HOSPITAL | Age: 58
End: 2023-09-19
Payer: MEDICARE

## 2023-09-19 VITALS
HEART RATE: 90 BPM | DIASTOLIC BLOOD PRESSURE: 72 MMHG | TEMPERATURE: 97.6 F | WEIGHT: 170 LBS | RESPIRATION RATE: 17 BRPM | BODY MASS INDEX: 24.34 KG/M2 | SYSTOLIC BLOOD PRESSURE: 102 MMHG | HEIGHT: 70 IN | OXYGEN SATURATION: 97 %

## 2023-09-19 DIAGNOSIS — R19.7 DIARRHEA OF PRESUMED INFECTIOUS ORIGIN: Primary | ICD-10-CM

## 2023-09-19 DIAGNOSIS — R10.9 RIGHT SIDED ABDOMINAL PAIN: ICD-10-CM

## 2023-09-19 LAB
ALBUMIN SERPL-MCNC: 3.9 G/DL (ref 3.4–5)
ALBUMIN/GLOB SERPL: 1.2 (ref 0.8–1.7)
ALP SERPL-CCNC: 63 U/L (ref 45–117)
ALT SERPL-CCNC: 24 U/L (ref 16–61)
ANION GAP SERPL CALC-SCNC: 5 MMOL/L (ref 3–18)
APPEARANCE UR: CLEAR
AST SERPL-CCNC: 11 U/L (ref 10–38)
BASOPHILS # BLD: 0 K/UL (ref 0–0.1)
BASOPHILS NFR BLD: 0 % (ref 0–2)
BILIRUB SERPL-MCNC: 0.6 MG/DL (ref 0.2–1)
BILIRUB UR QL: NEGATIVE
BUN SERPL-MCNC: 21 MG/DL (ref 7–18)
BUN/CREAT SERPL: 26 (ref 12–20)
CALCIUM SERPL-MCNC: 8.6 MG/DL (ref 8.5–10.1)
CHLORIDE SERPL-SCNC: 112 MMOL/L (ref 100–111)
CO2 SERPL-SCNC: 26 MMOL/L (ref 21–32)
COLOR UR: YELLOW
CREAT SERPL-MCNC: 0.81 MG/DL (ref 0.6–1.3)
DIFFERENTIAL METHOD BLD: ABNORMAL
EOSINOPHIL # BLD: 0.3 K/UL (ref 0–0.4)
EOSINOPHIL NFR BLD: 5 % (ref 0–5)
ERYTHROCYTE [DISTWIDTH] IN BLOOD BY AUTOMATED COUNT: 12 % (ref 11.6–14.5)
GLOBULIN SER CALC-MCNC: 3.2 G/DL (ref 2–4)
GLUCOSE SERPL-MCNC: 75 MG/DL (ref 74–99)
GLUCOSE UR STRIP.AUTO-MCNC: NEGATIVE MG/DL
HCT VFR BLD AUTO: 47 % (ref 36–48)
HGB BLD-MCNC: 16.4 G/DL (ref 13–16)
HGB UR QL STRIP: NEGATIVE
IMM GRANULOCYTES # BLD AUTO: 0 K/UL (ref 0–0.04)
IMM GRANULOCYTES NFR BLD AUTO: 1 % (ref 0–0.5)
KETONES UR QL STRIP.AUTO: NEGATIVE MG/DL
LEUKOCYTE ESTERASE UR QL STRIP.AUTO: NEGATIVE
LIPASE SERPL-CCNC: 103 U/L (ref 73–393)
LYMPHOCYTES # BLD: 1.6 K/UL (ref 0.9–3.6)
LYMPHOCYTES NFR BLD: 30 % (ref 21–52)
MCH RBC QN AUTO: 30 PG (ref 24–34)
MCHC RBC AUTO-ENTMCNC: 34.9 G/DL (ref 31–37)
MCV RBC AUTO: 86.1 FL (ref 78–100)
MONOCYTES # BLD: 0.8 K/UL (ref 0.05–1.2)
MONOCYTES NFR BLD: 14 % (ref 3–10)
NEUTS SEG # BLD: 2.8 K/UL (ref 1.8–8)
NEUTS SEG NFR BLD: 50 % (ref 40–73)
NITRITE UR QL STRIP.AUTO: NEGATIVE
NRBC # BLD: 0 K/UL (ref 0–0.01)
NRBC BLD-RTO: 0 PER 100 WBC
PH UR STRIP: 6 (ref 5–8)
PLATELET # BLD AUTO: 243 K/UL (ref 135–420)
PMV BLD AUTO: 9 FL (ref 9.2–11.8)
POTASSIUM SERPL-SCNC: 3.7 MMOL/L (ref 3.5–5.5)
PROT SERPL-MCNC: 7.1 G/DL (ref 6.4–8.2)
PROT UR STRIP-MCNC: NEGATIVE MG/DL
RBC # BLD AUTO: 5.46 M/UL (ref 4.35–5.65)
SODIUM SERPL-SCNC: 143 MMOL/L (ref 136–145)
SP GR UR REFRACTOMETRY: >1.03 (ref 1–1.03)
UROBILINOGEN UR QL STRIP.AUTO: 1 EU/DL (ref 0.2–1)
WBC # BLD AUTO: 5.5 K/UL (ref 4.6–13.2)

## 2023-09-19 PROCEDURE — 6360000004 HC RX CONTRAST MEDICATION: Performed by: EMERGENCY MEDICINE

## 2023-09-19 PROCEDURE — 96375 TX/PRO/DX INJ NEW DRUG ADDON: CPT

## 2023-09-19 PROCEDURE — 80053 COMPREHEN METABOLIC PANEL: CPT

## 2023-09-19 PROCEDURE — 99285 EMERGENCY DEPT VISIT HI MDM: CPT

## 2023-09-19 PROCEDURE — 96374 THER/PROPH/DIAG INJ IV PUSH: CPT

## 2023-09-19 PROCEDURE — 2580000003 HC RX 258: Performed by: EMERGENCY MEDICINE

## 2023-09-19 PROCEDURE — 81003 URINALYSIS AUTO W/O SCOPE: CPT

## 2023-09-19 PROCEDURE — 85025 COMPLETE CBC W/AUTO DIFF WBC: CPT

## 2023-09-19 PROCEDURE — 83690 ASSAY OF LIPASE: CPT

## 2023-09-19 PROCEDURE — 96361 HYDRATE IV INFUSION ADD-ON: CPT

## 2023-09-19 PROCEDURE — 6360000002 HC RX W HCPCS: Performed by: EMERGENCY MEDICINE

## 2023-09-19 PROCEDURE — 74177 CT ABD & PELVIS W/CONTRAST: CPT

## 2023-09-19 RX ORDER — LOPERAMIDE HYDROCHLORIDE 2 MG/1
2 TABLET ORAL 4 TIMES DAILY PRN
Qty: 20 TABLET | Refills: 0 | Status: SHIPPED | OUTPATIENT
Start: 2023-09-19 | End: 2023-09-24

## 2023-09-19 RX ORDER — DICYCLOMINE HCL 20 MG
20 TABLET ORAL 3 TIMES DAILY PRN
Qty: 21 TABLET | Refills: 0 | Status: SHIPPED | OUTPATIENT
Start: 2023-09-19

## 2023-09-19 RX ORDER — SODIUM CHLORIDE, SODIUM LACTATE, POTASSIUM CHLORIDE, AND CALCIUM CHLORIDE .6; .31; .03; .02 G/100ML; G/100ML; G/100ML; G/100ML
1000 INJECTION, SOLUTION INTRAVENOUS ONCE
Status: COMPLETED | OUTPATIENT
Start: 2023-09-19 | End: 2023-09-19

## 2023-09-19 RX ORDER — CIPROFLOXACIN 500 MG/1
500 TABLET, FILM COATED ORAL 2 TIMES DAILY
Qty: 10 TABLET | Refills: 0 | Status: SHIPPED | OUTPATIENT
Start: 2023-09-19 | End: 2023-09-24

## 2023-09-19 RX ORDER — ONDANSETRON 2 MG/ML
4 INJECTION INTRAMUSCULAR; INTRAVENOUS ONCE
Status: COMPLETED | OUTPATIENT
Start: 2023-09-19 | End: 2023-09-19

## 2023-09-19 RX ORDER — MORPHINE SULFATE 10 MG/ML
6 INJECTION, SOLUTION INTRAMUSCULAR; INTRAVENOUS
Status: COMPLETED | OUTPATIENT
Start: 2023-09-19 | End: 2023-09-19

## 2023-09-19 RX ORDER — KETOROLAC TROMETHAMINE 15 MG/ML
15 INJECTION, SOLUTION INTRAMUSCULAR; INTRAVENOUS ONCE
Status: COMPLETED | OUTPATIENT
Start: 2023-09-19 | End: 2023-09-19

## 2023-09-19 RX ADMIN — SODIUM CHLORIDE, POTASSIUM CHLORIDE, SODIUM LACTATE AND CALCIUM CHLORIDE 1000 ML: 600; 310; 30; 20 INJECTION, SOLUTION INTRAVENOUS at 21:25

## 2023-09-19 RX ADMIN — KETOROLAC TROMETHAMINE 15 MG: 15 INJECTION, SOLUTION INTRAMUSCULAR; INTRAVENOUS at 21:25

## 2023-09-19 RX ADMIN — MORPHINE SULFATE 6 MG: 10 INJECTION, SOLUTION INTRAMUSCULAR; INTRAVENOUS at 22:36

## 2023-09-19 RX ADMIN — ONDANSETRON 4 MG: 2 INJECTION INTRAMUSCULAR; INTRAVENOUS at 21:25

## 2023-09-19 RX ADMIN — IOPAMIDOL 100 ML: 612 INJECTION, SOLUTION INTRAVENOUS at 21:21

## 2023-09-19 ASSESSMENT — PAIN - FUNCTIONAL ASSESSMENT: PAIN_FUNCTIONAL_ASSESSMENT: 0-10

## 2023-09-19 ASSESSMENT — PAIN SCALES - GENERAL: PAINLEVEL_OUTOF10: 7

## 2023-09-19 NOTE — ED TRIAGE NOTES
Pt ambulatory to triage c/o intermittent swelling to L eye since Saturday after being stung by bees. Pt endorses abdominal pain described as \"sore to touch\", nausea, vomiting, and diarrhea since Saturday before being stung.  LBM abnormal for patient but consistent with recent diarrhea

## 2023-09-20 NOTE — ED NOTES
Discharge instructions and medications reviewed with the patient. Patient in stable condition at discharge.       Mini Cunningham RN  09/19/23 0006

## 2023-09-20 NOTE — DISCHARGE INSTRUCTIONS
Start with clear liquid diet, advance to soft bland diet as tolerated until your symptoms are improving. I prescribed 2 medications for symptom relief. Can also try Tylenol extra strength. I will also prescribe an antibiotic course for possible bacterial or parasitic causes.

## 2023-09-20 NOTE — ED PROVIDER NOTES
LOPERAMIDE (IMODIUM A-D) 2 MG TABLET    Take 1 tablet by mouth 4 times daily as needed for Diarrhea         Dictation disclaimer: Please note that this dictation was completed with [employer], the computer voice recognition software. Quite often unanticipated grammatical, syntax, homophones, and other interpretive errors are inadvertently transcribed by the computer software. Please disregard these errors. Please excuse any errors that have escaped final proofreading. Everett Moore D.O.   Emergency Physician  [address] [place] [address]  09/19/23 0986

## 2023-09-26 ENCOUNTER — CARE COORDINATION (OUTPATIENT)
Facility: CLINIC | Age: 58
End: 2023-09-26

## 2023-09-26 ASSESSMENT — PATIENT HEALTH QUESTIONNAIRE - PHQ9
SUM OF ALL RESPONSES TO PHQ QUESTIONS 1-9: 0
1. LITTLE INTEREST OR PLEASURE IN DOING THINGS: 0
SUM OF ALL RESPONSES TO PHQ QUESTIONS 1-9: 0
SUM OF ALL RESPONSES TO PHQ QUESTIONS 1-9: 0
SUM OF ALL RESPONSES TO PHQ9 QUESTIONS 1 & 2: 0
SUM OF ALL RESPONSES TO PHQ QUESTIONS 1-9: 0
2. FEELING DOWN, DEPRESSED OR HOPELESS: 0

## 2023-09-26 NOTE — CARE COORDINATION
patient well-coordinated? (Include coordination with other services you are now recommendation): All required care/services in place and well-coordinated   Suggested Interventions and Community Resources  Diabetes Education:  In Process                   Future Appointments   Date Time Provider [address]   11/3/2023  8:15 AM MD SLICK Zacarias AMB

## 2023-10-11 ENCOUNTER — CARE COORDINATION (OUTPATIENT)
Facility: CLINIC | Age: 58
End: 2023-10-11

## 2023-10-16 ENCOUNTER — CARE COORDINATION (OUTPATIENT)
Facility: CLINIC | Age: 58
End: 2023-10-16

## 2023-10-23 ENCOUNTER — CARE COORDINATION (OUTPATIENT)
Facility: CLINIC | Age: 58
End: 2023-10-23

## 2023-10-24 NOTE — LETTER
9/12/2022    Patient: Sherri Márquez   YOB: 1965   Date of Visit: 9/12/2022     Snehal Whitlock NP  [address] [facility] [address]  Via In Basket    Dear Snehal Whitlock NP,      Thank you for referring Mr. Dex Tidwell to [address] for evaluation. My notes for this consultation are attached. If you have questions, please do not hesitate to call me. I look forward to following your patient along with you.       Sincerely,    Scarlett Zapata MD Abdominal pressure applied.

## 2023-10-31 ENCOUNTER — CARE COORDINATION (OUTPATIENT)
Facility: CLINIC | Age: 58
End: 2023-10-31

## 2023-11-08 ENCOUNTER — CARE COORDINATION (OUTPATIENT)
Facility: CLINIC | Age: 58
End: 2023-11-08

## 2023-11-21 ENCOUNTER — CARE COORDINATION (OUTPATIENT)
Facility: CLINIC | Age: 58
End: 2023-11-21

## 2023-11-21 NOTE — CARE COORDINATION
Patient has graduated from the Complex Case Management  program on 11/21/2023  . Patient/family has the ability to self-manage at this time. Care management goals have been completed. No further Ambulatory Care Manager follow up scheduled. Goals Addressed                   This Visit's Progress     COMPLETED: Attend follow up appointments on schedule        COMPLETED: Prepare patients and caregivers for end of life decisions (ie. need for hospice, pain management, symptom relief, advance directives etc.)        COMPLETED: Take all medications as ordered               Patient has Ambulatory Care Manager's contact information for any further questions, concerns, or needs.   Patients upcoming visits:    Future Appointments   Date Time Provider [address]   12/4/2023  3:30 PM MD SLICK King AMB

## 2024-03-04 NOTE — PROGRESS NOTES
Outpatient Medications   Medication Sig Dispense Refill    cyclobenzaprine (FLEXERIL) 10 MG tablet Take 1 tablet by mouth 2 times daily as needed for Muscle spasms 90 tablet 0    gabapentin (NEURONTIN) 800 MG tablet Take 1 tablet by mouth 3 times daily for 90 days. Max Daily Amount: 2,400 mg 270 tablet 0    ondansetron (ZOFRAN-ODT) 4 MG disintegrating tablet Take 1 tablet by mouth every 8 hours as needed for Vomiting or Nausea 20 tablet 0    budesonide-formoterol (SYMBICORT) 160-4.5 MCG/ACT AERO Inhale 1 puff into the lungs 2 times daily 10.2 g 0    acetaminophen (TYLENOL) 500 MG tablet Take by mouth every 6 hours as needed      cyclobenzaprine (FLEXERIL) 10 MG tablet Take 1 tablet by mouth 3 times daily as needed      famotidine (PEPCID) 40 MG tablet Take 1 tablet by mouth 2 times daily      dicyclomine (BENTYL) 20 MG tablet Take 1 tablet by mouth 3 times daily as needed (Pain) 21 tablet 0    gabapentin (NEURONTIN) 800 MG tablet Take 1 tablet by mouth 3 times daily for 90 days. Max Daily Amount: 2,400 mg 270 tablet 0    gabapentin (NEURONTIN) 800 MG tablet Take 1 tablet by mouth 3 times daily for 60 days. Max Daily Amount: 2,400 mg 90 tablet 0    albuterol sulfate HFA (PROVENTIL;VENTOLIN;PROAIR) 108 (90 Base) MCG/ACT inhaler Inhale 2 puffs into the lungs every 6 hours as needed for Wheezing or Shortness of Breath 18 g 0    fluticasone (FLONASE) 50 MCG/ACT nasal spray One spray in each nostril once a day 16 g 2    Continuous Blood Gluc Sensor (FREESTYLE JAMES 14 DAY SENSOR) [facility] [place] fluctuating blood sugars and frequent hypoglycemia 1 each 0    Continuous Blood Gluc  (FREESTYLE JAMES 14 DAY READER) [facility] Patient should check blood glucose regularly as he has hypoglycemia frequently 1 each 0    aspirin 81 MG chewable tablet Take 1 tablet by mouth daily      gabapentin (NEURONTIN) 600 MG tablet Take 1 tablet by mouth 3 times daily.      oxybutynin (DITROPAN) 5 MG tablet Take 1 tablet by mouth 3 times

## 2024-03-05 ENCOUNTER — OFFICE VISIT (OUTPATIENT)
Age: 59
End: 2024-03-05
Payer: MEDICARE

## 2024-03-05 VITALS
DIASTOLIC BLOOD PRESSURE: 80 MMHG | WEIGHT: 176 LBS | HEIGHT: 70 IN | SYSTOLIC BLOOD PRESSURE: 123 MMHG | OXYGEN SATURATION: 95 % | TEMPERATURE: 97.5 F | BODY MASS INDEX: 25.2 KG/M2 | HEART RATE: 78 BPM

## 2024-03-05 DIAGNOSIS — M51.36 OTHER INTERVERTEBRAL DISC DEGENERATION, LUMBAR REGION: ICD-10-CM

## 2024-03-05 DIAGNOSIS — M54.16 LUMBAR RADICULOPATHY: ICD-10-CM

## 2024-03-05 DIAGNOSIS — M54.2 CERVICAL PAIN: Primary | ICD-10-CM

## 2024-03-05 DIAGNOSIS — M51.26 LUMBAR HERNIATED DISC: ICD-10-CM

## 2024-03-05 DIAGNOSIS — M51.36 DEGENERATIVE DISC DISEASE, LUMBAR: ICD-10-CM

## 2024-03-05 PROCEDURE — 1036F TOBACCO NON-USER: CPT | Performed by: PHYSICAL MEDICINE & REHABILITATION

## 2024-03-05 PROCEDURE — G8419 CALC BMI OUT NRM PARAM NOF/U: HCPCS | Performed by: PHYSICAL MEDICINE & REHABILITATION

## 2024-03-05 PROCEDURE — G8427 DOCREV CUR MEDS BY ELIG CLIN: HCPCS | Performed by: PHYSICAL MEDICINE & REHABILITATION

## 2024-03-05 PROCEDURE — 3017F COLORECTAL CA SCREEN DOC REV: CPT | Performed by: PHYSICAL MEDICINE & REHABILITATION

## 2024-03-05 PROCEDURE — G8484 FLU IMMUNIZE NO ADMIN: HCPCS | Performed by: PHYSICAL MEDICINE & REHABILITATION

## 2024-03-05 PROCEDURE — 99214 OFFICE O/P EST MOD 30 MIN: CPT | Performed by: PHYSICAL MEDICINE & REHABILITATION

## 2024-03-05 RX ORDER — GABAPENTIN 800 MG/1
800 TABLET ORAL 3 TIMES DAILY
Qty: 270 TABLET | Refills: 0 | Status: SHIPPED | OUTPATIENT
Start: 2024-03-05 | End: 2024-06-03

## 2024-03-05 RX ORDER — CYCLOBENZAPRINE HCL 10 MG
10 TABLET ORAL 2 TIMES DAILY PRN
Qty: 90 TABLET | Refills: 0 | Status: SHIPPED | OUTPATIENT
Start: 2024-03-05 | End: 2024-04-19

## 2024-03-26 ENCOUNTER — OFFICE VISIT (OUTPATIENT)
Facility: CLINIC | Age: 59
End: 2024-03-26
Payer: MEDICARE

## 2024-03-26 VITALS
WEIGHT: 174 LBS | RESPIRATION RATE: 18 BRPM | SYSTOLIC BLOOD PRESSURE: 126 MMHG | BODY MASS INDEX: 24.91 KG/M2 | HEIGHT: 70 IN | TEMPERATURE: 97.7 F | OXYGEN SATURATION: 97 % | DIASTOLIC BLOOD PRESSURE: 71 MMHG | HEART RATE: 76 BPM

## 2024-03-26 DIAGNOSIS — J45.909 REACTIVE AIRWAY DISEASE WITHOUT COMPLICATION, UNSPECIFIED ASTHMA SEVERITY, UNSPECIFIED WHETHER PERSISTENT: ICD-10-CM

## 2024-03-26 DIAGNOSIS — E11.9 TYPE 2 DIABETES MELLITUS WITHOUT COMPLICATION, UNSPECIFIED WHETHER LONG TERM INSULIN USE (HCC): ICD-10-CM

## 2024-03-26 DIAGNOSIS — B02.9 HERPES ZOSTER WITHOUT COMPLICATION: ICD-10-CM

## 2024-03-26 DIAGNOSIS — Z87.891 PERSONAL HISTORY OF TOBACCO USE: ICD-10-CM

## 2024-03-26 DIAGNOSIS — F11.99 OPIOID USE, UNSPECIFIED WITH UNSPECIFIED OPIOID-INDUCED DISORDER (HCC): ICD-10-CM

## 2024-03-26 DIAGNOSIS — Z00.00 ENCOUNTER FOR WELL ADULT EXAM WITHOUT ABNORMAL FINDINGS: Primary | ICD-10-CM

## 2024-03-26 DIAGNOSIS — K57.92 DIVERTICULITIS: ICD-10-CM

## 2024-03-26 DIAGNOSIS — Z12.11 SCREEN FOR COLON CANCER: ICD-10-CM

## 2024-03-26 DIAGNOSIS — Z71.89 ACP (ADVANCE CARE PLANNING): ICD-10-CM

## 2024-03-26 PROCEDURE — G0296 VISIT TO DETERM LDCT ELIG: HCPCS | Performed by: FAMILY MEDICINE

## 2024-03-26 PROCEDURE — 99396 PREV VISIT EST AGE 40-64: CPT | Performed by: FAMILY MEDICINE

## 2024-03-26 PROCEDURE — G8484 FLU IMMUNIZE NO ADMIN: HCPCS | Performed by: FAMILY MEDICINE

## 2024-03-26 RX ORDER — ALBUTEROL SULFATE 90 UG/1
2 AEROSOL, METERED RESPIRATORY (INHALATION) EVERY 6 HOURS PRN
Qty: 18 G | Refills: 5 | Status: SHIPPED | OUTPATIENT
Start: 2024-03-26 | End: 2024-09-22

## 2024-03-26 RX ORDER — VALACYCLOVIR HYDROCHLORIDE 1 G/1
1000 TABLET, FILM COATED ORAL 3 TIMES DAILY
Qty: 21 TABLET | Refills: 0 | Status: SHIPPED | OUTPATIENT
Start: 2024-03-26 | End: 2024-04-02

## 2024-03-26 RX ORDER — ONDANSETRON 4 MG/1
4 TABLET, ORALLY DISINTEGRATING ORAL EVERY 8 HOURS PRN
Qty: 12 TABLET | Refills: 1 | Status: SHIPPED | OUTPATIENT
Start: 2024-03-26

## 2024-03-26 RX ORDER — BUDESONIDE AND FORMOTEROL FUMARATE DIHYDRATE 160; 4.5 UG/1; UG/1
1 AEROSOL RESPIRATORY (INHALATION) 2 TIMES DAILY
Qty: 10.2 G | Refills: 5 | Status: SHIPPED | OUTPATIENT
Start: 2024-03-26

## 2024-03-26 SDOH — ECONOMIC STABILITY: FOOD INSECURITY: WITHIN THE PAST 12 MONTHS, YOU WORRIED THAT YOUR FOOD WOULD RUN OUT BEFORE YOU GOT MONEY TO BUY MORE.: NEVER TRUE

## 2024-03-26 SDOH — ECONOMIC STABILITY: FOOD INSECURITY: WITHIN THE PAST 12 MONTHS, THE FOOD YOU BOUGHT JUST DIDN'T LAST AND YOU DIDN'T HAVE MONEY TO GET MORE.: NEVER TRUE

## 2024-03-26 SDOH — ECONOMIC STABILITY: INCOME INSECURITY: HOW HARD IS IT FOR YOU TO PAY FOR THE VERY BASICS LIKE FOOD, HOUSING, MEDICAL CARE, AND HEATING?: NOT HARD AT ALL

## 2024-03-26 ASSESSMENT — PATIENT HEALTH QUESTIONNAIRE - PHQ9
SUM OF ALL RESPONSES TO PHQ QUESTIONS 1-9: 0
SUM OF ALL RESPONSES TO PHQ9 QUESTIONS 1 & 2: 0
SUM OF ALL RESPONSES TO PHQ QUESTIONS 1-9: 0
1. LITTLE INTEREST OR PLEASURE IN DOING THINGS: NOT AT ALL
SUM OF ALL RESPONSES TO PHQ QUESTIONS 1-9: 0
SUM OF ALL RESPONSES TO PHQ QUESTIONS 1-9: 0
2. FEELING DOWN, DEPRESSED OR HOPELESS: NOT AT ALL

## 2024-03-26 NOTE — PROGRESS NOTES
\"Have you been to the ER, urgent care clinic since your last visit?  Hospitalized since your last visit?\"    NO    “Have you seen or consulted any other health care providers outside of [facility] since your last visit?”    NO        “Have you had a colorectal cancer screening such as a colonoscopy/FIT/Cologuard?    NO    Date of last Colonoscopy: 8/12/2016  No cologuard on file  No FIT/FOBT on file   No flexible sigmoidoscopy on file         Click Here for Release of Records Request  
(1 of 3 - 3-dose series) Never done    COVID-19 Vaccine (1) Never done    Diabetic foot exam  Never done    HIV screen  Never done    Diabetic retinal exam  Never done    Shingles vaccine (1 of 2) Never done    Low dose CT lung screening &/or counseling  Never done    Pneumococcal 0-64 years Vaccine (2 of 2 - PCV) 02/02/2018    Colorectal Cancer Screen  08/12/2021    Diabetic Alb to Cr ratio (uACR) test  04/29/2023    Lipids  04/29/2023    Prostate Specific Antigen (PSA) Screening or Monitoring  04/29/2023    A1C test (Diabetic or Prediabetic)  05/03/2023    Flu vaccine (1) 08/01/2023    Annual Wellness Visit (Medicare)  11/27/2023    GFR test (Diabetes, CKD 3-4, OR last GFR 15-59)  09/19/2024    Depression Screen  09/26/2024    DTaP/Tdap/Td vaccine (3 - Td or Tdap) 08/24/2032    Hepatitis C screen  Completed    Hepatitis A vaccine  Aged Out    Hib vaccine  Aged Out    Polio vaccine  Aged Out    Meningococcal (ACWY) vaccine  Aged Out     Recommendations for Preventive Services Due: see orders and patient instructions/AVS.    No follow-ups on file.

## 2024-04-26 DIAGNOSIS — J45.30 MILD PERSISTENT ASTHMA WITHOUT COMPLICATION: Primary | ICD-10-CM

## 2024-04-26 RX ORDER — FLUTICASONE FUROATE AND VILANTEROL 100; 25 UG/1; UG/1
1 POWDER RESPIRATORY (INHALATION) DAILY
Qty: 28 EACH | Refills: 5 | Status: SHIPPED | OUTPATIENT
Start: 2024-04-26

## 2024-05-01 DIAGNOSIS — B02.9 HERPES ZOSTER WITHOUT COMPLICATION: ICD-10-CM

## 2024-05-01 RX ORDER — VALACYCLOVIR HYDROCHLORIDE 1 G/1
1000 TABLET, FILM COATED ORAL 3 TIMES DAILY
Qty: 21 TABLET | Refills: 0 | Status: SHIPPED | OUTPATIENT
Start: 2024-05-01 | End: 2024-05-08

## 2024-06-04 ENCOUNTER — TELEPHONE (OUTPATIENT)
Age: 59
End: 2024-06-04

## 2024-06-10 NOTE — ED TRIAGE NOTES
Patient complains of right wrist pain that radiates up his arm after protecting himself from a fall yesterday. Patient is not able to move his hand without pain, he currently has a wrist splint on that he got from home. none

## 2024-07-08 ENCOUNTER — OFFICE VISIT (OUTPATIENT)
Age: 59
End: 2024-07-08
Payer: MEDICARE

## 2024-07-08 VITALS
OXYGEN SATURATION: 96 % | WEIGHT: 177 LBS | TEMPERATURE: 98.6 F | BODY MASS INDEX: 25.34 KG/M2 | HEART RATE: 86 BPM | HEIGHT: 70 IN

## 2024-07-08 DIAGNOSIS — M54.16 LUMBAR RADICULOPATHY: ICD-10-CM

## 2024-07-08 DIAGNOSIS — M96.1 POSTLAMINECTOMY SYNDROME, NOT ELSEWHERE CLASSIFIED: ICD-10-CM

## 2024-07-08 DIAGNOSIS — M51.36 DEGENERATIVE DISC DISEASE, LUMBAR: Primary | ICD-10-CM

## 2024-07-08 DIAGNOSIS — M54.16 LUMBAR NEURITIS: ICD-10-CM

## 2024-07-08 DIAGNOSIS — M51.26 LUMBAR HERNIATED DISC: ICD-10-CM

## 2024-07-08 PROCEDURE — 1036F TOBACCO NON-USER: CPT | Performed by: PHYSICAL MEDICINE & REHABILITATION

## 2024-07-08 PROCEDURE — 99214 OFFICE O/P EST MOD 30 MIN: CPT | Performed by: PHYSICAL MEDICINE & REHABILITATION

## 2024-07-08 PROCEDURE — 3017F COLORECTAL CA SCREEN DOC REV: CPT | Performed by: PHYSICAL MEDICINE & REHABILITATION

## 2024-07-08 PROCEDURE — G8419 CALC BMI OUT NRM PARAM NOF/U: HCPCS | Performed by: PHYSICAL MEDICINE & REHABILITATION

## 2024-07-08 PROCEDURE — G8427 DOCREV CUR MEDS BY ELIG CLIN: HCPCS | Performed by: PHYSICAL MEDICINE & REHABILITATION

## 2024-07-08 RX ORDER — PREGABALIN 75 MG/1
75 CAPSULE ORAL 2 TIMES DAILY
Qty: 60 CAPSULE | Refills: 1 | Status: SHIPPED | OUTPATIENT
Start: 2024-07-08 | End: 2024-09-06

## 2024-07-08 NOTE — PROGRESS NOTES
mg    Lumbar radiculopathy  -     pregabalin (LYRICA) 75 MG capsule; Take 1 capsule by mouth 2 times daily for 60 days. Max Daily Amount: 150 mg    Postlaminectomy syndrome, not elsewhere classified  -     pregabalin (LYRICA) 75 MG capsule; Take 1 capsule by mouth 2 times daily for 60 days. Max Daily Amount: 150 mg    Lumbar neuritis  -     pregabalin (LYRICA) 75 MG capsule; Take 1 capsule by mouth 2 times daily for 60 days. Max Daily Amount: 150 mg          IMPRESSION AND PLAN:  Patient returns to the office today with c/o low back pain radiating into the LLE in an S1 distribution to the knee. Multiple treatment options were discussed. I recommended the patient continue to perform his HEP everyday.  He is not interested in surgery or injection at this time. I discussed changing his neuropathic medication, pt wished to proceed. I will try him on Lyrica 75 mg BID. The risks, benefits, and potential side effects of this medication were discussed. He understands and wishes to proceed. I advised him to continue taking the medication even if they do not see any relief. I told him to call the office if intolerant to the new medication.  The patient is Neurologically intact. I will see the patient back in 2 months or earlier if needed.     Written by Manny Thakur, medical scribe, as dictated by Haim Smith MD  I examined the patient, reviewed and agree with the note.

## 2024-07-16 ENCOUNTER — TELEPHONE (OUTPATIENT)
Facility: CLINIC | Age: 59
End: 2024-07-16

## 2024-07-16 NOTE — TELEPHONE ENCOUNTER
Pt would like for the provider to put in lab orders so he can get his lab work done before his appt on 7/29/24. Please advise. Thank you!!!

## 2024-07-16 NOTE — TELEPHONE ENCOUNTER
----- Message from Andres Angulo sent at 7/16/2024 12:01 PM EDT -----  Regarding: ECC Escalation To Practice  ECC Escalation To Practice      Type of Escalation: Red Flag Symptom  --------------------------------------------------------------------------------------------------------------------------    Information for Provider:Clarissa Springer MD  Reasons for Message: Unable to reach practice     Additional Information patient has concern with the high blood sugar  --------------------------------------------------------------------------------------------------------------------------    Relationship to Patient: Princess nelson/ yuriy     Call Back Info: OK to leave message on voicemail  Preferred Call Back Number: Phone [phone]

## 2024-07-17 ENCOUNTER — HOSPITAL ENCOUNTER (EMERGENCY)
Facility: HOSPITAL | Age: 59
Discharge: HOME | End: 2024-07-17
Attending: EMERGENCY MEDICINE
Payer: MEDICARE

## 2024-07-17 ENCOUNTER — APPOINTMENT (OUTPATIENT)
Facility: HOSPITAL | Age: 59
End: 2024-07-17
Payer: MEDICARE

## 2024-07-17 VITALS
HEIGHT: 70 IN | SYSTOLIC BLOOD PRESSURE: 130 MMHG | OXYGEN SATURATION: 98 % | BODY MASS INDEX: 25.34 KG/M2 | TEMPERATURE: 97.8 F | HEART RATE: 81 BPM | RESPIRATION RATE: 16 BRPM | WEIGHT: 177 LBS | DIASTOLIC BLOOD PRESSURE: 101 MMHG

## 2024-07-17 DIAGNOSIS — T50.905A MEDICATION SIDE EFFECT, INITIAL ENCOUNTER: Primary | ICD-10-CM

## 2024-07-17 LAB
ALBUMIN SERPL-MCNC: 4 G/DL (ref 3.4–5)
ALBUMIN/GLOB SERPL: 1.3 (ref 0.8–1.7)
ALP SERPL-CCNC: 74 U/L (ref 45–117)
ALT SERPL-CCNC: 21 U/L (ref 16–61)
ANION GAP SERPL CALC-SCNC: 6 MMOL/L (ref 3–18)
APPEARANCE UR: ABNORMAL
AST SERPL-CCNC: 13 U/L (ref 10–38)
BASOPHILS # BLD: 0 K/UL (ref 0–0.1)
BASOPHILS NFR BLD: 0 % (ref 0–2)
BILIRUB SERPL-MCNC: 0.5 MG/DL (ref 0.2–1)
BILIRUB UR QL: NEGATIVE
BUN SERPL-MCNC: 21 MG/DL (ref 7–18)
BUN/CREAT SERPL: 24 (ref 12–20)
CALCIUM SERPL-MCNC: 8.5 MG/DL (ref 8.5–10.1)
CHLORIDE SERPL-SCNC: 111 MMOL/L (ref 100–111)
CO2 SERPL-SCNC: 26 MMOL/L (ref 21–32)
COLOR UR: YELLOW
CREAT SERPL-MCNC: 0.86 MG/DL (ref 0.6–1.3)
DIFFERENTIAL METHOD BLD: ABNORMAL
EOSINOPHIL # BLD: 0.1 K/UL (ref 0–0.4)
EOSINOPHIL NFR BLD: 2 % (ref 0–5)
ERYTHROCYTE [DISTWIDTH] IN BLOOD BY AUTOMATED COUNT: 11.5 % (ref 11.6–14.5)
GLOBULIN SER CALC-MCNC: 3.1 G/DL (ref 2–4)
GLUCOSE BLD STRIP.AUTO-MCNC: 106 MG/DL (ref 70–110)
GLUCOSE SERPL-MCNC: 100 MG/DL (ref 74–99)
GLUCOSE UR STRIP.AUTO-MCNC: NEGATIVE MG/DL
HCT VFR BLD AUTO: 46.1 % (ref 36–48)
HGB BLD-MCNC: 15.9 G/DL (ref 13–16)
HGB UR QL STRIP: NEGATIVE
IMM GRANULOCYTES # BLD AUTO: 0 K/UL (ref 0–0.04)
IMM GRANULOCYTES NFR BLD AUTO: 0 % (ref 0–0.5)
KETONES UR QL STRIP.AUTO: ABNORMAL MG/DL
LEUKOCYTE ESTERASE UR QL STRIP.AUTO: NEGATIVE
LYMPHOCYTES # BLD: 1.4 K/UL (ref 0.9–3.6)
LYMPHOCYTES NFR BLD: 25 % (ref 21–52)
MCH RBC QN AUTO: 29.8 PG (ref 24–34)
MCHC RBC AUTO-ENTMCNC: 34.5 G/DL (ref 31–37)
MCV RBC AUTO: 86.3 FL (ref 78–100)
MONOCYTES # BLD: 0.9 K/UL (ref 0.05–1.2)
MONOCYTES NFR BLD: 15 % (ref 3–10)
NEUTS SEG # BLD: 3.3 K/UL (ref 1.8–8)
NEUTS SEG NFR BLD: 57 % (ref 40–73)
NITRITE UR QL STRIP.AUTO: NEGATIVE
NRBC # BLD: 0 K/UL (ref 0–0.01)
NRBC BLD-RTO: 0 PER 100 WBC
PH UR STRIP: 6.5 (ref 5–8)
PLATELET # BLD AUTO: 230 K/UL (ref 135–420)
PMV BLD AUTO: 9.2 FL (ref 9.2–11.8)
POTASSIUM SERPL-SCNC: 3.7 MMOL/L (ref 3.5–5.5)
PROT SERPL-MCNC: 7.1 G/DL (ref 6.4–8.2)
PROT UR STRIP-MCNC: NEGATIVE MG/DL
RBC # BLD AUTO: 5.34 M/UL (ref 4.35–5.65)
SODIUM SERPL-SCNC: 143 MMOL/L (ref 136–145)
SP GR UR REFRACTOMETRY: 1.02 (ref 1–1.03)
UROBILINOGEN UR QL STRIP.AUTO: 1 EU/DL (ref 0.2–1)
WBC # BLD AUTO: 5.8 K/UL (ref 4.6–13.2)

## 2024-07-17 PROCEDURE — 99284 EMERGENCY DEPT VISIT MOD MDM: CPT

## 2024-07-17 PROCEDURE — 81003 URINALYSIS AUTO W/O SCOPE: CPT

## 2024-07-17 PROCEDURE — 82962 GLUCOSE BLOOD TEST: CPT

## 2024-07-17 PROCEDURE — 96375 TX/PRO/DX INJ NEW DRUG ADDON: CPT

## 2024-07-17 PROCEDURE — 96374 THER/PROPH/DIAG INJ IV PUSH: CPT

## 2024-07-17 PROCEDURE — 74176 CT ABD & PELVIS W/O CONTRAST: CPT

## 2024-07-17 PROCEDURE — 80053 COMPREHEN METABOLIC PANEL: CPT

## 2024-07-17 PROCEDURE — 2580000003 HC RX 258: Performed by: EMERGENCY MEDICINE

## 2024-07-17 PROCEDURE — 85025 COMPLETE CBC W/AUTO DIFF WBC: CPT

## 2024-07-17 PROCEDURE — 96361 HYDRATE IV INFUSION ADD-ON: CPT

## 2024-07-17 PROCEDURE — 6360000002 HC RX W HCPCS: Performed by: EMERGENCY MEDICINE

## 2024-07-17 RX ORDER — DICYCLOMINE HYDROCHLORIDE 10 MG/ML
20 INJECTION INTRAMUSCULAR
Status: DISCONTINUED | OUTPATIENT
Start: 2024-07-17 | End: 2024-07-18 | Stop reason: HOSPADM

## 2024-07-17 RX ORDER — ONDANSETRON 4 MG/1
4 TABLET, FILM COATED ORAL 3 TIMES DAILY PRN
Qty: 10 TABLET | Refills: 0 | Status: SHIPPED | OUTPATIENT
Start: 2024-07-17

## 2024-07-17 RX ORDER — MORPHINE SULFATE 2 MG/ML
2 INJECTION, SOLUTION INTRAMUSCULAR; INTRAVENOUS
Status: COMPLETED | OUTPATIENT
Start: 2024-07-17 | End: 2024-07-17

## 2024-07-17 RX ORDER — ONDANSETRON 2 MG/ML
4 INJECTION INTRAMUSCULAR; INTRAVENOUS
Status: COMPLETED | OUTPATIENT
Start: 2024-07-17 | End: 2024-07-17

## 2024-07-17 RX ORDER — KETOROLAC TROMETHAMINE 15 MG/ML
15 INJECTION, SOLUTION INTRAMUSCULAR; INTRAVENOUS
Status: COMPLETED | OUTPATIENT
Start: 2024-07-17 | End: 2024-07-17

## 2024-07-17 RX ORDER — 0.9 % SODIUM CHLORIDE 0.9 %
500 INTRAVENOUS SOLUTION INTRAVENOUS ONCE
Status: COMPLETED | OUTPATIENT
Start: 2024-07-17 | End: 2024-07-17

## 2024-07-17 RX ORDER — HYDROCODONE BITARTRATE AND ACETAMINOPHEN 5; 325 MG/1; MG/1
1 TABLET ORAL EVERY 6 HOURS PRN
Qty: 10 TABLET | Refills: 0 | Status: SHIPPED | OUTPATIENT
Start: 2024-07-17 | End: 2024-07-20

## 2024-07-17 RX ADMIN — MORPHINE SULFATE 2 MG: 2 INJECTION, SOLUTION INTRAMUSCULAR; INTRAVENOUS at 22:19

## 2024-07-17 RX ADMIN — KETOROLAC TROMETHAMINE 15 MG: 15 INJECTION, SOLUTION INTRAMUSCULAR; INTRAVENOUS at 19:49

## 2024-07-17 RX ADMIN — ONDANSETRON 4 MG: 2 INJECTION INTRAMUSCULAR; INTRAVENOUS at 22:17

## 2024-07-17 RX ADMIN — SODIUM CHLORIDE 500 ML: 9 INJECTION, SOLUTION INTRAVENOUS at 19:45

## 2024-07-17 ASSESSMENT — PAIN DESCRIPTION - FREQUENCY: FREQUENCY: CONTINUOUS

## 2024-07-17 ASSESSMENT — PAIN DESCRIPTION - DESCRIPTORS
DESCRIPTORS: ACHING
DESCRIPTORS: ACHING

## 2024-07-17 ASSESSMENT — PAIN DESCRIPTION - ONSET: ONSET: AWAKENED FROM SLEEP

## 2024-07-17 ASSESSMENT — LIFESTYLE VARIABLES
HOW MANY STANDARD DRINKS CONTAINING ALCOHOL DO YOU HAVE ON A TYPICAL DAY: PATIENT DOES NOT DRINK
HOW OFTEN DO YOU HAVE A DRINK CONTAINING ALCOHOL: NEVER

## 2024-07-17 ASSESSMENT — PAIN SCALES - GENERAL
PAINLEVEL_OUTOF10: 10
PAINLEVEL_OUTOF10: 10

## 2024-07-17 ASSESSMENT — ENCOUNTER SYMPTOMS
VOMITING: 0
CONSTIPATION: 0
BLOOD IN STOOL: 0
NAUSEA: 0
RESPIRATORY NEGATIVE: 1
ABDOMINAL PAIN: 1

## 2024-07-17 ASSESSMENT — PAIN DESCRIPTION - LOCATION
LOCATION: HEAD
LOCATION: HEAD

## 2024-07-17 ASSESSMENT — PAIN DESCRIPTION - PAIN TYPE: TYPE: ACUTE PAIN

## 2024-07-17 ASSESSMENT — PAIN - FUNCTIONAL ASSESSMENT: PAIN_FUNCTIONAL_ASSESSMENT: NONE - DENIES PAIN

## 2024-07-17 NOTE — ED PROVIDER NOTES
[facility] EMERGENCY DEPT  eMERGENCY dEPARTMENT eNCOUnter      Pt Name: Loyd Gonzalez  MRN: [id number]  Birthdate 1965 of evaluation: 7/17/2024  Provider:Richard Tineo MD    CHIEF COMPLAINT       HPI    Loyd Gonzalez is a 58 y.o. male  c/o having a history of peripheral neuropathy and intermittent fluctuation of his blood glucose. He states he has no hx of diabetes but his glucose levels fluctuates in the past.    He was started on lyrica 2 days ago and today he has been having malaise and fluctuation  of his blood glucose.  He also state he has a hx of kidney stones and he has left lower abdominal pain.      ROS    Review of Systems   Constitutional:  Positive for activity change and appetite change. Negative for fever.   Respiratory: Negative.     Cardiovascular: Negative.    Gastrointestinal:  Positive for abdominal pain (LLQ). Negative for blood in stool, constipation, nausea and vomiting.   Genitourinary:  Negative for dysuria, flank pain, frequency, hematuria and testicular pain.   Musculoskeletal: Negative.    Psychiatric/Behavioral: Negative.     All other systems reviewed and are negative.      Except as noted above the remainder of the review of systems was reviewed and negative.       PAST MEDICAL HISTORY     Past Medical History:   Diagnosis Date    Arthritis of knee, right 12/2017    advanced degen changes noted, CT right LE    Asthma     Cellulitis 12/2017    DDD (degenerative disc disease), lumbar 2013    noted on MRI with annular tears    Diverticulitis 2016    GERD (gastroesophageal reflux disease) 2016    with esophagitis according to endscopy    Kidney stone     Lumbar post-laminectomy syndrome     Sacroiliitis (HCC)     Stroke (HCC) may 14 2010    Unspecified rotator cuff tear or rupture of left shoulder, not specified as traumatic 03/2016         SURGICAL HISTORY       Past Surgical History:   Procedure Laterality Date    BACK SURGERY  2002, 2004, 2006    L5-S1 fusion, laminectomies

## 2024-07-18 NOTE — ED NOTES
Administered medication and educated patient on side effects. Patient allergies verified. All questions and concerns answered. Pt instructed to use call bell at the bedside if needed. This nurse will continues to monitor pt at this time. Side rails up.     Patient discharged at this time. This RN reviewed discharge instructions at this time with the patient and spouse.  patient and spouse verbalized understanding and does not have any questions. Pt ambulatory upon discharge, & in stable condition. Pt armband removed & shredded. Patient I.V was discharged. Patient is ambulatory at discharged.

## 2024-07-18 NOTE — ED NOTES
Patient resting on stretcher, NAD noted, call bell in reach, bed low and locked with side rails up x 2; Reports pain 10/10. Updated on plan of care, patient verbalizes understanding. This nurse will continue to monitor.

## 2024-07-19 ENCOUNTER — TELEPHONE (OUTPATIENT)
Age: 59
End: 2024-07-19

## 2024-07-19 NOTE — TELEPHONE ENCOUNTER
Sharon Jerez,(emergency contact and Primary decision maker in [facility]), called requesting a med refill on Gabapentin 800MG (1 tablet 3 times daily). Pt no longer takes this med but she is requesting this refill because pt had an allergic reaction to the Pregabalin rx and the ED discontinued the Pregabalin rx for pt on 7/17 at  ED. Pt's had symptoms of vomitting, weakness, elevated sugar, feeling bad, and dehydration.    Pharmacy is   [facility] - [place], [place] -   [facility] [address] [facility] -  [phone] - F [phone]

## 2024-07-19 NOTE — TELEPHONE ENCOUNTER
The pt was identified using 2 pt identifiers. Spoke with the pts caregiver and went over how to ramp up the Gabapentin. She verbalized understanding and is aware to call back if she or the pt has any questions or concerns.

## 2024-07-29 ENCOUNTER — OFFICE VISIT (OUTPATIENT)
Facility: CLINIC | Age: 59
End: 2024-07-29
Payer: MEDICARE

## 2024-07-29 VITALS
DIASTOLIC BLOOD PRESSURE: 66 MMHG | SYSTOLIC BLOOD PRESSURE: 107 MMHG | OXYGEN SATURATION: 95 % | BODY MASS INDEX: 25.05 KG/M2 | HEART RATE: 88 BPM | RESPIRATION RATE: 18 BRPM | TEMPERATURE: 98.4 F | HEIGHT: 70 IN | WEIGHT: 175 LBS

## 2024-07-29 DIAGNOSIS — M51.36 DEGENERATIVE DISC DISEASE, LUMBAR: Primary | ICD-10-CM

## 2024-07-29 DIAGNOSIS — M54.16 LUMBAR NEURITIS: ICD-10-CM

## 2024-07-29 DIAGNOSIS — K57.92 DIVERTICULITIS: ICD-10-CM

## 2024-07-29 DIAGNOSIS — N20.0 KIDNEY STONES: ICD-10-CM

## 2024-07-29 DIAGNOSIS — E16.2 HYPOGLYCEMIA: ICD-10-CM

## 2024-07-29 DIAGNOSIS — J42 CHRONIC BRONCHITIS, UNSPECIFIED CHRONIC BRONCHITIS TYPE (HCC): ICD-10-CM

## 2024-07-29 DIAGNOSIS — M51.26 LUMBAR HERNIATED DISC: ICD-10-CM

## 2024-07-29 DIAGNOSIS — M96.1 POSTLAMINECTOMY SYNDROME, NOT ELSEWHERE CLASSIFIED: ICD-10-CM

## 2024-07-29 DIAGNOSIS — R55 SYNCOPE, UNSPECIFIED SYNCOPE TYPE: Primary | ICD-10-CM

## 2024-07-29 DIAGNOSIS — E11.9 TYPE 2 DIABETES MELLITUS WITHOUT COMPLICATION, UNSPECIFIED WHETHER LONG TERM INSULIN USE (HCC): ICD-10-CM

## 2024-07-29 DIAGNOSIS — M54.16 LUMBAR RADICULOPATHY: ICD-10-CM

## 2024-07-29 LAB — HBA1C MFR BLD: 5.7 %

## 2024-07-29 PROCEDURE — 3046F HEMOGLOBIN A1C LEVEL >9.0%: CPT | Performed by: FAMILY MEDICINE

## 2024-07-29 PROCEDURE — 99214 OFFICE O/P EST MOD 30 MIN: CPT | Performed by: FAMILY MEDICINE

## 2024-07-29 PROCEDURE — 3017F COLORECTAL CA SCREEN DOC REV: CPT | Performed by: FAMILY MEDICINE

## 2024-07-29 PROCEDURE — 3023F SPIROM DOC REV: CPT | Performed by: FAMILY MEDICINE

## 2024-07-29 PROCEDURE — 2022F DILAT RTA XM EVC RTNOPTHY: CPT | Performed by: FAMILY MEDICINE

## 2024-07-29 PROCEDURE — 83036 HEMOGLOBIN GLYCOSYLATED A1C: CPT | Performed by: FAMILY MEDICINE

## 2024-07-29 PROCEDURE — 4004F PT TOBACCO SCREEN RCVD TLK: CPT | Performed by: FAMILY MEDICINE

## 2024-07-29 PROCEDURE — G8419 CALC BMI OUT NRM PARAM NOF/U: HCPCS | Performed by: FAMILY MEDICINE

## 2024-07-29 PROCEDURE — G8428 CUR MEDS NOT DOCUMENT: HCPCS | Performed by: FAMILY MEDICINE

## 2024-07-29 RX ORDER — BLOOD-GLUCOSE SENSOR
EACH MISCELLANEOUS
Qty: 1 EACH | Refills: 5 | Status: SHIPPED | OUTPATIENT
Start: 2024-07-29

## 2024-07-29 RX ORDER — GABAPENTIN 300 MG/1
300 CAPSULE ORAL 3 TIMES DAILY
Qty: 90 CAPSULE | Refills: 2 | Status: SHIPPED | OUTPATIENT
Start: 2024-07-29 | End: 2024-10-27

## 2024-07-29 RX ORDER — ONDANSETRON 4 MG/1
4 TABLET, ORALLY DISINTEGRATING ORAL EVERY 8 HOURS PRN
Qty: 12 TABLET | Refills: 1 | Status: SHIPPED | OUTPATIENT
Start: 2024-07-29

## 2024-07-29 NOTE — TELEPHONE ENCOUNTER
Approved giving the pt 300 mg po TID #90 w/ 2 refills on 7/19/2024. Never got sent in. Please approve the pended medication.

## 2024-07-29 NOTE — TELEPHONE ENCOUNTER
Dr. Smith wanted him to restart the gabapentin at 300 mg tid.   Pend up med for Dr. Smith to sign.

## 2024-07-29 NOTE — PROGRESS NOTES
Loyd Gonzalez is a 59 y.o. male who presents to the office today for the following:  Chief Complaint   Patient presents with    Loss of Consciousness       Allergies   Allergen Reactions    Hydrocodone-Acetaminophen Nausea And Vomiting    Penicillins Anaphylaxis and Angioedema    Doxycycline Itching     Black eyes    Hydrochlorothiazide Nausea Only     Dizzy, lightheaded, blisters on his legs    Pantoprazole Dermatitis and Itching     Patient denies allergy    Azithromycin Nausea And Vomiting    Erythromycin Other (See Comments)         Current Outpatient Medications:     fluticasone furoate-vilanterol (BREO ELLIPTA) 100-25 MCG/ACT inhaler, Inhale 1 puff into the lungs daily, Disp: 28 each, Rfl: 5    albuterol sulfate HFA (PROVENTIL;VENTOLIN;PROAIR) 108 (90 Base) MCG/ACT inhaler, Inhale 2 puffs into the lungs every 6 hours as needed for Wheezing or Shortness of Breath, Disp: 18 g, Rfl: 5    budesonide-formoterol (SYMBICORT) 160-4.5 MCG/ACT AERO, Inhale 1 puff into the lungs 2 times daily, Disp: 10.2 g, Rfl: 5    gabapentin (NEURONTIN) 800 MG tablet, Take 1 tablet by mouth 3 times daily for 90 days. Max Daily Amount: 2,400 mg, Disp: 270 tablet, Rfl: 0    acetaminophen (TYLENOL) 500 MG tablet, Take by mouth every 6 hours as needed, Disp: , Rfl:     ondansetron (ZOFRAN) 4 MG tablet, Take 1 tablet by mouth 3 times daily as needed for Nausea or Vomiting, Disp: 10 tablet, Rfl: 0    pregabalin (LYRICA) 75 MG capsule, Take 1 capsule by mouth 2 times daily for 60 days. Max Daily Amount: 150 mg (Patient not taking: Reported on 7/29/2024), Disp: 60 capsule, Rfl: 1    ondansetron (ZOFRAN-ODT) 4 MG disintegrating tablet, Take 1 tablet by mouth every 8 hours as needed for Vomiting or Nausea, Disp: 12 tablet, Rfl: 1    dicyclomine (BENTYL) 20 MG tablet, Take 1 tablet by mouth 3 times daily as needed (Pain) (Patient not taking: Reported on 7/8/2024), Disp: 21 tablet, Rfl: 0    Continuous Blood Gluc  (FREESTYLE JAMES 14

## 2024-07-29 NOTE — TELEPHONE ENCOUNTER
Sharon Jerez , patients caregiver called and stated that Dr. Smith  discontinued the Pregabalin and started him back on Gabapentin but the pharmacy hasn't received anything.    Please advise.    Pharmacy:    [facility] - [place], [place] - [facility] - P [phone] - F [phone] [[id number]]

## 2024-07-29 NOTE — TELEPHONE ENCOUNTER
The pt was identified using 2 pt identifiers. They are aware the medication has been sent to the pharmacy and will call back if they have any questions or concerns. No further action needed at this time.

## 2024-07-29 NOTE — PROGRESS NOTES
\"Have you been to the ER, urgent care clinic since your last visit?  Hospitalized since your last visit?\"    NO    “Have you seen or consulted any other health care providers outside of [facility] since your last visit?”    NO            Click Here for Release of Records Request

## 2024-08-19 ENCOUNTER — HOSPITAL ENCOUNTER (EMERGENCY)
Facility: HOSPITAL | Age: 59
Discharge: HOME | End: 2024-08-19
Attending: EMERGENCY MEDICINE
Payer: MEDICARE

## 2024-08-19 ENCOUNTER — APPOINTMENT (OUTPATIENT)
Facility: HOSPITAL | Age: 59
End: 2024-08-19
Payer: MEDICARE

## 2024-08-19 VITALS
BODY MASS INDEX: 25.53 KG/M2 | OXYGEN SATURATION: 98 % | DIASTOLIC BLOOD PRESSURE: 63 MMHG | SYSTOLIC BLOOD PRESSURE: 105 MMHG | HEIGHT: 70 IN | RESPIRATION RATE: 15 BRPM | WEIGHT: 178.3 LBS | TEMPERATURE: 97.5 F | HEART RATE: 63 BPM

## 2024-08-19 DIAGNOSIS — R53.1 GENERAL WEAKNESS: Primary | ICD-10-CM

## 2024-08-19 DIAGNOSIS — G43.909 MIGRAINE WITHOUT STATUS MIGRAINOSUS, NOT INTRACTABLE, UNSPECIFIED MIGRAINE TYPE: ICD-10-CM

## 2024-08-19 LAB
ALBUMIN SERPL-MCNC: 4.1 G/DL (ref 3.4–5)
ALBUMIN/GLOB SERPL: 1.3 (ref 0.8–1.7)
ALP SERPL-CCNC: 73 U/L (ref 45–117)
ALT SERPL-CCNC: 23 U/L (ref 16–61)
ANION GAP SERPL CALC-SCNC: 6 MMOL/L (ref 3–18)
AST SERPL-CCNC: 18 U/L (ref 10–38)
BASOPHILS # BLD: 0 K/UL (ref 0–0.1)
BASOPHILS NFR BLD: 1 % (ref 0–2)
BILIRUB SERPL-MCNC: 0.8 MG/DL (ref 0.2–1)
BUN SERPL-MCNC: 21 MG/DL (ref 7–18)
BUN/CREAT SERPL: 24 (ref 12–20)
CALCIUM SERPL-MCNC: 8.8 MG/DL (ref 8.5–10.1)
CHLORIDE SERPL-SCNC: 111 MMOL/L (ref 100–111)
CO2 SERPL-SCNC: 24 MMOL/L (ref 21–32)
CREAT SERPL-MCNC: 0.87 MG/DL (ref 0.6–1.3)
DIFFERENTIAL METHOD BLD: ABNORMAL
EOSINOPHIL # BLD: 0.2 K/UL (ref 0–0.4)
EOSINOPHIL NFR BLD: 3 % (ref 0–5)
ERYTHROCYTE [DISTWIDTH] IN BLOOD BY AUTOMATED COUNT: 11.7 % (ref 11.6–14.5)
GLOBULIN SER CALC-MCNC: 3.2 G/DL (ref 2–4)
GLUCOSE BLD STRIP.AUTO-MCNC: 78 MG/DL (ref 70–110)
GLUCOSE SERPL-MCNC: 72 MG/DL (ref 74–99)
HCT VFR BLD AUTO: 44.9 % (ref 36–48)
HGB BLD-MCNC: 15.7 G/DL (ref 13–16)
IMM GRANULOCYTES # BLD AUTO: 0.1 K/UL (ref 0–0.04)
IMM GRANULOCYTES NFR BLD AUTO: 1 % (ref 0–0.5)
LYMPHOCYTES # BLD: 1.5 K/UL (ref 0.9–3.6)
LYMPHOCYTES NFR BLD: 27 % (ref 21–52)
MAGNESIUM SERPL-MCNC: 1.9 MG/DL (ref 1.6–2.6)
MCH RBC QN AUTO: 30.1 PG (ref 24–34)
MCHC RBC AUTO-ENTMCNC: 35 G/DL (ref 31–37)
MCV RBC AUTO: 86 FL (ref 78–100)
MONOCYTES # BLD: 0.9 K/UL (ref 0.05–1.2)
MONOCYTES NFR BLD: 17 % (ref 3–10)
NEUTS SEG # BLD: 2.9 K/UL (ref 1.8–8)
NEUTS SEG NFR BLD: 52 % (ref 40–73)
NRBC # BLD: 0 K/UL (ref 0–0.01)
NRBC BLD-RTO: 0 PER 100 WBC
PLATELET # BLD AUTO: 222 K/UL (ref 135–420)
PMV BLD AUTO: 9.2 FL (ref 9.2–11.8)
POTASSIUM SERPL-SCNC: 3.6 MMOL/L (ref 3.5–5.5)
PROT SERPL-MCNC: 7.3 G/DL (ref 6.4–8.2)
RBC # BLD AUTO: 5.22 M/UL (ref 4.35–5.65)
SODIUM SERPL-SCNC: 141 MMOL/L (ref 136–145)
T4 FREE SERPL-MCNC: 1 NG/DL (ref 0.7–1.5)
TROPONIN I SERPL HS-MCNC: 6 NG/L (ref 0–78)
TSH SERPL DL<=0.05 MIU/L-ACNC: 2.11 UIU/ML (ref 0.36–3.74)
WBC # BLD AUTO: 5.6 K/UL (ref 4.6–13.2)

## 2024-08-19 PROCEDURE — 82962 GLUCOSE BLOOD TEST: CPT

## 2024-08-19 PROCEDURE — 6360000002 HC RX W HCPCS: Performed by: EMERGENCY MEDICINE

## 2024-08-19 PROCEDURE — 99284 EMERGENCY DEPT VISIT MOD MDM: CPT

## 2024-08-19 PROCEDURE — 2580000003 HC RX 258: Performed by: EMERGENCY MEDICINE

## 2024-08-19 PROCEDURE — 84443 ASSAY THYROID STIM HORMONE: CPT

## 2024-08-19 PROCEDURE — 96374 THER/PROPH/DIAG INJ IV PUSH: CPT

## 2024-08-19 PROCEDURE — 83735 ASSAY OF MAGNESIUM: CPT

## 2024-08-19 PROCEDURE — 70450 CT HEAD/BRAIN W/O DYE: CPT

## 2024-08-19 PROCEDURE — 84484 ASSAY OF TROPONIN QUANT: CPT

## 2024-08-19 PROCEDURE — 93005 ELECTROCARDIOGRAM TRACING: CPT | Performed by: EMERGENCY MEDICINE

## 2024-08-19 PROCEDURE — 84480 ASSAY TRIIODOTHYRONINE (T3): CPT

## 2024-08-19 PROCEDURE — 96375 TX/PRO/DX INJ NEW DRUG ADDON: CPT

## 2024-08-19 PROCEDURE — 85025 COMPLETE CBC W/AUTO DIFF WBC: CPT

## 2024-08-19 PROCEDURE — 80053 COMPREHEN METABOLIC PANEL: CPT

## 2024-08-19 PROCEDURE — 84439 ASSAY OF FREE THYROXINE: CPT

## 2024-08-19 RX ORDER — METOCLOPRAMIDE HYDROCHLORIDE 5 MG/ML
10 INJECTION INTRAMUSCULAR; INTRAVENOUS
Status: COMPLETED | OUTPATIENT
Start: 2024-08-19 | End: 2024-08-19

## 2024-08-19 RX ORDER — 0.9 % SODIUM CHLORIDE 0.9 %
500 INTRAVENOUS SOLUTION INTRAVENOUS ONCE
Status: COMPLETED | OUTPATIENT
Start: 2024-08-19 | End: 2024-08-19

## 2024-08-19 RX ORDER — KETOROLAC TROMETHAMINE 15 MG/ML
15 INJECTION, SOLUTION INTRAMUSCULAR; INTRAVENOUS
Status: COMPLETED | OUTPATIENT
Start: 2024-08-19 | End: 2024-08-19

## 2024-08-19 RX ORDER — DIPHENHYDRAMINE HYDROCHLORIDE 50 MG/ML
25 INJECTION INTRAMUSCULAR; INTRAVENOUS
Status: COMPLETED | OUTPATIENT
Start: 2024-08-19 | End: 2024-08-19

## 2024-08-19 RX ADMIN — KETOROLAC TROMETHAMINE 15 MG: 15 INJECTION, SOLUTION INTRAMUSCULAR; INTRAVENOUS at 18:42

## 2024-08-19 RX ADMIN — METOCLOPRAMIDE HYDROCHLORIDE 10 MG: 5 INJECTION INTRAMUSCULAR; INTRAVENOUS at 18:42

## 2024-08-19 RX ADMIN — SODIUM CHLORIDE 500 ML: 9 INJECTION, SOLUTION INTRAVENOUS at 18:42

## 2024-08-19 RX ADMIN — DIPHENHYDRAMINE HYDROCHLORIDE 25 MG: 50 INJECTION, SOLUTION INTRAMUSCULAR; INTRAVENOUS at 18:41

## 2024-08-19 RX ADMIN — SODIUM CHLORIDE 500 ML: 9 INJECTION, SOLUTION INTRAVENOUS at 17:07

## 2024-08-19 ASSESSMENT — PAIN SCALES - GENERAL
PAINLEVEL_OUTOF10: 0
PAINLEVEL_OUTOF10: 10
PAINLEVEL_OUTOF10: 10

## 2024-08-19 ASSESSMENT — PAIN DESCRIPTION - DESCRIPTORS
DESCRIPTORS: PRESSURE
DESCRIPTORS: ACHING

## 2024-08-19 ASSESSMENT — PAIN - FUNCTIONAL ASSESSMENT: PAIN_FUNCTIONAL_ASSESSMENT: 0-10

## 2024-08-19 ASSESSMENT — LIFESTYLE VARIABLES
HOW OFTEN DO YOU HAVE A DRINK CONTAINING ALCOHOL: NEVER
HOW MANY STANDARD DRINKS CONTAINING ALCOHOL DO YOU HAVE ON A TYPICAL DAY: PATIENT DOES NOT DRINK

## 2024-08-19 NOTE — ED NOTES
Patient discharged at this time. This RN reviewed discharge instructions at this time with the patient and spouse.  patient and spouse verbalized understanding and does not have any questions. Pt ambulatory upon discharge, & in stable condition. Pt armband removed & shredded. Patient I.V was discharged. Patient is ambulatory at discharged.

## 2024-08-19 NOTE — ED TRIAGE NOTES
Patient reports passing out frequently over the past two weeks. Patient was to follow up with endocrinology but is still waiting appointments.

## 2024-08-19 NOTE — ED PROVIDER NOTES
provider for this patient.    I reviewed the vital signs, available nursing notes, past medical history, past surgical history, family history and social history.    Patient Vitals for the past 12 hrs:   Temp Pulse Resp BP SpO2   08/19/24 1553 97.5 °F (36.4 °C) 79 18 (!) 146/82 99 %       Vital Signs-Reviewed the patient's vital signs.    Pulse Oximetry Analysis, Cardiac Monitor, 12 lead ekg:      Interpreted by the EP.      Records Reviewed: Nursing notes reviewed (Time of Review: 4:53 PM)    Procedures:   Critical Care Time: 0  If critical care time is note it is exclusive of any separately billable procedures.     Aspirin: (was aspirin given for stroke?)    Diagnosis     Disposition: DISPOSITION    Condition at Disposition: Data Unavailable       DISCHARGE MEDICATIONS:  Current Discharge Medication List             Details   ondansetron (ZOFRAN-ODT) 4 MG disintegrating tablet Take 1 tablet by mouth every 8 hours as needed for Vomiting or Nausea  Qty: 12 tablet, Refills: 1    Associated Diagnoses: Diverticulitis      Continuous Glucose Sensor (FREESTYLE JAMES 3 SENSOR) MISC Apply to skin  Qty: 1 each, Refills: 5    Associated Diagnoses: Hypoglycemia      gabapentin (NEURONTIN) 300 MG capsule Take 1 capsule by mouth 3 times daily for 90 days. Intended supply: 90 days Max Daily Amount: 900 mg  Qty: 90 capsule, Refills: 2    Associated Diagnoses: Degenerative disc disease, lumbar; Lumbar herniated disc; Lumbar radiculopathy; Postlaminectomy syndrome, not elsewhere classified; Lumbar neuritis      ondansetron (ZOFRAN) 4 MG tablet Take 1 tablet by mouth 3 times daily as needed for Nausea or Vomiting  Qty: 10 tablet, Refills: 0      pregabalin (LYRICA) 75 MG capsule Take 1 capsule by mouth 2 times daily for 60 days. Max Daily Amount: 150 mg  Qty: 60 capsule, Refills: 1    Associated Diagnoses: Degenerative disc disease, lumbar; Lumbar herniated disc; Lumbar radiculopathy; Postlaminectomy syndrome, not elsewhere

## 2024-08-19 NOTE — ED NOTES
1910 rec'd pt in sign out, for near syncope, weakness.   Plan to reeval and plan to dc after reeval if improved.  1933 pt says mild ha continues but feels much better.  Declines further ed eval or tx agrees w dc per sign out plan.  Req dc.  To dc per d/w pt.  Detailed ret inst given.  No emc at this time       David Schmitz MD  08/20/24 2041

## 2024-08-20 LAB
EKG ATRIAL RATE: 77 BPM
EKG DIAGNOSIS: NORMAL
EKG P AXIS: 72 DEGREES
EKG P-R INTERVAL: 168 MS
EKG Q-T INTERVAL: 370 MS
EKG QRS DURATION: 86 MS
EKG QTC CALCULATION (BAZETT): 418 MS
EKG R AXIS: 37 DEGREES
EKG T AXIS: 61 DEGREES
EKG VENTRICULAR RATE: 77 BPM

## 2024-08-20 PROCEDURE — 93010 ELECTROCARDIOGRAM REPORT: CPT | Performed by: INTERNAL MEDICINE

## 2024-08-21 LAB — T3 SERPL-MCNC: 116 NG/DL (ref 71–180)

## 2024-08-27 DIAGNOSIS — M51.26 LUMBAR HERNIATED DISC: ICD-10-CM

## 2024-08-27 DIAGNOSIS — M96.1 POSTLAMINECTOMY SYNDROME, NOT ELSEWHERE CLASSIFIED: ICD-10-CM

## 2024-08-27 DIAGNOSIS — M51.36 DEGENERATIVE DISC DISEASE, LUMBAR: Primary | ICD-10-CM

## 2024-08-27 DIAGNOSIS — M54.16 LUMBAR RADICULOPATHY: ICD-10-CM

## 2024-08-27 RX ORDER — GABAPENTIN 600 MG/1
600 TABLET ORAL 3 TIMES DAILY
Qty: 90 TABLET | Refills: 1 | Status: SHIPPED | OUTPATIENT
Start: 2024-08-27 | End: 2024-10-26

## 2024-08-27 NOTE — TELEPHONE ENCOUNTER
Patient states that he had to restart gabapentin at his last office visit because he had been off of it for a while. He is currently taking 300mg TID and he is ready to increase it to 600mg TID. RX pended for signature.

## 2024-09-04 ENCOUNTER — HOSPITAL ENCOUNTER (OUTPATIENT)
Facility: HOSPITAL | Age: 59
Discharge: HOME | End: 2024-09-07
Payer: MEDICARE

## 2024-09-04 DIAGNOSIS — R97.20 ELEVATED PSA: ICD-10-CM

## 2024-09-04 PROCEDURE — 72197 MRI PELVIS W/O & W/DYE: CPT

## 2024-09-04 PROCEDURE — A9577 INJ MULTIHANCE: HCPCS | Performed by: NURSE PRACTITIONER

## 2024-09-04 PROCEDURE — 6360000004 HC RX CONTRAST MEDICATION: Performed by: NURSE PRACTITIONER

## 2024-09-04 RX ADMIN — GADOBENATE DIMEGLUMINE 20 ML: 529 INJECTION, SOLUTION INTRAVENOUS at 07:10

## 2024-09-11 ENCOUNTER — OFFICE VISIT (OUTPATIENT)
Age: 59
End: 2024-09-11
Payer: MEDICARE

## 2024-09-11 VITALS
HEIGHT: 70 IN | OXYGEN SATURATION: 97 % | HEART RATE: 67 BPM | BODY MASS INDEX: 25.77 KG/M2 | WEIGHT: 180 LBS | DIASTOLIC BLOOD PRESSURE: 71 MMHG | SYSTOLIC BLOOD PRESSURE: 109 MMHG

## 2024-09-11 DIAGNOSIS — Z71.6 TOBACCO ABUSE COUNSELING: ICD-10-CM

## 2024-09-11 DIAGNOSIS — R55 SYNCOPE, UNSPECIFIED SYNCOPE TYPE: Primary | ICD-10-CM

## 2024-09-11 PROCEDURE — 4004F PT TOBACCO SCREEN RCVD TLK: CPT | Performed by: INTERNAL MEDICINE

## 2024-09-11 PROCEDURE — G8419 CALC BMI OUT NRM PARAM NOF/U: HCPCS | Performed by: INTERNAL MEDICINE

## 2024-09-11 PROCEDURE — 3017F COLORECTAL CA SCREEN DOC REV: CPT | Performed by: INTERNAL MEDICINE

## 2024-09-11 PROCEDURE — 99204 OFFICE O/P NEW MOD 45 MIN: CPT | Performed by: INTERNAL MEDICINE

## 2024-09-11 PROCEDURE — G8427 DOCREV CUR MEDS BY ELIG CLIN: HCPCS | Performed by: INTERNAL MEDICINE

## 2024-09-11 ASSESSMENT — ENCOUNTER SYMPTOMS
GASTROINTESTINAL NEGATIVE: 1
RESPIRATORY NEGATIVE: 1
EYES NEGATIVE: 1

## 2024-09-11 ASSESSMENT — PATIENT HEALTH QUESTIONNAIRE - PHQ9
SUM OF ALL RESPONSES TO PHQ QUESTIONS 1-9: 0
SUM OF ALL RESPONSES TO PHQ9 QUESTIONS 1 & 2: 0
1. LITTLE INTEREST OR PLEASURE IN DOING THINGS: NOT AT ALL
DEPRESSION UNABLE TO ASSESS: FUNCTIONAL CAPACITY MOTIVATION LIMITS ACCURACY
SUM OF ALL RESPONSES TO PHQ QUESTIONS 1-9: 0
SUM OF ALL RESPONSES TO PHQ QUESTIONS 1-9: 0
2. FEELING DOWN, DEPRESSED OR HOPELESS: NOT AT ALL
SUM OF ALL RESPONSES TO PHQ QUESTIONS 1-9: 0

## 2024-09-24 ENCOUNTER — TELEPHONE (OUTPATIENT)
Age: 59
End: 2024-09-24

## 2024-09-24 ENCOUNTER — OFFICE VISIT (OUTPATIENT)
Age: 59
End: 2024-09-24
Payer: MEDICARE

## 2024-09-24 VITALS — TEMPERATURE: 97 F | HEIGHT: 70 IN | BODY MASS INDEX: 25.48 KG/M2 | WEIGHT: 178 LBS

## 2024-09-24 DIAGNOSIS — M54.16 LUMBAR RADICULOPATHY: ICD-10-CM

## 2024-09-24 DIAGNOSIS — M51.36 DEGENERATIVE DISC DISEASE, LUMBAR: Primary | ICD-10-CM

## 2024-09-24 DIAGNOSIS — M96.1 POSTLAMINECTOMY SYNDROME, NOT ELSEWHERE CLASSIFIED: ICD-10-CM

## 2024-09-24 DIAGNOSIS — M54.16 LUMBAR NEURITIS: ICD-10-CM

## 2024-09-24 DIAGNOSIS — M51.26 LUMBAR HERNIATED DISC: ICD-10-CM

## 2024-09-24 PROCEDURE — 4004F PT TOBACCO SCREEN RCVD TLK: CPT | Performed by: PHYSICAL MEDICINE & REHABILITATION

## 2024-09-24 PROCEDURE — G8427 DOCREV CUR MEDS BY ELIG CLIN: HCPCS | Performed by: PHYSICAL MEDICINE & REHABILITATION

## 2024-09-24 PROCEDURE — 99214 OFFICE O/P EST MOD 30 MIN: CPT | Performed by: PHYSICAL MEDICINE & REHABILITATION

## 2024-09-24 PROCEDURE — G8419 CALC BMI OUT NRM PARAM NOF/U: HCPCS | Performed by: PHYSICAL MEDICINE & REHABILITATION

## 2024-09-24 PROCEDURE — 3017F COLORECTAL CA SCREEN DOC REV: CPT | Performed by: PHYSICAL MEDICINE & REHABILITATION

## 2024-09-24 RX ORDER — CYCLOBENZAPRINE HCL 10 MG
TABLET ORAL
Qty: 30 TABLET | Refills: 0 | Status: SHIPPED | OUTPATIENT
Start: 2024-09-24

## 2024-09-24 RX ORDER — GABAPENTIN 600 MG/1
600 TABLET ORAL 3 TIMES DAILY
Qty: 270 TABLET | Refills: 0 | Status: SHIPPED | OUTPATIENT
Start: 2024-09-24 | End: 2024-12-23

## 2024-10-09 ENCOUNTER — OFFICE VISIT (OUTPATIENT)
Facility: CLINIC | Age: 59
End: 2024-10-09

## 2024-10-09 VITALS
BODY MASS INDEX: 26.2 KG/M2 | HEIGHT: 70 IN | HEART RATE: 90 BPM | SYSTOLIC BLOOD PRESSURE: 131 MMHG | WEIGHT: 183 LBS | OXYGEN SATURATION: 96 % | TEMPERATURE: 97.5 F | DIASTOLIC BLOOD PRESSURE: 81 MMHG | RESPIRATION RATE: 18 BRPM

## 2024-10-09 VITALS
BODY MASS INDEX: 26.2 KG/M2 | HEART RATE: 90 BPM | SYSTOLIC BLOOD PRESSURE: 131 MMHG | TEMPERATURE: 97.5 F | WEIGHT: 183 LBS | DIASTOLIC BLOOD PRESSURE: 81 MMHG | OXYGEN SATURATION: 96 % | RESPIRATION RATE: 18 BRPM | HEIGHT: 70 IN

## 2024-10-09 DIAGNOSIS — Z13.220 SCREENING, LIPID: Primary | ICD-10-CM

## 2024-10-09 DIAGNOSIS — R97.20 ELEVATED PSA: ICD-10-CM

## 2024-10-09 DIAGNOSIS — Z23 NEED FOR VACCINATION: ICD-10-CM

## 2024-10-09 DIAGNOSIS — J45.30 MILD PERSISTENT ASTHMA WITHOUT COMPLICATION: ICD-10-CM

## 2024-10-09 DIAGNOSIS — Z87.891 PERSONAL HISTORY OF TOBACCO USE: ICD-10-CM

## 2024-10-09 DIAGNOSIS — Z00.00 MEDICARE ANNUAL WELLNESS VISIT, SUBSEQUENT: Primary | ICD-10-CM

## 2024-10-09 DIAGNOSIS — R80.9 MICROALBUMINURIA: ICD-10-CM

## 2024-10-09 DIAGNOSIS — R11.0 NAUSEA: ICD-10-CM

## 2024-10-09 DIAGNOSIS — F17.200 TOBACCO DEPENDENCE: ICD-10-CM

## 2024-10-09 DIAGNOSIS — J45.909 REACTIVE AIRWAY DISEASE WITHOUT COMPLICATION, UNSPECIFIED ASTHMA SEVERITY, UNSPECIFIED WHETHER PERSISTENT: ICD-10-CM

## 2024-10-09 RX ORDER — ONDANSETRON 4 MG/1
4 TABLET, FILM COATED ORAL 3 TIMES DAILY PRN
Qty: 10 TABLET | Refills: 2 | Status: SHIPPED | OUTPATIENT
Start: 2024-10-09

## 2024-10-09 RX ORDER — BUDESONIDE AND FORMOTEROL FUMARATE DIHYDRATE 160; 4.5 UG/1; UG/1
1 AEROSOL RESPIRATORY (INHALATION) 2 TIMES DAILY
Qty: 10.2 G | Refills: 5 | Status: CANCELLED | OUTPATIENT
Start: 2024-10-09

## 2024-10-09 RX ORDER — FLUTICASONE FUROATE AND VILANTEROL 100; 25 UG/1; UG/1
1 POWDER RESPIRATORY (INHALATION) DAILY
Qty: 28 EACH | Refills: 5 | Status: SHIPPED | OUTPATIENT
Start: 2024-10-09

## 2024-10-09 RX ORDER — ALBUTEROL SULFATE 90 UG/1
2 INHALANT RESPIRATORY (INHALATION) EVERY 6 HOURS PRN
Qty: 18 G | Refills: 5 | Status: SHIPPED | OUTPATIENT
Start: 2024-10-09 | End: 2025-04-07

## 2024-10-09 RX ORDER — NICOTINE 21 MG/24HR
1 PATCH, TRANSDERMAL 24 HOURS TRANSDERMAL DAILY
Qty: 42 PATCH | Refills: 0 | Status: SHIPPED | OUTPATIENT
Start: 2024-10-09 | End: 2024-11-20

## 2024-10-09 SDOH — ECONOMIC STABILITY: FOOD INSECURITY: WITHIN THE PAST 12 MONTHS, YOU WORRIED THAT YOUR FOOD WOULD RUN OUT BEFORE YOU GOT MONEY TO BUY MORE.: NEVER TRUE

## 2024-10-09 SDOH — ECONOMIC STABILITY: INCOME INSECURITY: HOW HARD IS IT FOR YOU TO PAY FOR THE VERY BASICS LIKE FOOD, HOUSING, MEDICAL CARE, AND HEATING?: NOT HARD AT ALL

## 2024-10-09 SDOH — ECONOMIC STABILITY: FOOD INSECURITY: WITHIN THE PAST 12 MONTHS, THE FOOD YOU BOUGHT JUST DIDN'T LAST AND YOU DIDN'T HAVE MONEY TO GET MORE.: NEVER TRUE

## 2024-10-09 ASSESSMENT — PATIENT HEALTH QUESTIONNAIRE - PHQ9
1. LITTLE INTEREST OR PLEASURE IN DOING THINGS: NOT AT ALL
SUM OF ALL RESPONSES TO PHQ QUESTIONS 1-9: 0
2. FEELING DOWN, DEPRESSED OR HOPELESS: NOT AT ALL
SUM OF ALL RESPONSES TO PHQ QUESTIONS 1-9: 0
SUM OF ALL RESPONSES TO PHQ9 QUESTIONS 1 & 2: 0
SUM OF ALL RESPONSES TO PHQ QUESTIONS 1-9: 0
SUM OF ALL RESPONSES TO PHQ QUESTIONS 1-9: 0

## 2024-10-09 NOTE — PROGRESS NOTES
Medicare Annual Wellness Visit    Loyd Gonzalez is here for Medicare AWV    Assessment & Plan   Recommended patient to get shingles, pneumonia vaccine (out of stock) and RSV vaccine.  Recommended to follow-up with dentist and optometrist this year.  Recommended to get  colonoscopy up-to-date.  Discussed advanced directives and given handout for her to return on follow-up.  And designate family member to be a decision maker.  Discussed healthy diet and exercise.  Screen for pain, depression and risk for falls.  Recommendations for Preventive Services Due: see orders and patient instructions/AVS.  Recommended screening schedule for the next 5-10 years is provided to the patient in written form: see Patient Instructions/AVS.     Complete CT lung.     Subjective   Recommended patient to get shingles, pneumonia vaccine (out of stock) and RSV vaccine.  Recommended to follow-up with dentist and optometrist this year.  Recommended to get  colonoscopy up-to-date.  Discussed advanced directives and given handout for her to return on follow-up.  And designate family member to be a decision maker.  Discussed healthy diet and exercise.  Screen for pain, depression and risk for falls.    Patient's complete Health Risk Assessment and screening values have been reviewed and are found in Flowsheets. The following problems were reviewed today and where indicated follow up appointments were made and/or referrals ordered.    Positive Risk Factor Screenings with Interventions:                Inactivity:  On average, how many days per week do you engage in moderate to strenuous exercise (like a brisk walk)?: 0 days (!) Abnormal  On average, how many minutes do you engage in exercise at this level?: 30 min    Interventions:  Recommendations: patient agrees to exercise for at least 150 minutes/week    Poor Eating Habits/Diet:  Do you eat balanced/healthy meals regularly?: (!) No         Dentist Screen:  Have you seen the dentist within

## 2024-10-09 NOTE — PATIENT INSTRUCTIONS
oatmeal, beans, brown rice, citrus fruits, and apples.     Eat lean proteins. Heart-healthy proteins include seafood, lean meats and poultry, eggs, beans, peas, nuts, seeds, and soy products.     Limit drinks and foods with added sugar. These include candy, desserts, and soda pop.   Heart-healthy lifestyle    If your doctor recommends it, get more exercise. For many people, walking is a good choice. Or you may want to swim, bike, or do other activities. Bit by bit, increase the time you're active every day. Try for at least 30 minutes on most days of the week.     Try to quit or cut back on using tobacco and other nicotine products. This includes smoking and vaping. If you need help quitting, talk to your doctor about stop-smoking programs and medicines. These can increase your chances of quitting for good. Quitting is one of the most important things you can do to protect your heart. It is never too late to quit. Try to avoid secondhand smoke too.     Stay at a weight that's healthy for you. Talk to your doctor if you need help losing weight.     Try to get 7 to 9 hours of sleep each night.     Limit alcohol to 2 drinks a day for men and 1 drink a day for women. Too much alcohol can cause health problems.     Manage other health problems such as diabetes, high blood pressure, and high cholesterol. If you think you may have a problem with alcohol or drug use, talk to your doctor.   Medicines    Take your medicines exactly as prescribed. Call your doctor if you think you are having a problem with your medicine.     If your doctor recommends aspirin, take the amount directed each day. Make sure you take aspirin and not another kind of pain reliever, such as acetaminophen (Tylenol).   When should you call for help?   Call 911 if you have symptoms of a heart attack. These may include:    Chest pain or pressure, or a strange feeling in the chest.     Sweating.     Shortness of breath.     Pain, pressure, or a strange

## 2024-10-09 NOTE — PROGRESS NOTES
Loyd Gonzalez is a 59 y.o. male who presents to the office today for the following:  Chief Complaint   Patient presents with    Asthma       Allergies   Allergen Reactions    Hydrocodone-Acetaminophen Nausea And Vomiting    Penicillins Anaphylaxis and Angioedema    Doxycycline Itching     Black eyes    Hydrochlorothiazide Nausea Only     Dizzy, lightheaded, blisters on his legs    Pantoprazole Dermatitis and Itching     Patient denies allergy    Azithromycin Nausea And Vomiting    Erythromycin Other (See Comments)         Current Outpatient Medications:     albuterol sulfate HFA (PROVENTIL;VENTOLIN;PROAIR) 108 (90 Base) MCG/ACT inhaler, Inhale 2 puffs into the lungs every 6 hours as needed for Wheezing or Shortness of Breath, Disp: 18 g, Rfl: 5    fluticasone furoate-vilanterol (BREO ELLIPTA) 100-25 MCG/ACT inhaler, Inhale 1 puff into the lungs daily, Disp: 28 each, Rfl: 5    ondansetron (ZOFRAN) 4 MG tablet, Take 1 tablet by mouth 3 times daily as needed for Nausea or Vomiting, Disp: 10 tablet, Rfl: 2    nicotine (NICODERM CQ) 21 MG/24HR, Place 1 patch onto the skin daily, Disp: 42 patch, Rfl: 0    famotidine (PEPCID) 10 MG tablet, Take 2 tablets by mouth Daily, Disp: , Rfl:     cyclobenzaprine (FLEXERIL) 10 MG tablet, Take 1 tablet by mouth as needed for pain, Disp: 30 tablet, Rfl: 0    tamsulosin (FLOMAX) 0.4 MG capsule, Take 1 capsule by mouth daily, Disp: 90 capsule, Rfl: 3    finasteride (PROSCAR) 5 MG tablet, Take 1 tablet by mouth daily, Disp: 30 tablet, Rfl: 3    budesonide-formoterol (SYMBICORT) 160-4.5 MCG/ACT AERO, Inhale 1 puff into the lungs 2 times daily, Disp: 10.2 g, Rfl: 5    acetaminophen (TYLENOL) 500 MG tablet, Take by mouth every 6 hours as needed, Disp: , Rfl:     gabapentin (NEURONTIN) 600 MG tablet, Take 1 tablet by mouth 3 times daily for 90 days. Max Daily Amount: 1,800 mg, Disp: 270 tablet, Rfl: 0    gabapentin (NEURONTIN) 600 MG tablet, Take 1 tablet by mouth 3 times daily for 60

## 2024-10-09 NOTE — PROGRESS NOTES
\"Have you been to the ER, urgent care clinic since your last visit?  Hospitalized since your last visit?\"    NO    “Have you seen or consulted any other health care providers outside our system since your last visit?”    NO      “Have you had a diabetic eye exam?”    NO     No diabetic eye exam on file

## 2024-11-12 PROBLEM — I51.9 CARDIAC DISORDER: Status: ACTIVE | Noted: 2024-11-12

## 2024-12-20 NOTE — ROUTINE PROCESS
Bedside and Verbal shift change report given to Harper Guthrie (oncoming nurse) by Qian Nolasco (offgoing nurse). Report included the following information SBAR, Kardex, MAR and Recent Results. 98

## 2025-01-06 ENCOUNTER — OFFICE VISIT (OUTPATIENT)
Age: 60
End: 2025-01-06
Payer: MEDICARE

## 2025-01-06 VITALS — HEIGHT: 70 IN | TEMPERATURE: 97 F | WEIGHT: 175 LBS | BODY MASS INDEX: 25.05 KG/M2

## 2025-01-06 DIAGNOSIS — M51.26 LUMBAR HERNIATED DISC: ICD-10-CM

## 2025-01-06 DIAGNOSIS — M54.16 LUMBAR RADICULOPATHY: ICD-10-CM

## 2025-01-06 DIAGNOSIS — M96.1 POSTLAMINECTOMY SYNDROME, NOT ELSEWHERE CLASSIFIED: ICD-10-CM

## 2025-01-06 DIAGNOSIS — M51.362 DEGENERATION OF INTERVERTEBRAL DISC OF LUMBAR REGION WITH DISCOGENIC BACK PAIN AND LOWER EXTREMITY PAIN: Primary | ICD-10-CM

## 2025-01-06 DIAGNOSIS — M54.16 LUMBAR NEURITIS: ICD-10-CM

## 2025-01-06 PROCEDURE — G8427 DOCREV CUR MEDS BY ELIG CLIN: HCPCS | Performed by: PHYSICAL MEDICINE & REHABILITATION

## 2025-01-06 PROCEDURE — M1308 PR FLU IMMUNIZE NO ADMIN: HCPCS | Performed by: PHYSICAL MEDICINE & REHABILITATION

## 2025-01-06 PROCEDURE — 3017F COLORECTAL CA SCREEN DOC REV: CPT | Performed by: PHYSICAL MEDICINE & REHABILITATION

## 2025-01-06 PROCEDURE — G8419 CALC BMI OUT NRM PARAM NOF/U: HCPCS | Performed by: PHYSICAL MEDICINE & REHABILITATION

## 2025-01-06 PROCEDURE — 99214 OFFICE O/P EST MOD 30 MIN: CPT | Performed by: PHYSICAL MEDICINE & REHABILITATION

## 2025-01-06 PROCEDURE — 4004F PT TOBACCO SCREEN RCVD TLK: CPT | Performed by: PHYSICAL MEDICINE & REHABILITATION

## 2025-01-06 NOTE — PROGRESS NOTES
Azithromycin Nausea And Vomiting    Erythromycin Other (See Comments)          PHYSICAL EXAMINATION    Temp 97 °F (36.1 °C) (Temporal)   Ht 1.778 m (5' 10\")   Wt 79.4 kg (175 lb)   BMI 25.11 kg/m²       CONSTITUTIONAL: NAD, A&O x 3    Ambulates without an assistive device.    MOTOR:  Straight Leg Raise: Negative, Bilaterally     Hip Flex Knee Ext Knee Flex Ankle DF GTE Ankle PF Tone   Right +4/5 +4/5 +4/5 +4/5 +4/5 +4/5 +4/5   Left +4/5 +4/5 +4/5 +4/5 +4/5 +4/5 +4/5     SENSATION: Sensation is intact to light touch throughout.         ASSESSMENT   Loyd was seen today for back pain.    Diagnoses and all orders for this visit:    Degeneration of intervertebral disc of lumbar region with discogenic back pain and lower extremity pain    Lumbar herniated disc    Lumbar radiculopathy    Postlaminectomy syndrome, not elsewhere classified    Lumbar neuritis      IMPRESSION AND PLAN:  Patient returns to the office today with c/o lower back pain radiating into the LLE in an S1 distribution to the knee. Multiple treatment options were discussed.  He is not interested in surgery or injection at this time. I recommended the patient continue to perform his HEP everyday. He wished to continue with the current treatment plan. The pt does not need a refill of his  Gabapentin 600 mg TID at this time. I advised the pt contacts the office before he runs out of his neuropathic medication.  The patient is Neurologically intact. I will see the patient back in 3 months or earlier if needed.     Written by nate Ivan scribe, as dictated by Haim Smith MD  I examined the patient, reviewed and agree with the note.

## 2025-02-10 ENCOUNTER — HOSPITAL ENCOUNTER (OUTPATIENT)
Facility: HOSPITAL | Age: 60
Setting detail: RECURRING SERIES
Discharge: HOME | End: 2025-02-13
Payer: MEDICARE

## 2025-02-10 ENCOUNTER — TRANSCRIBE ORDERS (OUTPATIENT)
Facility: HOSPITAL | Age: 60
End: 2025-02-10

## 2025-02-10 DIAGNOSIS — C61 MALIGNANT NEOPLASM OF PROSTATE (HCC): Primary | ICD-10-CM

## 2025-02-10 DIAGNOSIS — C78.00 MALIGNANT NEOPLASM METASTATIC TO LUNG, UNSPECIFIED LATERALITY (HCC): ICD-10-CM

## 2025-02-10 PROCEDURE — 99214 OFFICE O/P EST MOD 30 MIN: CPT

## 2025-02-26 DIAGNOSIS — Z11.4 ENCOUNTER FOR SCREENING FOR HIV: ICD-10-CM

## 2025-02-26 DIAGNOSIS — Z13.29 THYROID DISORDER SCREENING: Primary | ICD-10-CM

## 2025-02-26 DIAGNOSIS — Z13.1 SCREENING FOR DIABETES MELLITUS (DM): ICD-10-CM

## 2025-02-26 DIAGNOSIS — Z13.220 SCREENING, LIPID: ICD-10-CM

## 2025-02-26 DIAGNOSIS — R53.83 FATIGUE, UNSPECIFIED TYPE: ICD-10-CM

## 2025-03-14 ENCOUNTER — HOSPITAL ENCOUNTER (OUTPATIENT)
Facility: HOSPITAL | Age: 60
Discharge: HOME | End: 2025-03-17
Attending: RADIOLOGY
Payer: MEDICARE

## 2025-03-14 DIAGNOSIS — C61 MALIGNANT NEOPLASM OF PROSTATE (HCC): ICD-10-CM

## 2025-03-14 DIAGNOSIS — C78.00 MALIGNANT NEOPLASM METASTATIC TO LUNG, UNSPECIFIED LATERALITY (HCC): ICD-10-CM

## 2025-03-14 PROCEDURE — 6360000004 HC RX CONTRAST MEDICATION: Performed by: RADIOLOGY

## 2025-03-14 PROCEDURE — 71260 CT THORAX DX C+: CPT

## 2025-03-14 RX ORDER — IOPAMIDOL 612 MG/ML
80 INJECTION, SOLUTION INTRAVASCULAR
Status: COMPLETED | OUTPATIENT
Start: 2025-03-14 | End: 2025-03-14

## 2025-03-14 RX ADMIN — IOPAMIDOL 80 ML: 612 INJECTION, SOLUTION INTRAVENOUS at 07:52

## 2025-03-24 ENCOUNTER — APPOINTMENT (OUTPATIENT)
Facility: HOSPITAL | Age: 60
End: 2025-03-24
Payer: MEDICARE

## 2025-03-24 ENCOUNTER — HOSPITAL ENCOUNTER (EMERGENCY)
Facility: HOSPITAL | Age: 60
Discharge: HOME | End: 2025-03-24
Attending: STUDENT IN AN ORGANIZED HEALTH CARE EDUCATION/TRAINING PROGRAM
Payer: MEDICARE

## 2025-03-24 VITALS
RESPIRATION RATE: 18 BRPM | TEMPERATURE: 98 F | HEART RATE: 79 BPM | DIASTOLIC BLOOD PRESSURE: 85 MMHG | WEIGHT: 175 LBS | BODY MASS INDEX: 25.05 KG/M2 | SYSTOLIC BLOOD PRESSURE: 120 MMHG | OXYGEN SATURATION: 99 % | HEIGHT: 70 IN

## 2025-03-24 DIAGNOSIS — J06.9 VIRAL URI WITH COUGH: Primary | ICD-10-CM

## 2025-03-24 LAB
FLUAV RNA SPEC QL NAA+PROBE: NOT DETECTED
FLUBV RNA SPEC QL NAA+PROBE: NOT DETECTED
S PYO DNA THROAT QL NAA+PROBE: NOT DETECTED
SARS-COV-2 RNA RESP QL NAA+PROBE: NOT DETECTED
SOURCE: NORMAL

## 2025-03-24 PROCEDURE — 99284 EMERGENCY DEPT VISIT MOD MDM: CPT

## 2025-03-24 PROCEDURE — 96372 THER/PROPH/DIAG INJ SC/IM: CPT

## 2025-03-24 PROCEDURE — 71046 X-RAY EXAM CHEST 2 VIEWS: CPT

## 2025-03-24 PROCEDURE — 87651 STREP A DNA AMP PROBE: CPT

## 2025-03-24 PROCEDURE — 87636 SARSCOV2 & INF A&B AMP PRB: CPT

## 2025-03-24 PROCEDURE — 6370000000 HC RX 637 (ALT 250 FOR IP): Performed by: STUDENT IN AN ORGANIZED HEALTH CARE EDUCATION/TRAINING PROGRAM

## 2025-03-24 PROCEDURE — 6360000002 HC RX W HCPCS: Performed by: STUDENT IN AN ORGANIZED HEALTH CARE EDUCATION/TRAINING PROGRAM

## 2025-03-24 RX ORDER — ASCORBIC ACID 500 MG
500 TABLET ORAL DAILY
COMMUNITY

## 2025-03-24 RX ORDER — ACETAMINOPHEN 500 MG
1000 TABLET ORAL
Status: COMPLETED | OUTPATIENT
Start: 2025-03-24 | End: 2025-03-24

## 2025-03-24 RX ORDER — KETOROLAC TROMETHAMINE 15 MG/ML
15 INJECTION, SOLUTION INTRAMUSCULAR; INTRAVENOUS
Status: COMPLETED | OUTPATIENT
Start: 2025-03-24 | End: 2025-03-24

## 2025-03-24 RX ADMIN — ACETAMINOPHEN 1000 MG: 500 TABLET, FILM COATED ORAL at 21:55

## 2025-03-24 RX ADMIN — KETOROLAC TROMETHAMINE 15 MG: 15 INJECTION, SOLUTION INTRAMUSCULAR; INTRAVENOUS at 21:56

## 2025-03-24 ASSESSMENT — PAIN SCALES - GENERAL
PAINLEVEL_OUTOF10: 2
PAINLEVEL_OUTOF10: 7
PAINLEVEL_OUTOF10: 8

## 2025-03-24 ASSESSMENT — PAIN DESCRIPTION - LOCATION
LOCATION: FACE;CHEST
LOCATION: CHEST;FACE
LOCATION: CHEST;FACE

## 2025-03-24 ASSESSMENT — PAIN DESCRIPTION - PAIN TYPE
TYPE: ACUTE PAIN

## 2025-03-24 ASSESSMENT — PAIN DESCRIPTION - ORIENTATION
ORIENTATION: OTHER (COMMENT)

## 2025-03-24 ASSESSMENT — PAIN - FUNCTIONAL ASSESSMENT
PAIN_FUNCTIONAL_ASSESSMENT: 0-10
PAIN_FUNCTIONAL_ASSESSMENT: ACTIVITIES ARE NOT PREVENTED

## 2025-03-24 ASSESSMENT — PAIN DESCRIPTION - ONSET
ONSET: ON-GOING
ONSET: PROGRESSIVE

## 2025-03-24 ASSESSMENT — PAIN DESCRIPTION - FREQUENCY
FREQUENCY: CONTINUOUS

## 2025-03-24 ASSESSMENT — PAIN DESCRIPTION - DESCRIPTORS
DESCRIPTORS: ACHING

## 2025-03-25 ENCOUNTER — APPOINTMENT (OUTPATIENT)
Facility: HOSPITAL | Age: 60
End: 2025-03-25
Attending: RADIOLOGY
Payer: MEDICARE

## 2025-03-25 NOTE — ED PROVIDER NOTES
EMERGENCY DEPARTMENT HISTORY AND PHYSICAL EXAM      Date: 3/24/2025  Patient Name: Loyd Gonzalez    History of Presenting Illness     Chief Complaint   Patient presents with    Cold Symptoms     Pt to Ed reports congestion, headache and sore throat onset Wednesday. Known exposure to flu        Patient is a 59-year-old male with past medical history of asthma, GERD and BPH who presents for flulike symptoms for the past few days. He is reporting cough, congestion, rhinorrhea, right ear fullness and headache.  He has sick contact at home.      Supplemental historian: Family          PCP: Clarissa Springer MD    No current facility-administered medications for this encounter.     Current Outpatient Medications   Medication Sig Dispense Refill    vitamin C (ASCORBIC ACID) 500 MG tablet Take 1 tablet by mouth daily      Cholecalciferol (VITAMIN D) 10 MCG (400 UNIT) CAPS Capsule Take 1 capsule by mouth daily      tamsulosin (FLOMAX) 0.4 MG capsule Take 1 capsule by mouth daily 90 capsule 3    albuterol sulfate HFA (PROVENTIL;VENTOLIN;PROAIR) 108 (90 Base) MCG/ACT inhaler Inhale 2 puffs into the lungs every 6 hours as needed for Wheezing or Shortness of Breath 18 g 5    fluticasone furoate-vilanterol (BREO ELLIPTA) 100-25 MCG/ACT inhaler Inhale 1 puff into the lungs daily 28 each 5    ondansetron (ZOFRAN) 4 MG tablet Take 1 tablet by mouth 3 times daily as needed for Nausea or Vomiting 10 tablet 2    famotidine (PEPCID) 10 MG tablet Take 2 tablets by mouth Daily      cyclobenzaprine (FLEXERIL) 10 MG tablet Take 1 tablet by mouth as needed for pain 30 tablet 0    gabapentin (NEURONTIN) 600 MG tablet Take 1 tablet by mouth 3 times daily for 60 days. Max Daily Amount: 1,800 mg 90 tablet 1    budesonide-formoterol (SYMBICORT) 160-4.5 MCG/ACT AERO Inhale 1 puff into the lungs 2 times daily 10.2 g 5    acetaminophen (TYLENOL) 500 MG tablet Take by mouth every 6 hours as needed      nicotine (NICODERM CQ) 21 MG/24HR Place 1

## 2025-03-25 NOTE — DISCHARGE INSTRUCTIONS
Alternate ibuprofen/Tylenol as discussed. Follow-up with your PCP.  Come back to emergency department for worsening symptoms, fevers, chest pain, shortness of air, dizziness, passing out or any other concerns.

## 2025-04-02 ENCOUNTER — HOSPITAL ENCOUNTER (EMERGENCY)
Facility: HOSPITAL | Age: 60
Discharge: HOME | End: 2025-04-02
Payer: MEDICARE

## 2025-04-02 VITALS
WEIGHT: 178.2 LBS | BODY MASS INDEX: 25.51 KG/M2 | HEART RATE: 100 BPM | TEMPERATURE: 98.4 F | DIASTOLIC BLOOD PRESSURE: 88 MMHG | OXYGEN SATURATION: 97 % | RESPIRATION RATE: 20 BRPM | SYSTOLIC BLOOD PRESSURE: 114 MMHG | HEIGHT: 70 IN

## 2025-04-02 DIAGNOSIS — J01.00 ACUTE NON-RECURRENT MAXILLARY SINUSITIS: Primary | ICD-10-CM

## 2025-04-02 PROCEDURE — 99283 EMERGENCY DEPT VISIT LOW MDM: CPT

## 2025-04-02 RX ORDER — AZITHROMYCIN 250 MG/1
TABLET, FILM COATED ORAL
Qty: 6 TABLET | Refills: 0 | Status: SHIPPED | OUTPATIENT
Start: 2025-04-02 | End: 2025-04-12

## 2025-04-02 RX ORDER — BENZOCAINE AND MENTHOL, UNSPECIFIED FORM 15; 2.3 MG/1; MG/1
1 LOZENGE ORAL 4 TIMES DAILY
Qty: 32 LOZENGE | Refills: 0 | Status: SHIPPED | OUTPATIENT
Start: 2025-04-02

## 2025-04-02 RX ORDER — ECHINACEA PURPUREA EXTRACT 125 MG
1 TABLET ORAL PRN
Qty: 1 EACH | Refills: 3 | Status: SHIPPED | OUTPATIENT
Start: 2025-04-02

## 2025-04-02 RX ORDER — ONDANSETRON 4 MG/1
4 TABLET, ORALLY DISINTEGRATING ORAL 3 TIMES DAILY PRN
Qty: 21 TABLET | Refills: 0 | Status: SHIPPED | OUTPATIENT
Start: 2025-04-02 | End: 2025-04-09

## 2025-04-02 ASSESSMENT — ENCOUNTER SYMPTOMS
VOMITING: 0
SHORTNESS OF BREATH: 0
DIARRHEA: 0

## 2025-04-02 NOTE — DISCHARGE INSTRUCTIONS
Return to emergency department if development of fever, severe sinus pain, persistent bloody discharge    Sinusitis Home care instructions:    - Use saline (saltwater) nasal washes. This can help keep your nasal passages open and wash out mucus and allergens.  You can make your own at home. Add 1 teaspoon (5 millilitres) of non-iodized salt and 1 teaspoon (5 millilitres) of baking soda to 2 cups (500 millilitres) of distilled or boiled and cooled water. Fill a squeeze bottle or a nasal cleansing pot (such as a neti pot) with the nasal wash. Then put the tip into your nostril, and lean over the sink. With your mouth open, gently squirt the liquid. Repeat on the other side.  - Try a decongestant nasal spray like oxymetazoline (Afrin). Do not use it for more than 3 days in a row. Using it for more than 3 days can make your congestion worse.  - If needed, take an over-the-counter pain medicine, such as acetaminophen (Tylenol), ibuprofen (Advil, Motrin), or naproxen (Aleve). Read and follow all instructions on the label.  - Be careful when taking over-the-counter cold or influenza (flu) medicines and Tylenol at the same time. Many of these medicines have acetaminophen, which is Tylenol. Read the labels to make sure that you are not taking more than the recommended dose. Too much acetaminophen (Tylenol) can be harmful.  - Oral antihistamine such as Claritin, Allegra, Zyrtec can also help with nasal symptoms.  Use caution when using combination cough and cold products as these often contain antihistamines as well.  - Use a steroid nasal spray such as Flonase  - Breathe warm, moist air. You can use a steamy shower, a hot bath, or a sink filled with hot water. Avoid cold, dry air.

## 2025-04-02 NOTE — ED PROVIDER NOTES
Normal range of motion.      Cervical back: Normal range of motion.   Skin:     General: Skin is warm.      Findings: No rash.   Neurological:      General: No focal deficit present.      Mental Status: He is alert and oriented to person, place, and time.      Cranial Nerves: No cranial nerve deficit.      Sensory: No sensory deficit.      Motor: No weakness.        Vitals:    04/02/25 1334   BP:    Pulse:    Resp:    Temp: 98.4 °F (36.9 °C)   SpO2:        Diagnostic Study Results     Labs -   No results found for this or any previous visit (from the past 12 hours).      Radiologic Studies -   Non-plain film images such as CT, Ultrasound and MRI are read by the radiologist. Plain radiographic images are visualized and preliminarily interpreted by me with findings noted in MDM section. In evenings and at night radiology interpretations are not routinely available.     Interpretation per the Radiologist below, if available at the time of this note:    No orders to display       EKG interpretation: (Preliminary)  All EKG's are interpreted by the Emergency Department Physician who either signs or Co-signs this chart in the absence of a cardiologist.  na    SCREENINGS                   Medical Decision Making   I am the first provider for this patient.    I reviewed the vital signs, available nursing notes, past medical history, past surgical history, family history and social history.    1. Acute non-recurrent maxillary sinusitis        DISPOSITION Decision To Discharge 04/02/2025 01:33:21 PM   DISPOSITION CONDITION Stable           Provider Notes (Medical Decision Making):     Acute bacterial sinusitis: Educated patient on use of nasal saline or Alona pot.  Prescribing azithromycin, patient said that he has mild nausea with this drug and will send Zofran to assist with the symptoms.  Bacterial rhinosinusitis today based on history of facial pain worsening over 2.5 weeks.  Follow-up with PCP in one week for evaluation

## 2025-04-08 ENCOUNTER — APPOINTMENT (OUTPATIENT)
Facility: HOSPITAL | Age: 60
End: 2025-04-08
Attending: RADIOLOGY
Payer: MEDICARE

## 2025-04-09 ENCOUNTER — OFFICE VISIT (OUTPATIENT)
Facility: CLINIC | Age: 60
End: 2025-04-09
Payer: MEDICARE

## 2025-04-09 VITALS
RESPIRATION RATE: 18 BRPM | SYSTOLIC BLOOD PRESSURE: 110 MMHG | OXYGEN SATURATION: 98 % | DIASTOLIC BLOOD PRESSURE: 75 MMHG | BODY MASS INDEX: 25.77 KG/M2 | HEIGHT: 70 IN | WEIGHT: 180 LBS | TEMPERATURE: 97.9 F | HEART RATE: 83 BPM

## 2025-04-09 DIAGNOSIS — T88.7XXA MEDICATION SIDE EFFECT: Primary | ICD-10-CM

## 2025-04-09 DIAGNOSIS — J45.30 MILD PERSISTENT ASTHMA WITHOUT COMPLICATION: ICD-10-CM

## 2025-04-09 DIAGNOSIS — R11.0 NAUSEA: ICD-10-CM

## 2025-04-09 DIAGNOSIS — J45.909 REACTIVE AIRWAY DISEASE WITHOUT COMPLICATION, UNSPECIFIED ASTHMA SEVERITY, UNSPECIFIED WHETHER PERSISTENT: ICD-10-CM

## 2025-04-09 PROCEDURE — 99214 OFFICE O/P EST MOD 30 MIN: CPT | Performed by: FAMILY MEDICINE

## 2025-04-09 RX ORDER — ONDANSETRON 4 MG/1
4 TABLET, ORALLY DISINTEGRATING ORAL 3 TIMES DAILY PRN
Qty: 21 TABLET | Refills: 3 | Status: SHIPPED | OUTPATIENT
Start: 2025-04-09 | End: 2025-05-09

## 2025-04-09 RX ORDER — ALBUTEROL SULFATE 90 UG/1
2 INHALANT RESPIRATORY (INHALATION) EVERY 6 HOURS PRN
Qty: 18 G | Refills: 5 | Status: SHIPPED | OUTPATIENT
Start: 2025-04-09 | End: 2025-10-06

## 2025-04-09 RX ORDER — FLUTICASONE FUROATE AND VILANTEROL 100; 25 UG/1; UG/1
1 POWDER RESPIRATORY (INHALATION) DAILY
Qty: 28 EACH | Refills: 5 | Status: SHIPPED | OUTPATIENT
Start: 2025-04-09

## 2025-04-09 RX ORDER — BUDESONIDE AND FORMOTEROL FUMARATE DIHYDRATE 160; 4.5 UG/1; UG/1
1 AEROSOL RESPIRATORY (INHALATION) 2 TIMES DAILY
Qty: 10.2 G | Refills: 5 | Status: CANCELLED | OUTPATIENT
Start: 2025-04-09

## 2025-04-09 SDOH — ECONOMIC STABILITY: FOOD INSECURITY: WITHIN THE PAST 12 MONTHS, THE FOOD YOU BOUGHT JUST DIDN'T LAST AND YOU DIDN'T HAVE MONEY TO GET MORE.: NEVER TRUE

## 2025-04-09 SDOH — ECONOMIC STABILITY: FOOD INSECURITY: WITHIN THE PAST 12 MONTHS, YOU WORRIED THAT YOUR FOOD WOULD RUN OUT BEFORE YOU GOT MONEY TO BUY MORE.: NEVER TRUE

## 2025-04-09 ASSESSMENT — PATIENT HEALTH QUESTIONNAIRE - PHQ9
SUM OF ALL RESPONSES TO PHQ QUESTIONS 1-9: 0
2. FEELING DOWN, DEPRESSED OR HOPELESS: NOT AT ALL
SUM OF ALL RESPONSES TO PHQ QUESTIONS 1-9: 0
1. LITTLE INTEREST OR PLEASURE IN DOING THINGS: NOT AT ALL

## 2025-04-15 ENCOUNTER — HOSPITAL ENCOUNTER (OUTPATIENT)
Facility: HOSPITAL | Age: 60
Setting detail: RECURRING SERIES
Discharge: HOME | End: 2025-04-18
Attending: RADIOLOGY

## 2025-04-15 ENCOUNTER — HOSPITAL ENCOUNTER (OUTPATIENT)
Facility: HOSPITAL | Age: 60
Discharge: HOME | End: 2025-04-18
Attending: RADIOLOGY
Payer: MEDICARE

## 2025-04-15 DIAGNOSIS — C61 MALIGNANT NEOPLASM OF PROSTATE (HCC): ICD-10-CM

## 2025-04-15 PROCEDURE — 76498 UNLISTED MR PROCEDURE: CPT

## 2025-04-15 RX ORDER — DIATRIZOATE MEGLUMINE AND DIATRIZOATE SODIUM 660; 100 MG/ML; MG/ML
30 SOLUTION ORAL; RECTAL
Status: DISCONTINUED | OUTPATIENT
Start: 2025-04-15 | End: 2025-04-19 | Stop reason: HOSPADM

## 2025-04-16 ENCOUNTER — HOSPITAL ENCOUNTER (OUTPATIENT)
Facility: HOSPITAL | Age: 60
Setting detail: RECURRING SERIES
Discharge: HOME | End: 2025-04-19
Attending: RADIOLOGY

## 2025-04-22 ENCOUNTER — HOSPITAL ENCOUNTER (OUTPATIENT)
Facility: HOSPITAL | Age: 60
Setting detail: RECURRING SERIES
Discharge: HOME | End: 2025-04-25

## 2025-04-23 ENCOUNTER — HOSPITAL ENCOUNTER (OUTPATIENT)
Facility: HOSPITAL | Age: 60
Setting detail: RECURRING SERIES
Discharge: HOME | End: 2025-04-26

## 2025-04-24 ENCOUNTER — HOSPITAL ENCOUNTER (OUTPATIENT)
Facility: HOSPITAL | Age: 60
Setting detail: RECURRING SERIES
Discharge: HOME | End: 2025-04-27
Attending: RADIOLOGY
Payer: MEDICARE

## 2025-04-24 LAB
RAD ONC ARIA COURSE FIRST TREATMENT DATE: NORMAL
RAD ONC ARIA COURSE ID: NORMAL
RAD ONC ARIA COURSE INTENT: NORMAL
RAD ONC ARIA COURSE LAST TREATMENT DATE: NORMAL
RAD ONC ARIA COURSE SESSION NUMBER: 1
RAD ONC ARIA COURSE START DATE: NORMAL
RAD ONC ARIA COURSE TREATMENT ELAPSED DAYS: 0
RAD ONC ARIA PLAN FRACTIONS TREATED TO DATE: 1
RAD ONC ARIA PLAN ID: NORMAL
RAD ONC ARIA PLAN PRESCRIBED DOSE PER FRACTION: 2.5 GY
RAD ONC ARIA PLAN PRIMARY REFERENCE POINT: NORMAL
RAD ONC ARIA PLAN TOTAL FRACTIONS PRESCRIBED: 28
RAD ONC ARIA PLAN TOTAL PRESCRIBED DOSE: 7000 CGY
RAD ONC ARIA REFERENCE POINT DOSAGE GIVEN TO DATE: 2.5 GY
RAD ONC ARIA REFERENCE POINT ID: NORMAL
RAD ONC ARIA REFERENCE POINT SESSION DOSAGE GIVEN: 2.5 GY

## 2025-04-24 PROCEDURE — 77385 HC NTSTY MODUL RAD TX DLVR SMPL: CPT

## 2025-04-24 PROCEDURE — 77427 RADIATION TX MANAGEMENT X5: CPT | Performed by: RADIOLOGY

## 2025-04-24 PROCEDURE — 77014 CHG CT GUIDANCE RADIATION THERAPY FLDS PLACEMENT: CPT | Performed by: RADIOLOGY

## 2025-04-25 ENCOUNTER — OFFICE VISIT (OUTPATIENT)
Age: 60
End: 2025-04-25
Payer: MEDICARE

## 2025-04-25 ENCOUNTER — HOSPITAL ENCOUNTER (OUTPATIENT)
Facility: HOSPITAL | Age: 60
Setting detail: RECURRING SERIES
Discharge: HOME | End: 2025-04-28
Attending: RADIOLOGY
Payer: MEDICARE

## 2025-04-25 VITALS — WEIGHT: 176 LBS | OXYGEN SATURATION: 98 % | TEMPERATURE: 98.2 F | BODY MASS INDEX: 25.25 KG/M2

## 2025-04-25 DIAGNOSIS — M54.16 LUMBAR RADICULOPATHY: ICD-10-CM

## 2025-04-25 DIAGNOSIS — M51.362 DEGENERATION OF INTERVERTEBRAL DISC OF LUMBAR REGION WITH DISCOGENIC BACK PAIN AND LOWER EXTREMITY PAIN: Primary | ICD-10-CM

## 2025-04-25 DIAGNOSIS — M54.16 LUMBAR NEURITIS: ICD-10-CM

## 2025-04-25 DIAGNOSIS — M96.1 POSTLAMINECTOMY SYNDROME, NOT ELSEWHERE CLASSIFIED: ICD-10-CM

## 2025-04-25 DIAGNOSIS — M51.26 LUMBAR HERNIATED DISC: ICD-10-CM

## 2025-04-25 LAB
RAD ONC ARIA COURSE FIRST TREATMENT DATE: NORMAL
RAD ONC ARIA COURSE ID: NORMAL
RAD ONC ARIA COURSE INTENT: NORMAL
RAD ONC ARIA COURSE LAST TREATMENT DATE: NORMAL
RAD ONC ARIA COURSE SESSION NUMBER: 2
RAD ONC ARIA COURSE START DATE: NORMAL
RAD ONC ARIA COURSE TREATMENT ELAPSED DAYS: 1
RAD ONC ARIA PLAN FRACTIONS TREATED TO DATE: 2
RAD ONC ARIA PLAN ID: NORMAL
RAD ONC ARIA PLAN PRESCRIBED DOSE PER FRACTION: 2.5 GY
RAD ONC ARIA PLAN PRIMARY REFERENCE POINT: NORMAL
RAD ONC ARIA PLAN TOTAL FRACTIONS PRESCRIBED: 28
RAD ONC ARIA PLAN TOTAL PRESCRIBED DOSE: 7000 CGY
RAD ONC ARIA REFERENCE POINT DOSAGE GIVEN TO DATE: 5 GY
RAD ONC ARIA REFERENCE POINT ID: NORMAL
RAD ONC ARIA REFERENCE POINT SESSION DOSAGE GIVEN: 2.5 GY

## 2025-04-25 PROCEDURE — 99214 OFFICE O/P EST MOD 30 MIN: CPT | Performed by: PHYSICAL MEDICINE & REHABILITATION

## 2025-04-25 PROCEDURE — 77014 CHG CT GUIDANCE RADIATION THERAPY FLDS PLACEMENT: CPT | Performed by: RADIOLOGY

## 2025-04-25 PROCEDURE — 77385 HC NTSTY MODUL RAD TX DLVR SMPL: CPT

## 2025-04-25 RX ORDER — POLYETHYLENE GLYCOL 3350 17 G/17G
17 POWDER, FOR SOLUTION ORAL DAILY
COMMUNITY

## 2025-04-25 RX ORDER — GABAPENTIN 600 MG/1
600 TABLET ORAL 3 TIMES DAILY
Qty: 270 TABLET | Refills: 0 | Status: SHIPPED | OUTPATIENT
Start: 2025-04-25 | End: 2025-07-24

## 2025-04-25 RX ORDER — DOCUSATE SODIUM 100 MG/1
100 CAPSULE, LIQUID FILLED ORAL 2 TIMES DAILY
COMMUNITY

## 2025-04-25 RX ORDER — SIMETHICONE 80 MG
80 TABLET,CHEWABLE ORAL 2 TIMES DAILY
COMMUNITY

## 2025-04-25 RX ORDER — MAGNESIUM 30 MG
30 TABLET ORAL DAILY
COMMUNITY
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
## 2025-04-28 ENCOUNTER — HOSPITAL ENCOUNTER (OUTPATIENT)
Facility: HOSPITAL | Age: 60
Setting detail: RECURRING SERIES
Discharge: HOME | End: 2025-05-01
Attending: RADIOLOGY
Payer: MEDICARE

## 2025-04-28 LAB
RAD ONC ARIA COURSE FIRST TREATMENT DATE: NORMAL
RAD ONC ARIA COURSE ID: NORMAL
RAD ONC ARIA COURSE INTENT: NORMAL
RAD ONC ARIA COURSE LAST TREATMENT DATE: NORMAL
RAD ONC ARIA COURSE SESSION NUMBER: 3
RAD ONC ARIA COURSE START DATE: NORMAL
RAD ONC ARIA COURSE TREATMENT ELAPSED DAYS: 4
RAD ONC ARIA PLAN FRACTIONS TREATED TO DATE: 3
RAD ONC ARIA PLAN ID: NORMAL
RAD ONC ARIA PLAN PRESCRIBED DOSE PER FRACTION: 2.5 GY
RAD ONC ARIA PLAN PRIMARY REFERENCE POINT: NORMAL
RAD ONC ARIA PLAN TOTAL FRACTIONS PRESCRIBED: 28
RAD ONC ARIA PLAN TOTAL PRESCRIBED DOSE: 7000 CGY
RAD ONC ARIA REFERENCE POINT DOSAGE GIVEN TO DATE: 7.5 GY
RAD ONC ARIA REFERENCE POINT ID: NORMAL
RAD ONC ARIA REFERENCE POINT SESSION DOSAGE GIVEN: 2.5 GY

## 2025-04-28 PROCEDURE — 77014 CHG CT GUIDANCE RADIATION THERAPY FLDS PLACEMENT: CPT | Performed by: RADIOLOGY

## 2025-04-28 PROCEDURE — 77385 HC NTSTY MODUL RAD TX DLVR SMPL: CPT

## 2025-04-29 ENCOUNTER — HOSPITAL ENCOUNTER (OUTPATIENT)
Facility: HOSPITAL | Age: 60
Setting detail: RECURRING SERIES
Discharge: HOME | End: 2025-05-02
Attending: RADIOLOGY
Payer: MEDICARE

## 2025-04-29 LAB
RAD ONC ARIA COURSE FIRST TREATMENT DATE: NORMAL
RAD ONC ARIA COURSE ID: NORMAL
RAD ONC ARIA COURSE INTENT: NORMAL
RAD ONC ARIA COURSE LAST TREATMENT DATE: NORMAL
RAD ONC ARIA COURSE SESSION NUMBER: 4
RAD ONC ARIA COURSE START DATE: NORMAL
RAD ONC ARIA COURSE TREATMENT ELAPSED DAYS: 5
RAD ONC ARIA PLAN FRACTIONS TREATED TO DATE: 4
RAD ONC ARIA PLAN ID: NORMAL
RAD ONC ARIA PLAN PRESCRIBED DOSE PER FRACTION: 2.5 GY
RAD ONC ARIA PLAN PRIMARY REFERENCE POINT: NORMAL
RAD ONC ARIA PLAN TOTAL FRACTIONS PRESCRIBED: 28
RAD ONC ARIA PLAN TOTAL PRESCRIBED DOSE: 7000 CGY
RAD ONC ARIA REFERENCE POINT DOSAGE GIVEN TO DATE: 10 GY
RAD ONC ARIA REFERENCE POINT ID: NORMAL
RAD ONC ARIA REFERENCE POINT SESSION DOSAGE GIVEN: 2.5 GY

## 2025-04-29 PROCEDURE — 77385 HC NTSTY MODUL RAD TX DLVR SMPL: CPT

## 2025-04-29 PROCEDURE — 77014 CHG CT GUIDANCE RADIATION THERAPY FLDS PLACEMENT: CPT | Performed by: RADIOLOGY

## 2025-04-30 ENCOUNTER — HOSPITAL ENCOUNTER (OUTPATIENT)
Facility: HOSPITAL | Age: 60
Setting detail: RECURRING SERIES
Discharge: HOME | End: 2025-05-03
Attending: RADIOLOGY
Payer: MEDICARE

## 2025-04-30 LAB
RAD ONC ARIA COURSE FIRST TREATMENT DATE: NORMAL
RAD ONC ARIA COURSE ID: NORMAL
RAD ONC ARIA COURSE INTENT: NORMAL
RAD ONC ARIA COURSE LAST TREATMENT DATE: NORMAL
RAD ONC ARIA COURSE SESSION NUMBER: 5
RAD ONC ARIA COURSE START DATE: NORMAL
RAD ONC ARIA COURSE TREATMENT ELAPSED DAYS: 6
RAD ONC ARIA PLAN FRACTIONS TREATED TO DATE: 5
RAD ONC ARIA PLAN ID: NORMAL
RAD ONC ARIA PLAN PRESCRIBED DOSE PER FRACTION: 2.5 GY
RAD ONC ARIA PLAN PRIMARY REFERENCE POINT: NORMAL
RAD ONC ARIA PLAN TOTAL FRACTIONS PRESCRIBED: 28
RAD ONC ARIA PLAN TOTAL PRESCRIBED DOSE: 7000 CGY
RAD ONC ARIA REFERENCE POINT DOSAGE GIVEN TO DATE: 12.5 GY
RAD ONC ARIA REFERENCE POINT ID: NORMAL
RAD ONC ARIA REFERENCE POINT SESSION DOSAGE GIVEN: 2.5 GY

## 2025-04-30 PROCEDURE — 77336 RADIATION PHYSICS CONSULT: CPT

## 2025-04-30 PROCEDURE — 77385 HC NTSTY MODUL RAD TX DLVR SMPL: CPT

## 2025-05-01 ENCOUNTER — HOSPITAL ENCOUNTER (OUTPATIENT)
Facility: HOSPITAL | Age: 60
Setting detail: RECURRING SERIES
Discharge: HOME | End: 2025-05-04
Attending: RADIOLOGY
Payer: MEDICARE

## 2025-05-01 LAB
RAD ONC ARIA COURSE FIRST TREATMENT DATE: NORMAL
RAD ONC ARIA COURSE ID: NORMAL
RAD ONC ARIA COURSE INTENT: NORMAL
RAD ONC ARIA COURSE LAST TREATMENT DATE: NORMAL
RAD ONC ARIA COURSE SESSION NUMBER: 6
RAD ONC ARIA COURSE START DATE: NORMAL
RAD ONC ARIA COURSE TREATMENT ELAPSED DAYS: 7
RAD ONC ARIA PLAN FRACTIONS TREATED TO DATE: 6
RAD ONC ARIA PLAN ID: NORMAL
RAD ONC ARIA PLAN PRESCRIBED DOSE PER FRACTION: 2.5 GY
RAD ONC ARIA PLAN PRIMARY REFERENCE POINT: NORMAL
RAD ONC ARIA PLAN TOTAL FRACTIONS PRESCRIBED: 28
RAD ONC ARIA PLAN TOTAL PRESCRIBED DOSE: 7000 CGY
RAD ONC ARIA REFERENCE POINT DOSAGE GIVEN TO DATE: 15 GY
RAD ONC ARIA REFERENCE POINT ID: NORMAL
RAD ONC ARIA REFERENCE POINT SESSION DOSAGE GIVEN: 2.5 GY

## 2025-05-01 PROCEDURE — 77427 RADIATION TX MANAGEMENT X5: CPT | Performed by: RADIOLOGY

## 2025-05-01 PROCEDURE — 77014 CHG CT GUIDANCE RADIATION THERAPY FLDS PLACEMENT: CPT | Performed by: RADIOLOGY

## 2025-05-01 PROCEDURE — 77385 HC NTSTY MODUL RAD TX DLVR SMPL: CPT

## 2025-05-02 ENCOUNTER — HOSPITAL ENCOUNTER (OUTPATIENT)
Facility: HOSPITAL | Age: 60
Setting detail: RECURRING SERIES
Discharge: HOME | End: 2025-05-05
Attending: RADIOLOGY
Payer: MEDICARE

## 2025-05-02 LAB
RAD ONC ARIA COURSE FIRST TREATMENT DATE: NORMAL
RAD ONC ARIA COURSE ID: NORMAL
RAD ONC ARIA COURSE INTENT: NORMAL
RAD ONC ARIA COURSE LAST TREATMENT DATE: NORMAL
RAD ONC ARIA COURSE SESSION NUMBER: 7
RAD ONC ARIA COURSE START DATE: NORMAL
RAD ONC ARIA COURSE TREATMENT ELAPSED DAYS: 8
RAD ONC ARIA PLAN FRACTIONS TREATED TO DATE: 7
RAD ONC ARIA PLAN ID: NORMAL
RAD ONC ARIA PLAN PRESCRIBED DOSE PER FRACTION: 2.5 GY
RAD ONC ARIA PLAN PRIMARY REFERENCE POINT: NORMAL
RAD ONC ARIA PLAN TOTAL FRACTIONS PRESCRIBED: 28
RAD ONC ARIA PLAN TOTAL PRESCRIBED DOSE: 7000 CGY
RAD ONC ARIA REFERENCE POINT DOSAGE GIVEN TO DATE: 17.5 GY
RAD ONC ARIA REFERENCE POINT ID: NORMAL
RAD ONC ARIA REFERENCE POINT SESSION DOSAGE GIVEN: 2.5 GY

## 2025-05-02 PROCEDURE — 77385 HC NTSTY MODUL RAD TX DLVR SMPL: CPT

## 2025-05-02 PROCEDURE — 77014 CHG CT GUIDANCE RADIATION THERAPY FLDS PLACEMENT: CPT | Performed by: RADIOLOGY

## 2025-05-06 ENCOUNTER — HOSPITAL ENCOUNTER (OUTPATIENT)
Facility: HOSPITAL | Age: 60
Setting detail: RECURRING SERIES
Discharge: HOME | End: 2025-05-09
Attending: RADIOLOGY
Payer: MEDICARE

## 2025-05-06 LAB
RAD ONC ARIA COURSE FIRST TREATMENT DATE: NORMAL
RAD ONC ARIA COURSE ID: NORMAL
RAD ONC ARIA COURSE INTENT: NORMAL
RAD ONC ARIA COURSE LAST TREATMENT DATE: NORMAL
RAD ONC ARIA COURSE SESSION NUMBER: 8
RAD ONC ARIA COURSE START DATE: NORMAL
RAD ONC ARIA COURSE TREATMENT ELAPSED DAYS: 12
RAD ONC ARIA PLAN FRACTIONS TREATED TO DATE: 8
RAD ONC ARIA PLAN ID: NORMAL
RAD ONC ARIA PLAN PRESCRIBED DOSE PER FRACTION: 2.5 GY
RAD ONC ARIA PLAN PRIMARY REFERENCE POINT: NORMAL
RAD ONC ARIA PLAN TOTAL FRACTIONS PRESCRIBED: 28
RAD ONC ARIA PLAN TOTAL PRESCRIBED DOSE: 7000 CGY
RAD ONC ARIA REFERENCE POINT DOSAGE GIVEN TO DATE: 20 GY
RAD ONC ARIA REFERENCE POINT ID: NORMAL
RAD ONC ARIA REFERENCE POINT SESSION DOSAGE GIVEN: 2.5 GY

## 2025-05-06 PROCEDURE — 77385 HC NTSTY MODUL RAD TX DLVR SMPL: CPT

## 2025-05-06 PROCEDURE — 77014 CHG CT GUIDANCE RADIATION THERAPY FLDS PLACEMENT: CPT | Performed by: RADIOLOGY

## 2025-05-07 ENCOUNTER — APPOINTMENT (OUTPATIENT)
Facility: HOSPITAL | Age: 60
End: 2025-05-07
Attending: RADIOLOGY
Payer: MEDICARE

## 2025-05-08 ENCOUNTER — HOSPITAL ENCOUNTER (OUTPATIENT)
Facility: HOSPITAL | Age: 60
Setting detail: RECURRING SERIES
Discharge: HOME | End: 2025-05-11
Attending: RADIOLOGY
Payer: MEDICARE

## 2025-05-08 LAB
RAD ONC ARIA COURSE FIRST TREATMENT DATE: NORMAL
RAD ONC ARIA COURSE ID: NORMAL
RAD ONC ARIA COURSE INTENT: NORMAL
RAD ONC ARIA COURSE LAST TREATMENT DATE: NORMAL
RAD ONC ARIA COURSE SESSION NUMBER: 9
RAD ONC ARIA COURSE START DATE: NORMAL
RAD ONC ARIA COURSE TREATMENT ELAPSED DAYS: 14
RAD ONC ARIA PLAN FRACTIONS TREATED TO DATE: 9
RAD ONC ARIA PLAN ID: NORMAL
RAD ONC ARIA PLAN PRESCRIBED DOSE PER FRACTION: 2.5 GY
RAD ONC ARIA PLAN PRIMARY REFERENCE POINT: NORMAL
RAD ONC ARIA PLAN TOTAL FRACTIONS PRESCRIBED: 28
RAD ONC ARIA PLAN TOTAL PRESCRIBED DOSE: 7000 CGY
RAD ONC ARIA REFERENCE POINT DOSAGE GIVEN TO DATE: 22.5 GY
RAD ONC ARIA REFERENCE POINT ID: NORMAL
RAD ONC ARIA REFERENCE POINT SESSION DOSAGE GIVEN: 2.5 GY

## 2025-05-08 PROCEDURE — 77014 CHG CT GUIDANCE RADIATION THERAPY FLDS PLACEMENT: CPT | Performed by: RADIOLOGY

## 2025-05-08 PROCEDURE — 77385 HC NTSTY MODUL RAD TX DLVR SMPL: CPT

## 2025-05-09 ENCOUNTER — HOSPITAL ENCOUNTER (OUTPATIENT)
Facility: HOSPITAL | Age: 60
Setting detail: RECURRING SERIES
Discharge: HOME | End: 2025-05-12
Attending: RADIOLOGY
Payer: MEDICARE

## 2025-05-09 LAB
RAD ONC ARIA COURSE FIRST TREATMENT DATE: NORMAL
RAD ONC ARIA COURSE ID: NORMAL
RAD ONC ARIA COURSE INTENT: NORMAL
RAD ONC ARIA COURSE LAST TREATMENT DATE: NORMAL
RAD ONC ARIA COURSE SESSION NUMBER: 10
RAD ONC ARIA COURSE START DATE: NORMAL
RAD ONC ARIA COURSE TREATMENT ELAPSED DAYS: 15
RAD ONC ARIA PLAN FRACTIONS TREATED TO DATE: 10
RAD ONC ARIA PLAN ID: NORMAL
RAD ONC ARIA PLAN PRESCRIBED DOSE PER FRACTION: 2.5 GY
RAD ONC ARIA PLAN PRIMARY REFERENCE POINT: NORMAL
RAD ONC ARIA PLAN TOTAL FRACTIONS PRESCRIBED: 28
RAD ONC ARIA PLAN TOTAL PRESCRIBED DOSE: 7000 CGY
RAD ONC ARIA REFERENCE POINT DOSAGE GIVEN TO DATE: 25 GY
RAD ONC ARIA REFERENCE POINT ID: NORMAL
RAD ONC ARIA REFERENCE POINT SESSION DOSAGE GIVEN: 2.5 GY

## 2025-05-09 PROCEDURE — 77336 RADIATION PHYSICS CONSULT: CPT

## 2025-05-09 PROCEDURE — 77014 CHG CT GUIDANCE RADIATION THERAPY FLDS PLACEMENT: CPT | Performed by: RADIOLOGY

## 2025-05-09 PROCEDURE — 77385 HC NTSTY MODUL RAD TX DLVR SMPL: CPT

## 2025-05-12 ENCOUNTER — HOSPITAL ENCOUNTER (OUTPATIENT)
Facility: HOSPITAL | Age: 60
Setting detail: RECURRING SERIES
Discharge: HOME | End: 2025-05-15
Attending: RADIOLOGY
Payer: MEDICARE

## 2025-05-12 LAB
RAD ONC ARIA COURSE FIRST TREATMENT DATE: NORMAL
RAD ONC ARIA COURSE ID: NORMAL
RAD ONC ARIA COURSE INTENT: NORMAL
RAD ONC ARIA COURSE LAST TREATMENT DATE: NORMAL
RAD ONC ARIA COURSE SESSION NUMBER: 11
RAD ONC ARIA COURSE START DATE: NORMAL
RAD ONC ARIA COURSE TREATMENT ELAPSED DAYS: 18
RAD ONC ARIA PLAN FRACTIONS TREATED TO DATE: 11
RAD ONC ARIA PLAN ID: NORMAL
RAD ONC ARIA PLAN PRESCRIBED DOSE PER FRACTION: 2.5 GY
RAD ONC ARIA PLAN PRIMARY REFERENCE POINT: NORMAL
RAD ONC ARIA PLAN TOTAL FRACTIONS PRESCRIBED: 28
RAD ONC ARIA PLAN TOTAL PRESCRIBED DOSE: 7000 CGY
RAD ONC ARIA REFERENCE POINT DOSAGE GIVEN TO DATE: 27.5 GY
RAD ONC ARIA REFERENCE POINT ID: NORMAL
RAD ONC ARIA REFERENCE POINT SESSION DOSAGE GIVEN: 2.5 GY

## 2025-05-12 PROCEDURE — 77014 CHG CT GUIDANCE RADIATION THERAPY FLDS PLACEMENT: CPT | Performed by: RADIOLOGY

## 2025-05-12 PROCEDURE — 77385 HC NTSTY MODUL RAD TX DLVR SMPL: CPT

## 2025-05-13 ENCOUNTER — HOSPITAL ENCOUNTER (OUTPATIENT)
Facility: HOSPITAL | Age: 60
Setting detail: RECURRING SERIES
Discharge: HOME | End: 2025-05-16
Attending: RADIOLOGY
Payer: MEDICARE

## 2025-05-13 LAB
RAD ONC ARIA COURSE FIRST TREATMENT DATE: NORMAL
RAD ONC ARIA COURSE ID: NORMAL
RAD ONC ARIA COURSE INTENT: NORMAL
RAD ONC ARIA COURSE LAST TREATMENT DATE: NORMAL
RAD ONC ARIA COURSE SESSION NUMBER: 12
RAD ONC ARIA COURSE START DATE: NORMAL
RAD ONC ARIA COURSE TREATMENT ELAPSED DAYS: 19
RAD ONC ARIA PLAN FRACTIONS TREATED TO DATE: 12
RAD ONC ARIA PLAN ID: NORMAL
RAD ONC ARIA PLAN PRESCRIBED DOSE PER FRACTION: 2.5 GY
RAD ONC ARIA PLAN PRIMARY REFERENCE POINT: NORMAL
RAD ONC ARIA PLAN TOTAL FRACTIONS PRESCRIBED: 28
RAD ONC ARIA PLAN TOTAL PRESCRIBED DOSE: 7000 CGY
RAD ONC ARIA REFERENCE POINT DOSAGE GIVEN TO DATE: 30 GY
RAD ONC ARIA REFERENCE POINT ID: NORMAL
RAD ONC ARIA REFERENCE POINT SESSION DOSAGE GIVEN: 2.5 GY

## 2025-05-13 PROCEDURE — 77014 CHG CT GUIDANCE RADIATION THERAPY FLDS PLACEMENT: CPT | Performed by: RADIOLOGY

## 2025-05-13 PROCEDURE — 77385 HC NTSTY MODUL RAD TX DLVR SMPL: CPT

## 2025-05-14 ENCOUNTER — APPOINTMENT (OUTPATIENT)
Facility: HOSPITAL | Age: 60
End: 2025-05-14
Attending: RADIOLOGY
Payer: MEDICARE

## 2025-05-15 ENCOUNTER — HOSPITAL ENCOUNTER (OUTPATIENT)
Facility: HOSPITAL | Age: 60
Setting detail: RECURRING SERIES
End: 2025-05-15
Attending: RADIOLOGY
Payer: MEDICARE

## 2025-05-16 ENCOUNTER — HOSPITAL ENCOUNTER (OUTPATIENT)
Facility: HOSPITAL | Age: 60
Setting detail: RECURRING SERIES
Discharge: HOME | End: 2025-05-19
Attending: RADIOLOGY
Payer: MEDICARE

## 2025-05-16 LAB
RAD ONC ARIA COURSE FIRST TREATMENT DATE: NORMAL
RAD ONC ARIA COURSE ID: NORMAL
RAD ONC ARIA COURSE INTENT: NORMAL
RAD ONC ARIA COURSE LAST TREATMENT DATE: NORMAL
RAD ONC ARIA COURSE SESSION NUMBER: 13
RAD ONC ARIA COURSE START DATE: NORMAL
RAD ONC ARIA COURSE TREATMENT ELAPSED DAYS: 22
RAD ONC ARIA PLAN FRACTIONS TREATED TO DATE: 13
RAD ONC ARIA PLAN ID: NORMAL
RAD ONC ARIA PLAN PRESCRIBED DOSE PER FRACTION: 2.5 GY
RAD ONC ARIA PLAN PRIMARY REFERENCE POINT: NORMAL
RAD ONC ARIA PLAN TOTAL FRACTIONS PRESCRIBED: 28
RAD ONC ARIA PLAN TOTAL PRESCRIBED DOSE: 7000 CGY
RAD ONC ARIA REFERENCE POINT DOSAGE GIVEN TO DATE: 32.5 GY
RAD ONC ARIA REFERENCE POINT ID: NORMAL
RAD ONC ARIA REFERENCE POINT SESSION DOSAGE GIVEN: 2.5 GY

## 2025-05-16 PROCEDURE — 77385 HC NTSTY MODUL RAD TX DLVR SMPL: CPT

## 2025-05-19 ENCOUNTER — HOSPITAL ENCOUNTER (OUTPATIENT)
Facility: HOSPITAL | Age: 60
Setting detail: RECURRING SERIES
Discharge: HOME | End: 2025-05-22
Attending: RADIOLOGY
Payer: MEDICARE

## 2025-05-19 LAB
RAD ONC ARIA COURSE FIRST TREATMENT DATE: NORMAL
RAD ONC ARIA COURSE ID: NORMAL
RAD ONC ARIA COURSE INTENT: NORMAL
RAD ONC ARIA COURSE LAST TREATMENT DATE: NORMAL
RAD ONC ARIA COURSE SESSION NUMBER: 14
RAD ONC ARIA COURSE START DATE: NORMAL
RAD ONC ARIA COURSE TREATMENT ELAPSED DAYS: 25
RAD ONC ARIA PLAN FRACTIONS TREATED TO DATE: 14
RAD ONC ARIA PLAN ID: NORMAL
RAD ONC ARIA PLAN PRESCRIBED DOSE PER FRACTION: 2.5 GY
RAD ONC ARIA PLAN PRIMARY REFERENCE POINT: NORMAL
RAD ONC ARIA PLAN TOTAL FRACTIONS PRESCRIBED: 28
RAD ONC ARIA PLAN TOTAL PRESCRIBED DOSE: 7000 CGY
RAD ONC ARIA REFERENCE POINT DOSAGE GIVEN TO DATE: 35 GY
RAD ONC ARIA REFERENCE POINT ID: NORMAL
RAD ONC ARIA REFERENCE POINT SESSION DOSAGE GIVEN: 2.5 GY

## 2025-05-19 PROCEDURE — 77385 HC NTSTY MODUL RAD TX DLVR SMPL: CPT

## 2025-05-19 PROCEDURE — 77014 CHG CT GUIDANCE RADIATION THERAPY FLDS PLACEMENT: CPT | Performed by: RADIOLOGY

## 2025-05-20 ENCOUNTER — HOSPITAL ENCOUNTER (OUTPATIENT)
Facility: HOSPITAL | Age: 60
Setting detail: RECURRING SERIES
Discharge: HOME | End: 2025-05-23
Attending: RADIOLOGY
Payer: MEDICARE

## 2025-05-20 LAB
RAD ONC ARIA COURSE FIRST TREATMENT DATE: NORMAL
RAD ONC ARIA COURSE ID: NORMAL
RAD ONC ARIA COURSE INTENT: NORMAL
RAD ONC ARIA COURSE LAST TREATMENT DATE: NORMAL
RAD ONC ARIA COURSE SESSION NUMBER: 15
RAD ONC ARIA COURSE START DATE: NORMAL
RAD ONC ARIA COURSE TREATMENT ELAPSED DAYS: 26
RAD ONC ARIA PLAN FRACTIONS TREATED TO DATE: 15
RAD ONC ARIA PLAN ID: NORMAL
RAD ONC ARIA PLAN PRESCRIBED DOSE PER FRACTION: 2.5 GY
RAD ONC ARIA PLAN PRIMARY REFERENCE POINT: NORMAL
RAD ONC ARIA PLAN TOTAL FRACTIONS PRESCRIBED: 28
RAD ONC ARIA PLAN TOTAL PRESCRIBED DOSE: 7000 CGY
RAD ONC ARIA REFERENCE POINT DOSAGE GIVEN TO DATE: 37.5 GY
RAD ONC ARIA REFERENCE POINT ID: NORMAL
RAD ONC ARIA REFERENCE POINT SESSION DOSAGE GIVEN: 2.5 GY

## 2025-05-20 PROCEDURE — 77385 HC NTSTY MODUL RAD TX DLVR SMPL: CPT

## 2025-05-20 PROCEDURE — 77336 RADIATION PHYSICS CONSULT: CPT

## 2025-05-20 PROCEDURE — 77014 CHG CT GUIDANCE RADIATION THERAPY FLDS PLACEMENT: CPT | Performed by: RADIOLOGY

## 2025-05-21 ENCOUNTER — HOSPITAL ENCOUNTER (OUTPATIENT)
Facility: HOSPITAL | Age: 60
Setting detail: RECURRING SERIES
Discharge: HOME | End: 2025-05-24
Attending: RADIOLOGY
Payer: MEDICARE

## 2025-05-21 LAB
RAD ONC ARIA COURSE FIRST TREATMENT DATE: NORMAL
RAD ONC ARIA COURSE ID: NORMAL
RAD ONC ARIA COURSE INTENT: NORMAL
RAD ONC ARIA COURSE LAST TREATMENT DATE: NORMAL
RAD ONC ARIA COURSE SESSION NUMBER: 16
RAD ONC ARIA COURSE START DATE: NORMAL
RAD ONC ARIA COURSE TREATMENT ELAPSED DAYS: 27
RAD ONC ARIA PLAN FRACTIONS TREATED TO DATE: 16
RAD ONC ARIA PLAN ID: NORMAL
RAD ONC ARIA PLAN PRESCRIBED DOSE PER FRACTION: 2.5 GY
RAD ONC ARIA PLAN PRIMARY REFERENCE POINT: NORMAL
RAD ONC ARIA PLAN TOTAL FRACTIONS PRESCRIBED: 28
RAD ONC ARIA PLAN TOTAL PRESCRIBED DOSE: 7000 CGY
RAD ONC ARIA REFERENCE POINT DOSAGE GIVEN TO DATE: 40 GY
RAD ONC ARIA REFERENCE POINT ID: NORMAL
RAD ONC ARIA REFERENCE POINT SESSION DOSAGE GIVEN: 2.5 GY

## 2025-05-21 PROCEDURE — 77385 HC NTSTY MODUL RAD TX DLVR SMPL: CPT

## 2025-05-21 PROCEDURE — 77014 CHG CT GUIDANCE RADIATION THERAPY FLDS PLACEMENT: CPT | Performed by: RADIOLOGY

## 2025-05-22 ENCOUNTER — HOSPITAL ENCOUNTER (OUTPATIENT)
Facility: HOSPITAL | Age: 60
Setting detail: RECURRING SERIES
Discharge: HOME | End: 2025-05-25
Attending: RADIOLOGY
Payer: MEDICARE

## 2025-05-22 LAB
RAD ONC ARIA COURSE FIRST TREATMENT DATE: NORMAL
RAD ONC ARIA COURSE ID: NORMAL
RAD ONC ARIA COURSE INTENT: NORMAL
RAD ONC ARIA COURSE LAST TREATMENT DATE: NORMAL
RAD ONC ARIA COURSE SESSION NUMBER: 17
RAD ONC ARIA COURSE START DATE: NORMAL
RAD ONC ARIA COURSE TREATMENT ELAPSED DAYS: 28
RAD ONC ARIA PLAN FRACTIONS TREATED TO DATE: 17
RAD ONC ARIA PLAN ID: NORMAL
RAD ONC ARIA PLAN PRESCRIBED DOSE PER FRACTION: 2.5 GY
RAD ONC ARIA PLAN PRIMARY REFERENCE POINT: NORMAL
RAD ONC ARIA PLAN TOTAL FRACTIONS PRESCRIBED: 28
RAD ONC ARIA PLAN TOTAL PRESCRIBED DOSE: 7000 CGY
RAD ONC ARIA REFERENCE POINT DOSAGE GIVEN TO DATE: 42.5 GY
RAD ONC ARIA REFERENCE POINT ID: NORMAL
RAD ONC ARIA REFERENCE POINT SESSION DOSAGE GIVEN: 2.5 GY

## 2025-05-22 PROCEDURE — 77014 CHG CT GUIDANCE RADIATION THERAPY FLDS PLACEMENT: CPT | Performed by: RADIOLOGY

## 2025-05-22 PROCEDURE — 77385 HC NTSTY MODUL RAD TX DLVR SMPL: CPT

## 2025-05-23 ENCOUNTER — HOSPITAL ENCOUNTER (OUTPATIENT)
Facility: HOSPITAL | Age: 60
Setting detail: RECURRING SERIES
Discharge: HOME | End: 2025-05-26
Attending: RADIOLOGY
Payer: MEDICARE

## 2025-05-23 LAB
RAD ONC ARIA COURSE FIRST TREATMENT DATE: NORMAL
RAD ONC ARIA COURSE ID: NORMAL
RAD ONC ARIA COURSE INTENT: NORMAL
RAD ONC ARIA COURSE LAST TREATMENT DATE: NORMAL
RAD ONC ARIA COURSE SESSION NUMBER: 18
RAD ONC ARIA COURSE START DATE: NORMAL
RAD ONC ARIA COURSE TREATMENT ELAPSED DAYS: 29
RAD ONC ARIA PLAN FRACTIONS TREATED TO DATE: 18
RAD ONC ARIA PLAN ID: NORMAL
RAD ONC ARIA PLAN PRESCRIBED DOSE PER FRACTION: 2.5 GY
RAD ONC ARIA PLAN PRIMARY REFERENCE POINT: NORMAL
RAD ONC ARIA PLAN TOTAL FRACTIONS PRESCRIBED: 28
RAD ONC ARIA PLAN TOTAL PRESCRIBED DOSE: 7000 CGY
RAD ONC ARIA REFERENCE POINT DOSAGE GIVEN TO DATE: 45 GY
RAD ONC ARIA REFERENCE POINT ID: NORMAL
RAD ONC ARIA REFERENCE POINT SESSION DOSAGE GIVEN: 2.5 GY

## 2025-05-23 PROCEDURE — 77014 CHG CT GUIDANCE RADIATION THERAPY FLDS PLACEMENT: CPT | Performed by: RADIOLOGY

## 2025-05-23 PROCEDURE — 77385 HC NTSTY MODUL RAD TX DLVR SMPL: CPT

## 2025-05-27 ENCOUNTER — HOSPITAL ENCOUNTER (OUTPATIENT)
Facility: HOSPITAL | Age: 60
Setting detail: RECURRING SERIES
Discharge: HOME | End: 2025-05-30
Attending: RADIOLOGY
Payer: MEDICARE

## 2025-05-27 LAB
RAD ONC ARIA COURSE FIRST TREATMENT DATE: NORMAL
RAD ONC ARIA COURSE ID: NORMAL
RAD ONC ARIA COURSE INTENT: NORMAL
RAD ONC ARIA COURSE LAST TREATMENT DATE: NORMAL
RAD ONC ARIA COURSE SESSION NUMBER: 19
RAD ONC ARIA COURSE START DATE: NORMAL
RAD ONC ARIA COURSE TREATMENT ELAPSED DAYS: 33
RAD ONC ARIA PLAN FRACTIONS TREATED TO DATE: 19
RAD ONC ARIA PLAN ID: NORMAL
RAD ONC ARIA PLAN PRESCRIBED DOSE PER FRACTION: 2.5 GY
RAD ONC ARIA PLAN PRIMARY REFERENCE POINT: NORMAL
RAD ONC ARIA PLAN TOTAL FRACTIONS PRESCRIBED: 28
RAD ONC ARIA PLAN TOTAL PRESCRIBED DOSE: 7000 CGY
RAD ONC ARIA REFERENCE POINT DOSAGE GIVEN TO DATE: 47.5 GY
RAD ONC ARIA REFERENCE POINT ID: NORMAL
RAD ONC ARIA REFERENCE POINT SESSION DOSAGE GIVEN: 2.5 GY

## 2025-05-27 PROCEDURE — 77014 CHG CT GUIDANCE RADIATION THERAPY FLDS PLACEMENT: CPT | Performed by: RADIOLOGY

## 2025-05-27 PROCEDURE — 77385 HC NTSTY MODUL RAD TX DLVR SMPL: CPT

## 2025-05-28 ENCOUNTER — HOSPITAL ENCOUNTER (OUTPATIENT)
Facility: HOSPITAL | Age: 60
Setting detail: RECURRING SERIES
Discharge: HOME | End: 2025-05-31
Attending: RADIOLOGY
Payer: MEDICARE

## 2025-05-28 LAB
RAD ONC ARIA COURSE FIRST TREATMENT DATE: NORMAL
RAD ONC ARIA COURSE ID: NORMAL
RAD ONC ARIA COURSE INTENT: NORMAL
RAD ONC ARIA COURSE LAST TREATMENT DATE: NORMAL
RAD ONC ARIA COURSE SESSION NUMBER: 20
RAD ONC ARIA COURSE START DATE: NORMAL
RAD ONC ARIA COURSE TREATMENT ELAPSED DAYS: 34
RAD ONC ARIA PLAN FRACTIONS TREATED TO DATE: 20
RAD ONC ARIA PLAN ID: NORMAL
RAD ONC ARIA PLAN PRESCRIBED DOSE PER FRACTION: 2.5 GY
RAD ONC ARIA PLAN PRIMARY REFERENCE POINT: NORMAL
RAD ONC ARIA PLAN TOTAL FRACTIONS PRESCRIBED: 28
RAD ONC ARIA PLAN TOTAL PRESCRIBED DOSE: 7000 CGY
RAD ONC ARIA REFERENCE POINT DOSAGE GIVEN TO DATE: 50 GY
RAD ONC ARIA REFERENCE POINT ID: NORMAL
RAD ONC ARIA REFERENCE POINT SESSION DOSAGE GIVEN: 2.5 GY

## 2025-05-28 PROCEDURE — 77014 CHG CT GUIDANCE RADIATION THERAPY FLDS PLACEMENT: CPT | Performed by: RADIOLOGY

## 2025-05-28 PROCEDURE — 77385 HC NTSTY MODUL RAD TX DLVR SMPL: CPT

## 2025-05-28 PROCEDURE — 77336 RADIATION PHYSICS CONSULT: CPT

## 2025-05-29 ENCOUNTER — HOSPITAL ENCOUNTER (OUTPATIENT)
Facility: HOSPITAL | Age: 60
Setting detail: RECURRING SERIES
End: 2025-05-29
Attending: RADIOLOGY
Payer: MEDICARE

## 2025-05-29 LAB
RAD ONC ARIA COURSE FIRST TREATMENT DATE: NORMAL
RAD ONC ARIA COURSE ID: NORMAL
RAD ONC ARIA COURSE INTENT: NORMAL
RAD ONC ARIA COURSE LAST TREATMENT DATE: NORMAL
RAD ONC ARIA COURSE SESSION NUMBER: 21
RAD ONC ARIA COURSE START DATE: NORMAL
RAD ONC ARIA COURSE TREATMENT ELAPSED DAYS: 35
RAD ONC ARIA PLAN FRACTIONS TREATED TO DATE: 21
RAD ONC ARIA PLAN ID: NORMAL
RAD ONC ARIA PLAN PRESCRIBED DOSE PER FRACTION: 2.5 GY
RAD ONC ARIA PLAN PRIMARY REFERENCE POINT: NORMAL
RAD ONC ARIA PLAN TOTAL FRACTIONS PRESCRIBED: 28
RAD ONC ARIA PLAN TOTAL PRESCRIBED DOSE: 7000 CGY
RAD ONC ARIA REFERENCE POINT DOSAGE GIVEN TO DATE: 52.5 GY
RAD ONC ARIA REFERENCE POINT ID: NORMAL
RAD ONC ARIA REFERENCE POINT SESSION DOSAGE GIVEN: 2.5 GY

## 2025-05-29 PROCEDURE — 77427 RADIATION TX MANAGEMENT X5: CPT | Performed by: RADIOLOGY

## 2025-05-29 PROCEDURE — 77014 CHG CT GUIDANCE RADIATION THERAPY FLDS PLACEMENT: CPT | Performed by: RADIOLOGY

## 2025-05-29 PROCEDURE — 77385 HC NTSTY MODUL RAD TX DLVR SMPL: CPT

## 2025-05-30 ENCOUNTER — HOSPITAL ENCOUNTER (OUTPATIENT)
Facility: HOSPITAL | Age: 60
Setting detail: RECURRING SERIES
End: 2025-05-30
Attending: RADIOLOGY
Payer: MEDICARE

## 2025-05-30 ENCOUNTER — TELEMEDICINE (OUTPATIENT)
Facility: CLINIC | Age: 60
End: 2025-05-30

## 2025-05-30 DIAGNOSIS — N30.00 ACUTE CYSTITIS WITHOUT HEMATURIA: Primary | ICD-10-CM

## 2025-05-30 LAB
RAD ONC ARIA COURSE FIRST TREATMENT DATE: NORMAL
RAD ONC ARIA COURSE ID: NORMAL
RAD ONC ARIA COURSE INTENT: NORMAL
RAD ONC ARIA COURSE LAST TREATMENT DATE: NORMAL
RAD ONC ARIA COURSE SESSION NUMBER: 22
RAD ONC ARIA COURSE START DATE: NORMAL
RAD ONC ARIA COURSE TREATMENT ELAPSED DAYS: 36
RAD ONC ARIA PLAN FRACTIONS TREATED TO DATE: 22
RAD ONC ARIA PLAN ID: NORMAL
RAD ONC ARIA PLAN PRESCRIBED DOSE PER FRACTION: 2.5 GY
RAD ONC ARIA PLAN PRIMARY REFERENCE POINT: NORMAL
RAD ONC ARIA PLAN TOTAL FRACTIONS PRESCRIBED: 28
RAD ONC ARIA PLAN TOTAL PRESCRIBED DOSE: 7000 CGY
RAD ONC ARIA REFERENCE POINT DOSAGE GIVEN TO DATE: 55 GY
RAD ONC ARIA REFERENCE POINT ID: NORMAL
RAD ONC ARIA REFERENCE POINT SESSION DOSAGE GIVEN: 2.5 GY

## 2025-05-30 PROCEDURE — 77014 CHG CT GUIDANCE RADIATION THERAPY FLDS PLACEMENT: CPT | Performed by: RADIOLOGY

## 2025-05-30 PROCEDURE — 77385 HC NTSTY MODUL RAD TX DLVR SMPL: CPT

## 2025-05-30 RX ORDER — NITROFURANTOIN 25; 75 MG/1; MG/1
100 CAPSULE ORAL 2 TIMES DAILY
Qty: 14 CAPSULE | Refills: 0 | Status: SHIPPED | OUTPATIENT
Start: 2025-05-30 | End: 2025-06-06

## 2025-05-30 NOTE — PROGRESS NOTES
2025    TELEHEALTH EVALUATION -- Audio/Visual    HPI:    Loyd Gonzalez (:  1965) has requested an audio/video evaluation for the following concern(s):    Patient notes burning with urination and increased urinary frequency but is not able to come into the office today.      Review of Systems   Genitourinary:  Positive for dysuria.     Prior to Visit Medications    Medication Sig Taking? Authorizing Provider   nitrofurantoin, macrocrystal-monohydrate, (MACROBID) 100 MG capsule Take 1 capsule by mouth 2 times daily for 7 days Yes Clarissa Springer MD   magnesium 30 MG tablet Take 1 tablet by mouth daily  Ashley Butterfield MD   polyethylene glycol (GLYCOLAX) 17 GM/SCOOP powder Take 17 g by mouth daily  Ashley Butterfield MD   docusate sodium (COLACE) 100 MG capsule Take 1 capsule by mouth 2 times daily  Ashley Butterfield MD   simethicone (MYLICON) 80 MG chewable tablet Take 1 tablet by mouth 2 times daily  Ashley Butterfield MD   gabapentin (NEURONTIN) 600 MG tablet Take 1 tablet by mouth 3 times daily for 90 days. Max Daily Amount: 1,800 mg  Haim Smith MD   albuterol sulfate HFA (PROVENTIL;VENTOLIN;PROAIR) 108 (90 Base) MCG/ACT inhaler Inhale 2 puffs into the lungs every 6 hours as needed for Wheezing or Shortness of Breath  Clarissa Springer MD   fluticasone furoate-vilanterol (BREO ELLIPTA) 100-25 MCG/ACT inhaler Inhale 1 puff into the lungs daily  Clarissa Springer MD   sodium chloride (OCEAN) 0.65 % nasal spray 1 spray by Nasal route as needed for Congestion  Patient not taking: Reported on 2025  Markos Jean PA-C   vitamin C (ASCORBIC ACID) 500 MG tablet Take 1 tablet by mouth daily  Patient not taking: Reported on 2025  Ashley Butterfield MD   Cholecalciferol (VITAMIN D) 10 MCG (400 UNIT) CAPS Capsule Take 1 capsule by mouth daily  Ashley Butterfield MD   tamsulosin (FLOMAX) 0.4 MG capsule Take 1 capsule by mouth daily  Damián Gilbert MD

## 2025-06-02 ENCOUNTER — HOSPITAL ENCOUNTER (OUTPATIENT)
Facility: HOSPITAL | Age: 60
Setting detail: RECURRING SERIES
End: 2025-06-02
Attending: RADIOLOGY
Payer: MEDICARE

## 2025-06-03 ENCOUNTER — HOSPITAL ENCOUNTER (OUTPATIENT)
Facility: HOSPITAL | Age: 60
Setting detail: RECURRING SERIES
Discharge: HOME | End: 2025-06-06
Attending: RADIOLOGY
Payer: MEDICARE

## 2025-06-03 LAB
RAD ONC ARIA COURSE FIRST TREATMENT DATE: NORMAL
RAD ONC ARIA COURSE ID: NORMAL
RAD ONC ARIA COURSE INTENT: NORMAL
RAD ONC ARIA COURSE LAST TREATMENT DATE: NORMAL
RAD ONC ARIA COURSE SESSION NUMBER: 23
RAD ONC ARIA COURSE START DATE: NORMAL
RAD ONC ARIA COURSE TREATMENT ELAPSED DAYS: 40
RAD ONC ARIA PLAN FRACTIONS TREATED TO DATE: 23
RAD ONC ARIA PLAN ID: NORMAL
RAD ONC ARIA PLAN PRESCRIBED DOSE PER FRACTION: 2.5 GY
RAD ONC ARIA PLAN PRIMARY REFERENCE POINT: NORMAL
RAD ONC ARIA PLAN TOTAL FRACTIONS PRESCRIBED: 28
RAD ONC ARIA PLAN TOTAL PRESCRIBED DOSE: 7000 CGY
RAD ONC ARIA REFERENCE POINT DOSAGE GIVEN TO DATE: 57.5 GY
RAD ONC ARIA REFERENCE POINT ID: NORMAL
RAD ONC ARIA REFERENCE POINT SESSION DOSAGE GIVEN: 2.5 GY

## 2025-06-03 PROCEDURE — 77385 HC NTSTY MODUL RAD TX DLVR SMPL: CPT

## 2025-06-03 PROCEDURE — 77014 CHG CT GUIDANCE RADIATION THERAPY FLDS PLACEMENT: CPT | Performed by: RADIOLOGY

## 2025-06-04 ENCOUNTER — APPOINTMENT (OUTPATIENT)
Facility: HOSPITAL | Age: 60
End: 2025-06-04
Attending: RADIOLOGY
Payer: MEDICARE

## 2025-06-04 ENCOUNTER — HOSPITAL ENCOUNTER (OUTPATIENT)
Facility: HOSPITAL | Age: 60
Setting detail: RECURRING SERIES
Discharge: HOME | End: 2025-06-07
Attending: RADIOLOGY
Payer: MEDICARE

## 2025-06-04 LAB
RAD ONC ARIA COURSE FIRST TREATMENT DATE: NORMAL
RAD ONC ARIA COURSE ID: NORMAL
RAD ONC ARIA COURSE INTENT: NORMAL
RAD ONC ARIA COURSE LAST TREATMENT DATE: NORMAL
RAD ONC ARIA COURSE SESSION NUMBER: 24
RAD ONC ARIA COURSE START DATE: NORMAL
RAD ONC ARIA COURSE TREATMENT ELAPSED DAYS: 41
RAD ONC ARIA PLAN FRACTIONS TREATED TO DATE: 24
RAD ONC ARIA PLAN ID: NORMAL
RAD ONC ARIA PLAN PRESCRIBED DOSE PER FRACTION: 2.5 GY
RAD ONC ARIA PLAN PRIMARY REFERENCE POINT: NORMAL
RAD ONC ARIA PLAN TOTAL FRACTIONS PRESCRIBED: 28
RAD ONC ARIA PLAN TOTAL PRESCRIBED DOSE: 7000 CGY
RAD ONC ARIA REFERENCE POINT DOSAGE GIVEN TO DATE: 60 GY
RAD ONC ARIA REFERENCE POINT ID: NORMAL
RAD ONC ARIA REFERENCE POINT SESSION DOSAGE GIVEN: 2.5 GY

## 2025-06-04 PROCEDURE — 77385 HC NTSTY MODUL RAD TX DLVR SMPL: CPT

## 2025-06-04 PROCEDURE — 77014 CHG CT GUIDANCE RADIATION THERAPY FLDS PLACEMENT: CPT | Performed by: RADIOLOGY

## 2025-06-05 ENCOUNTER — APPOINTMENT (OUTPATIENT)
Facility: HOSPITAL | Age: 60
End: 2025-06-05
Attending: RADIOLOGY
Payer: MEDICARE

## 2025-06-06 ENCOUNTER — HOSPITAL ENCOUNTER (OUTPATIENT)
Facility: HOSPITAL | Age: 60
Setting detail: RECURRING SERIES
Discharge: HOME | End: 2025-06-09
Attending: RADIOLOGY
Payer: MEDICARE

## 2025-06-06 LAB
RAD ONC ARIA COURSE FIRST TREATMENT DATE: NORMAL
RAD ONC ARIA COURSE ID: NORMAL
RAD ONC ARIA COURSE INTENT: NORMAL
RAD ONC ARIA COURSE LAST TREATMENT DATE: NORMAL
RAD ONC ARIA COURSE SESSION NUMBER: 25
RAD ONC ARIA COURSE START DATE: NORMAL
RAD ONC ARIA COURSE TREATMENT ELAPSED DAYS: 43
RAD ONC ARIA PLAN FRACTIONS TREATED TO DATE: 25
RAD ONC ARIA PLAN ID: NORMAL
RAD ONC ARIA PLAN PRESCRIBED DOSE PER FRACTION: 2.5 GY
RAD ONC ARIA PLAN PRIMARY REFERENCE POINT: NORMAL
RAD ONC ARIA PLAN TOTAL FRACTIONS PRESCRIBED: 28
RAD ONC ARIA PLAN TOTAL PRESCRIBED DOSE: 7000 CGY
RAD ONC ARIA REFERENCE POINT DOSAGE GIVEN TO DATE: 62.5 GY
RAD ONC ARIA REFERENCE POINT ID: NORMAL
RAD ONC ARIA REFERENCE POINT SESSION DOSAGE GIVEN: 2.5 GY

## 2025-06-06 PROCEDURE — 77336 RADIATION PHYSICS CONSULT: CPT

## 2025-06-06 PROCEDURE — 77014 CHG CT GUIDANCE RADIATION THERAPY FLDS PLACEMENT: CPT | Performed by: RADIOLOGY

## 2025-06-06 PROCEDURE — 77385 HC NTSTY MODUL RAD TX DLVR SMPL: CPT

## 2025-06-09 ENCOUNTER — HOSPITAL ENCOUNTER (OUTPATIENT)
Facility: HOSPITAL | Age: 60
Setting detail: RECURRING SERIES
Discharge: HOME | End: 2025-06-12
Attending: RADIOLOGY
Payer: MEDICARE

## 2025-06-09 LAB
RAD ONC ARIA COURSE FIRST TREATMENT DATE: NORMAL
RAD ONC ARIA COURSE ID: NORMAL
RAD ONC ARIA COURSE INTENT: NORMAL
RAD ONC ARIA COURSE LAST TREATMENT DATE: NORMAL
RAD ONC ARIA COURSE SESSION NUMBER: 26
RAD ONC ARIA COURSE START DATE: NORMAL
RAD ONC ARIA COURSE TREATMENT ELAPSED DAYS: 46
RAD ONC ARIA PLAN FRACTIONS TREATED TO DATE: 26
RAD ONC ARIA PLAN ID: NORMAL
RAD ONC ARIA PLAN PRESCRIBED DOSE PER FRACTION: 2.5 GY
RAD ONC ARIA PLAN PRIMARY REFERENCE POINT: NORMAL
RAD ONC ARIA PLAN TOTAL FRACTIONS PRESCRIBED: 28
RAD ONC ARIA PLAN TOTAL PRESCRIBED DOSE: 7000 CGY
RAD ONC ARIA REFERENCE POINT DOSAGE GIVEN TO DATE: 65 GY
RAD ONC ARIA REFERENCE POINT ID: NORMAL
RAD ONC ARIA REFERENCE POINT SESSION DOSAGE GIVEN: 2.5 GY

## 2025-06-09 PROCEDURE — 77014 CHG CT GUIDANCE RADIATION THERAPY FLDS PLACEMENT: CPT | Performed by: RADIOLOGY

## 2025-06-09 PROCEDURE — 77385 HC NTSTY MODUL RAD TX DLVR SMPL: CPT

## 2025-06-10 ENCOUNTER — HOSPITAL ENCOUNTER (OUTPATIENT)
Facility: HOSPITAL | Age: 60
Setting detail: RECURRING SERIES
Discharge: HOME | End: 2025-06-13
Attending: RADIOLOGY
Payer: MEDICARE

## 2025-06-10 LAB
RAD ONC ARIA COURSE FIRST TREATMENT DATE: NORMAL
RAD ONC ARIA COURSE ID: NORMAL
RAD ONC ARIA COURSE INTENT: NORMAL
RAD ONC ARIA COURSE LAST TREATMENT DATE: NORMAL
RAD ONC ARIA COURSE SESSION NUMBER: 27
RAD ONC ARIA COURSE START DATE: NORMAL
RAD ONC ARIA COURSE TREATMENT ELAPSED DAYS: 47
RAD ONC ARIA PLAN FRACTIONS TREATED TO DATE: 27
RAD ONC ARIA PLAN ID: NORMAL
RAD ONC ARIA PLAN PRESCRIBED DOSE PER FRACTION: 2.5 GY
RAD ONC ARIA PLAN PRIMARY REFERENCE POINT: NORMAL
RAD ONC ARIA PLAN TOTAL FRACTIONS PRESCRIBED: 28
RAD ONC ARIA PLAN TOTAL PRESCRIBED DOSE: 7000 CGY
RAD ONC ARIA REFERENCE POINT DOSAGE GIVEN TO DATE: 67.5 GY
RAD ONC ARIA REFERENCE POINT ID: NORMAL
RAD ONC ARIA REFERENCE POINT SESSION DOSAGE GIVEN: 2.5 GY

## 2025-06-10 PROCEDURE — 77385 HC NTSTY MODUL RAD TX DLVR SMPL: CPT

## 2025-06-10 PROCEDURE — 77014 CHG CT GUIDANCE RADIATION THERAPY FLDS PLACEMENT: CPT | Performed by: RADIOLOGY

## 2025-06-11 ENCOUNTER — HOSPITAL ENCOUNTER (OUTPATIENT)
Facility: HOSPITAL | Age: 60
Setting detail: RECURRING SERIES
Discharge: HOME | End: 2025-06-14
Attending: RADIOLOGY
Payer: MEDICARE

## 2025-06-11 LAB
RAD ONC ARIA COURSE FIRST TREATMENT DATE: NORMAL
RAD ONC ARIA COURSE ID: NORMAL
RAD ONC ARIA COURSE INTENT: NORMAL
RAD ONC ARIA COURSE LAST TREATMENT DATE: NORMAL
RAD ONC ARIA COURSE SESSION NUMBER: 28
RAD ONC ARIA COURSE START DATE: NORMAL
RAD ONC ARIA COURSE TREATMENT ELAPSED DAYS: 48
RAD ONC ARIA PLAN FRACTIONS TREATED TO DATE: 28
RAD ONC ARIA PLAN ID: NORMAL
RAD ONC ARIA PLAN PRESCRIBED DOSE PER FRACTION: 2.5 GY
RAD ONC ARIA PLAN PRIMARY REFERENCE POINT: NORMAL
RAD ONC ARIA PLAN TOTAL FRACTIONS PRESCRIBED: 28
RAD ONC ARIA PLAN TOTAL PRESCRIBED DOSE: 7000 CGY
RAD ONC ARIA REFERENCE POINT DOSAGE GIVEN TO DATE: 70 GY
RAD ONC ARIA REFERENCE POINT ID: NORMAL
RAD ONC ARIA REFERENCE POINT SESSION DOSAGE GIVEN: 2.5 GY

## 2025-06-11 PROCEDURE — 77385 HC NTSTY MODUL RAD TX DLVR SMPL: CPT

## 2025-06-11 PROCEDURE — 77014 CHG CT GUIDANCE RADIATION THERAPY FLDS PLACEMENT: CPT | Performed by: RADIOLOGY

## 2025-07-02 ENCOUNTER — APPOINTMENT (OUTPATIENT)
Age: 60
End: 2025-07-02
Payer: MEDICARE

## 2025-07-02 ENCOUNTER — APPOINTMENT (OUTPATIENT)
Age: 60
End: 2025-07-02
Attending: EMERGENCY MEDICINE
Payer: MEDICARE

## 2025-07-02 ENCOUNTER — HOSPITAL ENCOUNTER (EMERGENCY)
Age: 60
Discharge: HOME | End: 2025-07-02
Attending: EMERGENCY MEDICINE
Payer: MEDICARE

## 2025-07-02 VITALS
DIASTOLIC BLOOD PRESSURE: 81 MMHG | HEIGHT: 70 IN | SYSTOLIC BLOOD PRESSURE: 142 MMHG | WEIGHT: 175 LBS | BODY MASS INDEX: 25.05 KG/M2 | HEART RATE: 56 BPM | TEMPERATURE: 98.2 F | OXYGEN SATURATION: 99 % | RESPIRATION RATE: 18 BRPM

## 2025-07-02 DIAGNOSIS — R55 SYNCOPE AND COLLAPSE: Primary | ICD-10-CM

## 2025-07-02 DIAGNOSIS — S40.011A CONTUSION OF RIGHT SHOULDER, INITIAL ENCOUNTER: ICD-10-CM

## 2025-07-02 DIAGNOSIS — T50.905A MEDICATION REACTION, INITIAL ENCOUNTER: ICD-10-CM

## 2025-07-02 LAB
ALBUMIN SERPL-MCNC: 3.7 G/DL (ref 3.4–5)
ALBUMIN/GLOB SERPL: 1.6 (ref 1–1.9)
ALP SERPL-CCNC: 72 U/L (ref 45–117)
ALT SERPL-CCNC: 14 U/L (ref 10–50)
ANION GAP SERPL CALC-SCNC: 10 MMOL/L (ref 7–16)
AST SERPL-CCNC: 14 U/L (ref 10–38)
BASOPHILS # BLD: 0.02 K/UL (ref 0–0.1)
BASOPHILS NFR BLD: 0.4 % (ref 0–2)
BILIRUB SERPL-MCNC: <0.2 MG/DL (ref 0.2–1)
BUN SERPL-MCNC: 14 MG/DL (ref 6–23)
BUN/CREAT SERPL: 18
CALCIUM SERPL-MCNC: 9 MG/DL (ref 8.5–10.1)
CHLORIDE SERPL-SCNC: 110 MMOL/L (ref 98–107)
CO2 SERPL-SCNC: 25 MMOL/L (ref 21–32)
CREAT SERPL-MCNC: 0.78 MG/DL (ref 0.6–1.3)
DIFFERENTIAL METHOD BLD: ABNORMAL
EOSINOPHIL # BLD: 0.11 K/UL (ref 0–0.4)
EOSINOPHIL NFR BLD: 2.4 % (ref 0–5)
ERYTHROCYTE [DISTWIDTH] IN BLOOD BY AUTOMATED COUNT: 12 % (ref 11.6–14.5)
GLOBULIN SER CALC-MCNC: 2.3 G/DL (ref 2.3–3.5)
GLUCOSE BLD STRIP.AUTO-MCNC: 102 MG/DL (ref 70–110)
GLUCOSE SERPL-MCNC: 93 MG/DL (ref 74–108)
HCT VFR BLD AUTO: 38.4 % (ref 36–48)
HGB BLD-MCNC: 13.2 G/DL (ref 13–16)
IMM GRANULOCYTES # BLD AUTO: 0.02 K/UL (ref 0–0.04)
IMM GRANULOCYTES NFR BLD AUTO: 0.4 % (ref 0–0.5)
LYMPHOCYTES # BLD: 0.56 K/UL (ref 0.9–3.6)
LYMPHOCYTES NFR BLD: 12.2 % (ref 21–52)
MCH RBC QN AUTO: 30.5 PG (ref 24–34)
MCHC RBC AUTO-ENTMCNC: 34.4 G/DL (ref 31–37)
MCV RBC AUTO: 88.7 FL (ref 78–100)
MONOCYTES # BLD: 0.84 K/UL (ref 0.05–1.2)
MONOCYTES NFR BLD: 18.3 % (ref 3–10)
NEUTS SEG # BLD: 3.05 K/UL (ref 1.8–8)
NEUTS SEG NFR BLD: 66.3 % (ref 40–73)
NRBC # BLD: 0 K/UL (ref 0–0.01)
NRBC BLD-RTO: 0 PER 100 WBC
PLATELET # BLD AUTO: 198 K/UL (ref 135–420)
PMV BLD AUTO: 9.3 FL (ref 9.2–11.8)
POTASSIUM SERPL-SCNC: 3.9 MMOL/L (ref 3.5–5.5)
PROT SERPL-MCNC: 5.9 G/DL (ref 6.4–8.2)
RBC # BLD AUTO: 4.33 M/UL (ref 4.35–5.65)
SODIUM SERPL-SCNC: 145 MMOL/L (ref 136–145)
TROPONIN T SERPL HS-MCNC: 6.3 NG/L (ref 0–22)
WBC # BLD AUTO: 4.6 K/UL (ref 4.6–13.2)

## 2025-07-02 PROCEDURE — 6370000000 HC RX 637 (ALT 250 FOR IP): Performed by: EMERGENCY MEDICINE

## 2025-07-02 PROCEDURE — 99285 EMERGENCY DEPT VISIT HI MDM: CPT

## 2025-07-02 PROCEDURE — 80053 COMPREHEN METABOLIC PANEL: CPT

## 2025-07-02 PROCEDURE — 73030 X-RAY EXAM OF SHOULDER: CPT

## 2025-07-02 PROCEDURE — 82962 GLUCOSE BLOOD TEST: CPT

## 2025-07-02 PROCEDURE — 93005 ELECTROCARDIOGRAM TRACING: CPT

## 2025-07-02 PROCEDURE — 70450 CT HEAD/BRAIN W/O DYE: CPT

## 2025-07-02 PROCEDURE — 85025 COMPLETE CBC W/AUTO DIFF WBC: CPT

## 2025-07-02 PROCEDURE — 84484 ASSAY OF TROPONIN QUANT: CPT

## 2025-07-02 RX ORDER — HYDROCODONE BITARTRATE AND ACETAMINOPHEN 5; 325 MG/1; MG/1
1 TABLET ORAL EVERY 6 HOURS PRN
Qty: 12 TABLET | Refills: 0 | Status: SHIPPED | OUTPATIENT
Start: 2025-07-02 | End: 2025-07-05

## 2025-07-02 RX ORDER — HYDROCODONE BITARTRATE AND ACETAMINOPHEN 5; 325 MG/1; MG/1
1 TABLET ORAL
Refills: 0 | Status: COMPLETED | OUTPATIENT
Start: 2025-07-02 | End: 2025-07-02

## 2025-07-02 RX ADMIN — HYDROCODONE BITARTRATE AND ACETAMINOPHEN 1 TABLET: 5; 325 TABLET ORAL at 22:26

## 2025-07-02 ASSESSMENT — PAIN SCALES - GENERAL
PAINLEVEL_OUTOF10: 10

## 2025-07-02 ASSESSMENT — PAIN DESCRIPTION - ORIENTATION: ORIENTATION: RIGHT

## 2025-07-02 ASSESSMENT — PAIN DESCRIPTION - LOCATION: LOCATION: SHOULDER

## 2025-07-02 ASSESSMENT — PAIN - FUNCTIONAL ASSESSMENT: PAIN_FUNCTIONAL_ASSESSMENT: 0-10

## 2025-07-02 ASSESSMENT — ENCOUNTER SYMPTOMS
RESPIRATORY NEGATIVE: 1
GASTROINTESTINAL NEGATIVE: 1
EYES NEGATIVE: 1

## 2025-07-02 NOTE — ED PROVIDER NOTES
[facility] EMERGENCY DEPARTMENT  eMERGENCY dEPARTMENT eNCOUnter      Pt Name: Loyd Gonzalez  MRN: [id number]  Birthdate 1965 of evaluation: 7/2/2025  Provider:Richard Tineo MD    CHIEF COMPLAINT         HPI    Loyd Gonzalez is a 59 y.o. male  with prostate cancer.  He is on hormone therapy.  He got a hormone injection 3 days ago. Two day after getting his hormone shot he fainted and fell while going up stairs. He landed on his right shoulder and upper arm.  He states he has been having severe pain in his shoulder. He states the fainting spells are a side affect of his hormonal therapy.  He states every time he get a hormonal injection for treatment of his prostates cancer he get a random fainting episode. He states his oncologist states the fainting episode ate due to his hormonal therapy.    ROS    Review of Systems   Constitutional: Negative.    HENT: Negative.     Eyes: Negative.    Respiratory: Negative.     Cardiovascular: Negative.    Gastrointestinal: Negative.    Genitourinary: Negative.    Musculoskeletal:         Right arm: (+) pain over his right shoulder. Normal ROM.   Neurological:  Positive for syncope (occurred 2 days ago). Negative for dizziness, speech difficulty, weakness, light-headedness, numbness and headaches.   Psychiatric/Behavioral: Negative.  Negative for agitation, behavioral problems, confusion, hallucinations, self-injury and sleep disturbance. The patient is not nervous/anxious.        Except as noted above the remainder of the review of systems was reviewed and negative.       PAST MEDICAL HISTORY     Past Medical History:   Diagnosis Date    Arthritis of knee, right 12/2017    advanced degen changes noted, CT right LE    Asthma     Cellulitis 12/2017    DDD (degenerative disc disease), lumbar 2013    noted on MRI with annular tears    Diverticulitis 2016    GERD (gastroesophageal reflux disease) 2016    with esophagitis according to endscopy    Kidney  MD Noman(electronically signed)  Attending Emergency Physician          Richard Tineo MD  07/05/25 6208

## 2025-07-02 NOTE — ED TRIAGE NOTES
Ambulatory to 1 with c/o right elbow, upper arm, and shoulder pain. States was walking up steps yesterday and became dizzy and fell to right arm/shoulder. Denies LOC. States gets intermittent dizzy spells for at least a month after each hormone injection he gets every 3 months for prostate Ca and this is not new. Denies dizziness or Neuro sx's at time of triage \"I just need my arm and shoulder checked\".

## 2025-07-03 LAB
EKG ATRIAL RATE: 62 BPM
EKG DIAGNOSIS: NORMAL
EKG P AXIS: 53 DEGREES
EKG P-R INTERVAL: 170 MS
EKG Q-T INTERVAL: 394 MS
EKG QRS DURATION: 86 MS
EKG QTC CALCULATION (BAZETT): 399 MS
EKG R AXIS: 53 DEGREES
EKG T AXIS: 47 DEGREES
EKG VENTRICULAR RATE: 62 BPM

## 2025-07-03 NOTE — ED NOTES
D/C instructions reviewed with pt. Reviewed pain relief measures and side effects of Norco. Pt states he took Norco last time he needed pain meds and he did fine with it. Pt states he is mainly allergic to the antibiotics. Pt denies any complaints or concerns at this time.

## 2025-07-03 NOTE — FLOWSHEET NOTE
07/02/25 2100   Vital Signs   Pulse 61   Respirations 17   Orthostatic B/P and Pulse? Yes   Blood Pressure Lying 131/82   Pulse Lying 61 PER MINUTE   Blood Pressure Sitting 125/86   Pulse Sitting 60 PER MINUTE   Blood Pressure Standing 123/81   Pulse Standing 81 PER MINUTE   Pain Assessment   Pain Assessment 0-10   Pain Level 10   Oxygen Therapy   SpO2 97 %   Pulse via Oximetry 58 beats per minute

## 2025-07-22 ENCOUNTER — HOSPITAL ENCOUNTER (OUTPATIENT)
Facility: HOSPITAL | Age: 60
Setting detail: RECURRING SERIES
Discharge: HOME | End: 2025-07-25
Attending: RADIOLOGY

## 2025-07-22 DIAGNOSIS — C61 PROSTATE CA (HCC): Primary | ICD-10-CM
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
## 2025-07-25 ENCOUNTER — OFFICE VISIT (OUTPATIENT)
Age: 60
End: 2025-07-25
Payer: MEDICARE

## 2025-07-25 VITALS
HEIGHT: 70 IN | BODY MASS INDEX: 25.2 KG/M2 | TEMPERATURE: 98 F | OXYGEN SATURATION: 96 % | HEART RATE: 81 BPM | WEIGHT: 176 LBS

## 2025-07-25 DIAGNOSIS — M96.1 POSTLAMINECTOMY SYNDROME, NOT ELSEWHERE CLASSIFIED: ICD-10-CM

## 2025-07-25 DIAGNOSIS — M54.16 LUMBAR NEURITIS: ICD-10-CM

## 2025-07-25 DIAGNOSIS — M51.26 LUMBAR HERNIATED DISC: ICD-10-CM

## 2025-07-25 DIAGNOSIS — M54.16 LUMBAR RADICULOPATHY: ICD-10-CM

## 2025-07-25 DIAGNOSIS — M51.362 DEGENERATION OF INTERVERTEBRAL DISC OF LUMBAR REGION WITH DISCOGENIC BACK PAIN AND LOWER EXTREMITY PAIN: Primary | ICD-10-CM

## 2025-07-25 PROCEDURE — 99214 OFFICE O/P EST MOD 30 MIN: CPT | Performed by: PHYSICAL MEDICINE & REHABILITATION

## 2025-07-25 RX ORDER — GABAPENTIN 600 MG/1
600 TABLET ORAL 3 TIMES DAILY
Qty: 270 TABLET | Refills: 1 | Status: SHIPPED | OUTPATIENT
Start: 2025-07-25 | End: 2026-01-21

## 2025-07-28 ENCOUNTER — HOSPITAL ENCOUNTER (OUTPATIENT)
Facility: HOSPITAL | Age: 60
Discharge: HOME | End: 2025-07-31
Payer: MEDICARE

## 2025-07-28 DIAGNOSIS — N20.0 KIDNEY STONES: ICD-10-CM

## 2025-07-28 PROCEDURE — 74176 CT ABD & PELVIS W/O CONTRAST: CPT

## 2025-08-28 ENCOUNTER — OFFICE VISIT (OUTPATIENT)
Facility: CLINIC | Age: 60
End: 2025-08-28

## 2025-08-28 VITALS
WEIGHT: 176 LBS | DIASTOLIC BLOOD PRESSURE: 60 MMHG | SYSTOLIC BLOOD PRESSURE: 96 MMHG | OXYGEN SATURATION: 97 % | RESPIRATION RATE: 18 BRPM | BODY MASS INDEX: 25.2 KG/M2 | TEMPERATURE: 97.9 F | HEIGHT: 70 IN | HEART RATE: 60 BPM

## 2025-08-28 DIAGNOSIS — R11.0 NAUSEA: ICD-10-CM

## 2025-08-28 DIAGNOSIS — R05.9 COUGH, UNSPECIFIED TYPE: Primary | ICD-10-CM

## 2025-08-28 RX ORDER — BENZONATATE 200 MG/1
200 CAPSULE ORAL 3 TIMES DAILY PRN
Qty: 30 CAPSULE | Refills: 0 | Status: SHIPPED | OUTPATIENT
Start: 2025-08-28 | End: 2025-09-07

## 2025-08-28 RX ORDER — ONDANSETRON 4 MG/1
4 TABLET, ORALLY DISINTEGRATING ORAL 3 TIMES DAILY PRN
Qty: 21 TABLET | Refills: 0 | Status: SHIPPED | OUTPATIENT
Start: 2025-08-28

## 2025-08-28 SDOH — ECONOMIC STABILITY: FOOD INSECURITY: WITHIN THE PAST 12 MONTHS, YOU WORRIED THAT YOUR FOOD WOULD RUN OUT BEFORE YOU GOT MONEY TO BUY MORE.: NEVER TRUE

## 2025-08-28 SDOH — ECONOMIC STABILITY: FOOD INSECURITY: WITHIN THE PAST 12 MONTHS, THE FOOD YOU BOUGHT JUST DIDN'T LAST AND YOU DIDN'T HAVE MONEY TO GET MORE.: NEVER TRUE

## 2025-08-28 ASSESSMENT — PATIENT HEALTH QUESTIONNAIRE - PHQ9
1. LITTLE INTEREST OR PLEASURE IN DOING THINGS: NOT AT ALL
SUM OF ALL RESPONSES TO PHQ QUESTIONS 1-9: 0
2. FEELING DOWN, DEPRESSED OR HOPELESS: NOT AT ALL

## 2025-08-28 ASSESSMENT — ENCOUNTER SYMPTOMS: COUGH: 1

## 2025-09-03 DIAGNOSIS — J06.9 UPPER RESPIRATORY TRACT INFECTION, UNSPECIFIED TYPE: Primary | ICD-10-CM

## 2025-09-03 RX ORDER — LEVOFLOXACIN 500 MG/1
500 TABLET, FILM COATED ORAL DAILY
Qty: 7 TABLET | Refills: 0 | Status: SHIPPED | OUTPATIENT
Start: 2025-09-03 | End: 2025-09-10

## (undated) DEVICE — SUTURE MCRYL SZ 4-0 L18IN ABSRB UD L19MM PS-2 3/8 CIR PRIM Y496G

## (undated) DEVICE — (D)NDL SPNE 22GX15CM -- DISC BY MFR W/NO SUB

## (undated) DEVICE — (D)BNDG ADHESIVE FABRIC 3/4X3 -- DISC BY MFR USE ITEM 357960

## (undated) DEVICE — TABLE COVER: Brand: CONVERTORS

## (undated) DEVICE — INTENDED FOR TISSUE SEPARATION, AND OTHER PROCEDURES THAT REQUIRE A SHARP SURGICAL BLADE TO PUNCTURE OR CUT.: Brand: BARD-PARKER SAFETY BLADES SIZE 10, STERILE

## (undated) DEVICE — THREE-QUARTER SHEET: Brand: CONVERTORS

## (undated) DEVICE — DRESSING,GAUZE,XEROFORM,CURAD,1"X8",ST: Brand: CURAD

## (undated) DEVICE — DRAPE,HAND,STERILE: Brand: MEDLINE

## (undated) DEVICE — TRAY MYEL SFTY +

## (undated) DEVICE — Device

## (undated) DEVICE — MAYO STAND COVER: Brand: CONVERTORS

## (undated) DEVICE — CANNULA PERF L2IN BLNT TIP 2MM VES CLR RADPQ BODY FEM LUER

## (undated) DEVICE — GAUZE,SPONGE,4"X4",16PLY,STRL,LF,10/TRAY: Brand: MEDLINE

## (undated) DEVICE — SYR 10ML LUER LOK 1/5ML GRAD --

## (undated) DEVICE — SOLUTION IV 1000ML 0.9% SOD CHL

## (undated) DEVICE — KIT CLN UP BON SECOURS MARYV

## (undated) DEVICE — SUTURE VCRL SZ 3-0 L27IN ABSRB UD L26MM SH 1/2 CIR J416H

## (undated) DEVICE — Device: Brand: MEDEX

## (undated) DEVICE — HEX-LOCKING BLADE ELECTRODE: Brand: EDGE

## (undated) DEVICE — PAD,ABDOMINAL,8"X10",ST,LF: Brand: MEDLINE

## (undated) DEVICE — NEEDLE HYPO 25GA L1.5IN BVL ORIENTED ECLIPSE

## (undated) DEVICE — REM POLYHESIVE ADULT PATIENT RETURN ELECTRODE: Brand: VALLEYLAB

## (undated) DEVICE — SET EPI 18GA L3.5IN TUOHY NDL W/ 20GA CLS TIP NYL CATH

## (undated) DEVICE — (D)SYR 10ML 1/5ML GRAD NSAF -- PKGING CHANGE USE ITEM 338027

## (undated) DEVICE — SYRINGE MED 3ML NDL 22GA L1 1/2IN REG BVL SFGLDE

## (undated) DEVICE — DRAPE TWL SURG 16X26IN BLU ORB04] ALLCARE INC]

## (undated) DEVICE — BNDG ELAS HK LOOP 4X5YD NS -- MATRIX

## (undated) DEVICE — 3M™ TEGADERM™ HP TRANSPARENT FILM DRESSING FRAME STYLE, 9534HP, 2-3/8 X 2-3/4 IN (6 CM X 7 CM), 100/CT 4CT/CASE: Brand: 3M™ TEGADERM™

## (undated) DEVICE — PENROSE TUBING RADIOPAQUE: Brand: ARGYLE

## (undated) DEVICE — FLEX ADVANTAGE 3000CC: Brand: FLEX ADVANTAGE

## (undated) DEVICE — SWAB CULT LIQ STUART AGR AERB MOD IN BRK SGL RAYON TIP PLAS 220099] BECTON DICKINSON MICRO]

## (undated) DEVICE — ELECTRODE BLDE L4IN NONINSULATED EDGE

## (undated) DEVICE — SYR LR LCK 1ML GRAD NSAF 30ML --

## (undated) DEVICE — GAUZE,SPONGE,4"X4",12PLY,STERILE,LF,2'S: Brand: MEDLINE

## (undated) DEVICE — NDL SPNE MANAN 22GX6IN --

## (undated) DEVICE — 3M™ IOBAN™ 2 ANTIMICROBIAL INCISE DRAPE 6648EZ: Brand: IOBAN™ 2

## (undated) DEVICE — NEEDLE EPI 18GA L3.5IN CLR HUB TUOHY W/ WNG PERIFIX

## (undated) DEVICE — PACK PROCEDURE SURG MAJ W/ BASIN LF

## (undated) DEVICE — BLANKET WRM AD W50XL85.8IN PACU FULL BODY FORC AIR

## (undated) DEVICE — STERILE POLYISOPRENE POWDER-FREE SURGICAL GLOVES: Brand: PROTEXIS

## (undated) DEVICE — SKIN PREP POV IOD SOL BTL 4OZ --

## (undated) DEVICE — CULTURETTE SGL EVAC TUBE PALL -- 100/CA

## (undated) DEVICE — SUTURE MCRYL SZ 4-0 L27IN ABSRB UD L24MM PS-1 3/8 CIR PRIM Y935H

## (undated) DEVICE — SUTURE ETHLN SZ 2-0 L18IN NONABSORBABLE BLK L26MM PS 3/8 585H

## (undated) DEVICE — BANDAGE,GAUZE,BULKEE II,4.5"X4.1YD,STRL: Brand: MEDLINE

## (undated) DEVICE — BAG DRAINAGE CUST DISP

## (undated) DEVICE — MEDIA CONTRAST 10ML 200MG/ML 41%

## (undated) DEVICE — GAUZE,PACKING STRIP,PLAIN,1/4"X5YD,STRL: Brand: CURAD

## (undated) DEVICE — PACK,CYSTOSCOPY,PK I,AURORA: Brand: MEDLINE

## (undated) DEVICE — SUTURE ETHLN SZ 4-0 L18IN NONABSORBABLE BLK L19MM PC-5 3/8 1894G

## (undated) DEVICE — 3M™ STERI-STRIP™ COMPOUND BENZOIN TINCTURE 40 BAGS/CARTON 4 CARTONS/CASE C1544: Brand: 3M™ STERI-STRIP™

## (undated) DEVICE — SOL IRR SOD CL 0.9% 3000ML BG --

## (undated) DEVICE — LUB SURG MEDC STRL 2OZ TUBE MC -- MEDICHOICE

## (undated) DEVICE — INTENDED FOR TISSUE SEPARATION, AND OTHER PROCEDURES THAT REQUIRE A SHARP SURGICAL BLADE TO PUNCTURE OR CUT.: Brand: BARD-PARKER SAFETY BLADES SIZE 15, STERILE

## (undated) DEVICE — STRIP,CLOSURE,WOUND,MEDI-STRIP,1/2X4: Brand: MEDLINE

## (undated) DEVICE — PREP SKN PREVAIL 40ML APPL --

## (undated) DEVICE — LIGHT HANDLE: Brand: DEVON

## (undated) DEVICE — GOWN,REINFORCED,POLY,AURORA,XLARGE,STRL: Brand: MEDLINE

## (undated) DEVICE — SUT ETHLN 3-0 18IN PS1 BLK --

## (undated) DEVICE — DRESSING,GAUZE,XEROFORM,CURAD,5"X9",ST: Brand: CURAD

## (undated) DEVICE — GARMENT,MEDLINE,DVT,SEQUENTIAL,CALF,MD: Brand: MEDLINE

## (undated) DEVICE — DRAIN SURG W10XL20CM SIL SMOOTH FLAT 3/4 PERF DBL WRP

## (undated) DEVICE — SHEET, T, LAPAROTOMY, STERILE: Brand: MEDLINE

## (undated) DEVICE — Y-TYPE TUR/BLADDER IRRIGATION SET, REGULATING CLAMP

## (undated) DEVICE — TRAY PREP DRY W/ PREM GLV 2 APPL 6 SPNG 2 UNDPD 1 OVERWRAP

## (undated) DEVICE — TRAY PREP DRY SKIN W/ 5 COMPARTMENT W/ REM BSIN AND FCPS